# Patient Record
Sex: MALE | Race: WHITE | NOT HISPANIC OR LATINO | Employment: OTHER | URBAN - METROPOLITAN AREA
[De-identification: names, ages, dates, MRNs, and addresses within clinical notes are randomized per-mention and may not be internally consistent; named-entity substitution may affect disease eponyms.]

---

## 2017-04-18 ENCOUNTER — HOSPITAL ENCOUNTER (EMERGENCY)
Facility: HOSPITAL | Age: 82
Discharge: HOME | End: 2017-04-18
Attending: FAMILY MEDICINE
Payer: MEDICARE

## 2017-04-18 VITALS
WEIGHT: 230 LBS | TEMPERATURE: 98 F | SYSTOLIC BLOOD PRESSURE: 140 MMHG | RESPIRATION RATE: 18 BRPM | HEART RATE: 90 BPM | HEIGHT: 72 IN | OXYGEN SATURATION: 98 % | BODY MASS INDEX: 31.15 KG/M2 | DIASTOLIC BLOOD PRESSURE: 70 MMHG

## 2017-04-18 DIAGNOSIS — R33.9 URINARY RETENTION: Primary | ICD-10-CM

## 2017-04-18 LAB
ALBUMIN SERPL BCP-MCNC: 3.2 G/DL
ALP SERPL-CCNC: 67 U/L
ALT SERPL W/O P-5'-P-CCNC: 14 U/L
ANION GAP SERPL CALC-SCNC: 9 MMOL/L
AST SERPL-CCNC: 20 U/L
BACTERIA #/AREA URNS HPF: ABNORMAL /HPF
BASOPHILS # BLD AUTO: 0.04 K/UL
BASOPHILS NFR BLD: 0.8 %
BILIRUB SERPL-MCNC: 1 MG/DL
BILIRUB UR QL STRIP: NEGATIVE
BUN SERPL-MCNC: 16 MG/DL
CALCIUM SERPL-MCNC: 9.4 MG/DL
CHLORIDE SERPL-SCNC: 105 MMOL/L
CLARITY UR: CLEAR
CO2 SERPL-SCNC: 27 MMOL/L
COLOR UR: YELLOW
CREAT SERPL-MCNC: 1.5 MG/DL
DIFFERENTIAL METHOD: ABNORMAL
EOSINOPHIL # BLD AUTO: 0.1 K/UL
EOSINOPHIL NFR BLD: 2.5 %
ERYTHROCYTE [DISTWIDTH] IN BLOOD BY AUTOMATED COUNT: 13.1 %
EST. GFR  (AFRICAN AMERICAN): 49 ML/MIN/1.73 M^2
EST. GFR  (NON AFRICAN AMERICAN): 42 ML/MIN/1.73 M^2
GLUCOSE SERPL-MCNC: 94 MG/DL
GLUCOSE UR QL STRIP: NEGATIVE
HCT VFR BLD AUTO: 37.7 %
HGB BLD-MCNC: 12 G/DL
HGB UR QL STRIP: NEGATIVE
HYALINE CASTS #/AREA URNS LPF: 0 /LPF
KETONES UR QL STRIP: NEGATIVE
LEUKOCYTE ESTERASE UR QL STRIP: NEGATIVE
LYMPHOCYTES # BLD AUTO: 0.9 K/UL
LYMPHOCYTES NFR BLD: 18.2 %
MCH RBC QN AUTO: 31 PG
MCHC RBC AUTO-ENTMCNC: 31.8 %
MCV RBC AUTO: 97 FL
MICROSCOPIC COMMENT: ABNORMAL
MONOCYTES # BLD AUTO: 0.8 K/UL
MONOCYTES NFR BLD: 15.1 %
NEUTROPHILS # BLD AUTO: 3.3 K/UL
NEUTROPHILS NFR BLD: 63.4 %
NITRITE UR QL STRIP: NEGATIVE
PH UR STRIP: 6 [PH] (ref 5–8)
PLATELET # BLD AUTO: 166 K/UL
PMV BLD AUTO: 9 FL
POTASSIUM SERPL-SCNC: 3.8 MMOL/L
PROT SERPL-MCNC: 6.7 G/DL
PROT UR QL STRIP: ABNORMAL
RBC # BLD AUTO: 3.87 M/UL
RBC #/AREA URNS HPF: 1 /HPF (ref 0–4)
SODIUM SERPL-SCNC: 141 MMOL/L
SP GR UR STRIP: 1.02 (ref 1–1.03)
URN SPEC COLLECT METH UR: ABNORMAL
UROBILINOGEN UR STRIP-ACNC: ABNORMAL EU/DL
WBC # BLD AUTO: 5.17 K/UL
WBC #/AREA URNS HPF: 5 /HPF (ref 0–5)

## 2017-04-18 PROCEDURE — 36415 COLL VENOUS BLD VENIPUNCTURE: CPT

## 2017-04-18 PROCEDURE — 85025 COMPLETE CBC W/AUTO DIFF WBC: CPT

## 2017-04-18 PROCEDURE — 81000 URINALYSIS NONAUTO W/SCOPE: CPT

## 2017-04-18 PROCEDURE — 51702 INSERT TEMP BLADDER CATH: CPT

## 2017-04-18 PROCEDURE — 80053 COMPREHEN METABOLIC PANEL: CPT

## 2017-04-18 PROCEDURE — 99283 EMERGENCY DEPT VISIT LOW MDM: CPT | Mod: 25

## 2017-04-18 NOTE — ED AVS SNAPSHOT
[facility]  [address] [address]               Radhames Alonzo   2017  9:28 AM   ED    Description:  Male : 3/17/1934   Department:  [facility]           Your Care was Coordinated By:     Provider Role From To    Wolf Matthew MD Attending Provider 17 0929 --      Reason for Visit     Urinary Retention     Dysuria           Diagnoses this Visit        Comments    Urinary retention    -  Primary       ED Disposition     ED Disposition Condition Comment    Discharge             To Do List           Follow-up Information     Follow up with Primary Doctor No In 1 week(s).        Follow up with Urologist. Schedule an appointment as soon as possible for a visit in 2 days.    Why:  For continued care      [facility]     [facility] -  Assistance  Unless otherwise directed by your provider, please contact [facility], our nurse care line that is available for  assistance.     Registered nurses in the [facility] provide: appointment scheduling, clinical advisement, health education, and other advisory services.  Call: [phone] (toll free)               Medications           Message regarding Medications     Verify the changes and/or additions to your medication regime listed below are the same as discussed with your clinician today.  If any of these changes or additions are incorrect, please notify your healthcare provider.             Verify that the below list of medications is an accurate representation of the medications you are currently taking.  If none reported, the list may be blank. If incorrect, please contact your healthcare provider. Carry this list with you in case of emergency.           Current Medications     cholecalciferol, vitamin D3, (VITAMIN D3) 2,000 unit Cap Take 1 capsule by mouth once daily.    cloNIDine (CATAPRES) 0.1 MG tablet Take 0.1 mg by mouth 3 (three)  times daily as needed. SBP > 150    furosemide (LASIX) 40 MG tablet Take 40 mg by mouth as directed. Take 2 tablets on M-W-F,and 1 tablet T-Th-S-S    ipratropium-albuterol (COMBIVENT)  mcg/actuation inhaler Inhale 1 puff into the lungs every 6 (six) hours as needed for Wheezing or Shortness of Breath.    ipratropium-albuterol (COMBIVENT)  mcg/actuation inhaler Inhale 1 puff into the lungs every 6 (six) hours as needed for Wheezing or Shortness of Breath.    losartan (COZAAR) 100 MG tablet Take 100 mg by mouth once daily.    metoprolol succinate (TOPROL-XL) 100 MG 24 hr tablet Take 100 mg by mouth once daily.    predniSONE (DELTASONE) 10 MG tablet 3 for 3 days, 2 for 2 days and 1 for 1 day    simvastatin (ZOCOR) 80 MG tablet Take 40 mg by mouth every evening.    warfarin (COUMADIN) 5 MG tablet Take 5 mg by mouth once daily. 1-2 tablets daily as directed           Clinical Reference Information           Your Vitals Were     BP Pulse Temp Resp Height Weight    158/80 (BP Location: Left arm, Patient Position: Sitting) 102 97 °F (36.1 °C) (Oral) 20 6' (1.829 m) 104.3 kg (230 lb)    SpO2 BMI             98% 31.19 kg/m2         Allergies as of 4/18/2017     No Known Allergies      Immunizations Administered on Date of Encounter - 4/18/2017     None      ED Micro, Lab, POCT     Start Ordered       Status Ordering Provider    04/18/17 0935 04/18/17 0934  Urinalysis  STAT      Final result     04/18/17 0935 04/18/17 0934  CBC auto differential  STAT      Final result     04/18/17 0935 04/18/17 0934  Comprehensive metabolic panel  STAT      Final result     04/18/17 0934 04/18/17 0934  Urinalysis Microscopic  Once      Final result       ED Imaging Orders     None        Discharge Instructions           Urinary Retention (Male)  Urinary retention is the medical term for difficulty or inability to pass urine, even though your bladder is full.  Causes  The most common cause of urinary retention in men is the bladder  outlet being blocked. This can be due to an enlarged prostate gland or a bladder infection. Certain medicines can also cause this problem. This condition is more likely to occur as men get older.    This condition is treated by insertion of a catheter into the bladder to drain the urine. This provides immediate relief. The catheter may need to remain in place for a few days to prevent a recurrence. The catheter has a balloon on the tip which was inflated after insertion. This prevents the catheter from falling out.  Symptoms  Common symptoms of urinary retention include:  · Pain (not experienced by everyone)  · Frequent urination  · Feeling that the bladder is still full after urinating  · Incontinence (not being able to control the release of urine)  · Swollen abdomen  Treatment  This condition is treated by inserting a tube (catheter) into the bladder to drain the urine. This provides immediate relief. The catheter may need to stay in place for a few days. The catheter has a balloon on the tip, which is inflated after insertion. This prevents the catheter from falling out.  Home care  · If you were given antibiotics, take them until they are used up, or your healthcare provider tells you to stop. It is important to finish the antibiotics even though you feel better. This is to make sure your infection has cleared.  · If a catheter was left in place, it is important to keep bacteria from getting into the collection bag. Do not disconnect the catheter from the collection bag.  · Use a leg band to secure the drainage tube, so it does not pull on the catheter. Drain the collection bag when it becomes full using the drain spout at the bottom of the bag.  · Do not pull on or try to remove your catheter. This will injure your urethra. The catheter must be removed by a healthcare provider.  Follow-up care  Follow up with your healthcare provider, or as advised.  If a catheter was left in place, it can usually be removed  within 3 to 7 days. Some conditions require the catheter to stay in longer. Your healthcare provider will tell you when to return to have the catheter removed.  When to seek medical advice  Call your healthcare provider right away if any of these occur:  · Fever of 100.4ºF (38ºC) or higher, or as directed by your healthcare provider  · Bladder or lower-abdominal pain or fullness  · Abdominal swelling, nausea, vomiting, or back pain  · Blood or urine leakage around the catheter  · Bloody urine coming from the catheter (if a new symptom)  · Weakness, dizziness, or fainting  · Confusion or change in usual level of alertness  · If a catheter was left in place, return if:  ¨ Catheter falls out  ¨ Catheter stops draining for 6 hours  Date Last Reviewed: 7/26/2015  © 8907-1648 [employer]. [address] [place] [address], [place] [address]. All rights reserved. This information is not intended as a substitute for professional medical care. Always follow your healthcare professional's instructions.          Hein Catheter Care    A Hein catheter is a rubber tube that is placed through the urethra (opening where urine comes out) and into the bladder. This helps drain urine from the bladder. There is a small balloon on the end of the tube that is inflated after insertion. This keeps the catheter from sliding out of the bladder.  A Hein catheter is used to treat urinary retention (unable to pass urine). It is also used when there is incontinence (loss of bladder control).  Home care  · Finish taking any prescribed antibiotic even if you are feeling better before then.  · It is important to keep bacteria from getting into the collection bag. Do not disconnect the catheter from the collection bag.  · Use a leg band to secure the drainage tube, so it does not pull on the catheter. Drain the collection bag when it becomes full using the drain spout at the bottom of the bag.  · Do not try to pull or remove your catheter.  This will injure your urethra. It must be removed by your healthcare provider or nurse.  Follow-up care  Follow up with your healthcare provider as advised for repeat urine testing and catheter removal or replacement.  When to seek medical advice  Call your healthcare provider right away if any of these occur:  · Fever of 100.4ºF (38ºC) or higher, or as directed by your healthcare provider  · Bladder pain or fullness  · Abdominal swelling, nausea or vomiting, or back pain  · Blood or urine leakage around the catheter  · Bloody urine coming from the catheter (if a new symptom)  · Catheter falls out  · Catheter stops draining for 6 hours  · Weakness, dizziness, or fainting  Date Last Reviewed: 10/1/2016  © 6350-0086 [employer]. [address] [place] [address], [place] [address]. All rights reserved. This information is not intended as a substitute for professional medical care. Always follow your healthcare professional's instructions.          [facility] Sign-Up     Activating your [facility] account is as easy as 1-2-3!     1) Visit [email or web address], select Sign Up Now, enter this activation code and your date of birth, then select Next.  [email or web address]  Expires: 6/2/2017 10:42 AM      2) Create a username and password to use when you visit [facility] in the future and select a security question in case you lose your password and select Next.    3) Enter your e-mail address and click Sign Up!    Additional Information  If you have questions, please e-mail [email or web address] or call [phone] to talk to our [facility] staff. Remember, [facility] is NOT to be used for urgent needs. For medical emergencies, dial 911.         Smoking Cessation     If you would like to quit smoking:   You may be eligible for free services if you are a [place] resident and started smoking cigarettes before September 1, 1988.  Call the Smoking Cessation Trust (SCT) toll free at (581) 206-7287 or (332) 255-9872.   Call  1-800-QUIT-NOW if you do not meet the above criteria.   Contact us via email: [email or web address]   View our website for more information: [email or web address]         [facility] complies with applicable Federal civil rights laws and does not discriminate on the basis of race, color, national origin, age, disability, or sex.        Language Assistance Services     ATTENTION: Language assistance services are available, free of charge. Please call [phone].      ATENCIÓN: Si habla español, tiene a barker disposición servicios gratuitos de asistencia lingüística. Llame al [phone].     CHÚ Ý: N?u b?n nói Ti?ng Vi?t, có các d?ch v? h? tr? ngôn ng? mi?n phí dành cho b?n. G?i s? [phone].

## 2017-04-18 NOTE — ED TRIAGE NOTES
Patient presents to the ER with c/o of urinary retention and painful urination for the past week.

## 2017-04-18 NOTE — ED PROVIDER NOTES
Encounter Date: 4/18/2017       History     Chief Complaint   Patient presents with    Urinary Retention    Dysuria     Review of patient's allergies indicates:  No Known Allergies  Patient is a 83 y.o. male presenting with the following complaint: male genitourinary complaint. The history is provided by the patient. No  was used.   Male  Problem   Primary symptoms include no dysuria, no penile pain, no scrotal pain. This is a new problem. The current episode started several days ago. The problem has been unchanged. Pertinent negatives include no anorexia, no diaphoresis, no nausea, no vomiting, no abdominal pain, no abdominal swelling, no frequency, no constipation and no diarrhea. He has tried nothing for the symptoms.     Patient complains of urinary retention progressively getting worse for the past 3 days.  Some discomfort when he tries to urinate.  No fever chills nausea or vomiting.  No chest pain or shortness of breath.    Past Medical History:   Diagnosis Date    Atrial fibrillation     AVD (aortic valve disease)     Cardiomyopathy     Gout     Hypertension     Mitral valve disorder     Pure hypercholesterolemia      History reviewed. No pertinent surgical history.  History reviewed. No pertinent family history.  Social History   Substance Use Topics    Smoking status: Former Smoker    Smokeless tobacco: None    Alcohol use None     Review of Systems   Constitutional: Negative for diaphoresis.   Gastrointestinal: Negative for abdominal pain, anorexia, constipation, diarrhea, nausea and vomiting.   Genitourinary: Negative for dysuria, frequency and penile pain.       Physical Exam   Initial Vitals   BP Pulse Resp Temp SpO2   04/18/17 0925 04/18/17 0925 04/18/17 0925 04/18/17 0925 04/18/17 0925   158/80 102 20 97 °F (36.1 °C) 98 %     Physical Exam    Nursing note and vitals reviewed.  Constitutional: He appears well-developed and well-nourished.   Elderly male in no acute  distress.   HENT:   Head: Normocephalic and atraumatic.   Eyes: EOM are normal. Pupils are equal, round, and reactive to light.   Cardiovascular: Regular rhythm and normal heart sounds.   Pulmonary/Chest: Breath sounds normal.   Abdominal: He exhibits no distension and no mass. There is tenderness. There is no rebound and no guarding.       Neurological: He is alert and oriented to person, place, and time.   Psychiatric: He has a normal mood and affect.         ED Course   Procedures  Labs Reviewed   URINALYSIS   CBC W/ AUTO DIFFERENTIAL   COMPREHENSIVE METABOLIC PANEL                               ED Course     Clinical Impression:   The encounter diagnosis was Urinary retention.          Wolf Matthew MD  04/18/17 1030

## 2017-04-18 NOTE — DISCHARGE INSTRUCTIONS
Urinary Retention (Male)  Urinary retention is the medical term for difficulty or inability to pass urine, even though your bladder is full.  Causes  The most common cause of urinary retention in men is the bladder outlet being blocked. This can be due to an enlarged prostate gland or a bladder infection. Certain medicines can also cause this problem. This condition is more likely to occur as men get older.    This condition is treated by insertion of a catheter into the bladder to drain the urine. This provides immediate relief. The catheter may need to remain in place for a few days to prevent a recurrence. The catheter has a balloon on the tip which was inflated after insertion. This prevents the catheter from falling out.  Symptoms  Common symptoms of urinary retention include:  · Pain (not experienced by everyone)  · Frequent urination  · Feeling that the bladder is still full after urinating  · Incontinence (not being able to control the release of urine)  · Swollen abdomen  Treatment  This condition is treated by inserting a tube (catheter) into the bladder to drain the urine. This provides immediate relief. The catheter may need to stay in place for a few days. The catheter has a balloon on the tip, which is inflated after insertion. This prevents the catheter from falling out.  Home care  · If you were given antibiotics, take them until they are used up, or your healthcare provider tells you to stop. It is important to finish the antibiotics even though you feel better. This is to make sure your infection has cleared.  · If a catheter was left in place, it is important to keep bacteria from getting into the collection bag. Do not disconnect the catheter from the collection bag.  · Use a leg band to secure the drainage tube, so it does not pull on the catheter. Drain the collection bag when it becomes full using the drain spout at the bottom of the bag.  · Do not pull on or try to remove your catheter.  This will injure your urethra. The catheter must be removed by a healthcare provider.  Follow-up care  Follow up with your healthcare provider, or as advised.  If a catheter was left in place, it can usually be removed within 3 to 7 days. Some conditions require the catheter to stay in longer. Your healthcare provider will tell you when to return to have the catheter removed.  When to seek medical advice  Call your healthcare provider right away if any of these occur:  · Fever of 100.4ºF (38ºC) or higher, or as directed by your healthcare provider  · Bladder or lower-abdominal pain or fullness  · Abdominal swelling, nausea, vomiting, or back pain  · Blood or urine leakage around the catheter  · Bloody urine coming from the catheter (if a new symptom)  · Weakness, dizziness, or fainting  · Confusion or change in usual level of alertness  · If a catheter was left in place, return if:  ¨ Catheter falls out  ¨ Catheter stops draining for 6 hours  Date Last Reviewed: 7/26/2015  © 4877-8618 [employer]. [address] [place] [address]. All rights reserved. This information is not intended as a substitute for professional medical care. Always follow your healthcare professional's instructions.          Hein Catheter Care    A Hein catheter is a rubber tube that is placed through the urethra (opening where urine comes out) and into the bladder. This helps drain urine from the bladder. There is a small balloon on the end of the tube that is inflated after insertion. This keeps the catheter from sliding out of the bladder.  A Hein catheter is used to treat urinary retention (unable to pass urine). It is also used when there is incontinence (loss of bladder control).  Home care  · Finish taking any prescribed antibiotic even if you are feeling better before then.  · It is important to keep bacteria from getting into the collection bag. Do not disconnect the catheter from the collection bag.  · Use a  leg band to secure the drainage tube, so it does not pull on the catheter. Drain the collection bag when it becomes full using the drain spout at the bottom of the bag.  · Do not try to pull or remove your catheter. This will injure your urethra. It must be removed by your healthcare provider or nurse.  Follow-up care  Follow up with your healthcare provider as advised for repeat urine testing and catheter removal or replacement.  When to seek medical advice  Call your healthcare provider right away if any of these occur:  · Fever of 100.4ºF (38ºC) or higher, or as directed by your healthcare provider  · Bladder pain or fullness  · Abdominal swelling, nausea or vomiting, or back pain  · Blood or urine leakage around the catheter  · Bloody urine coming from the catheter (if a new symptom)  · Catheter falls out  · Catheter stops draining for 6 hours  · Weakness, dizziness, or fainting  Date Last Reviewed: 10/1/2016  © 6383-8539 [employer]. [address] [place] [address], [place] [address]. All rights reserved. This information is not intended as a substitute for professional medical care. Always follow your healthcare professional's instructions.

## 2017-04-19 ENCOUNTER — NURSE TRIAGE (OUTPATIENT)
Dept: ADMINISTRATIVE | Facility: CLINIC | Age: 82
End: 2017-04-19

## 2017-04-19 NOTE — TELEPHONE ENCOUNTER
Reason for Disposition   Urinary catheter care, questions about    Protocols used: ST URINARY CATHETER - VALDES - COUDE-A-OH  Caller needed assistance with operating valdes bag. Caller states valdes bag seems to be leaking. Patient's wife is unsure if bag is properly closed. Advised caller to securely close the clamp used for emptying.

## 2017-05-12 ENCOUNTER — HOSPITAL ENCOUNTER (EMERGENCY)
Facility: HOSPITAL | Age: 82
Discharge: HOME | End: 2017-05-12
Attending: SURGERY
Payer: MEDICARE

## 2017-05-12 VITALS
TEMPERATURE: 97 F | RESPIRATION RATE: 20 BRPM | BODY MASS INDEX: 29.84 KG/M2 | WEIGHT: 220 LBS | HEART RATE: 86 BPM | DIASTOLIC BLOOD PRESSURE: 88 MMHG | SYSTOLIC BLOOD PRESSURE: 160 MMHG

## 2017-05-12 DIAGNOSIS — R33.9 URINARY RETENTION: Primary | ICD-10-CM

## 2017-05-12 LAB
BACTERIA #/AREA URNS HPF: ABNORMAL /HPF
BILIRUB UR QL STRIP: NEGATIVE
CLARITY UR: CLEAR
COLOR UR: YELLOW
GLUCOSE UR QL STRIP: NEGATIVE
HGB UR QL STRIP: ABNORMAL
KETONES UR QL STRIP: NEGATIVE
LEUKOCYTE ESTERASE UR QL STRIP: ABNORMAL
MICROSCOPIC COMMENT: ABNORMAL
NITRITE UR QL STRIP: NEGATIVE
PH UR STRIP: 6 [PH] (ref 5–8)
PROT UR QL STRIP: NEGATIVE
RBC #/AREA URNS HPF: >100 /HPF (ref 0–4)
SP GR UR STRIP: 1.01 (ref 1–1.03)
URN SPEC COLLECT METH UR: ABNORMAL
UROBILINOGEN UR STRIP-ACNC: 1 EU/DL
WBC #/AREA URNS HPF: >100 /HPF (ref 0–5)

## 2017-05-12 PROCEDURE — 87088 URINE BACTERIA CULTURE: CPT

## 2017-05-12 PROCEDURE — 87077 CULTURE AEROBIC IDENTIFY: CPT

## 2017-05-12 PROCEDURE — 99283 EMERGENCY DEPT VISIT LOW MDM: CPT | Mod: 25

## 2017-05-12 PROCEDURE — 87186 SC STD MICRODIL/AGAR DIL: CPT

## 2017-05-12 PROCEDURE — 51702 INSERT TEMP BLADDER CATH: CPT

## 2017-05-12 PROCEDURE — 81000 URINALYSIS NONAUTO W/SCOPE: CPT

## 2017-05-12 PROCEDURE — 87086 URINE CULTURE/COLONY COUNT: CPT

## 2017-05-12 RX ORDER — CIPROFLOXACIN 500 MG/1
500 TABLET ORAL 2 TIMES DAILY
Qty: 14 TABLET | Refills: 0 | Status: SHIPPED | OUTPATIENT
Start: 2017-05-12 | End: 2017-05-19

## 2017-05-12 RX ORDER — TAMSULOSIN HYDROCHLORIDE 0.4 MG/1
0.4 CAPSULE ORAL DAILY
Qty: 30 CAPSULE | Refills: 0 | Status: SHIPPED | OUTPATIENT
Start: 2017-05-12 | End: 2017-06-11

## 2017-05-12 NOTE — ED AVS SNAPSHOT
[facility]  [address] [facility] [address]               Radhames Alonzo   2017 10:35 AM   ED    Description:  Male : 3/17/1934   Department:  [facility]           Your Care was Coordinated By:     Provider Role From To    Doroteo Milan MD Attending Provider 17 1031 --      Reason for Visit     Dysuria           Diagnoses this Visit        Comments    Urinary retention    -  Primary       ED Disposition     ED Disposition Condition Comment    Discharge             To Do List           Follow-up Information     Follow up with Domenico Ramirez MD. Go in 3 days.    Specialty:  Urology    Contact information:    [place] [address]  [phone]         These Medications        Disp Refills Start End    tamsulosin (FLOMAX) 0.4 mg Cp24 30 capsule 0 2017    Take 1 capsule (0.4 mg total) by mouth once daily. - Oral    Pharmacy: [facility] [address] [place] [address] Ph #: [phone]       ciprofloxacin HCl (CIPRO) 500 MG tablet 14 tablet 0 2017    Take 1 tablet (500 mg total) by mouth 2 (two) times daily. - Oral    Pharmacy: [facility] [address] [place] [address] Ph #: [phone]         [facility]     [facility] Care [facility] -  Assistance  Unless otherwise directed by your provider, please contact [facility], our nurse care line that is available for  assistance.     Registered nurses in the [facility] provide: appointment scheduling, clinical advisement, health education, and other advisory services.  Call: [phone] (toll free)               Medications           Message regarding Medications     Verify the changes and/or additions to your medication regime listed below are the same as discussed with your clinician today.  If any of these changes or additions are incorrect, please notify your healthcare provider.         START taking these NEW medications        Refills    tamsulosin (FLOMAX) 0.4 mg Cp24 0    Sig: Take 1 capsule (0.4 mg total) by mouth once daily.    Class: Normal    Route: Oral    ciprofloxacin HCl (CIPRO) 500 MG tablet 0    Sig: Take 1 tablet (500 mg total) by mouth 2 (two) times daily.    Class: Normal    Route: Oral           Verify that the below list of medications is an accurate representation of the medications you are currently taking.  If none reported, the list may be blank. If incorrect, please contact your healthcare provider. Carry this list with you in case of emergency.           Current Medications     cholecalciferol, vitamin D3, (VITAMIN D3) 2,000 unit Cap Take 1 capsule by mouth once daily.    ciprofloxacin HCl (CIPRO) 500 MG tablet Take 1 tablet (500 mg total) by mouth 2 (two) times daily.    cloNIDine (CATAPRES) 0.1 MG tablet Take 0.1 mg by mouth 3 (three) times daily as needed. SBP > 150    furosemide (LASIX) 40 MG tablet Take 40 mg by mouth as directed. Take 2 tablets on M-W-F,and 1 tablet T-Th-S-S    ipratropium-albuterol (COMBIVENT)  mcg/actuation inhaler Inhale 1 puff into the lungs every 6 (six) hours as needed for Wheezing or Shortness of Breath.    ipratropium-albuterol (COMBIVENT)  mcg/actuation inhaler Inhale 1 puff into the lungs every 6 (six) hours as needed for Wheezing or Shortness of Breath.    losartan (COZAAR) 100 MG tablet Take 100 mg by mouth once daily.    metoprolol succinate (TOPROL-XL) 100 MG 24 hr tablet Take 100 mg by mouth once daily.    predniSONE (DELTASONE) 10 MG tablet 3 for 3 days, 2 for 2 days and 1 for 1 day    simvastatin (ZOCOR) 80 MG tablet Take 40 mg by mouth every evening.    tamsulosin (FLOMAX) 0.4 mg Cp24 Take 1 capsule (0.4 mg total) by mouth once daily.    warfarin (COUMADIN) 5 MG tablet Take 5 mg by mouth once daily. 1-2 tablets daily as directed           Clinical Reference Information           Your Vitals Were     BP Pulse Temp Resp  Weight BMI    185/96 (BP Location: Right arm) 86 96.7 °F (35.9 °C) (Oral) 20 99.8 kg (220 lb) 29.84 kg/m2      Allergies as of 5/12/2017     No Known Allergies      Immunizations Administered on Date of Encounter - 5/12/2017     None      ED Micro, Lab, POCT     Start Ordered       Status Ordering Provider    05/12/17 1038 05/12/17 1037  Urinalysis  STAT      In process     05/12/17 1038 05/12/17 1037  Urine culture  Add-on      Completed     05/12/17 1037 05/12/17 1037  Urinalysis Microscopic  Once      In process       ED Imaging Orders     None        Discharge Instructions         Urinary Retention (Male)  Urinary retention is the medical term for difficulty or inability to pass urine, even though your bladder is full.  Causes  The most common cause of urinary retention in men is the bladder outlet being blocked. This can be due to an enlarged prostate gland or a bladder infection. Certain medicines can also cause this problem. This condition is more likely to occur as men get older.    This condition is treated by insertion of a catheter into the bladder to drain the urine. This provides immediate relief. The catheter may need to remain in place for a few days to prevent a recurrence. The catheter has a balloon on the tip which was inflated after insertion. This prevents the catheter from falling out.  Symptoms  Common symptoms of urinary retention include:  · Pain (not experienced by everyone)  · Frequent urination  · Feeling that the bladder is still full after urinating  · Incontinence (not being able to control the release of urine)  · Swollen abdomen  Treatment  This condition is treated by inserting a tube (catheter) into the bladder to drain the urine. This provides immediate relief. The catheter may need to stay in place for a few days. The catheter has a balloon on the tip, which is inflated after insertion. This prevents the catheter from falling out.  Home care  · If you were given antibiotics, take  them until they are used up, or your healthcare provider tells you to stop. It is important to finish the antibiotics even though you feel better. This is to make sure your infection has cleared.  · If a catheter was left in place, it is important to keep bacteria from getting into the collection bag. Do not disconnect the catheter from the collection bag.  · Use a leg band to secure the drainage tube, so it does not pull on the catheter. Drain the collection bag when it becomes full using the drain spout at the bottom of the bag.  · Do not pull on or try to remove your catheter. This will injure your urethra. The catheter must be removed by a healthcare provider.  Follow-up care  Follow up with your healthcare provider, or as advised.  If a catheter was left in place, it can usually be removed within 3 to 7 days. Some conditions require the catheter to stay in longer. Your healthcare provider will tell you when to return to have the catheter removed.  When to seek medical advice  Call your healthcare provider right away if any of these occur:  · Fever of 100.4ºF (38ºC) or higher, or as directed by your healthcare provider  · Bladder or lower-abdominal pain or fullness  · Abdominal swelling, nausea, vomiting, or back pain  · Blood or urine leakage around the catheter  · Bloody urine coming from the catheter (if a new symptom)  · Weakness, dizziness, or fainting  · Confusion or change in usual level of alertness  · If a catheter was left in place, return if:  ¨ Catheter falls out  ¨ Catheter stops draining for 6 hours  Date Last Reviewed: 7/26/2015  © 7480-8145 [employer]. [address] [place] [address], [place] [address]. All rights reserved. This information is not intended as a substitute for professional medical care. Always follow your healthcare professional's instructions.          [facility] Sign-Up     Activating your [facility] account is as easy as 1-2-3!     1) Visit [email or web address], select Sign Up  Now, enter this activation code and your date of birth, then select Next.  [email or web address]  Expires: 6/2/2017 10:42 AM      2) Create a username and password to use when you visit [facility] in the future and select a security question in case you lose your password and select Next.    3) Enter your e-mail address and click Sign Up!    Additional Information  If you have questions, please e-mail [email or web address] or call [phone] to talk to our [facility] staff. Remember, [facility] is NOT to be used for urgent needs. For medical emergencies, dial 911.         Smoking Cessation     If you would like to quit smoking:   You may be eligible for free services if you are a [place] resident and started smoking cigarettes before September 1, 1988.  Call the Smoking Cessation Trust (SCT) toll free at (487) 360-2380 or (620) 000-0708.   Call 7-972-QUIT-NOW if you do not meet the above criteria.   Contact us via email: [email or web address]   View our website for more information: [email or web address]         [facility] complies with applicable Federal civil rights laws and does not discriminate on the basis of race, color, national origin, age, disability, or sex.        Language Assistance Services     ATTENTION: Language assistance services are available, free of charge. Please call [phone].      ATENCIÓN: Si habla español, tiene a barker disposición servicios gratuitos de asistencia lingüística. Llame al [phone].     CHÚ Ý: N?u b?n nói Ti?ng Vi?t, có các d?ch v? h? tr? ngôn ng? mi?n phí dành cho b?n. G?i s? [phone].

## 2017-05-12 NOTE — DISCHARGE INSTRUCTIONS
Urinary Retention (Male)  Urinary retention is the medical term for difficulty or inability to pass urine, even though your bladder is full.  Causes  The most common cause of urinary retention in men is the bladder outlet being blocked. This can be due to an enlarged prostate gland or a bladder infection. Certain medicines can also cause this problem. This condition is more likely to occur as men get older.    This condition is treated by insertion of a catheter into the bladder to drain the urine. This provides immediate relief. The catheter may need to remain in place for a few days to prevent a recurrence. The catheter has a balloon on the tip which was inflated after insertion. This prevents the catheter from falling out.  Symptoms  Common symptoms of urinary retention include:  · Pain (not experienced by everyone)  · Frequent urination  · Feeling that the bladder is still full after urinating  · Incontinence (not being able to control the release of urine)  · Swollen abdomen  Treatment  This condition is treated by inserting a tube (catheter) into the bladder to drain the urine. This provides immediate relief. The catheter may need to stay in place for a few days. The catheter has a balloon on the tip, which is inflated after insertion. This prevents the catheter from falling out.  Home care  · If you were given antibiotics, take them until they are used up, or your healthcare provider tells you to stop. It is important to finish the antibiotics even though you feel better. This is to make sure your infection has cleared.  · If a catheter was left in place, it is important to keep bacteria from getting into the collection bag. Do not disconnect the catheter from the collection bag.  · Use a leg band to secure the drainage tube, so it does not pull on the catheter. Drain the collection bag when it becomes full using the drain spout at the bottom of the bag.  · Do not pull on or try to remove your catheter.  This will injure your urethra. The catheter must be removed by a healthcare provider.  Follow-up care  Follow up with your healthcare provider, or as advised.  If a catheter was left in place, it can usually be removed within 3 to 7 days. Some conditions require the catheter to stay in longer. Your healthcare provider will tell you when to return to have the catheter removed.  When to seek medical advice  Call your healthcare provider right away if any of these occur:  · Fever of 100.4ºF (38ºC) or higher, or as directed by your healthcare provider  · Bladder or lower-abdominal pain or fullness  · Abdominal swelling, nausea, vomiting, or back pain  · Blood or urine leakage around the catheter  · Bloody urine coming from the catheter (if a new symptom)  · Weakness, dizziness, or fainting  · Confusion or change in usual level of alertness  · If a catheter was left in place, return if:  ¨ Catheter falls out  ¨ Catheter stops draining for 6 hours  Date Last Reviewed: 7/26/2015  © 7240-2465 [employer]. [address] [place] [address], [place] [address]. All rights reserved. This information is not intended as a substitute for professional medical care. Always follow your healthcare professional's instructions.

## 2017-05-12 NOTE — ED PROVIDER NOTES
[facility]                                        May 12, 2017                   Chief Complaint  83 y.o. male with Dysuria    History of Present Illness  Radhames Alonzo presents to the emergency room with dysuria this past week  Patient is wearing a indwelling Hein catheter due to prostate issues last week  Patient states he has an appointment with urologist next Monday, Hx of BPH  Pt states he needs his Hein catheter change, got clogged last night at home  Hein catheter has brown urine with no active hematuria noted on evaluation  Pt like a Hein catheter change, performed by the ER nurse today without issue    The history is provided by the patient    Past Medical History   -- Atrial fibrillation    -- AVD (aortic valve disease)    -- Cardiomyopathy    -- Gout    -- Hypertension    -- Mitral valve disorder    -- Pure hypercholesterolemia      No past surgical history on file.   No Known Allergies     Review of Systems and Physical Exam     Review of Systems  -- Constitution - no fever, denies fatigue, no weakness, no chills  -- Eyes - no tearing or redness, no visual disturbance  -- Ear, Nose - no tinnitus or earache, no nasal congestion or discharge  -- Mouth,Throat - no sore throat, no toothache, normal voice, normal swallowing  -- Respiratory - denies cough and congestion, no shortness of breath, no NUNEZ  -- Cardiovascular - denies chest pain, no palpitations, denies claudication  -- Gastrointestinal - denies abdominal pain, nausea, vomiting, or diarrhea  -- Genitourinary - urinary retention, no dysuria, no denies flank pain, no hematuria   -- Musculoskeletal - denies back pain, negative for myalgias and arthralgias   -- Neurological - no headache, denies weakness or seizure; no LOC  -- Skin - denies pallor, rash, or changes in skin. no hives or welts noted    Vital Signs  -- His blood pressure is 160/88 and his pulse is 86. His respiration is 20.      Physical Exam  -- Nursing note and  vitals reviewed  -- Head: Atraumatic. Normocephalic. No obvious abnormality  -- Eyes: Pupils are equal and reactive to light. Normal conjunctiva and lids  -- Cardiac: Normal rate, regular rhythm and normal heart sounds  -- Pulmonary: Normal respiratory effort, breath sounds clear to auscultation  -- Abdominal: Soft, no tenderness. Normal bowel sounds. Normal liver edge  -- Genitourinary: no flank pain on exam, no suprapubic pain by palpation   -- Musculoskeletal: Normal range of motion, no effusions. Joints stable   -- Neurological: No focal deficits. Showed good interaction with staff  -- Vascular: Posterior tibial, dorsalis pedis and radial pulses 2+ bilaterally      Emergency Room Course     Treatment and Evaluation  -- Urinalysis performed during this ER visit showed signs of infection   -- The urine today has been sent for lab culture, results pending   --  Hein was placed in the ER by the RN     Diagnosis  -- The encounter diagnosis was Urinary retention.    Disposition and Plan  -- Disposition: home  -- Condition: stable  -- Follow-up: Patient to follow up with Urology in 1-2 days.  -- I advised the patient that we have found no life threatening condition today  -- At this time, I believe the patient is clinically stable for discharge.   -- The patient acknowledges that close follow up with a MD is required   -- Patient agrees to comply with all instruction and direction given in the ER    This note is dictated on Dragon Natural Speaking word recognition program.  There are word recognition mistakes that are occasionally missed on review.           Doroteo Milan MD  05/12/17 9148

## 2017-05-15 LAB — BACTERIA UR CULT: NORMAL

## 2017-05-18 NOTE — PHYSICIAN QUERY
PT Name: Radhames Alonzo  MR #: [id number]     Physician Query Form - Documentation Clarification      CDS/: Viry Cisneros               Contact information:[email or web address]    This form is a permanent document in the medical record.     Query Date: May 18, 2017    By submitting this query, we are merely seeking further clarification of documentation. Please utilize your independent clinical judgment when addressing the question(s) below.    The Medical record reflects the following:    Supporting Clinical Findings Location in Medical Record     KLEBSIELLA PNEUMONIAE  >100,000 cfu/ml         Urine Culture   The encounter diagnosis was Urinary retention.    Radhames Alonzo presents to the emergency room with dysuria this past week  Patient is wearing a indwelling Hein catheter due to prostate issues last week    Urinalysis performed during this ER visit showed signs of infection      ED Summary                                                                            Dr. Milan,    Please review the urine culture and add any additional diagnosis if necessary.    Provider Use Only    Urine culture was significant for Klebsiella, confirming urinary tract infection                                                                                                                           [  ] Clinically undetermined

## 2017-05-18 NOTE — PHYSICIAN QUERY
PT Name: Radhames Alonzo  MR #: [id number]    Physician Query Form - Cause and Effect Relationship Clarification      CDS/: Viry Cisneros               Contact information:[email or web address]    This form is a permanent document in the medical record.     Query Date: May 18, 2017    By submitting this query, we are merely seeking further clarification of documentation. Please utilize your independent clinical judgment when addressing the question(s) below.    The Medical record contains the following:  Supporting Clinical Findings   Location in record     KLEBSIELLA PNEUMONIAE  >100,000 cfu/ml                                                                                                                                                                       Urine Culture      Radhames Alonzo presents to the emergency room with dysuria this past week  Patient is wearing a indwelling Valdes catheter due to prostate issues last week  Patient states he has an appointment with urologist next Monday, Hx of BPH  Pt states he needs his Valdes catheter change, got clogged last night at home  Valdes catheter has brown urine with no active hematuria noted on evaluation  Pt like a Valdes catheter change, performed by the ER nurse today without issue             Urinalysis performed during this ER visit showed signs of infection                                                                                                                                                                            ED Summary         Dr. Milan, please clarify if there is any correlation between infection of the urine and indwelling valdes catheter.           Are the conditions:     [  ] Due to or associated with each other     [  ] Unrelated to each other     [  ] Other (Please Specify): _________________________     [ X] Clinically Undetermined

## 2017-06-26 ENCOUNTER — HOSPITAL ENCOUNTER (EMERGENCY)
Facility: HOSPITAL | Age: 82
Discharge: HOME | End: 2017-06-26
Attending: FAMILY MEDICINE
Payer: MEDICARE

## 2017-06-26 VITALS
TEMPERATURE: 98 F | OXYGEN SATURATION: 97 % | SYSTOLIC BLOOD PRESSURE: 120 MMHG | WEIGHT: 220 LBS | HEIGHT: 73 IN | RESPIRATION RATE: 18 BRPM | HEART RATE: 89 BPM | BODY MASS INDEX: 29.16 KG/M2 | DIASTOLIC BLOOD PRESSURE: 74 MMHG

## 2017-06-26 DIAGNOSIS — N39.0 URINARY TRACT INFECTION WITHOUT HEMATURIA, SITE UNSPECIFIED: ICD-10-CM

## 2017-06-26 DIAGNOSIS — R33.9 URINARY RETENTION: Primary | ICD-10-CM

## 2017-06-26 LAB
BACTERIA #/AREA URNS HPF: ABNORMAL /HPF
BILIRUB UR QL STRIP: NEGATIVE
CLARITY UR: CLEAR
COLOR UR: YELLOW
GLUCOSE UR QL STRIP: NEGATIVE
HGB UR QL STRIP: ABNORMAL
KETONES UR QL STRIP: NEGATIVE
LEUKOCYTE ESTERASE UR QL STRIP: ABNORMAL
MICROSCOPIC COMMENT: ABNORMAL
NITRITE UR QL STRIP: POSITIVE
PH UR STRIP: 6 [PH] (ref 5–8)
PROT UR QL STRIP: NEGATIVE
RBC #/AREA URNS HPF: 5 /HPF (ref 0–4)
SP GR UR STRIP: 1.01 (ref 1–1.03)
URN SPEC COLLECT METH UR: ABNORMAL
UROBILINOGEN UR STRIP-ACNC: 1 EU/DL
WBC #/AREA URNS HPF: 9 /HPF (ref 0–5)

## 2017-06-26 PROCEDURE — 99283 EMERGENCY DEPT VISIT LOW MDM: CPT | Mod: 25

## 2017-06-26 PROCEDURE — 81000 URINALYSIS NONAUTO W/SCOPE: CPT

## 2017-06-26 PROCEDURE — 51702 INSERT TEMP BLADDER CATH: CPT

## 2017-06-26 RX ORDER — SULFAMETHOXAZOLE AND TRIMETHOPRIM 800; 160 MG/1; MG/1
1 TABLET ORAL
Status: DISCONTINUED | OUTPATIENT
Start: 2017-06-26 | End: 2017-06-26 | Stop reason: HOSPADM

## 2017-06-26 RX ORDER — SULFAMETHOXAZOLE AND TRIMETHOPRIM 800; 160 MG/1; MG/1
1 TABLET ORAL 2 TIMES DAILY
Qty: 14 TABLET | Refills: 0 | Status: SHIPPED | OUTPATIENT
Start: 2017-06-26 | End: 2017-07-03

## 2017-06-26 RX ORDER — TAMSULOSIN HYDROCHLORIDE 0.4 MG/1
0.4 CAPSULE ORAL
Status: DISCONTINUED | OUTPATIENT
Start: 2017-06-26 | End: 2017-06-26 | Stop reason: HOSPADM

## 2017-06-26 RX ORDER — TAMSULOSIN HYDROCHLORIDE 0.4 MG/1
0.4 CAPSULE ORAL DAILY
Qty: 30 CAPSULE | Refills: 0 | Status: SHIPPED | OUTPATIENT
Start: 2017-06-26 | End: 2020-03-05

## 2017-06-26 NOTE — ED NOTES
Hourly Rounding complete. Pt resting quietly in room respirations even and unlabored KANG pt has no needs or complaints at this time bed locked and in lowest position call bell in reach pt instructed to call for needs

## 2017-06-26 NOTE — ED NOTES
Assumed care of 83 y.o. male presents to ER ED 04/ED 04   Chief Complaint   Patient presents with    Urinary Retention    as per triage nurse.  No acute distress noted.

## 2017-06-26 NOTE — ED PROVIDER NOTES
Encounter Date: 6/26/2017       History     Chief Complaint   Patient presents with    Urinary Retention     The history is provided by the patient. No  was used.   Male  Problem   Primary symptoms include no dysuria, no genital itching, no genital lesions, no genital rash, no penile discharge, no penile pain. This is a recurrent problem. The problem has been unchanged. Sexual activity: non-contributory.     Patient has recurrent urinary retention.  His urinary catheter came out today he's had urinary retention.  No fever chills nausea or vomiting.  No dysuria urgency or frequency.    Review of patient's allergies indicates:  No Known Allergies  Past Medical History:   Diagnosis Date    Atrial fibrillation     AVD (aortic valve disease)     Cardiomyopathy     Gout     Hypertension     Mitral valve disorder     Pure hypercholesterolemia      History reviewed. No pertinent surgical history.  History reviewed. No pertinent family history.  Social History   Substance Use Topics    Smoking status: Former Smoker    Smokeless tobacco: Never Used    Alcohol use Not on file     Review of Systems   Genitourinary: Negative for dysuria, penile discharge and penile pain.       Physical Exam     Initial Vitals [06/26/17 1356]   BP Pulse Resp Temp SpO2   117/71 99 18 98 °F (36.7 °C) 97 %      MAP       86.33         Physical Exam    Nursing note and vitals reviewed.  Constitutional: He appears well-nourished.   Elderly male in no acute distress.   HENT:   Head: Normocephalic and atraumatic.   Cardiovascular: Normal rate and normal heart sounds.   Pulmonary/Chest: He has rhonchi.   Abdominal: Soft. Bowel sounds are normal.   Genitourinary: No discharge found.   Neurological: He is alert and oriented to person, place, and time.   Psychiatric: He has a normal mood and affect.         ED Course   Procedures  Labs Reviewed   URINALYSIS                               ED Course     Clinical Impression:    The primary encounter diagnosis was Urinary retention. A diagnosis of Urinary tract infection without hematuria, site unspecified was also pertinent to this visit.                           Wolf Matthew MD  06/26/17 1897

## 2017-06-26 NOTE — PROVIDER PROGRESS NOTES - EMERGENCY DEPT.
Encounter Date: 6/26/2017    ED Physician Progress Notes         patient feels much better.  Indicated I would give him prescriptions for tamsulosin and an antibiotic.

## 2017-06-26 NOTE — ED TRIAGE NOTES
Patient presents to the ER with c/o urinary retention.  Patient reports that this condition is chronic.  He has PCP appointment next week.

## 2017-09-12 ENCOUNTER — LAB VISIT (OUTPATIENT)
Dept: LAB | Facility: HOSPITAL | Age: 82
End: 2017-09-12
Attending: SPECIALIST
Payer: MEDICARE

## 2017-09-12 DIAGNOSIS — Z12.5 SPECIAL SCREENING FOR MALIGNANT NEOPLASM OF PROSTATE: Primary | ICD-10-CM

## 2017-09-12 LAB — COMPLEXED PSA SERPL-MCNC: 15.2 NG/ML

## 2017-09-12 PROCEDURE — 36415 COLL VENOUS BLD VENIPUNCTURE: CPT

## 2017-09-12 PROCEDURE — 84153 ASSAY OF PSA TOTAL: CPT

## 2017-10-04 ENCOUNTER — HOSPITAL ENCOUNTER (OUTPATIENT)
Dept: RADIOLOGY | Facility: HOSPITAL | Age: 82
Discharge: HOME | End: 2017-10-04
Attending: NURSE PRACTITIONER
Payer: MEDICARE

## 2017-10-04 DIAGNOSIS — R79.89 POSITIVE D-DIMER: ICD-10-CM

## 2017-10-04 DIAGNOSIS — R06.02 SOB (SHORTNESS OF BREATH): ICD-10-CM

## 2017-10-04 DIAGNOSIS — I48.91 ATRIAL FIBRILLATION: Primary | ICD-10-CM

## 2017-10-04 PROCEDURE — 25500020 PHARM REV CODE 255: Performed by: NURSE PRACTITIONER

## 2017-10-04 PROCEDURE — 71275 CT ANGIOGRAPHY CHEST: CPT | Mod: TC

## 2017-10-04 PROCEDURE — 71275 CT ANGIOGRAPHY CHEST: CPT | Mod: 26,,, | Performed by: RADIOLOGY

## 2017-10-04 RX ADMIN — IOHEXOL 100 ML: 350 INJECTION, SOLUTION INTRAVENOUS at 10:10

## 2018-03-15 ENCOUNTER — LAB VISIT (OUTPATIENT)
Dept: LAB | Facility: HOSPITAL | Age: 83
End: 2018-03-15
Attending: INTERNAL MEDICINE
Payer: MEDICARE

## 2018-03-15 DIAGNOSIS — E87.6 HYPOKALEMIA: ICD-10-CM

## 2018-03-15 DIAGNOSIS — I50.9 CHF (CONGESTIVE HEART FAILURE): Primary | ICD-10-CM

## 2018-03-15 LAB
ANION GAP SERPL CALC-SCNC: 9 MMOL/L
BUN SERPL-MCNC: 27 MG/DL
CALCIUM SERPL-MCNC: 9.8 MG/DL
CHLORIDE SERPL-SCNC: 102 MMOL/L
CO2 SERPL-SCNC: 29 MMOL/L
CREAT SERPL-MCNC: 1.4 MG/DL
EST. GFR  (AFRICAN AMERICAN): 53 ML/MIN/1.73 M^2
EST. GFR  (NON AFRICAN AMERICAN): 46 ML/MIN/1.73 M^2
GLUCOSE SERPL-MCNC: 95 MG/DL
POTASSIUM SERPL-SCNC: 4.5 MMOL/L
SODIUM SERPL-SCNC: 140 MMOL/L

## 2018-03-15 PROCEDURE — 80048 BASIC METABOLIC PNL TOTAL CA: CPT

## 2018-03-29 ENCOUNTER — LAB VISIT (OUTPATIENT)
Dept: LAB | Facility: HOSPITAL | Age: 83
End: 2018-03-29
Attending: INTERNAL MEDICINE
Payer: MEDICARE

## 2018-03-29 DIAGNOSIS — I50.9 CHF (CONGESTIVE HEART FAILURE): Primary | ICD-10-CM

## 2018-03-29 LAB
ALBUMIN SERPL BCP-MCNC: 3.3 G/DL
ALP SERPL-CCNC: 90 U/L
ALT SERPL W/O P-5'-P-CCNC: 16 U/L
ANION GAP SERPL CALC-SCNC: 9 MMOL/L
AST SERPL-CCNC: 25 U/L
BILIRUB DIRECT SERPL-MCNC: 0.5 MG/DL
BILIRUB SERPL-MCNC: 1 MG/DL
BNP SERPL-MCNC: 168 PG/ML
BUN SERPL-MCNC: 18 MG/DL
CALCIUM SERPL-MCNC: 9.7 MG/DL
CHLORIDE SERPL-SCNC: 102 MMOL/L
CHOLEST SERPL-MCNC: 127 MG/DL
CHOLEST/HDLC SERPL: 3 {RATIO}
CO2 SERPL-SCNC: 28 MMOL/L
CREAT SERPL-MCNC: 1.2 MG/DL
ERYTHROCYTE [DISTWIDTH] IN BLOOD BY AUTOMATED COUNT: 12.8 %
EST. GFR  (AFRICAN AMERICAN): >60 ML/MIN/1.73 M^2
EST. GFR  (NON AFRICAN AMERICAN): 55 ML/MIN/1.73 M^2
GLUCOSE SERPL-MCNC: 91 MG/DL
HCT VFR BLD AUTO: 38.3 %
HDLC SERPL-MCNC: 42 MG/DL
HDLC SERPL: 33.1 %
HGB BLD-MCNC: 12.3 G/DL
LDLC SERPL CALC-MCNC: 78 MG/DL
MCH RBC QN AUTO: 32.4 PG
MCHC RBC AUTO-ENTMCNC: 32.1 G/DL
MCV RBC AUTO: 101 FL
NONHDLC SERPL-MCNC: 85 MG/DL
PLATELET # BLD AUTO: 192 K/UL
PMV BLD AUTO: 9.4 FL
POTASSIUM SERPL-SCNC: 4 MMOL/L
PROT SERPL-MCNC: 7 G/DL
RBC # BLD AUTO: 3.8 M/UL
SODIUM SERPL-SCNC: 139 MMOL/L
TRIGL SERPL-MCNC: 35 MG/DL
WBC # BLD AUTO: 4.15 K/UL

## 2018-03-29 PROCEDURE — 83880 ASSAY OF NATRIURETIC PEPTIDE: CPT

## 2018-03-29 PROCEDURE — 80061 LIPID PANEL: CPT

## 2018-03-29 PROCEDURE — 85027 COMPLETE CBC AUTOMATED: CPT

## 2018-03-29 PROCEDURE — 80048 BASIC METABOLIC PNL TOTAL CA: CPT

## 2018-03-29 PROCEDURE — 80076 HEPATIC FUNCTION PANEL: CPT

## 2018-04-05 ENCOUNTER — LAB VISIT (OUTPATIENT)
Dept: LAB | Facility: HOSPITAL | Age: 83
End: 2018-04-05
Attending: INTERNAL MEDICINE
Payer: MEDICARE

## 2018-04-05 DIAGNOSIS — I50.9 CHF (CONGESTIVE HEART FAILURE): Primary | ICD-10-CM

## 2018-04-05 LAB
ANION GAP SERPL CALC-SCNC: 10 MMOL/L
BNP SERPL-MCNC: 293 PG/ML
BUN SERPL-MCNC: 20 MG/DL
CALCIUM SERPL-MCNC: 9.3 MG/DL
CHLORIDE SERPL-SCNC: 104 MMOL/L
CO2 SERPL-SCNC: 28 MMOL/L
CREAT SERPL-MCNC: 1.2 MG/DL
EST. GFR  (AFRICAN AMERICAN): >60 ML/MIN/1.73 M^2
EST. GFR  (NON AFRICAN AMERICAN): 55 ML/MIN/1.73 M^2
GLUCOSE SERPL-MCNC: 128 MG/DL
POTASSIUM SERPL-SCNC: 3.9 MMOL/L
SODIUM SERPL-SCNC: 142 MMOL/L

## 2018-04-05 PROCEDURE — 80048 BASIC METABOLIC PNL TOTAL CA: CPT

## 2018-04-05 PROCEDURE — 83880 ASSAY OF NATRIURETIC PEPTIDE: CPT

## 2018-04-05 PROCEDURE — 36415 COLL VENOUS BLD VENIPUNCTURE: CPT

## 2018-09-27 ENCOUNTER — LAB VISIT (OUTPATIENT)
Dept: LAB | Facility: HOSPITAL | Age: 83
End: 2018-09-27
Attending: INTERNAL MEDICINE
Payer: MEDICARE

## 2018-09-27 DIAGNOSIS — I50.32 CHRONIC DIASTOLIC HEART FAILURE: Primary | ICD-10-CM

## 2018-09-27 LAB
ANION GAP SERPL CALC-SCNC: 13 MMOL/L
BUN SERPL-MCNC: 17 MG/DL
CALCIUM SERPL-MCNC: 9.3 MG/DL
CHLORIDE SERPL-SCNC: 101 MMOL/L
CO2 SERPL-SCNC: 27 MMOL/L
CREAT SERPL-MCNC: 1.2 MG/DL
EST. GFR  (AFRICAN AMERICAN): >60 ML/MIN/1.73 M^2
EST. GFR  (NON AFRICAN AMERICAN): 55 ML/MIN/1.73 M^2
GLUCOSE SERPL-MCNC: 118 MG/DL
POTASSIUM SERPL-SCNC: 3 MMOL/L
SODIUM SERPL-SCNC: 141 MMOL/L

## 2018-09-27 PROCEDURE — 80048 BASIC METABOLIC PNL TOTAL CA: CPT

## 2018-10-05 ENCOUNTER — LAB VISIT (OUTPATIENT)
Dept: LAB | Facility: HOSPITAL | Age: 83
End: 2018-10-05
Attending: INTERNAL MEDICINE
Payer: MEDICARE

## 2018-10-05 DIAGNOSIS — I50.32 CHRONIC DIASTOLIC HEART FAILURE: Primary | ICD-10-CM

## 2018-10-05 LAB
ANION GAP SERPL CALC-SCNC: 10 MMOL/L
BUN SERPL-MCNC: 17 MG/DL
CALCIUM SERPL-MCNC: 9.1 MG/DL
CHLORIDE SERPL-SCNC: 102 MMOL/L
CO2 SERPL-SCNC: 27 MMOL/L
CREAT SERPL-MCNC: 1.3 MG/DL
EST. GFR  (AFRICAN AMERICAN): 58 ML/MIN/1.73 M^2
EST. GFR  (NON AFRICAN AMERICAN): 50 ML/MIN/1.73 M^2
GLUCOSE SERPL-MCNC: 152 MG/DL
POTASSIUM SERPL-SCNC: 4 MMOL/L
SODIUM SERPL-SCNC: 139 MMOL/L

## 2018-10-05 PROCEDURE — 36415 COLL VENOUS BLD VENIPUNCTURE: CPT

## 2018-10-05 PROCEDURE — 80048 BASIC METABOLIC PNL TOTAL CA: CPT

## 2018-10-17 ENCOUNTER — OFFICE VISIT (OUTPATIENT)
Dept: FAMILY MEDICINE | Facility: CLINIC | Age: 83
End: 2018-10-17
Payer: MEDICARE

## 2018-10-17 VITALS
WEIGHT: 225 LBS | HEIGHT: 73 IN | SYSTOLIC BLOOD PRESSURE: 132 MMHG | DIASTOLIC BLOOD PRESSURE: 68 MMHG | BODY MASS INDEX: 29.82 KG/M2 | HEART RATE: 92 BPM

## 2018-10-17 DIAGNOSIS — R73.9 HYPERGLYCEMIA: Primary | ICD-10-CM

## 2018-10-17 LAB
ANION GAP SERPL CALC-SCNC: 8 MMOL/L
BASOPHILS # BLD AUTO: 0.04 K/UL
BASOPHILS NFR BLD: 0.8 %
BUN SERPL-MCNC: 15 MG/DL
CALCIUM SERPL-MCNC: 9.4 MG/DL
CHLORIDE SERPL-SCNC: 100 MMOL/L
CO2 SERPL-SCNC: 30 MMOL/L
CREAT SERPL-MCNC: 1.4 MG/DL
DIFFERENTIAL METHOD: ABNORMAL
EOSINOPHIL # BLD AUTO: 0.2 K/UL
EOSINOPHIL NFR BLD: 4.4 %
ERYTHROCYTE [DISTWIDTH] IN BLOOD BY AUTOMATED COUNT: 13.2 %
EST. GFR  (AFRICAN AMERICAN): 53 ML/MIN/1.73 M^2
EST. GFR  (NON AFRICAN AMERICAN): 46 ML/MIN/1.73 M^2
GLUCOSE SERPL-MCNC: 91 MG/DL
HCT VFR BLD AUTO: 40.7 %
HGB BLD-MCNC: 12.7 G/DL
LYMPHOCYTES # BLD AUTO: 1.2 K/UL
LYMPHOCYTES NFR BLD: 22.1 %
MCH RBC QN AUTO: 32.3 PG
MCHC RBC AUTO-ENTMCNC: 31.2 G/DL
MCV RBC AUTO: 104 FL
MONOCYTES # BLD AUTO: 0.9 K/UL
MONOCYTES NFR BLD: 17.3 %
NEUTROPHILS # BLD AUTO: 2.9 K/UL
NEUTROPHILS NFR BLD: 55.4 %
PLATELET # BLD AUTO: 178 K/UL
PMV BLD AUTO: 9.5 FL
POTASSIUM SERPL-SCNC: 4 MMOL/L
RBC # BLD AUTO: 3.93 M/UL
SODIUM SERPL-SCNC: 138 MMOL/L
WBC # BLD AUTO: 5.25 K/UL

## 2018-10-17 PROCEDURE — 99213 OFFICE O/P EST LOW 20 MIN: CPT | Mod: PBBFAC | Performed by: NURSE PRACTITIONER

## 2018-10-17 PROCEDURE — 99999 PR STA SHADOW: CPT | Mod: PBBFAC,,, | Performed by: NURSE PRACTITIONER

## 2018-10-17 PROCEDURE — 99213 OFFICE O/P EST LOW 20 MIN: CPT | Mod: S$PBB | Performed by: NURSE PRACTITIONER

## 2018-10-17 PROCEDURE — 85025 COMPLETE CBC W/AUTO DIFF WBC: CPT

## 2018-10-17 PROCEDURE — 80048 BASIC METABOLIC PNL TOTAL CA: CPT

## 2018-10-17 PROCEDURE — 99999 PR PBB SHADOW E&M-EST. PATIENT-LVL III: CPT | Mod: PBBFAC,,, | Performed by: NURSE PRACTITIONER

## 2018-10-17 PROCEDURE — 36415 COLL VENOUS BLD VENIPUNCTURE: CPT | Mod: PBBFAC

## 2018-10-17 PROCEDURE — 83036 HEMOGLOBIN GLYCOSYLATED A1C: CPT

## 2018-10-17 NOTE — PROGRESS NOTES
Subjective:       Patient ID: Radhames Alonzo is a 84 y.o. male.    Chief Complaint: Hypertension    Sent over here because of elevated blood sugar per Dr. Jean-Baptiste. Sees [facility] for routine care.      Review of Systems   Constitutional: Negative.  Negative for appetite change, fatigue and fever.   HENT: Negative.  Negative for congestion, ear pain and sore throat.    Eyes: Negative.  Negative for visual disturbance.   Respiratory: Negative.  Negative for cough, shortness of breath and wheezing.    Cardiovascular: Negative.  Negative for chest pain and palpitations.   Gastrointestinal: Negative.  Negative for abdominal pain, diarrhea, nausea and vomiting.   Endocrine: Negative for polydipsia and polyphagia.        Always has gotten up at night to urinate; no change.   Genitourinary: Negative.  Negative for difficulty urinating.   Musculoskeletal: Negative.    Skin: Negative.  Negative for rash.   Neurological: Negative.  Negative for headaches.   Psychiatric/Behavioral: Negative.  Negative for sleep disturbance. The patient is not nervous/anxious.    All other systems reviewed and are negative.      Objective:      Physical Exam   Constitutional: He is oriented to person, place, and time. He appears well-developed and well-nourished.   HENT:   Head: Normocephalic.   Right Ear: External ear normal.   Left Ear: External ear normal.   Nose: Nose normal.   Mouth/Throat: Oropharynx is clear and moist.   Eyes: Conjunctivae and EOM are normal. Pupils are equal, round, and reactive to light.   Neck: Normal range of motion. Neck supple.   Cardiovascular:   Irregular heart rate   Pulmonary/Chest: Effort normal and breath sounds normal.   Abdominal: Soft. Bowel sounds are normal.   Musculoskeletal: Normal range of motion. He exhibits edema.   Venous stasis to LE   Neurological: He is alert and oriented to person, place, and time.   Skin: Skin is warm and dry.   Dry and scaly   Psychiatric: He has a normal mood and affect.   Nursing note  and vitals reviewed.      Assessment:       1. Hyperglycemia        Plan:   Radhames was seen today for hypertension.    Diagnoses and all orders for this visit:    Hyperglycemia  -     Hemoglobin A1c  -     CBC auto differential  -     Basic metabolic panel    Will call with lab results and therapy changes

## 2018-10-18 LAB
ESTIMATED AVG GLUCOSE: 105 MG/DL
HBA1C MFR BLD HPLC: 5.3 %

## 2018-10-25 ENCOUNTER — TELEPHONE (OUTPATIENT)
Dept: FAMILY MEDICINE | Facility: CLINIC | Age: 83
End: 2018-10-25

## 2018-10-25 NOTE — TELEPHONE ENCOUNTER
Spoke to [facility]. Dumont[facility] nurse ([facility]) states that pt is requesting a motorized scooter. She wants to know if it is possible for you to put some sort of documentation regarding pt's immobility on his last office visit? Please advise, thank you.     Fax: [phone]

## 2018-11-07 ENCOUNTER — TELEPHONE (OUTPATIENT)
Dept: FAMILY MEDICINE | Facility: CLINIC | Age: 83
End: 2018-11-07

## 2018-11-07 ENCOUNTER — LAB VISIT (OUTPATIENT)
Dept: LAB | Facility: HOSPITAL | Age: 83
End: 2018-11-07
Attending: NURSE PRACTITIONER
Payer: MEDICARE

## 2018-11-07 DIAGNOSIS — N39.0 URINARY TRACT INFECTION: Primary | ICD-10-CM

## 2018-11-07 LAB
BACTERIA #/AREA URNS HPF: ABNORMAL /HPF
BILIRUB UR QL STRIP: NEGATIVE
CLARITY UR: ABNORMAL
COLOR UR: ABNORMAL
GLUCOSE UR QL STRIP: NEGATIVE
HGB UR QL STRIP: ABNORMAL
HYALINE CASTS #/AREA URNS LPF: 0 /LPF
KETONES UR QL STRIP: NEGATIVE
LEUKOCYTE ESTERASE UR QL STRIP: ABNORMAL
MICROSCOPIC COMMENT: ABNORMAL
NITRITE UR QL STRIP: POSITIVE
PH UR STRIP: 6 [PH] (ref 5–8)
PROT UR QL STRIP: ABNORMAL
RBC #/AREA URNS HPF: 2 /HPF (ref 0–4)
SP GR UR STRIP: 1.02 (ref 1–1.03)
URN SPEC COLLECT METH UR: ABNORMAL
UROBILINOGEN UR STRIP-ACNC: >=8 EU/DL
WBC #/AREA URNS HPF: >100 /HPF (ref 0–5)

## 2018-11-07 PROCEDURE — 87088 URINE BACTERIA CULTURE: CPT

## 2018-11-07 PROCEDURE — 81000 URINALYSIS NONAUTO W/SCOPE: CPT

## 2018-11-07 PROCEDURE — 87077 CULTURE AEROBIC IDENTIFY: CPT

## 2018-11-07 PROCEDURE — 87186 SC STD MICRODIL/AGAR DIL: CPT

## 2018-11-07 PROCEDURE — 87086 URINE CULTURE/COLONY COUNT: CPT

## 2018-11-07 NOTE — TELEPHONE ENCOUNTER
----- Message from Deejay Berkowitz sent at 2018 10:23 AM CST -----  Contact: Patient  Radhames Alonzo  MRN: [id number]  : 3/17/1934  PCP: Primary Doctor No  Home Phone      [phone]  Work Phone      Not on file.  [employer]          [phone]      MESSAGE: desires mobility chair -- has some questions -- would like to speak with Ping     Call [phone]    PCP: Luisito

## 2018-11-07 NOTE — TELEPHONE ENCOUNTER
Spoke with Marylou--advised her that [employer] does not do mobility chair evals. They would have to contact a mobility company and get them to fax the eval form here and then the pt would have to make an appt with one of the doctors, voiced understanding.

## 2018-11-07 NOTE — TELEPHONE ENCOUNTER
Arelis with [employer] calling due to pt having urinary frequency, dark urine with strong odor. Gave her a verbal order to collect u/a at today's home visit, voiced understanding. Also faxed her a copy of the pt's last clinic visit to [phone]. They are seeing him per Dr Jean-Baptiste.

## 2018-11-07 NOTE — TELEPHONE ENCOUNTER
----- Message from Laura Miranda MA sent at 2018 10:56 AM CST -----  [facility]  Radhames Alonzo  MRN: [id number]  : 3/17/1934  PCP: Primary Doctor No  Home Phone      [phone]  Work Phone      Not on file.  [employer]          [phone]      MESSAGE: [facility] would like to speak with nurse regarding patient. He is have UTI symptoms.   Phone: [phone]

## 2018-11-07 NOTE — TELEPHONE ENCOUNTER
----- Message from Sabra Corrales sent at 2018  1:51 PM CST -----  Contact: Guardian  Radhames Alonzo  MRN: [id number]  : 3/17/1934  PCP: Primary Doctor No  Home Phone      [phone]  Work Phone      Not on file.  [employer]          [phone]      MESSAGE:   Patient's keeper called to check status of earlier call regarding getting a motorized scooter. Please call to discuss.    Radhames   [phone]

## 2018-11-08 DIAGNOSIS — N30.01 ACUTE CYSTITIS WITH HEMATURIA: Primary | ICD-10-CM

## 2018-11-08 RX ORDER — CEPHALEXIN 500 MG/1
500 CAPSULE ORAL EVERY 12 HOURS
Qty: 20 CAPSULE | Refills: 0 | Status: SHIPPED | OUTPATIENT
Start: 2018-11-08 | End: 2018-11-18

## 2018-11-08 NOTE — PROGRESS NOTES
Abnormal lab - + UTI - Keflex sent in. Recheck after antibiotic finished. Please notify HH and patient or care giver

## 2018-11-09 NOTE — TELEPHONE ENCOUNTER
Spoke with pt--advised him that he does have a UTI and Ping sent in abx to [facility]. Asked if he had someone that could go pick this up for him. He said he did.

## 2018-11-10 LAB — BACTERIA UR CULT: NORMAL

## 2018-11-21 ENCOUNTER — APPOINTMENT (OUTPATIENT)
Dept: LAB | Facility: HOSPITAL | Age: 83
End: 2018-11-21
Attending: FAMILY MEDICINE
Payer: MEDICARE

## 2018-11-21 DIAGNOSIS — N39.0 URINARY TRACT INFECTION, SITE NOT SPECIFIED: Primary | ICD-10-CM

## 2018-11-21 LAB
BACTERIA #/AREA URNS HPF: ABNORMAL /HPF
BILIRUB UR QL STRIP: NEGATIVE
CLARITY UR: CLEAR
COLOR UR: YELLOW
GLUCOSE UR QL STRIP: NEGATIVE
HGB UR QL STRIP: ABNORMAL
HYALINE CASTS #/AREA URNS LPF: 10 /LPF
KETONES UR QL STRIP: NEGATIVE
LEUKOCYTE ESTERASE UR QL STRIP: NEGATIVE
MICROSCOPIC COMMENT: ABNORMAL
NITRITE UR QL STRIP: NEGATIVE
PH UR STRIP: 6 [PH] (ref 5–8)
PROT UR QL STRIP: ABNORMAL
RBC #/AREA URNS HPF: 0 /HPF (ref 0–4)
SP GR UR STRIP: 1.01 (ref 1–1.03)
SQUAMOUS #/AREA URNS HPF: 2 /HPF
URN SPEC COLLECT METH UR: ABNORMAL
UROBILINOGEN UR STRIP-ACNC: 1 EU/DL
WBC #/AREA URNS HPF: 3 /HPF (ref 0–5)

## 2018-11-21 PROCEDURE — 87086 URINE CULTURE/COLONY COUNT: CPT

## 2018-11-21 PROCEDURE — 81000 URINALYSIS NONAUTO W/SCOPE: CPT

## 2018-11-23 LAB — BACTERIA UR CULT: NO GROWTH

## 2018-12-18 ENCOUNTER — LAB VISIT (OUTPATIENT)
Dept: LAB | Facility: HOSPITAL | Age: 83
End: 2018-12-18
Attending: INTERNAL MEDICINE
Payer: MEDICARE

## 2018-12-18 ENCOUNTER — TELEPHONE (OUTPATIENT)
Dept: FAMILY MEDICINE | Facility: CLINIC | Age: 83
End: 2018-12-18

## 2018-12-18 DIAGNOSIS — I50.32 HEART FAILURE, DIASTOLIC, CHRONIC: ICD-10-CM

## 2018-12-18 DIAGNOSIS — I11.0 HYPERTENSIVE HEART FAILURE: Primary | ICD-10-CM

## 2018-12-18 DIAGNOSIS — L97.509 ULCER OF FOOT, UNSPECIFIED LATERALITY, UNSPECIFIED ULCER STAGE: Primary | ICD-10-CM

## 2018-12-18 LAB
ALBUMIN SERPL BCP-MCNC: 3.3 G/DL
ALP SERPL-CCNC: 89 U/L
ALT SERPL W/O P-5'-P-CCNC: 15 U/L
ANION GAP SERPL CALC-SCNC: 10 MMOL/L
AST SERPL-CCNC: 27 U/L
BASOPHILS # BLD AUTO: 0.04 K/UL
BASOPHILS NFR BLD: 0.7 %
BILIRUB DIRECT SERPL-MCNC: 0.5 MG/DL
BILIRUB SERPL-MCNC: 0.8 MG/DL
BNP SERPL-MCNC: 239 PG/ML
BUN SERPL-MCNC: 22 MG/DL
CALCIUM SERPL-MCNC: 8.7 MG/DL
CHLORIDE SERPL-SCNC: 100 MMOL/L
CHOLEST SERPL-MCNC: 115 MG/DL
CHOLEST/HDLC SERPL: 2.9 {RATIO}
CO2 SERPL-SCNC: 30 MMOL/L
CREAT SERPL-MCNC: 1.3 MG/DL
DIFFERENTIAL METHOD: ABNORMAL
EOSINOPHIL # BLD AUTO: 0.2 K/UL
EOSINOPHIL NFR BLD: 3.1 %
ERYTHROCYTE [DISTWIDTH] IN BLOOD BY AUTOMATED COUNT: 12.9 %
EST. GFR  (AFRICAN AMERICAN): 58 ML/MIN/1.73 M^2
EST. GFR  (NON AFRICAN AMERICAN): 50 ML/MIN/1.73 M^2
GLUCOSE SERPL-MCNC: 81 MG/DL
HCT VFR BLD AUTO: 38.3 %
HDLC SERPL-MCNC: 39 MG/DL
HDLC SERPL: 33.9 %
HGB BLD-MCNC: 11.9 G/DL
LDLC SERPL CALC-MCNC: 66 MG/DL
LYMPHOCYTES # BLD AUTO: 1.2 K/UL
LYMPHOCYTES NFR BLD: 20.1 %
MCH RBC QN AUTO: 32.2 PG
MCHC RBC AUTO-ENTMCNC: 31.1 G/DL
MCV RBC AUTO: 104 FL
MONOCYTES # BLD AUTO: 0.9 K/UL
MONOCYTES NFR BLD: 14.1 %
NEUTROPHILS # BLD AUTO: 3.8 K/UL
NEUTROPHILS NFR BLD: 62 %
NONHDLC SERPL-MCNC: 76 MG/DL
PLATELET # BLD AUTO: 196 K/UL
PMV BLD AUTO: 9.3 FL
POTASSIUM SERPL-SCNC: 3 MMOL/L
PROT SERPL-MCNC: 6.8 G/DL
RBC # BLD AUTO: 3.7 M/UL
SODIUM SERPL-SCNC: 140 MMOL/L
TRIGL SERPL-MCNC: 50 MG/DL
WBC # BLD AUTO: 6.08 K/UL

## 2018-12-18 PROCEDURE — 80076 HEPATIC FUNCTION PANEL: CPT

## 2018-12-18 PROCEDURE — 80061 LIPID PANEL: CPT

## 2018-12-18 PROCEDURE — 83880 ASSAY OF NATRIURETIC PEPTIDE: CPT

## 2018-12-18 PROCEDURE — 36415 COLL VENOUS BLD VENIPUNCTURE: CPT

## 2018-12-18 PROCEDURE — 80048 BASIC METABOLIC PNL TOTAL CA: CPT

## 2018-12-18 PROCEDURE — 85025 COMPLETE CBC W/AUTO DIFF WBC: CPT

## 2018-12-18 NOTE — TELEPHONE ENCOUNTER
Spoke with Krystal from [facility] states pt has an ulcer on his L lateral foot. States she wrapped and dressed it, would like to know if you would like to order wound care

## 2018-12-24 ENCOUNTER — TELEPHONE (OUTPATIENT)
Dept: FAMILY MEDICINE | Facility: CLINIC | Age: 83
End: 2018-12-24

## 2018-12-24 DIAGNOSIS — R06.2 WHEEZING: ICD-10-CM

## 2018-12-24 DIAGNOSIS — R06.02 SOB (SHORTNESS OF BREATH): ICD-10-CM

## 2018-12-24 DIAGNOSIS — J45.50 ASTHMATIC BRONCHITIS, SEVERE PERSISTENT, UNCOMPLICATED: ICD-10-CM

## 2018-12-24 NOTE — TELEPHONE ENCOUNTER
----- Message from Sabra Corrales sent at 2018 10:32 AM CST -----  Contact: arelis with [facility]  Radhames Alonzo  MRN: [id number]  : 3/17/1934  PCP: Pennie Jorge  Home Phone      [phone]  Work Phone      Not on file.  [employer]          [phone]      MESSAGE:   Nuno called to discuss medication- inhaler either a prescription or sample. He needs a refill. Combivent inhaler.    Pharmacy: [facility] in [place]    Arelis  [phone]

## 2018-12-26 ENCOUNTER — TELEPHONE (OUTPATIENT)
Dept: FAMILY MEDICINE | Facility: CLINIC | Age: 83
End: 2018-12-26

## 2018-12-26 NOTE — TELEPHONE ENCOUNTER
Spoke with pt--has a question about wound care referral. Will speak with Sam about who this referral was sent to --need more info

## 2018-12-26 NOTE — TELEPHONE ENCOUNTER
----- Message from Sabra Corrales sent at 2018 11:21 AM CST -----  Contact: pt  Radhames Alonzo  MRN: [id number]  : 3/17/1934  PCP: Pennie Jorge  Home Phone      [phone]  Work Phone      Not on file.  [employer]          [phone]      MESSAGE:   Pt requests a sooner appointment than the  can schedule.  Does patient feel like they need to be seen today:  yes  What is the nature of the appointment:  Not feeling well  What visit type:  ep  Did you check other providers/department schedules for availability:   yes  Comments: pt said he has a sore on his left leg, it has clear drainage. He was told he needs to be seen by  or the wound clinic.     Phone:  [phone]

## 2018-12-27 ENCOUNTER — OFFICE VISIT (OUTPATIENT)
Dept: WOUND CARE | Facility: HOSPITAL | Age: 83
End: 2018-12-27
Attending: SURGERY
Payer: MEDICARE

## 2018-12-27 VITALS
HEART RATE: 84 BPM | RESPIRATION RATE: 18 BRPM | DIASTOLIC BLOOD PRESSURE: 98 MMHG | SYSTOLIC BLOOD PRESSURE: 150 MMHG | TEMPERATURE: 98 F

## 2018-12-27 DIAGNOSIS — L97.422 VENOUS STASIS ULCER OF LEFT MIDFOOT WITH FAT LAYER EXPOSED WITHOUT VARICOSE VEINS: ICD-10-CM

## 2018-12-27 DIAGNOSIS — I87.2 VENOUS STASIS ULCER OF LEFT MIDFOOT WITH FAT LAYER EXPOSED WITHOUT VARICOSE VEINS: ICD-10-CM

## 2018-12-27 DIAGNOSIS — L97.522 VENOUS STASIS ULCER OF OTHER PART OF LEFT FOOT WITH FAT LAYER EXPOSED WITHOUT VARICOSE VEINS: Primary | ICD-10-CM

## 2018-12-27 DIAGNOSIS — I87.2 VENOUS STASIS ULCER OF OTHER PART OF LEFT FOOT WITH FAT LAYER EXPOSED WITHOUT VARICOSE VEINS: Primary | ICD-10-CM

## 2018-12-27 PROCEDURE — 99213 OFFICE O/P EST LOW 20 MIN: CPT | Mod: 25

## 2018-12-27 PROCEDURE — 11042 DBRDMT SUBQ TIS 1ST 20SQCM/<: CPT

## 2018-12-27 PROCEDURE — 27201912 HC WOUND CARE DEBRIDEMENT SUPPLIES

## 2018-12-27 PROCEDURE — 99499 UNLISTED E&M SERVICE: CPT | Mod: ,,, | Performed by: SURGERY

## 2018-12-27 RX ORDER — NITROGLYCERIN 0.4 MG/1
0.4 TABLET SUBLINGUAL EVERY 5 MIN PRN
Status: ON HOLD | COMMUNITY
End: 2019-01-24 | Stop reason: HOSPADM

## 2018-12-27 RX ORDER — TORSEMIDE 20 MG/1
20 TABLET ORAL DAILY
COMMUNITY

## 2018-12-27 RX ORDER — BICALUTAMIDE 50 MG/1
50 TABLET, FILM COATED ORAL DAILY
COMMUNITY

## 2018-12-27 RX ORDER — POTASSIUM CHLORIDE 20 MEQ/1
20 TABLET, EXTENDED RELEASE ORAL 2 TIMES DAILY
COMMUNITY

## 2018-12-27 NOTE — H&P
HISTORY OF PRESENT ILLNESS:  Mr. Alonzo presents to Wound Clinic today for the   new wound on the left lateral foot that has been present for 4 or 5 weeks.  He   has had no previous problems with ulcers.  The patient lives at home,   nonambulatory.  He has chronic swelling of both legs, the left is much worse   than the right.  He was referred for evaluation of this ulcer of left lateral   foot.    ALLERGIES:  NO KNOWN ALLERGIES.    SOCIAL HISTORY:  He used to smoke, but quit over 20 years ago.  No alcohol.    PAST MEDICAL HISTORY:  AFib, hypertension, cardiomyopathy, gout, aortic valve   disease.    PAST SURGICAL HISTORY:  Negative.    SOCIAL HISTORY:  He lives at home.    FAMILY HISTORY:  Noncontributory.    MEDICATIONS:  Reviewed and are mostly supportive for his diagnosis.  He does not   have diabetes.    REVIEW OF SYSTEMS:  CONSTITUTIONAL:  He does not ambulate much according to his caregiver.  HEAD AND NECK:  He has had no vision changes, no sore throat, no headaches.  No   nasal discharge.  His neck has no masses.  No trouble swallowing.  RESPIRATORY:  He has had no shortness of breath or coughing or wheezing.  HEART:  He has had no chest pain or palpitations.  ABDOMEN:  He has had no nausea, vomiting or diarrhea.  GENITOURINARY:  No dysuria.  No hematuria.  NEUROLOGIC:  No neurological problems.  No history of seizures.  EXTREMITIES:  He does have chronic scaly skin on both lower legs, has chronic   swelling of both lower legs, had really not been treated for this.  He has no   rest pain, but he does not ambulate well enough.  He has ischemic changes with   ambulation.  PSYCHIATRIC:  Mental status is unchanged.    PHYSICAL EXAMINATION:  GENERAL:  He is awake, alert and oriented, in no apparent distress, cooperative.  HEENT:  Shows a little poor dentition.  HEAD AND NECK:  Otherwise normal.  LUNGS:  Clear.  HEART:  Regular rate and rhythm.  EXTREMITIES:  He has got chronic venous changes of both legs, left is  a little   worse than the right with a little more edema, a little more erythema, but no   sign of infection.  He has about 2.5 cm ulcer lateral to left foot with fat   layer exposed, some chronic eschar in the middle.  He has good Doppler pulses in   both lower extremities to the indexes.    IMPRESSION:  Chronic venous stasis with ulcer of the left lateral foot with a   fat layer exposed.    PLAN:  Mild compression, Santyl to the wound after debridement and elevation if   possible.  The patient is to return weekly for continued wound care.      BGL/HN  dd: 12/27/2018 11:34:08 (CST)  td: 12/27/2018 12:03:24 (CST)  Doc ID   #[id number]  Job ID #[id number]    CC:

## 2018-12-27 NOTE — PROGRESS NOTES
[facility]  Wound Care  Progress Note    Problem List Items Addressed This Visit     Venous stasis ulcer of left midfoot with fat layer exposed without varicose veins    Overview     Patient with first ever wound on left lateral foot. No pain. Present for about 4 wks. Has chronic swelling of both lower legs but left worse with edema and mild erythema to toes. Dry skin . Chronic changes of venous insufficiency. Doppler ok and good pulses bilateral. Former smoker over 20 yrs ago. Does not ambulate much.            Other Visit Diagnoses     Venous stasis ulcer of other part of left foot with fat layer exposed without varicose veins    -  Primary          See wound doc progress notes. Documents will be scanned.        Shaka Haq  [facility]

## 2018-12-27 NOTE — TELEPHONE ENCOUNTER
Called and spoke with Liza at [facility] and she states that the patient is coming in at 10:30 am today to be seen.

## 2019-01-02 DIAGNOSIS — J45.50 ASTHMATIC BRONCHITIS, SEVERE PERSISTENT, UNCOMPLICATED: ICD-10-CM

## 2019-01-02 NOTE — TELEPHONE ENCOUNTER
Arelis with [employer] calling from a home visit--pt with feet pain, wound, SOB--appt made for 1/4/19 with JF    Also pt's inhaler sent to pharmacy--cost is $300, [facility] [place] [facility] Fax rx to [phone]

## 2019-01-03 ENCOUNTER — OFFICE VISIT (OUTPATIENT)
Dept: WOUND CARE | Facility: HOSPITAL | Age: 84
End: 2019-01-03
Attending: SURGERY
Payer: MEDICARE

## 2019-01-03 VITALS — HEART RATE: 60 BPM | RESPIRATION RATE: 18 BRPM | SYSTOLIC BLOOD PRESSURE: 91 MMHG | DIASTOLIC BLOOD PRESSURE: 60 MMHG

## 2019-01-03 DIAGNOSIS — I87.2 VENOUS STASIS ULCER OF LEFT MIDFOOT WITH FAT LAYER EXPOSED WITHOUT VARICOSE VEINS: ICD-10-CM

## 2019-01-03 DIAGNOSIS — L97.422 VENOUS STASIS ULCER OF LEFT MIDFOOT WITH FAT LAYER EXPOSED WITHOUT VARICOSE VEINS: ICD-10-CM

## 2019-01-03 PROCEDURE — 11042 DBRDMT SUBQ TIS 1ST 20SQCM/<: CPT

## 2019-01-03 PROCEDURE — 99499 UNLISTED E&M SERVICE: CPT | Mod: ,,, | Performed by: SURGERY

## 2019-01-03 PROCEDURE — 27201912 HC WOUND CARE DEBRIDEMENT SUPPLIES

## 2019-01-03 PROCEDURE — 99499 NO LOS: ICD-10-PCS | Mod: ,,, | Performed by: SURGERY

## 2019-01-03 PROCEDURE — 11044 DBRDMT BONE 1ST 20 SQ CM/<: CPT

## 2019-01-03 NOTE — PROGRESS NOTES
[facility]  Wound Care  Progress Note    Problem List Items Addressed This Visit     Venous stasis ulcer of left midfoot with fat layer exposed without varicose veins    Overview     Patient with first ever wound on left lateral foot. No pain. Present for about 4 wks. Has chronic swelling of both lower legs but left worse with edema and mild erythema to toes. Dry skin . Chronic changes of venous insufficiency. Doppler pulses bilateral. Former smoker over 20 yrs ago. Does not ambulate much.  Was using Santyl.  Will switch to Mepilex Ag today.  Patient does have home health.  He is ambulating and has less edema in the left lower extremity today than last week.  Also with less drainage.               See wound doc progress notes. Documents will be scanned.        Miles Ya Jr  [facility]

## 2019-01-04 ENCOUNTER — OFFICE VISIT (OUTPATIENT)
Dept: FAMILY MEDICINE | Facility: CLINIC | Age: 84
End: 2019-01-04
Payer: MEDICARE

## 2019-01-04 VITALS
HEART RATE: 70 BPM | RESPIRATION RATE: 20 BRPM | SYSTOLIC BLOOD PRESSURE: 120 MMHG | DIASTOLIC BLOOD PRESSURE: 60 MMHG | OXYGEN SATURATION: 98 % | HEIGHT: 73 IN | BODY MASS INDEX: 29.69 KG/M2

## 2019-01-04 DIAGNOSIS — L97.529 ULCER OF LEFT FOOT, UNSPECIFIED ULCER STAGE: ICD-10-CM

## 2019-01-04 DIAGNOSIS — M79.672 LEFT FOOT PAIN: ICD-10-CM

## 2019-01-04 DIAGNOSIS — R06.02 SOB (SHORTNESS OF BREATH): Primary | ICD-10-CM

## 2019-01-04 DIAGNOSIS — J45.50 ASTHMATIC BRONCHITIS, SEVERE PERSISTENT, UNCOMPLICATED: ICD-10-CM

## 2019-01-04 PROCEDURE — 99214 OFFICE O/P EST MOD 30 MIN: CPT | Mod: PBBFAC | Performed by: NURSE PRACTITIONER

## 2019-01-04 PROCEDURE — 99213 OFFICE O/P EST LOW 20 MIN: CPT | Mod: 25,S$PBB | Performed by: NURSE PRACTITIONER

## 2019-01-04 PROCEDURE — 99999 PR STA SHADOW: CPT | Mod: S$PBB,PBBFAC,, | Performed by: NURSE PRACTITIONER

## 2019-01-04 PROCEDURE — 99999 PR STA SHADOW: ICD-10-PCS | Mod: PBBFAC,,, | Performed by: NURSE PRACTITIONER

## 2019-01-04 PROCEDURE — 99999 PR PBB SHADOW E&M-EST. PATIENT-LVL IV: CPT | Mod: PBBFAC,,, | Performed by: NURSE PRACTITIONER

## 2019-01-04 RX ORDER — HYDROCODONE BITARTRATE AND ACETAMINOPHEN 7.5; 325 MG/1; MG/1
1 TABLET ORAL EVERY 8 HOURS PRN
Qty: 21 TABLET | Refills: 0 | Status: ON HOLD | OUTPATIENT
Start: 2019-01-04 | End: 2019-01-24 | Stop reason: HOSPADM

## 2019-01-04 NOTE — PROGRESS NOTES
Subjective:       Patient ID: Radhames Alonzo is a 84 y.o. male.    Chief Complaint: Foot Pain (L foot) and Shortness of Breath    Patient presents for face to face for refill of Combivent - needs to go to [facility]. Also complains of foot pain at the left outer foot where there is an ulcer - seeing wound care and has [facility]. Wife accompanies him today.      Review of Systems   Constitutional: Negative.  Negative for appetite change, fatigue and fever.   HENT: Negative.  Negative for congestion, ear pain and sore throat.    Eyes: Negative.  Negative for visual disturbance.   Respiratory: Positive for shortness of breath. Negative for cough and wheezing.    Cardiovascular: Negative.    Gastrointestinal: Negative.  Negative for abdominal pain, diarrhea, nausea and vomiting.   Genitourinary: Negative.  Negative for difficulty urinating.   Musculoskeletal: Negative.    Skin: Positive for wound (left outer foot with increased pain). Negative for rash.   Neurological: Negative.  Negative for headaches.   Psychiatric/Behavioral: Negative.  Negative for sleep disturbance. The patient is not nervous/anxious.    All other systems reviewed and are negative.      Objective:      Physical Exam   Constitutional: He is oriented to person, place, and time. He appears well-developed and well-nourished. No distress.   HENT:   Head: Normocephalic and atraumatic.   Eyes: Pupils are equal, round, and reactive to light.   Neck: Normal range of motion. Neck supple.   Cardiovascular: Normal rate and regular rhythm.   Murmur heard.  Pulmonary/Chest: Effort normal and breath sounds normal. No respiratory distress.   Musculoskeletal:   Wheelchair bound. Wound at the left outer foot is ulcerated and pink. Skin on the foot is thick and dark in appearance. Very painful to palpation of area   Neurological: He is alert and oriented to person, place, and time.   Skin: Skin is warm and dry.   Psychiatric: He has a normal mood and affect.   Nursing note and  vitals reviewed.      Assessment:       1. SOB (shortness of breath)    2. Ulcer of left foot, unspecified ulcer stage    3. Asthmatic bronchitis, severe persistent, uncomplicated    4. Left foot pain        Plan:   Radhames was seen today for foot pain and shortness of breath.    Diagnoses and all orders for this visit:    SOB (shortness of breath) - [facility]    Ulcer of left foot, unspecified ulcer stage  -     HYDROcodone-acetaminophen (NORCO) 7.5-325 mg per tablet; Take 1 tablet by mouth every 8 (eight) hours as needed for Pain.    Asthmatic bronchitis, severe persistent, uncomplicated  -     ipratropium-albuterol (COMBIVENT)  mcg/actuation inhaler; Inhale 1 puff into the lungs every 6 (six) hours as needed for Wheezing or Shortness of Breath.    Left foot pain  -     HYDROcodone-acetaminophen (NORCO) 7.5-325 mg per tablet; Take 1 tablet by mouth every 8 (eight) hours as needed for Pain.    Continue wound care and RTC PRN

## 2019-01-09 ENCOUNTER — HOSPITAL ENCOUNTER (INPATIENT)
Facility: HOSPITAL | Age: 84
LOS: 15 days | Discharge: HOME HEALTH | DRG: 871 | End: 2019-01-24
Attending: SURGERY | Admitting: FAMILY MEDICINE
Payer: MEDICARE

## 2019-01-09 ENCOUNTER — TELEPHONE (OUTPATIENT)
Dept: FAMILY MEDICINE | Facility: CLINIC | Age: 84
End: 2019-01-09

## 2019-01-09 DIAGNOSIS — L03.116 CELLULITIS OF LEFT LOWER EXTREMITY: Primary | ICD-10-CM

## 2019-01-09 DIAGNOSIS — I27.9 PULMONARY HEART DISEASE: ICD-10-CM

## 2019-01-09 DIAGNOSIS — R65.20 SEVERE SEPSIS: ICD-10-CM

## 2019-01-09 DIAGNOSIS — J90 PLEURAL EFFUSION: ICD-10-CM

## 2019-01-09 DIAGNOSIS — A41.9 SEVERE SEPSIS: ICD-10-CM

## 2019-01-09 DIAGNOSIS — J96.11 CHRONIC RESPIRATORY FAILURE WITH HYPOXIA: ICD-10-CM

## 2019-01-09 DIAGNOSIS — I50.9 CHF (CONGESTIVE HEART FAILURE): ICD-10-CM

## 2019-01-09 PROBLEM — M62.82 RHABDOMYOLYSIS: Status: ACTIVE | Noted: 2019-01-09

## 2019-01-09 LAB
ALBUMIN SERPL BCP-MCNC: 3.3 G/DL
ALP SERPL-CCNC: 94 U/L
ALT SERPL W/O P-5'-P-CCNC: 20 U/L
ANION GAP SERPL CALC-SCNC: 13 MMOL/L
AST SERPL-CCNC: 76 U/L
BACTERIA #/AREA URNS HPF: ABNORMAL /HPF
BASOPHILS # BLD AUTO: 0.01 K/UL
BASOPHILS NFR BLD: 0.1 %
BILIRUB SERPL-MCNC: 2 MG/DL
BILIRUB UR QL STRIP: NEGATIVE
BNP SERPL-MCNC: 659 PG/ML
BUN SERPL-MCNC: 20 MG/DL
CALCIUM SERPL-MCNC: 9.4 MG/DL
CHLORIDE SERPL-SCNC: 92 MMOL/L
CK MB SERPL-MCNC: 8.2 NG/ML
CK MB SERPL-RTO: 0.3 %
CK SERPL-CCNC: 2378 U/L
CK SERPL-CCNC: 2752 U/L
CK SERPL-CCNC: 2752 U/L
CLARITY UR: CLEAR
CO2 SERPL-SCNC: 29 MMOL/L
COLOR UR: YELLOW
CREAT SERPL-MCNC: 1.4 MG/DL
DIFFERENTIAL METHOD: ABNORMAL
EOSINOPHIL # BLD AUTO: 0 K/UL
EOSINOPHIL NFR BLD: 0 %
ERYTHROCYTE [DISTWIDTH] IN BLOOD BY AUTOMATED COUNT: 13.3 %
EST. GFR  (AFRICAN AMERICAN): 53 ML/MIN/1.73 M^2
EST. GFR  (NON AFRICAN AMERICAN): 46 ML/MIN/1.73 M^2
GLUCOSE SERPL-MCNC: 124 MG/DL
GLUCOSE UR QL STRIP: NEGATIVE
GRAN CASTS #/AREA URNS LPF: 1 /LPF
HCT VFR BLD AUTO: 42.5 %
HGB BLD-MCNC: 13.4 G/DL
HGB UR QL STRIP: ABNORMAL
HYALINE CASTS #/AREA URNS LPF: 0 /LPF
KETONES UR QL STRIP: NEGATIVE
LACTATE SERPL-SCNC: 2.4 MMOL/L
LACTATE SERPL-SCNC: 2.4 MMOL/L
LACTATE SERPL-SCNC: 3.5 MMOL/L
LACTATE SERPL-SCNC: 3.6 MMOL/L
LEUKOCYTE ESTERASE UR QL STRIP: NEGATIVE
LYMPHOCYTES # BLD AUTO: 1.3 K/UL
LYMPHOCYTES NFR BLD: 8.6 %
MAGNESIUM SERPL-MCNC: 2.2 MG/DL
MCH RBC QN AUTO: 31.7 PG
MCHC RBC AUTO-ENTMCNC: 31.5 G/DL
MCV RBC AUTO: 101 FL
MICROSCOPIC COMMENT: ABNORMAL
MONOCYTES # BLD AUTO: 0.9 K/UL
MONOCYTES NFR BLD: 6.2 %
NEUTROPHILS # BLD AUTO: 12.3 K/UL
NEUTROPHILS NFR BLD: 85.1 %
NITRITE UR QL STRIP: NEGATIVE
PH UR STRIP: 6 [PH] (ref 5–8)
PHOSPHATE SERPL-MCNC: 3.2 MG/DL
PLATELET # BLD AUTO: 144 K/UL
PMV BLD AUTO: 9.1 FL
POTASSIUM SERPL-SCNC: 3 MMOL/L
PROT SERPL-MCNC: 7.8 G/DL
PROT UR QL STRIP: ABNORMAL
RBC # BLD AUTO: 4.23 M/UL
RBC #/AREA URNS HPF: 0 /HPF (ref 0–4)
SODIUM SERPL-SCNC: 134 MMOL/L
SP GR UR STRIP: 1.01 (ref 1–1.03)
TROPONIN I SERPL DL<=0.01 NG/ML-MCNC: 0.03 NG/ML
TROPONIN I SERPL DL<=0.01 NG/ML-MCNC: 0.04 NG/ML
TROPONIN I SERPL DL<=0.01 NG/ML-MCNC: 0.04 NG/ML
URN SPEC COLLECT METH UR: ABNORMAL
UROBILINOGEN UR STRIP-ACNC: 1 EU/DL
WBC # BLD AUTO: 14.46 K/UL
WBC #/AREA URNS HPF: 3 /HPF (ref 0–5)

## 2019-01-09 PROCEDURE — 63600175 PHARM REV CODE 636 W HCPCS: Performed by: SURGERY

## 2019-01-09 PROCEDURE — 82550 ASSAY OF CK (CPK): CPT | Mod: 91

## 2019-01-09 PROCEDURE — 93010 ELECTROCARDIOGRAM REPORT: CPT | Mod: ,,, | Performed by: INTERNAL MEDICINE

## 2019-01-09 PROCEDURE — 82550 ASSAY OF CK (CPK): CPT

## 2019-01-09 PROCEDURE — 25000003 PHARM REV CODE 250: Performed by: SURGERY

## 2019-01-09 PROCEDURE — 63600175 PHARM REV CODE 636 W HCPCS: Performed by: FAMILY MEDICINE

## 2019-01-09 PROCEDURE — 11000001 HC ACUTE MED/SURG PRIVATE ROOM

## 2019-01-09 PROCEDURE — 93005 ELECTROCARDIOGRAM TRACING: CPT

## 2019-01-09 PROCEDURE — 80053 COMPREHEN METABOLIC PANEL: CPT

## 2019-01-09 PROCEDURE — 96375 TX/PRO/DX INJ NEW DRUG ADDON: CPT

## 2019-01-09 PROCEDURE — 63600175 PHARM REV CODE 636 W HCPCS: Performed by: NURSE PRACTITIONER

## 2019-01-09 PROCEDURE — 99223 1ST HOSP IP/OBS HIGH 75: CPT | Mod: AI,,, | Performed by: FAMILY MEDICINE

## 2019-01-09 PROCEDURE — 84100 ASSAY OF PHOSPHORUS: CPT

## 2019-01-09 PROCEDURE — 83605 ASSAY OF LACTIC ACID: CPT | Mod: 91

## 2019-01-09 PROCEDURE — 99223 PR INITIAL HOSPITAL CARE,LEVL III: ICD-10-PCS | Mod: AI,,, | Performed by: FAMILY MEDICINE

## 2019-01-09 PROCEDURE — 84484 ASSAY OF TROPONIN QUANT: CPT | Mod: 91

## 2019-01-09 PROCEDURE — 83735 ASSAY OF MAGNESIUM: CPT

## 2019-01-09 PROCEDURE — 36415 COLL VENOUS BLD VENIPUNCTURE: CPT

## 2019-01-09 PROCEDURE — 82553 CREATINE MB FRACTION: CPT

## 2019-01-09 PROCEDURE — 25000003 PHARM REV CODE 250: Performed by: FAMILY MEDICINE

## 2019-01-09 PROCEDURE — 81000 URINALYSIS NONAUTO W/SCOPE: CPT

## 2019-01-09 PROCEDURE — 93010 EKG 12-LEAD: ICD-10-PCS | Mod: ,,, | Performed by: INTERNAL MEDICINE

## 2019-01-09 PROCEDURE — 94760 N-INVAS EAR/PLS OXIMETRY 1: CPT

## 2019-01-09 PROCEDURE — 85025 COMPLETE CBC W/AUTO DIFF WBC: CPT

## 2019-01-09 PROCEDURE — 99285 EMERGENCY DEPT VISIT HI MDM: CPT | Mod: 25

## 2019-01-09 PROCEDURE — 83880 ASSAY OF NATRIURETIC PEPTIDE: CPT

## 2019-01-09 PROCEDURE — 87040 BLOOD CULTURE FOR BACTERIA: CPT

## 2019-01-09 PROCEDURE — 27000221 HC OXYGEN, UP TO 24 HOURS

## 2019-01-09 PROCEDURE — S0077 INJECTION, CLINDAMYCIN PHOSP: HCPCS | Performed by: FAMILY MEDICINE

## 2019-01-09 PROCEDURE — 96365 THER/PROPH/DIAG IV INF INIT: CPT

## 2019-01-09 RX ORDER — METOPROLOL SUCCINATE 50 MG/1
100 TABLET, EXTENDED RELEASE ORAL DAILY
Status: DISCONTINUED | OUTPATIENT
Start: 2019-01-10 | End: 2019-01-15

## 2019-01-09 RX ORDER — LOSARTAN POTASSIUM 50 MG/1
100 TABLET ORAL DAILY
Status: DISCONTINUED | OUTPATIENT
Start: 2019-01-10 | End: 2019-01-15

## 2019-01-09 RX ORDER — FUROSEMIDE 10 MG/ML
20 INJECTION INTRAMUSCULAR; INTRAVENOUS ONCE
Status: COMPLETED | OUTPATIENT
Start: 2019-01-09 | End: 2019-01-09

## 2019-01-09 RX ORDER — SODIUM CHLORIDE 9 MG/ML
INJECTION, SOLUTION INTRAVENOUS CONTINUOUS
Status: ACTIVE | OUTPATIENT
Start: 2019-01-09 | End: 2019-01-10

## 2019-01-09 RX ORDER — FUROSEMIDE 10 MG/ML
40 INJECTION INTRAMUSCULAR; INTRAVENOUS
Status: COMPLETED | OUTPATIENT
Start: 2019-01-09 | End: 2019-01-09

## 2019-01-09 RX ORDER — CEFEPIME HYDROCHLORIDE 1 G/50ML
1 INJECTION, SOLUTION INTRAVENOUS
Status: DISCONTINUED | OUTPATIENT
Start: 2019-01-09 | End: 2019-01-21

## 2019-01-09 RX ORDER — ENOXAPARIN SODIUM 100 MG/ML
40 INJECTION SUBCUTANEOUS EVERY 24 HOURS
Status: DISCONTINUED | OUTPATIENT
Start: 2019-01-09 | End: 2019-01-10

## 2019-01-09 RX ORDER — SODIUM CHLORIDE 0.9 % (FLUSH) 0.9 %
3 SYRINGE (ML) INJECTION EVERY 8 HOURS
Status: DISCONTINUED | OUTPATIENT
Start: 2019-01-09 | End: 2019-01-14

## 2019-01-09 RX ORDER — SILVER SULFADIAZINE 10 G/1000G
1 CREAM TOPICAL
Status: COMPLETED | OUTPATIENT
Start: 2019-01-09 | End: 2019-01-09

## 2019-01-09 RX ORDER — CLINDAMYCIN PHOSPHATE 600 MG/50ML
600 INJECTION, SOLUTION INTRAVENOUS
Status: DISCONTINUED | OUTPATIENT
Start: 2019-01-09 | End: 2019-01-15

## 2019-01-09 RX ORDER — POLYETHYLENE GLYCOL 3350 17 G/17G
17 POWDER, FOR SOLUTION ORAL DAILY PRN
Status: DISCONTINUED | OUTPATIENT
Start: 2019-01-09 | End: 2019-01-24 | Stop reason: HOSPADM

## 2019-01-09 RX ADMIN — ENOXAPARIN SODIUM 40 MG: 100 INJECTION SUBCUTANEOUS at 06:01

## 2019-01-09 RX ADMIN — FUROSEMIDE 20 MG: 10 INJECTION, SOLUTION INTRAMUSCULAR; INTRAVENOUS at 10:01

## 2019-01-09 RX ADMIN — SODIUM CHLORIDE: 0.9 INJECTION, SOLUTION INTRAVENOUS at 09:01

## 2019-01-09 RX ADMIN — FUROSEMIDE 40 MG: 10 INJECTION, SOLUTION INTRAMUSCULAR; INTRAVENOUS at 02:01

## 2019-01-09 RX ADMIN — CEFEPIME HYDROCHLORIDE 1 G: 1 INJECTION, SOLUTION INTRAVENOUS at 10:01

## 2019-01-09 RX ADMIN — CEFTRIAXONE 1 G: 1 INJECTION, SOLUTION INTRAVENOUS at 01:01

## 2019-01-09 RX ADMIN — SILVER SULFADIAZINE 1 TUBE: 10 CREAM TOPICAL at 05:01

## 2019-01-09 RX ADMIN — CLINDAMYCIN IN 5 PERCENT DEXTROSE 600 MG: 12 INJECTION, SOLUTION INTRAVENOUS at 09:01

## 2019-01-09 NOTE — ED NOTES
The patient is awake, alert and cooperative with a calm affect, patient is aware of environment, airway is open and patent, respirations are spontaneous, normal effort and rate noted, skin warm and dry, moves all extremities well, appearance: no apparent distress noted, bed placed in low position, side rails up x 2, call light is within reach of patient or family, explanation of care provided to family and patient, alarms set and turned on for monitor, pulse ox, plan of care: family to bedside, observe and reassure, position of comfort, patient offers no complaints at this time, awaiting results, will continue to monitor.

## 2019-01-09 NOTE — TELEPHONE ENCOUNTER
Meg/Manpreet calling from a home visit--pt with c/o fall last night--has wheezing, SOB, skin tears to both knees, weakness, no feeling to lower extremities. Advised Meg to send pt to ER for eval/tx. She may have to call for ambulance for transportation. Voiced understanding.

## 2019-01-09 NOTE — ED NOTES
EMS reported caretaker reported pt had fallen 2 days ago and has not been eating, reported to Dr Alexander, Jeff [facility]

## 2019-01-09 NOTE — ED NOTES
Patient lying in bed, awake, alert, and calm, NADN, RR even and unlabored, call bell within reach, bed in lowest position and locked. Patient denies needs at this time, family at bedside, instructed to call for needs, patient verbalized understanding

## 2019-01-09 NOTE — CONSULTS
[facility]  Cardiology  Consult Note    Patient Name: Radhames Alonzo  MRN: [id number]  Admission Date: 1/9/2019  Hospital Length of Stay: 0 days  Code Status: No Order   Attending Provider: Pool Alexander Jr., MD   Consulting Provider: Delma Haq NP  Primary Care Physician: Pennie Jorge NP  Principal Problem:<principal problem not specified>    Patient information was obtained from patient and ER records.     Consults  Subjective:     Chief Complaint: Weakness, shortness of breath, cough     HPI: Patient is an 84 year old male with a past medical history of chronic diastolic heart failure, chronic atrial fibrillation, carotid stenosis, HHD, dyslipidemia, and chronic peripheral edema presents to the emergency room after becoming increasingly weak over the last two days. He also reports shortness of breath and coughing with white sputum. He has had a decline in status over the last couple of months and is now incontinent of bowels and bladder.  He denies any chest pain at this time. Upon admission, EKG reveals atrial fibrillation with controlled ventricular response. Chest x ray reveals left sided pleural effusion and mild pulmonary edema. Blood pressure is marginal. BNP moderately elevated at 659 as well as mildly elevated troponin .041 and CK-MB 2752. WBC mildly elevated and renal function borderline and at baseline. [facility] asked to evaluate.     Past Medical History:   Diagnosis Date    Atrial fibrillation     AVD (aortic valve disease)     Cardiomyopathy     Gout     Hypertension     Mitral valve disorder     Pure hypercholesterolemia        History reviewed. No pertinent surgical history.    Review of patient's allergies indicates:  No Known Allergies    No current facility-administered medications on file prior to encounter.      Current Outpatient Medications on File Prior to Encounter   Medication Sig    bicalutamide (CASODEX) 50 MG Tab Take 50 mg by mouth once daily.     cholecalciferol, vitamin D3, (VITAMIN D3) 2,000 unit Cap Take 1 capsule by mouth once daily.    cloNIDine (CATAPRES) 0.1 MG tablet Take 0.1 mg by mouth 3 (three) times daily as needed. SBP > 150    furosemide (LASIX) 40 MG tablet Take 40 mg by mouth as directed. Take 2 tablets on M-W-F,and 1 tablet T-Th-S-S    HYDROcodone-acetaminophen (NORCO) 7.5-325 mg per tablet Take 1 tablet by mouth every 8 (eight) hours as needed for Pain.    ipratropium-albuterol (COMBIVENT)  mcg/actuation inhaler Inhale 1 puff into the lungs every 6 (six) hours as needed for Wheezing or Shortness of Breath.    ipratropium-albuterol (COMBIVENT)  mcg/actuation inhaler Inhale 1 puff into the lungs every 6 (six) hours as needed for Wheezing or Shortness of Breath.    losartan (COZAAR) 100 MG tablet Take 100 mg by mouth once daily.    metoprolol succinate (TOPROL-XL) 100 MG 24 hr tablet Take 100 mg by mouth once daily.    nitroGLYCERIN (NITROSTAT) 0.4 MG SL tablet Place 0.4 mg under the tongue every 5 (five) minutes as needed for Chest pain.    potassium chloride SA (K-DUR,KLOR-CON) 20 MEQ tablet Take 20 mEq by mouth 2 (two) times daily.    simvastatin (ZOCOR) 80 MG tablet Take 40 mg by mouth every evening.    tamsulosin (FLOMAX) 0.4 mg Cp24 Take 1 capsule (0.4 mg total) by mouth once daily.    torsemide (DEMADEX) 20 MG Tab Take 20 mg by mouth once daily.     Family History     None        Tobacco Use    Smoking status: Former Smoker    Smokeless tobacco: Never Used   Substance and Sexual Activity    Alcohol use: No     Frequency: Never    Drug use: Not on file    Sexual activity: Not on file     ROS     Constitutional : Weakness, lethargy  EENT : Negative  CV : BLE edema +2 at baseline.   Respiratory : Shortness of breath, coughing with sputum production.   Gastrointestinal: Negative   Genitourinary: Negative  Musculoskeletal: Negative  Skin : Negative  Neurological : AAO x 3     Objective:     Vital Signs (Most  Recent):  Temp: 98.4 °F (36.9 °C) (01/09/19 1151)  Pulse: 96 (01/09/19 1321)  Resp: 20 (01/09/19 1321)  BP: (!) 98/54 (01/09/19 1321)  SpO2: 99 % (01/09/19 1321) Vital Signs (24h Range):  Temp:  [98.4 °F (36.9 °C)] 98.4 °F (36.9 °C)  Pulse:  [] 96  Resp:  [20-25] 20  SpO2:  [94 %-99 %] 99 %  BP: ()/(54-80) 98/54     Weight: 94.8 kg (209 lb)  Body mass index is 27.57 kg/m².    SpO2: 99 %  O2 Device (Oxygen Therapy): venti mask    No intake or output data in the 24 hours ending 01/09/19 1355    Lines/Drains/Airways     Drain                 Urethral Catheter 06/26/17 1422 Straight-tip 562 days                Physical Exam     General appearance: alert, appears stated age and cooperative. Frail in appearance. Lethargic. Generalized weakness.   Head: Normocephalic, without obvious abnormality, atraumatic  Eyes: conjunctivae/corneas clear. PERRL  Neck: no carotid bruit, no JVD and supple, symmetrical, trachea midline  Lungs: Expiratory rhales to left upper and lower lobe  Chest Wall: no tenderness  Heart: regular rate and rhythm, S1, S2 normal, 1/6 SM, click, rub or gallop  Abdomen: soft, non-tender; bowel sounds normal; no masses,  no organomegaly  Extremities: Extremities normal, atraumatic, no cyanosis, clubbing. +2 edema to BLE.   Pulses: Dorsalis Pedis R: 2+ (normal)/L: 2+ (normal)  Skin: Skin color, texture, turgor normal. No rashes or lesions  Neurologic: Normal mood and affect  Alert and oriented X 3    Significant Labs:   BMP:   Recent Labs   Lab 01/09/19  1205   *   *   K 3.0*   CL 92*   CO2 29   BUN 20   CREATININE 1.4   CALCIUM 9.4   , CMP   Recent Labs   Lab 01/09/19  1205   *   K 3.0*   CL 92*   CO2 29   *   BUN 20   CREATININE 1.4   CALCIUM 9.4   PROT 7.8   ALBUMIN 3.3*   BILITOT 2.0*   ALKPHOS 94   AST 76*   ALT 20   ANIONGAP 13   ESTGFRAFRICA 53*   EGFRNONAA 46*   , CBC   Recent Labs   Lab 01/09/19  1205   WBC 14.46*   HGB 13.4*   HCT 42.5   *    and Troponin    Recent Labs   Lab 01/09/19  1205   TROPONINI 0.041*       Assessment and Plan:     There are no hospital problems to display for this patient.      VTE Risk Mitigation (From admission, onward)    None        No current facility-administered medications for this encounter.      Current Outpatient Medications   Medication Sig    bicalutamide (CASODEX) 50 MG Tab Take 50 mg by mouth once daily.    cholecalciferol, vitamin D3, (VITAMIN D3) 2,000 unit Cap Take 1 capsule by mouth once daily.    cloNIDine (CATAPRES) 0.1 MG tablet Take 0.1 mg by mouth 3 (three) times daily as needed. SBP > 150    furosemide (LASIX) 40 MG tablet Take 40 mg by mouth as directed. Take 2 tablets on M-W-F,and 1 tablet T-Th-S-S    HYDROcodone-acetaminophen (NORCO) 7.5-325 mg per tablet Take 1 tablet by mouth every 8 (eight) hours as needed for Pain.    ipratropium-albuterol (COMBIVENT)  mcg/actuation inhaler Inhale 1 puff into the lungs every 6 (six) hours as needed for Wheezing or Shortness of Breath.    ipratropium-albuterol (COMBIVENT)  mcg/actuation inhaler Inhale 1 puff into the lungs every 6 (six) hours as needed for Wheezing or Shortness of Breath.    losartan (COZAAR) 100 MG tablet Take 100 mg by mouth once daily.    metoprolol succinate (TOPROL-XL) 100 MG 24 hr tablet Take 100 mg by mouth once daily.    nitroGLYCERIN (NITROSTAT) 0.4 MG SL tablet Place 0.4 mg under the tongue every 5 (five) minutes as needed for Chest pain.    potassium chloride SA (K-DUR,KLOR-CON) 20 MEQ tablet Take 20 mEq by mouth 2 (two) times daily.    simvastatin (ZOCOR) 80 MG tablet Take 40 mg by mouth every evening.    tamsulosin (FLOMAX) 0.4 mg Cp24 Take 1 capsule (0.4 mg total) by mouth once daily.    torsemide (DEMADEX) 20 MG Tab Take 20 mg by mouth once daily.       Dx:     Left sided pleural effusion and mild pulmonary edema evidenced by chest x ray. BNP moderately elevated at 659.     Acute on chronic diastolic CHF TTE December 2018  with LVEF 55%, mild LVH    Chronic atrial fibrillation rate controlled not currently on anticoagulation according to last progress note in clinic but on xarelto in the past.     Chronic peripheral edema at baseline.     Dyslipidemia    Hypertension now low.     Plan: lasix iv  Continue losartan toprol  Consider metolazone    Delma Haq NP for Dr. Jean-Baptiste   Cardiology   [facility]    I attest that I have personally seen and examined this patient. I have reviewed and discussed the management in detail as outlined above.

## 2019-01-09 NOTE — ED PROVIDER NOTES
Encounter Date: 1/9/2019       History     Chief Complaint   Patient presents with    Shortness of Breath     Patient is 84-year-old white male with history of atrial fibrillation and congestive heart failure.  He presents with overnight history of increasing shortness of breath and pulmonary congestion. He has a nonproductive cough.  He also complains of generalized weakness over the last day or so.  Denies specific chest pain at this time.          Review of patient's allergies indicates:  No Known Allergies  Past Medical History:   Diagnosis Date    Atrial fibrillation     AVD (aortic valve disease)     Cardiomyopathy     Gout     Hypertension     Mitral valve disorder     Pure hypercholesterolemia      No past surgical history on file.  No family history on file.  Social History     Tobacco Use    Smoking status: Former Smoker    Smokeless tobacco: Never Used   Substance Use Topics    Alcohol use: Not on file    Drug use: Not on file     Review of Systems   Constitutional: Negative for fever.   HENT: Negative for sore throat.    Respiratory: Positive for cough and shortness of breath.    Cardiovascular: Negative for chest pain.   Gastrointestinal: Negative for nausea.   Genitourinary: Negative for dysuria.   Musculoskeletal: Negative for back pain.   Skin: Negative for rash.   Neurological: Negative for weakness.   Hematological: Does not bruise/bleed easily.       Physical Exam     Initial Vitals [01/09/19 1151]   BP Pulse Resp Temp SpO2   119/80 96 (!) 24 98.4 °F (36.9 °C) 98 %      MAP       --         Physical Exam    Nursing note and vitals reviewed.  Constitutional: He appears well-developed and well-nourished.   HENT:   Head: Normocephalic and atraumatic.   Mouth/Throat: Oropharynx is clear and moist.   Eyes: EOM are normal. Pupils are equal, round, and reactive to light.   Neck: Normal range of motion. Neck supple.   Cardiovascular:   Irregular rate   Pulmonary/Chest: He has wheezes in the  right upper field and the left upper field. He has rales in the right upper field and the left upper field.   Abdominal: Soft.   Musculoskeletal: Normal range of motion.        Right lower leg: He exhibits edema.        Left lower leg: He exhibits edema.   Neurological: He is alert and oriented to person, place, and time.   Skin: Skin is warm and dry.   Psychiatric: He has a normal mood and affect. Thought content normal.         ED Course   Procedures  Labs Reviewed - No data to display       Imaging Results    None         BNP and chest x-ray consistent with congestive failure.  He has been seen by the cardiologist.  His initial lactic acid was 2.4, however I do not believe this is a septic picture because he is and congestive heart failure.  He will have repeat lactic acid drawn.                       Clinical Impression:   The encounter diagnosis was CHF (congestive heart failure).      Disposition:   Disposition: Admitted  Condition: Stable                        Pool Alexander Jr., MD  01/09/19 9847

## 2019-01-10 PROBLEM — E87.6 HYPOKALEMIA: Status: ACTIVE | Noted: 2019-01-10

## 2019-01-10 PROBLEM — J44.1 COPD EXACERBATION: Status: ACTIVE | Noted: 2019-01-10

## 2019-01-10 PROBLEM — L03.116 CELLULITIS OF LEFT LOWER EXTREMITY: Status: ACTIVE | Noted: 2019-01-10

## 2019-01-10 LAB
ANION GAP SERPL CALC-SCNC: 10 MMOL/L
BUN SERPL-MCNC: 18 MG/DL
CALCIUM SERPL-MCNC: 8.3 MG/DL
CHLORIDE SERPL-SCNC: 96 MMOL/L
CK SERPL-CCNC: 1762 U/L
CO2 SERPL-SCNC: 29 MMOL/L
CREAT SERPL-MCNC: 0.9 MG/DL
EST. GFR  (AFRICAN AMERICAN): >60 ML/MIN/1.73 M^2
EST. GFR  (NON AFRICAN AMERICAN): >60 ML/MIN/1.73 M^2
GLUCOSE SERPL-MCNC: 109 MG/DL
LACTATE SERPL-SCNC: 0.7 MMOL/L
LACTATE SERPL-SCNC: 0.8 MMOL/L
LACTATE SERPL-SCNC: 1 MMOL/L
POTASSIUM SERPL-SCNC: 2.8 MMOL/L
SODIUM SERPL-SCNC: 135 MMOL/L

## 2019-01-10 PROCEDURE — 82550 ASSAY OF CK (CPK): CPT

## 2019-01-10 PROCEDURE — 27000221 HC OXYGEN, UP TO 24 HOURS

## 2019-01-10 PROCEDURE — 83605 ASSAY OF LACTIC ACID: CPT | Mod: 91

## 2019-01-10 PROCEDURE — 25000003 PHARM REV CODE 250: Performed by: NURSE PRACTITIONER

## 2019-01-10 PROCEDURE — 11000001 HC ACUTE MED/SURG PRIVATE ROOM

## 2019-01-10 PROCEDURE — 99233 PR SUBSEQUENT HOSPITAL CARE,LEVL III: ICD-10-PCS | Mod: ,,, | Performed by: FAMILY MEDICINE

## 2019-01-10 PROCEDURE — 25000003 PHARM REV CODE 250: Performed by: FAMILY MEDICINE

## 2019-01-10 PROCEDURE — A4216 STERILE WATER/SALINE, 10 ML: HCPCS | Performed by: SURGERY

## 2019-01-10 PROCEDURE — 63600175 PHARM REV CODE 636 W HCPCS: Performed by: NURSE PRACTITIONER

## 2019-01-10 PROCEDURE — 25000003 PHARM REV CODE 250: Performed by: SURGERY

## 2019-01-10 PROCEDURE — S0077 INJECTION, CLINDAMYCIN PHOSP: HCPCS | Performed by: FAMILY MEDICINE

## 2019-01-10 PROCEDURE — 94761 N-INVAS EAR/PLS OXIMETRY MLT: CPT

## 2019-01-10 PROCEDURE — 36415 COLL VENOUS BLD VENIPUNCTURE: CPT

## 2019-01-10 PROCEDURE — 25000242 PHARM REV CODE 250 ALT 637 W/ HCPCS: Performed by: NURSE PRACTITIONER

## 2019-01-10 PROCEDURE — 80048 BASIC METABOLIC PNL TOTAL CA: CPT

## 2019-01-10 PROCEDURE — 63600175 PHARM REV CODE 636 W HCPCS: Performed by: FAMILY MEDICINE

## 2019-01-10 PROCEDURE — 94640 AIRWAY INHALATION TREATMENT: CPT

## 2019-01-10 PROCEDURE — 99233 SBSQ HOSP IP/OBS HIGH 50: CPT | Mod: ,,, | Performed by: FAMILY MEDICINE

## 2019-01-10 RX ORDER — DABIGATRAN ETEXILATE 75 MG/1
75 CAPSULE ORAL DAILY
Status: DISCONTINUED | OUTPATIENT
Start: 2019-01-10 | End: 2019-01-10

## 2019-01-10 RX ORDER — LEVALBUTEROL 1.25 MG/.5ML
1.25 SOLUTION, CONCENTRATE RESPIRATORY (INHALATION)
Status: DISCONTINUED | OUTPATIENT
Start: 2019-01-10 | End: 2019-01-24 | Stop reason: HOSPADM

## 2019-01-10 RX ORDER — FUROSEMIDE 10 MG/ML
80 INJECTION INTRAMUSCULAR; INTRAVENOUS 2 TIMES DAILY
Status: DISCONTINUED | OUTPATIENT
Start: 2019-01-10 | End: 2019-01-10

## 2019-01-10 RX ORDER — POTASSIUM CHLORIDE 20 MEQ/1
40 TABLET, EXTENDED RELEASE ORAL ONCE
Status: COMPLETED | OUTPATIENT
Start: 2019-01-10 | End: 2019-01-10

## 2019-01-10 RX ORDER — METHYLPREDNISOLONE SOD SUCC 125 MG
125 VIAL (EA) INJECTION ONCE
Status: COMPLETED | OUTPATIENT
Start: 2019-01-10 | End: 2019-01-10

## 2019-01-10 RX ORDER — FUROSEMIDE 10 MG/ML
80 INJECTION INTRAMUSCULAR; INTRAVENOUS 2 TIMES DAILY
Status: COMPLETED | OUTPATIENT
Start: 2019-01-10 | End: 2019-01-11

## 2019-01-10 RX ORDER — ENOXAPARIN SODIUM 100 MG/ML
1 INJECTION SUBCUTANEOUS
Status: DISCONTINUED | OUTPATIENT
Start: 2019-01-10 | End: 2019-01-14

## 2019-01-10 RX ADMIN — Medication 3 ML: at 01:01

## 2019-01-10 RX ADMIN — LOSARTAN POTASSIUM 100 MG: 50 TABLET, FILM COATED ORAL at 08:01

## 2019-01-10 RX ADMIN — LEVALBUTEROL 1.25 MG: 1.25 SOLUTION, CONCENTRATE RESPIRATORY (INHALATION) at 02:01

## 2019-01-10 RX ADMIN — CEFEPIME HYDROCHLORIDE 1 G: 1 INJECTION, SOLUTION INTRAVENOUS at 09:01

## 2019-01-10 RX ADMIN — METHYLPREDNISOLONE SODIUM SUCCINATE 125 MG: 125 INJECTION, POWDER, FOR SOLUTION INTRAMUSCULAR; INTRAVENOUS at 11:01

## 2019-01-10 RX ADMIN — Medication: at 10:01

## 2019-01-10 RX ADMIN — CLINDAMYCIN IN 5 PERCENT DEXTROSE 600 MG: 12 INJECTION, SOLUTION INTRAVENOUS at 05:01

## 2019-01-10 RX ADMIN — CEFEPIME HYDROCHLORIDE 1 G: 1 INJECTION, SOLUTION INTRAVENOUS at 05:01

## 2019-01-10 RX ADMIN — METOPROLOL SUCCINATE 100 MG: 50 TABLET, EXTENDED RELEASE ORAL at 08:01

## 2019-01-10 RX ADMIN — SODIUM CHLORIDE: 0.9 INJECTION, SOLUTION INTRAVENOUS at 03:01

## 2019-01-10 RX ADMIN — LEVALBUTEROL 1.25 MG: 1.25 SOLUTION, CONCENTRATE RESPIRATORY (INHALATION) at 07:01

## 2019-01-10 RX ADMIN — FUROSEMIDE 80 MG: 10 INJECTION, SOLUTION INTRAMUSCULAR; INTRAVENOUS at 05:01

## 2019-01-10 RX ADMIN — POTASSIUM CHLORIDE 40 MEQ: 1500 TABLET, EXTENDED RELEASE ORAL at 05:01

## 2019-01-10 RX ADMIN — FUROSEMIDE 80 MG: 10 INJECTION, SOLUTION INTRAMUSCULAR; INTRAVENOUS at 11:01

## 2019-01-10 RX ADMIN — ENOXAPARIN SODIUM 100 MG: 100 INJECTION SUBCUTANEOUS at 11:01

## 2019-01-10 RX ADMIN — POTASSIUM CHLORIDE 40 MEQ: 1500 TABLET, EXTENDED RELEASE ORAL at 11:01

## 2019-01-10 RX ADMIN — CLINDAMYCIN IN 5 PERCENT DEXTROSE 600 MG: 12 INJECTION, SOLUTION INTRAVENOUS at 01:01

## 2019-01-10 RX ADMIN — CLINDAMYCIN IN 5 PERCENT DEXTROSE 600 MG: 12 INJECTION, SOLUTION INTRAVENOUS at 09:01

## 2019-01-10 RX ADMIN — CEFEPIME HYDROCHLORIDE 1 G: 1 INJECTION, SOLUTION INTRAVENOUS at 12:01

## 2019-01-10 NOTE — NURSING
Handoff report received from Jin. Pt assessed per PCS flowsheet. No c/o pain or discomfort. Dr Tay rounding on pt and instructed to perform a 2L bolus @250ml /hr after seeing what dose of lasix [facility] wants to give patient due to Implementing Sepsis Bundle. Antibiotics started and tolerated well. Jocy with [facility] contacted and ordered a 20mg Lasix dose now x 1. Administered and tolerated well, will cont to monitor

## 2019-01-10 NOTE — PROGRESS NOTES
"[facility] Hospital Medicine  Progress Note    Patient Name: Radhames Alonzo  MRN: [id number]  Patient Class: IP- Inpatient   Admission Date: 1/9/2019  Length of Stay: 1 days  Attending Physician: Jesús Tay MD  Primary Care Provider: Pennie Jorge NP        Subjective:     Principal Problem:Severe sepsis    HPI:  84 year old male presents to ED b/c " I couldn't get up and walk."   SOB for about a year. Worse recently. Last night it got to the point that he was too SOB to stand up.   SOB at rest and with eating.   He also notes left foot pain. He has been seeing woundcare for a wound on this foot last 7 months   Workup in ED is significant for BNP 600s. Last BNP 200s 3 weeks ago. 160s 9 months ago.  He sees Dr. Jean-Baptiste regularly.   [facility] has seen pt and rec admit for diuresis, rule out MI   First TPN with slight bump.038  Lactic acid is 3.6. Meets I am told by ED MD that patient "has no signs of infection on physical exam." I am also told that CXR and U/A are clear.     He drinks six pack daily. Last drink 2 days ago. No h/o DT's in past         Hospital Course:  He has been started on cefepime and clinda for left lower ext cellulitis. He has been afebrile. WBC was 10104. Blood cultures NGTD. Lactate was up to 3.8, coming down.RR was 24 and had low sats on admission. Was on venti, now down to 3L NC     He is also wheezing. C/o SOB. CXR with CHF.     He does report that he takes pradaxa for his a fib at home. Not on home meds list nor reordered here. A fib on EKG. BNP was 659. Ordered for lasix 80mg IV BID. Urinating a lot. K low this am 2.8    Interval note: SOB  Review of Systems   Constitutional: Positive for chills. Negative for appetite change and fatigue.   HENT: Positive for congestion and sore throat. Negative for rhinorrhea.    Respiratory: Positive for shortness of breath. Negative for cough and wheezing.    Cardiovascular: Positive for leg swelling. Negative for chest pain and " palpitations.        2 pillow orthopnea    Gastrointestinal: Negative for constipation, nausea and vomiting.   Genitourinary: Positive for dysuria. Negative for difficulty urinating, enuresis, flank pain, frequency, hematuria and urgency.   Musculoskeletal: Positive for gait problem.   Skin: Positive for wound. Negative for color change and rash.   Neurological: Positive for weakness. Negative for light-headedness.   Hematological: Negative for adenopathy. Does not bruise/bleed easily.     Objective:     Vital Signs (Most Recent):  Temp: 96.2 °F (35.7 °C) (01/10/19 0746)  Pulse: 86 (01/10/19 1000)  Resp: 20 (01/10/19 0746)  BP: 111/66 (01/10/19 0746)  SpO2: 100 % (01/10/19 0746) Vital Signs (24h Range):  Temp:  [96.2 °F (35.7 °C)-98.4 °F (36.9 °C)] 96.2 °F (35.7 °C)  Pulse:  [] 86  Resp:  [18-25] 20  SpO2:  [94 %-100 %] 100 %  BP: ()/(54-80) 111/66     Weight: 99.8 kg (220 lb)  Body mass index is 29.03 kg/m².    Physical Exam   Constitutional: He is oriented to person, place, and time. He appears well-developed and well-nourished.   Fetor hepaticus    HENT:   Head: Normocephalic and atraumatic.   Right Ear: External ear normal.   Eyes: Conjunctivae and EOM are normal. Pupils are equal, round, and reactive to light. Scleral icterus is present.   Neck: Normal range of motion. Neck supple. No JVD present.   Cardiovascular: Normal rate, regular rhythm, normal heart sounds and intact distal pulses.   No murmur heard.  Pulmonary/Chest: He is in respiratory distress. He has wheezes. He has rales.   Crackle bases   Abdominal: Soft. Bowel sounds are normal. There is no rebound and no guarding.   Periumbilical venous congestion/prominence    Musculoskeletal: Normal range of motion. He exhibits edema.   Neurological: He is alert and oriented to person, place, and time. He has normal reflexes. He displays normal reflexes. No cranial nerve deficit. He exhibits normal muscle tone. Coordination normal.   Skin: Skin is  warm and dry. There is erythema.   2cm wound left lateral foot, see pic   Erythema of foot and leg up to thigh, see picture     Psychiatric: He has a normal mood and affect. His behavior is normal. Judgment and thought content normal.                 CRANIAL NERVES     CN III, IV, VI   Pupils are equal, round, and reactive to light.  Extraocular motions are normal.       Significant Labs:   Blood Culture:   Recent Labs   Lab 01/09/19  1205 01/09/19  1230   LABBLOO No Growth to date No Growth to date     CBC:   Recent Labs   Lab 01/09/19  1205   WBC 14.46*   HGB 13.4*   HCT 42.5   *     CMP:   Recent Labs   Lab 01/09/19  1205 01/10/19  0205   * 135*   K 3.0* 2.8*   CL 92* 96   CO2 29 29   * 109   BUN 20 18   CREATININE 1.4 0.9   CALCIUM 9.4 8.3*   PROT 7.8  --    ALBUMIN 3.3*  --    BILITOT 2.0*  --    ALKPHOS 94  --    AST 76*  --    ALT 20  --    ANIONGAP 13 10   EGFRNONAA 46* >60   lactic acid 3.6>>1.0  Mag 2.2  Recent Labs   Lab 01/09/19  2027   TROPONINI 0.032*       BNP  Recent Labs   Lab 01/09/19  1205   *       Urine Studies:   Recent Labs   Lab 01/09/19  1305   COLORU Yellow   APPEARANCEUA Clear   PHUR 6.0   SPECGRAV 1.015   PROTEINUA 1+*   GLUCUA Negative   KETONESU Negative   BILIRUBINUA Negative   OCCULTUA 3+*   NITRITE Negative   UROBILINOGEN 1.0   LEUKOCYTESUR Negative   RBCUA 0   WBCUA 3   BACTERIA Few*   HYALINECASTS 0     Blood cultures NGTD x 2    Significant Imaging:     CXR There is elevation of the left hemidiaphragm.  There is a small left-sided pleural effusion.  Central pulmonary vascular congestion is noted with mild interstitial edema.  The heart is enlarged.  Calcified atheromatous disease affects the aorta.  Age-appropriate degenerative changes affect the skeleton.    US lower ext   CXR   EKG Atrial fibrillation with premature ventricular or aberrantly conducted  complexes  Right axis deviation  Abnormal ECG  No previous ECGs available  Confirmed by Lizett BO,  Monica ([id number]) on 1/9/2019 4:33:19 PM    ECHO:    Assessment/Plan:      * Severe sepsis    Due to Venous stasis and wound of LLE   Start Cefepime and clinda to cover for everything including MRSA and psuedomonas.   Clinda instead of Vanc since mild CKD and we will diurese him as well   Lactic acid q 6 coming down  Bolus 2 liters overnight creat 0.9 bp 111/66 this am  woundcare consult          COPD exacerbation    Need to assess if truly hypoxic. No low sats charted but has been on venti and now 4L NC. Will try to wean. Repeat CXR.        Cellulitis of left lower extremity    See severe sepsis treatment  Check u/s       Hypokalemia    Likely due to large amount of fluids and lasix  Replace today 40meq po x 2 doses and consider adding in spironolactone tomorrow.       Rhabdomyolysis    Mild. Due to chronic ETOHism   2  liter bolus for sepsis should take care of it   CPK pending this am          CHF (congestive heart failure)    [facility] consulted   I am assuming they have a recent ECHO in their records. Will not order one here but would like records  Continue ARB, BB    [facility] on call for Lasix 80mg IV BID per cards, creat stable. Watch urine output strictly as well as weights.         Atrial fibrillation    Rate controlled. On metoprolol    He reports that he is taking pradaxa but called pharmacy and they do not report that he has filled anything for anticoagulation especially pradaxa in recent months. Start lovenox 1mg/kg BID until we get his home meds           VTE Risk Mitigation (From admission, onward)        Ordered     enoxaparin injection 100 mg  Every 12 hours (non-standard times)      01/10/19 1134     IP VTE HIGH RISK PATIENT  Once      01/09/19 3292              Laura Ruiz MD  Department of Hospital Medicine   [facility]

## 2019-01-10 NOTE — ASSESSMENT & PLAN NOTE
Rate controlled. On metoprolol  Not on anticoagulation according to [facility] note. I would presume this is from liver failure based on his exam

## 2019-01-10 NOTE — PROGRESS NOTES
[facility]  Cardiology  Progress Note    Patient Name: Radhames Alonzo  MRN: [id number]  Admission Date: 1/9/2019  Hospital Length of Stay: 1 days  Code Status: Full Code   Attending Physician: Jesús Tay MD   Primary Care Physician: Pennie Jorge NP  Expected Discharge Date: 1/10/2019  Principal Problem:Severe sepsis    Subjective:     Hospital Course: HPI: Patient is an 84 year old male with a past medical history of chronic diastolic heart failure, chronic atrial fibrillation, carotid stenosis, HHD, dyslipidemia, and chronic peripheral edema presents to the emergency room after becoming increasingly weak over the last two days. He also reports shortness of breath and coughing with white sputum. He has had a decline in status over the last couple of months and is now incontinent of bowels and bladder.  He denies any chest pain at this time. Upon admission, EKG reveals atrial fibrillation with controlled ventricular response. Chest x ray reveals left sided pleural effusion and mild pulmonary edema. Blood pressure is marginal. BNP moderately elevated at 659 as well as mildly elevated troponin .041 and CK-MB 2752. WBC mildly elevated and renal function borderline and at baseline. [facility] asked to evaluate.          Interval History: He has received two doses of Lasix IV and 2L bolus overnight as he was found to be septic with a venous stasis ulcer to left foot. He continues to endorse shortness of breath this morning. Also on IV abx per primary and wound care was consulted.     ROS      Constitutional : Weakness, lethargy  EENT : Negative  CV : BLE edema +2 at baseline.   Respiratory : Shortness of breath, coughing with sputum production.   Gastrointestinal: Negative   Genitourinary: Negative  Musculoskeletal: Negative  Skin : Ulcer to left lateral foot  Neurological : AAO x 3       Objective:     Vital Signs (Most Recent):  Temp: 96.2 °F (35.7 °C) (01/10/19 0746)  Pulse: 97 (01/10/19  0800)  Resp: 20 (01/10/19 0746)  BP: 111/66 (01/10/19 0746)  SpO2: 100 % (01/10/19 0746) Vital Signs (24h Range):  Temp:  [96.2 °F (35.7 °C)-98.4 °F (36.9 °C)] 96.2 °F (35.7 °C)  Pulse:  [] 97  Resp:  [18-25] 20  SpO2:  [94 %-100 %] 100 %  BP: ()/(54-80) 111/66     Weight: 99.8 kg (220 lb)  Body mass index is 29.03 kg/m².    SpO2: 100 %  O2 Device (Oxygen Therapy): nasal cannula      Intake/Output Summary (Last 24 hours) at 1/10/2019 0940  Last data filed at 1/10/2019 0555  Gross per 24 hour   Intake 2072 ml   Output 225 ml   Net 1847 ml       Lines/Drains/Airways     Peripheral Intravenous Line                 Peripheral IV - Single Lumen 01/10/19 0525 Right Upper Arm less than 1 day                Physical Exam       General appearance: alert, appears stated age and cooperative. Frail in appearance. Lethargic. Generalized weakness.   Head: Normocephalic, without obvious abnormality, atraumatic  Eyes: conjunctivae/corneas clear. PERRL  Neck: no carotid bruit, no JVD and supple, symmetrical, trachea midline  Lungs: Expiratory rhales to left upper and lower lobe  Chest Wall: no tenderness  Heart: regular rate and rhythm, S1, S2 normal, 1/6 SM, click, rub or gallop  Abdomen: Distended, soft, non-tender; bowel sounds normal; no masses,  no organomegaly  Extremities: Extremities normal, atraumatic, no cyanosis, clubbing. +2 edema to BLE.   Pulses: Dorsalis Pedis R: 2+ (normal)/L: 2+ (normal)  Skin: Skin color, texture, turgor normal. Venous stasis ulcer to left lateral foot.   Neurologic: Normal mood and affect  Alert and oriented X 3        Significant Labs:   BMP:   Recent Labs   Lab 01/09/19  1205 01/09/19  1832 01/10/19  0205   *  --  109   *  --  135*   K 3.0*  --  2.8*   CL 92*  --  96   CO2 29  --  29   BUN 20  --  18   CREATININE 1.4  --  0.9   CALCIUM 9.4  --  8.3*   MG  --  2.2  --    , CMP   Recent Labs   Lab 01/09/19  1205 01/10/19  0205   * 135*   K 3.0* 2.8*   CL 92* 96   CO2  29 29   * 109   BUN 20 18   CREATININE 1.4 0.9   CALCIUM 9.4 8.3*   PROT 7.8  --    ALBUMIN 3.3*  --    BILITOT 2.0*  --    ALKPHOS 94  --    AST 76*  --    ALT 20  --    ANIONGAP 13 10   ESTGFRAFRICA 53* >60   EGFRNONAA 46* >60   , CBC   Recent Labs   Lab 01/09/19  1205   WBC 14.46*   HGB 13.4*   HCT 42.5   *    and Troponin   Recent Labs   Lab 01/09/19  1205 01/09/19  1535 01/09/19 2027   TROPONINI 0.041* 0.038* 0.032*         Assessment and Plan:       Active Diagnoses:    Diagnosis Date Noted POA    PRINCIPAL PROBLEM:  Severe sepsis [A41.9, R65.20] 01/09/2019 Yes    CHF (congestive heart failure) [I50.9] 01/09/2019 Yes    Rhabdomyolysis [M62.82] 01/09/2019 Yes    Atrial fibrillation [I48.91] 04/30/2015 Yes      Problems Resolved During this Admission:       VTE Risk Mitigation (From admission, onward)        Ordered     enoxaparin injection 40 mg  Daily      01/09/19 1751     IP VTE HIGH RISK PATIENT  Once      01/09/19 1751        Dx: Improving     Sepsis possibly secondary to venous stasis ulcer on left lateral foot. 2L fluid bolus overnight, and IV abx initiated per primary.     Left sided pleural effusion and mild pulmonary edema evidenced by chest x ray. BNP moderately elevated at 659.      Acute on chronic diastolic CHF TTE December 2018 with LVEF 55%, mild LVH. Lasix IV 80 mg bid.      Chronic atrial fibrillation rate controlled not currently on anticoagulation according to last progress note in clinic but on xarelto in the past.      Chronic peripheral edema at baseline.      Dyslipidemia     Hypertension now low.      Plan:continue diuresis, ABX and breathing treament        Delma Haq NP for Dr. Jean-Baptiste  Cardiology  [facility]    I attest that I have personally seen and examined this patient. I have reviewed and discussed the management in detail as outlined above.

## 2019-01-10 NOTE — ASSESSMENT & PLAN NOTE
Due to Venous stasis and wound of LLE   Start Cefepime and clinda to cover for everything including MRSA and psuedomonas.   Clinda instead of Vanc since mild CKD and we will diurese him as well   Lactic acid q 6   Bolus 2 liters overnight   woundcare consult

## 2019-01-10 NOTE — HOSPITAL COURSE
He has been started on cefepime and clinda for left lower ext cellulitis. He has been afebrile. WBC was 01574. Blood cultures NGTD. Lactate was up to 3.8, coming down.RR was 24 and had low sats on admission. Was on venti, now down to 3L NC   He is also wheezing. C/o SOB. CXR with CHF.   He does report that he takes pradaxa for his a fib at home. Not on home meds list nor reordered here. A fib on EKG. BNP was 659. Ordered for lasix 80mg IV BID. Urinating a lot. K low this am 2.8    1/11/19 Getting lasix 80mg IV bid per Cardiology; not much urine output yesterday with dribbling noted; concern for obstruction; will get bladder scan; consider renal US.   Creat 0.9 stable; K+ 3.6    CPK 2378>1762; TNI 0.032 x 1; repeat this am     1-12 Pt continues to have SOB and pul congestion; Dr Robertson consulted    1-13 Pt is fairly complex with mx cardiac/pulmonary issues causing his coughing and SOB; has a hx of underlying prostate problems; a swing bed might be necessary; Dr Robertson and cardiology following    1/14 pt is still on clinda and cefepime for the lower ext cellulitis. Afebrile/ no elevated WBC. US negative for DVT. Still swollen but reports that it is much better than his normal   CXR 1/812 Left basilar consolidation or atelectasis and small pleural effusion. He is 95% on RA  Still weak. Having trouble standing on side bed.     1/15/19 Pt started feeling worse yesterday evening; this am noted to have LOW BP- SBP 80s, increased RR 30s, Temp 95.6,   Dr. Jean-Baptiste reports HF better, BNP 200s, still with LE edema, US negative for DVT; Also gets pradaxa  Underlying chronic prostate problems; defers any intervention or sx   Day 7/10 Cefepime and clinda for LE cellulitis and pneumonia. Blood cultures negative.   Looks jaundice with sclerema icterus; admit bilirubin - 2.0 elevated on 1/9/19 ; check LFTs/CMP/bilirubin    1/15/19: patient with worsening respiratory status overnight. He was tried on BiPaP but refused to wear it. Was  agitated during the night. He was abdominal breathing but maintainings sats on 2L NC. BNP normal. D-dimer normal and anticoagulated since admit, restarted PO pradaxa yesterday. This AM, continues to decompensated. He is alert and oriented on initial eval but over the course of our conversation decompensates and becomes very lethargic. He has been waxing and waning throughout the night per RN report. Unclear if has h/o cirrhosis but on chart review this AM his bilirubin 2 and mild elevation AST--this was not trended during his hospital stay. This AM he has scleral icterus and yellowing of skin which was NOT PRESENT YESTERDAY. Also his LLE cellulitis is worsening, redness extending up the shin to the knee today which was NOT PRESENT YESTERDAY. He is hypothermic, tachypenic, hypotensive and tachycardic (despite BB on board).     1/16/19  Transferred up to ICU yesterday for hypotension and AMS on the floor. NH4 level 24. Bolused 1 liter NS. Did not require pressors. This am 120s/70s  UOP is good. Abx changed from cefepime and clinda to cefepime and VANC. Afebrile.  ABG ordered yesterday. He does have some hypercapnea. Refuses to wear bipap.    1/17/19  Patient became more alert and arousable throughout the day yesterday. Still with issues lying flat (which is why his imaging was not completed yesterday). However, his BP is improved without his home meds. He is tolerating his abx well. He did wear Bipap VERY little last night, but did not sleep because of it. UOP is good. No fevers.     1/18/19  He is awake and alert this am. He wore his bipap overnight   Tachy but BP is stable   Increased edema this am     1/19/19  He is very somnolent this am. Fought with him overnight to get bipap. Required ativan. He is sleepy this am   He is now a DNR     1/20/19  Steroids increased and IV Lasix added yesterday  He wore bipap about an hour last night   He is more awake today, no ativan given.   Spoke about hospice yesterday since he  "largely refuses the Bipap. Pt family is in agreement with hospice if he deteriorates or fails to improve   Out of bed and eating breakfast today    1/21 he is awake and alert again today. Did wear bipap a couple hours over evening yesterday (5 1/2hrs). Ct shows pleural effusion and pericardial effusion. Not on diuresis due to creat inability to tolerate. D/julia IV yesterday. Creat 1.4 today.     He is still on vanc (7days) and cefepime (13 days) (and s/p 1 week clinda) for the lower leg cellulitis. Afebrile WBC did go up as well but on steroids dose up and down over last week. No fevers. BP stable     TTE December 2018 with LVEF 55%, mild LVH    1/22   Abx were d/julia yesterday as he has had 1 week vanc and clinda as well as 2 weeks cefepime and cellulitis had resolved yesterday. He remains a febrile. WBC stable (slightly lower than yesterday) legs without increase redness or swelling    Dr tom following. Does not feel that a throcentesis is warranted at this time. Will order patient CPAP for home use as he is tolerating it more each day. Pleural effusion stable. On 2L NC and sats 96%. Also noted pericardial effusion. [facility] following as well. Added aldactone and demadex 20mg po daily yesterday.  GFR stable. He is out -1420 on yesterday I/O. Reports breathing is getting better    Will need to have diuretics BID     1/23  This am pt bp was 77/52 manually. He was asymptomatic. Was increased to torsemide 20mg po BID and also on aldactone 25mg po daily. Creat stable 1.2 this am. He was -3620 yesterday.  this am. Given 500ml NS bolus for hypotension this am. Bp better 100/57. Cards following    Dr tom following as well. Trilogy ordered for home use. Awaiting approval.     Still without abx for cellulitis, legs remain without s/s infection.     1/24   He is lying in bed today. He reports that he is breathing "pretty damn good". He is no longer on O2. Has trilogy ordered for at home. Also has home health. Legs without " redness, still with pitting edema. On torsemide daily and aldactone daily. Kidney function stable. No fever. WBC stable. On prednisone 40mg po daily.     He reports that he has BM yesterday evening. Reports that it was green/brown. H/H slowing dropping and BUN up. Still has catheter,. Has had issues with urinary retention in past. Will set up with urology at d/c

## 2019-01-10 NOTE — ASSESSMENT & PLAN NOTE
[facility] consulted   I am assuming they have a recent ECHO in their records. Will not order one here  Continue ARB, BB  [facility] plan has IV Lasix. They have not ordered any lasix. I am not familiar with this patient.   Call [facility] on call for Lasix orders

## 2019-01-10 NOTE — H&P
"[facility] Hospital Medicine  History & Physical    Patient Name: Radhames Alonzo  MRN: [id number]  Admission Date: 1/9/2019  Attending Physician: Jesús Tay MD   Primary Care Provider: Pennie Jorge NP         Patient information was obtained from patient and ER records.     Subjective:     Principal Problem:Severe sepsis    Chief Complaint:   Chief Complaint   Patient presents with    Shortness of Breath        HPI: 84 year old male presents to ED b/c " I couldn't get up and walk."   SOB for about a year. Worse recently. Last night it got to the point that he was too SOB to stand up.   SOB at rest and with eating.   He also notes left foot pain. He has been seeing woundcare for a wound on this foot last 7 months   Workup in ED is significant for BNP 600s. Last BNP 200s 3 weeks ago. 160s 9 months ago.  He sees Dr. Jean-Baptiste regularly.   [facility] has seen pt and rec admit for diuresis, rule out MI   First TPN with slight bump.038  Lactic acid is 3.6. Meets I am told by ED MD that patient "has no signs of infection on physical exam." I am also told that CXR and U/A are clear.     He drinks six pack daily. Last drink 2 days ago. No h/o DT's in past         Past Medical History:   Diagnosis Date    Atrial fibrillation     AVD (aortic valve disease)     Cardiomyopathy     Gout     Hypertension     Mitral valve disorder     Pure hypercholesterolemia        History reviewed. No pertinent surgical history.    Review of patient's allergies indicates:  No Known Allergies    No current facility-administered medications on file prior to encounter.      Current Outpatient Medications on File Prior to Encounter   Medication Sig    bicalutamide (CASODEX) 50 MG Tab Take 50 mg by mouth once daily.    furosemide (LASIX) 40 MG tablet Take 40 mg by mouth as directed. Take 2 tablets on M-W-F,and 1 tablet T-Th-S-S    HYDROcodone-acetaminophen (NORCO) 7.5-325 mg per tablet Take 1 tablet by mouth every 8 " (eight) hours as needed for Pain.    losartan (COZAAR) 100 MG tablet Take 100 mg by mouth once daily.    metoprolol succinate (TOPROL-XL) 100 MG 24 hr tablet Take 100 mg by mouth once daily.    potassium chloride SA (K-DUR,KLOR-CON) 20 MEQ tablet Take 20 mEq by mouth 2 (two) times daily.    simvastatin (ZOCOR) 80 MG tablet Take 40 mg by mouth every evening.    torsemide (DEMADEX) 20 MG Tab Take 20 mg by mouth once daily.    cholecalciferol, vitamin D3, (VITAMIN D3) 2,000 unit Cap Take 1 capsule by mouth once daily.    cloNIDine (CATAPRES) 0.1 MG tablet Take 0.1 mg by mouth 3 (three) times daily as needed. SBP > 150    ipratropium-albuterol (COMBIVENT)  mcg/actuation inhaler Inhale 1 puff into the lungs every 6 (six) hours as needed for Wheezing or Shortness of Breath.    ipratropium-albuterol (COMBIVENT)  mcg/actuation inhaler Inhale 1 puff into the lungs every 6 (six) hours as needed for Wheezing or Shortness of Breath.    nitroGLYCERIN (NITROSTAT) 0.4 MG SL tablet Place 0.4 mg under the tongue every 5 (five) minutes as needed for Chest pain.    tamsulosin (FLOMAX) 0.4 mg Cp24 Take 1 capsule (0.4 mg total) by mouth once daily.     Family History     None        Tobacco Use    Smoking status: Former Smoker    Smokeless tobacco: Never Used   Substance and Sexual Activity    Alcohol use: No     Frequency: Never    Drug use: Not on file    Sexual activity: Not on file     Review of Systems   Constitutional: Positive for chills. Negative for activity change, appetite change, diaphoresis, fatigue, fever and unexpected weight change.   HENT: Positive for congestion and sore throat. Negative for dental problem, drooling, ear discharge, ear pain, facial swelling, mouth sores, nosebleeds, postnasal drip, rhinorrhea, sinus pressure, sneezing, trouble swallowing and voice change.    Eyes: Negative for photophobia, pain, discharge, redness, itching and visual disturbance.   Respiratory: Positive for  shortness of breath. Negative for apnea, cough, chest tightness and wheezing.    Cardiovascular: Positive for leg swelling. Negative for chest pain and palpitations.        2 pillow orthopnea    Gastrointestinal: Positive for constipation, nausea and vomiting. Negative for abdominal distention, abdominal pain, blood in stool and diarrhea.   Genitourinary: Positive for dysuria. Negative for difficulty urinating, enuresis, flank pain, frequency, hematuria and urgency.   Musculoskeletal: Negative for arthralgias, back pain, gait problem, joint swelling, myalgias, neck pain and neck stiffness.   Skin: Positive for wound. Negative for color change and rash.   Neurological: Negative for dizziness, tremors, seizures, syncope, facial asymmetry, speech difficulty, weakness, light-headedness, numbness and headaches.   Hematological: Negative for adenopathy. Does not bruise/bleed easily.   Psychiatric/Behavioral: Negative for agitation, behavioral problems, confusion, decreased concentration, dysphoric mood, sleep disturbance and suicidal ideas. The patient is not nervous/anxious.      Objective:     Vital Signs (Most Recent):  Temp: 98 °F (36.7 °C) (01/09/19 1751)  Pulse: 93 (01/09/19 1806)  Resp: 20 (01/09/19 1751)  BP: 132/78 (01/09/19 1751)  SpO2: 98 % (01/09/19 1924) Vital Signs (24h Range):  Temp:  [98 °F (36.7 °C)-98.4 °F (36.9 °C)] 98 °F (36.7 °C)  Pulse:  [] 93  Resp:  [18-25] 20  SpO2:  [94 %-100 %] 98 %  BP: ()/(54-80) 132/78     Weight: 94.8 kg (209 lb)  Body mass index is 27.57 kg/m².    Physical Exam   Constitutional: He is oriented to person, place, and time. He appears well-developed and well-nourished.   Fetor hepaticus    HENT:   Head: Normocephalic and atraumatic.   Right Ear: External ear normal.   Left Ear: External ear normal.   Nose: Nose normal.   Mouth/Throat: Oropharynx is clear and moist.   Eyes: Conjunctivae and EOM are normal. Pupils are equal, round, and reactive to light. Right eye  exhibits no discharge. Left eye exhibits no discharge. No scleral icterus.   Neck: Normal range of motion. Neck supple. No JVD present. No tracheal deviation present. No thyromegaly present.   Cardiovascular: Normal rate, regular rhythm, normal heart sounds and intact distal pulses.   No murmur heard.  Pulmonary/Chest: Effort normal and breath sounds normal. No respiratory distress. He has no wheezes. He has no rales. He exhibits no tenderness.   Crackle bases   Abdominal: Soft. Bowel sounds are normal. He exhibits no distension and no mass. There is no tenderness. There is no rebound and no guarding.   Periumbilical venous congestion/prominence    Musculoskeletal: Normal range of motion.   Lymphadenopathy:     He has no cervical adenopathy.   Neurological: He is alert and oriented to person, place, and time. He has normal reflexes. He displays normal reflexes. No cranial nerve deficit. He exhibits normal muscle tone. Coordination normal.   Skin: Skin is warm and dry. There is erythema.   2cm wound left lateral foot, see pic   Erythema of foot and leg up to thigh, see picture     Psychiatric: He has a normal mood and affect. His behavior is normal. Judgment and thought content normal.                 CRANIAL NERVES     CN III, IV, VI   Pupils are equal, round, and reactive to light.  Extraocular motions are normal.       Significant Labs:   Blood Culture: No results for input(s): LABBLOO in the last 48 hours.  CBC:   Recent Labs   Lab 01/09/19  1205   WBC 14.46*   HGB 13.4*   HCT 42.5   *     CMP:   Recent Labs   Lab 01/09/19  1205   *   K 3.0*   CL 92*   CO2 29   *   BUN 20   CREATININE 1.4   CALCIUM 9.4   PROT 7.8   ALBUMIN 3.3*   BILITOT 2.0*   ALKPHOS 94   AST 76*   ALT 20   ANIONGAP 13   EGFRNONAA 46*     Urine Culture: No results for input(s): LABURIN in the last 48 hours.  Urine Studies:   Recent Labs   Lab 01/09/19  1305   COLORU Yellow   APPEARANCEUA Clear   PHUR 6.0   SPECGRAV 1.015    PROTEINUA 1+*   GLUCUA Negative   KETONESU Negative   BILIRUBINUA Negative   OCCULTUA 3+*   NITRITE Negative   UROBILINOGEN 1.0   LEUKOCYTESUR Negative   RBCUA 0   WBCUA 3   BACTERIA Few*   HYALINECASTS 0       Significant Imaging: I have reviewed and interpreted all pertinent imaging results/findings within the past 24 hours.    Assessment/Plan:     * Severe sepsis    Due to Venous stasis and wound of LLE   Start Cefepime and clinda to cover for everything including MRSA and psuedomonas.   Clinda instead of Vanc since mild CKD and we will diurese him as well   Lactic acid q 6   Bolus 2 liters overnight   woundcare consult          Rhabdomyolysis    Mild. Due to chronic ETOHism   2  liter bolus for sepsis should take care of it   CPK in am          CHF (congestive heart failure)    [facility] consulted   I am assuming they have a recent ECHO in their records. Will not order one here  Continue ARB, BB  [facility] plan has IV Lasix. They have not ordered any lasix. I am not familiar with this patient.   Call [facility] on call for Lasix orders          Atrial fibrillation    Rate controlled. On metoprolol  Not on anticoagulation according to [facility] note. I would presume this is from liver failure based on his exam           VTE Risk Mitigation (From admission, onward)        Ordered     enoxaparin injection 40 mg  Daily      01/09/19 1751     IP VTE HIGH RISK PATIENT  Once      01/09/19 1751             Jesús Tay MD  Department of Hospital Medicine   [facility]

## 2019-01-10 NOTE — CONSULTS
Recommendation for Left lateral foot venous ulcer as follows:  Cleanse wound with NS using gauze. Apply silver alginate to wound bed, cover with ABD and secure with tape daily.      Optimize nutrition with increased protein and elevate lower legs to promote venous return.

## 2019-01-10 NOTE — PLAN OF CARE
01/10/19 1329   Medicare Message   Important Message from Medicare regarding Discharge Appeal Rights Given to patient/caregiver;Explained to patient/caregiver;Signed/date by patient/caregiver   Date IMM was signed 01/09/19   Time IMM was signed [id number]

## 2019-01-10 NOTE — ASSESSMENT & PLAN NOTE
Need to assess if truly hypoxic. No low sats charted but has been on venti and now 4L NC. Will try to wean. Repeat CXR.

## 2019-01-10 NOTE — HPI
"84 year old male presents to ED b/c " I couldn't get up and walk."   SOB for about a year. Worse recently. Last night it got to the point that he was too SOB to stand up.   SOB at rest and with eating.   He also notes left foot pain. He has been seeing woundcare for a wound on this foot last 7 months   Workup in ED is significant for BNP 600s. Last BNP 200s 3 weeks ago. 160s 9 months ago.  He sees Dr. Jean-Baptiste regularly.   [facility] has seen pt and rec admit for diuresis, rule out MI   First TPN with slight bump.038  Lactic acid is 3.6. Meets I am told by ED MD that patient "has no signs of infection on physical exam." I am also told that CXR and U/A are clear.     He drinks six pack daily. Last drink 2 days ago. No h/o DT's in past       "

## 2019-01-10 NOTE — ASSESSMENT & PLAN NOTE
Mild. Due to chronic ETOHism   2  liter bolus for sepsis should take care of it   CPK pending this am

## 2019-01-10 NOTE — ASSESSMENT & PLAN NOTE
Likely due to large amount of fluids and lasix  Replace today 40meq po x 2 doses and consider adding in spironolactone tomorrow.

## 2019-01-10 NOTE — ASSESSMENT & PLAN NOTE
[facility] consulted   I am assuming they have a recent ECHO in their records. Will not order one here but would like records  Continue ARB, BB    [facility] on call for Lasix 80mg IV BID per cards, creat stable. Watch urine output strictly as well as weights.

## 2019-01-10 NOTE — SUBJECTIVE & OBJECTIVE
Past Medical History:   Diagnosis Date    Atrial fibrillation     AVD (aortic valve disease)     Cardiomyopathy     Gout     Hypertension     Mitral valve disorder     Pure hypercholesterolemia        History reviewed. No pertinent surgical history.    Review of patient's allergies indicates:  No Known Allergies    No current facility-administered medications on file prior to encounter.      Current Outpatient Medications on File Prior to Encounter   Medication Sig    bicalutamide (CASODEX) 50 MG Tab Take 50 mg by mouth once daily.    furosemide (LASIX) 40 MG tablet Take 40 mg by mouth as directed. Take 2 tablets on M-W-F,and 1 tablet T-Th-S-S    HYDROcodone-acetaminophen (NORCO) 7.5-325 mg per tablet Take 1 tablet by mouth every 8 (eight) hours as needed for Pain.    losartan (COZAAR) 100 MG tablet Take 100 mg by mouth once daily.    metoprolol succinate (TOPROL-XL) 100 MG 24 hr tablet Take 100 mg by mouth once daily.    potassium chloride SA (K-DUR,KLOR-CON) 20 MEQ tablet Take 20 mEq by mouth 2 (two) times daily.    simvastatin (ZOCOR) 80 MG tablet Take 40 mg by mouth every evening.    torsemide (DEMADEX) 20 MG Tab Take 20 mg by mouth once daily.    cholecalciferol, vitamin D3, (VITAMIN D3) 2,000 unit Cap Take 1 capsule by mouth once daily.    cloNIDine (CATAPRES) 0.1 MG tablet Take 0.1 mg by mouth 3 (three) times daily as needed. SBP > 150    ipratropium-albuterol (COMBIVENT)  mcg/actuation inhaler Inhale 1 puff into the lungs every 6 (six) hours as needed for Wheezing or Shortness of Breath.    ipratropium-albuterol (COMBIVENT)  mcg/actuation inhaler Inhale 1 puff into the lungs every 6 (six) hours as needed for Wheezing or Shortness of Breath.    nitroGLYCERIN (NITROSTAT) 0.4 MG SL tablet Place 0.4 mg under the tongue every 5 (five) minutes as needed for Chest pain.    tamsulosin (FLOMAX) 0.4 mg Cp24 Take 1 capsule (0.4 mg total) by mouth once daily.     Family History     None         Tobacco Use    Smoking status: Former Smoker    Smokeless tobacco: Never Used   Substance and Sexual Activity    Alcohol use: No     Frequency: Never    Drug use: Not on file    Sexual activity: Not on file     Review of Systems   Constitutional: Positive for chills. Negative for activity change, appetite change, diaphoresis, fatigue, fever and unexpected weight change.   HENT: Positive for congestion and sore throat. Negative for dental problem, drooling, ear discharge, ear pain, facial swelling, mouth sores, nosebleeds, postnasal drip, rhinorrhea, sinus pressure, sneezing, trouble swallowing and voice change.    Eyes: Negative for photophobia, pain, discharge, redness, itching and visual disturbance.   Respiratory: Positive for shortness of breath. Negative for apnea, cough, chest tightness and wheezing.    Cardiovascular: Positive for leg swelling. Negative for chest pain and palpitations.        2 pillow orthopnea    Gastrointestinal: Positive for constipation, nausea and vomiting. Negative for abdominal distention, abdominal pain, blood in stool and diarrhea.   Genitourinary: Positive for dysuria. Negative for difficulty urinating, enuresis, flank pain, frequency, hematuria and urgency.   Musculoskeletal: Negative for arthralgias, back pain, gait problem, joint swelling, myalgias, neck pain and neck stiffness.   Skin: Positive for wound. Negative for color change and rash.   Neurological: Negative for dizziness, tremors, seizures, syncope, facial asymmetry, speech difficulty, weakness, light-headedness, numbness and headaches.   Hematological: Negative for adenopathy. Does not bruise/bleed easily.   Psychiatric/Behavioral: Negative for agitation, behavioral problems, confusion, decreased concentration, dysphoric mood, sleep disturbance and suicidal ideas. The patient is not nervous/anxious.      Objective:     Vital Signs (Most Recent):  Temp: 98 °F (36.7 °C) (01/09/19 1751)  Pulse: 93 (01/09/19  1806)  Resp: 20 (01/09/19 1751)  BP: 132/78 (01/09/19 1751)  SpO2: 98 % (01/09/19 1924) Vital Signs (24h Range):  Temp:  [98 °F (36.7 °C)-98.4 °F (36.9 °C)] 98 °F (36.7 °C)  Pulse:  [] 93  Resp:  [18-25] 20  SpO2:  [94 %-100 %] 98 %  BP: ()/(54-80) 132/78     Weight: 94.8 kg (209 lb)  Body mass index is 27.57 kg/m².    Physical Exam   Constitutional: He is oriented to person, place, and time. He appears well-developed and well-nourished.   Fetor hepaticus    HENT:   Head: Normocephalic and atraumatic.   Right Ear: External ear normal.   Left Ear: External ear normal.   Nose: Nose normal.   Mouth/Throat: Oropharynx is clear and moist.   Eyes: Conjunctivae and EOM are normal. Pupils are equal, round, and reactive to light. Right eye exhibits no discharge. Left eye exhibits no discharge. No scleral icterus.   Neck: Normal range of motion. Neck supple. No JVD present. No tracheal deviation present. No thyromegaly present.   Cardiovascular: Normal rate, regular rhythm, normal heart sounds and intact distal pulses.   No murmur heard.  Pulmonary/Chest: Effort normal and breath sounds normal. No respiratory distress. He has no wheezes. He has no rales. He exhibits no tenderness.   Crackle bases   Abdominal: Soft. Bowel sounds are normal. He exhibits no distension and no mass. There is no tenderness. There is no rebound and no guarding.   Periumbilical venous congestion/prominence    Musculoskeletal: Normal range of motion.   Lymphadenopathy:     He has no cervical adenopathy.   Neurological: He is alert and oriented to person, place, and time. He has normal reflexes. He displays normal reflexes. No cranial nerve deficit. He exhibits normal muscle tone. Coordination normal.   Skin: Skin is warm and dry. There is erythema.   2cm wound left lateral foot, see pic   Erythema of foot and leg up to thigh, see picture     Psychiatric: He has a normal mood and affect. His behavior is normal. Judgment and thought content  normal.                 CRANIAL NERVES     CN III, IV, VI   Pupils are equal, round, and reactive to light.  Extraocular motions are normal.       Significant Labs:   Blood Culture: No results for input(s): LABBLOO in the last 48 hours.  CBC:   Recent Labs   Lab 01/09/19  1205   WBC 14.46*   HGB 13.4*   HCT 42.5   *     CMP:   Recent Labs   Lab 01/09/19  1205   *   K 3.0*   CL 92*   CO2 29   *   BUN 20   CREATININE 1.4   CALCIUM 9.4   PROT 7.8   ALBUMIN 3.3*   BILITOT 2.0*   ALKPHOS 94   AST 76*   ALT 20   ANIONGAP 13   EGFRNONAA 46*     Urine Culture: No results for input(s): LABURIN in the last 48 hours.  Urine Studies:   Recent Labs   Lab 01/09/19  1305   COLORU Yellow   APPEARANCEUA Clear   PHUR 6.0   SPECGRAV 1.015   PROTEINUA 1+*   GLUCUA Negative   KETONESU Negative   BILIRUBINUA Negative   OCCULTUA 3+*   NITRITE Negative   UROBILINOGEN 1.0   LEUKOCYTESUR Negative   RBCUA 0   WBCUA 3   BACTERIA Few*   HYALINECASTS 0       Significant Imaging: I have reviewed and interpreted all pertinent imaging results/findings within the past 24 hours.

## 2019-01-10 NOTE — PLAN OF CARE
Problem: Adult Inpatient Plan of Care  Goal: Plan of Care Review  Educated patient on diagnosis and plan of care, Pt agrees with decisions Dr Tay has made, and is glad we are treating his cellulitis. Verbalized understanding and agrees to call for assistance or needs. Will cont to monitor

## 2019-01-10 NOTE — ASSESSMENT & PLAN NOTE
Due to Venous stasis and wound of LLE   Start Cefepime and clinda to cover for everything including MRSA and psuedomonas.   Clinda instead of Vanc since mild CKD and we will diurese him as well   Lactic acid q 6 coming down  Bolus 2 liters overnight creat 0.9 bp 111/66 this am  woundcare consult

## 2019-01-10 NOTE — PLAN OF CARE
Problem: Adult Inpatient Plan of Care  Goal: Plan of Care Review  Outcome: Ongoing (interventions implemented as appropriate)  Patient is compliant with fluid and medication management.  Patient is compliant with with all lab testing and procedures.  Patient remains free from all falls and injury.  Wife was at bedside for an hour this afternoon.  Patient still has decreased appetite and did not eat much of his lunch or supper.  VSS. Plan of care reviewed and agreed upon with patient.  Will continue to monitor.

## 2019-01-10 NOTE — SUBJECTIVE & OBJECTIVE
Interval note: SOB  Review of Systems   Constitutional: Positive for chills. Negative for appetite change and fatigue.   HENT: Positive for congestion and sore throat. Negative for rhinorrhea.    Respiratory: Positive for shortness of breath. Negative for cough and wheezing.    Cardiovascular: Positive for leg swelling. Negative for chest pain and palpitations.        2 pillow orthopnea    Gastrointestinal: Negative for constipation, nausea and vomiting.   Genitourinary: Positive for dysuria. Negative for difficulty urinating, enuresis, flank pain, frequency, hematuria and urgency.   Musculoskeletal: Positive for gait problem.   Skin: Positive for wound. Negative for color change and rash.   Neurological: Positive for weakness. Negative for light-headedness.   Hematological: Negative for adenopathy. Does not bruise/bleed easily.     Objective:     Vital Signs (Most Recent):  Temp: 96.2 °F (35.7 °C) (01/10/19 0746)  Pulse: 86 (01/10/19 1000)  Resp: 20 (01/10/19 0746)  BP: 111/66 (01/10/19 0746)  SpO2: 100 % (01/10/19 0746) Vital Signs (24h Range):  Temp:  [96.2 °F (35.7 °C)-98.4 °F (36.9 °C)] 96.2 °F (35.7 °C)  Pulse:  [] 86  Resp:  [18-25] 20  SpO2:  [94 %-100 %] 100 %  BP: ()/(54-80) 111/66     Weight: 99.8 kg (220 lb)  Body mass index is 29.03 kg/m².    Physical Exam   Constitutional: He is oriented to person, place, and time. He appears well-developed and well-nourished.   Fetor hepaticus    HENT:   Head: Normocephalic and atraumatic.   Right Ear: External ear normal.   Eyes: Conjunctivae and EOM are normal. Pupils are equal, round, and reactive to light. Scleral icterus is present.   Neck: Normal range of motion. Neck supple. No JVD present.   Cardiovascular: Normal rate, regular rhythm, normal heart sounds and intact distal pulses.   No murmur heard.  Pulmonary/Chest: He is in respiratory distress. He has wheezes. He has rales.   Crackle bases   Abdominal: Soft. Bowel sounds are normal. There is no  rebound and no guarding.   Periumbilical venous congestion/prominence    Musculoskeletal: Normal range of motion. He exhibits edema.   Neurological: He is alert and oriented to person, place, and time. He has normal reflexes. He displays normal reflexes. No cranial nerve deficit. He exhibits normal muscle tone. Coordination normal.   Skin: Skin is warm and dry. There is erythema.   2cm wound left lateral foot, see pic   Erythema of foot and leg up to thigh, see picture     Psychiatric: He has a normal mood and affect. His behavior is normal. Judgment and thought content normal.                 CRANIAL NERVES     CN III, IV, VI   Pupils are equal, round, and reactive to light.  Extraocular motions are normal.       Significant Labs:   Blood Culture:   Recent Labs   Lab 01/09/19  1205 01/09/19  1230   LABBLOO No Growth to date No Growth to date     CBC:   Recent Labs   Lab 01/09/19  1205   WBC 14.46*   HGB 13.4*   HCT 42.5   *     CMP:   Recent Labs   Lab 01/09/19  1205 01/10/19  0205   * 135*   K 3.0* 2.8*   CL 92* 96   CO2 29 29   * 109   BUN 20 18   CREATININE 1.4 0.9   CALCIUM 9.4 8.3*   PROT 7.8  --    ALBUMIN 3.3*  --    BILITOT 2.0*  --    ALKPHOS 94  --    AST 76*  --    ALT 20  --    ANIONGAP 13 10   EGFRNONAA 46* >60   lactic acid 3.6>>1.0  Mag 2.2  Recent Labs   Lab 01/09/19  2027   TROPONINI 0.032*       BNP  Recent Labs   Lab 01/09/19  1205   *       Urine Studies:   Recent Labs   Lab 01/09/19  1305   COLORU Yellow   APPEARANCEUA Clear   PHUR 6.0   SPECGRAV 1.015   PROTEINUA 1+*   GLUCUA Negative   KETONESU Negative   BILIRUBINUA Negative   OCCULTUA 3+*   NITRITE Negative   UROBILINOGEN 1.0   LEUKOCYTESUR Negative   RBCUA 0   WBCUA 3   BACTERIA Few*   HYALINECASTS 0     Blood cultures NGTD x 2    Significant Imaging:     CXR There is elevation of the left hemidiaphragm.  There is a small left-sided pleural effusion.  Central pulmonary vascular congestion is noted with mild  interstitial edema.  The heart is enlarged.  Calcified atheromatous disease affects the aorta.  Age-appropriate degenerative changes affect the skeleton.    US lower ext   CXR   EKG Atrial fibrillation with premature ventricular or aberrantly conducted  complexes  Right axis deviation  Abnormal ECG  No previous ECGs available  Confirmed by Monica Phoenix MD ([id number]) on 1/9/2019 4:33:19 PM    ECHO:

## 2019-01-10 NOTE — ASSESSMENT & PLAN NOTE
Rate controlled. On metoprolol    He reports that he is taking pradaxa but called pharmacy and they do not report that he has filled anything for anticoagulation especially pradaxa in recent months. Start lovenox 1mg/kg BID until we get his home meds

## 2019-01-10 NOTE — PLAN OF CARE
01/10/19 1330   Discharge Assessment   Assessment Type Discharge Planning Assessment   Confirmed/corrected address and phone number on facesheet? Yes   Assessment information obtained from? Patient   Expected Length of Stay (days) 3   Communicated expected length of stay with patient/caregiver yes   Prior to hospitilization cognitive status: Alert/Oriented   Prior to hospitalization functional status: Assistive Equipment   Current cognitive status: Alert/Oriented   Current Functional Status: Needs Assistance   Lives With spouse   Able to Return to Prior Arrangements yes   Is patient able to care for self after discharge? Unable to determine at this time (comments)   Who are your caregiver(s) and their phone number(s)? Sherman- [phone]   Patient's perception of discharge disposition home or selfcare   Readmission Within the Last 30 Days no previous admission in last 30 days   Patient currently being followed by outpatient case management? No   Patient currently receives any other outside agency services? Yes   Name and contact number of agency or person providing outside services [facility]   Is it the patient/care giver preference to resume care with the current outside agency? Yes   Equipment Currently Used at Home walker, standard   Do you have any problems affording any of your prescribed medications? No   Is the patient taking medications as prescribed? yes   Does the patient have transportation home? Yes   Transportation Anticipated family or friend will provide   Dialysis Name and Scheduled days n/a   Does the patient receive services at the [facility]? No   Discharge Plan A Home;Home Health   Discharge Plan B Home Health   DME Needed Upon Discharge  none   Patient/Family in Agreement with Plan yes         Patient admitted for cellulitis and CHF. He lives at home with spouse and states that it is just them two. He states that other than Home Health ([facility]) he does not have  anyone that checks on them. He states they take care of each other. He denies any needs for discharge at this time. Discharge planning brochure and educational handout of what signs and symptoms to look for after discharge given to patient and family. Patient and family are encouraged to call with any questions or help the would need. Contact information given. CM will continue to follow patient throughout the transitions of care, and assist with any discharge needs.

## 2019-01-11 PROBLEM — N40.1 BENIGN PROSTATIC HYPERPLASIA (BPH) WITH POST-VOID DRIBBLING: Status: ACTIVE | Noted: 2019-01-11

## 2019-01-11 PROBLEM — N39.43 BENIGN PROSTATIC HYPERPLASIA (BPH) WITH POST-VOID DRIBBLING: Status: ACTIVE | Noted: 2019-01-11

## 2019-01-11 LAB
ANION GAP SERPL CALC-SCNC: 8 MMOL/L
BASOPHILS # BLD AUTO: 0.01 K/UL
BASOPHILS NFR BLD: 0.2 %
BUN SERPL-MCNC: 21 MG/DL
CALCIUM SERPL-MCNC: 9 MG/DL
CHLORIDE SERPL-SCNC: 100 MMOL/L
CO2 SERPL-SCNC: 30 MMOL/L
CREAT SERPL-MCNC: 0.9 MG/DL
DIFFERENTIAL METHOD: ABNORMAL
EOSINOPHIL # BLD AUTO: 0 K/UL
EOSINOPHIL NFR BLD: 0 %
ERYTHROCYTE [DISTWIDTH] IN BLOOD BY AUTOMATED COUNT: 13.2 %
EST. GFR  (AFRICAN AMERICAN): >60 ML/MIN/1.73 M^2
EST. GFR  (NON AFRICAN AMERICAN): >60 ML/MIN/1.73 M^2
GLUCOSE SERPL-MCNC: 159 MG/DL
HCT VFR BLD AUTO: 38.9 %
HGB BLD-MCNC: 11.9 G/DL
LYMPHOCYTES # BLD AUTO: 0.5 K/UL
LYMPHOCYTES NFR BLD: 8.9 %
MCH RBC QN AUTO: 31.4 PG
MCHC RBC AUTO-ENTMCNC: 30.6 G/DL
MCV RBC AUTO: 103 FL
MONOCYTES # BLD AUTO: 0.3 K/UL
MONOCYTES NFR BLD: 4.3 %
NEUTROPHILS # BLD AUTO: 5.1 K/UL
NEUTROPHILS NFR BLD: 86.6 %
PLATELET # BLD AUTO: 129 K/UL
PMV BLD AUTO: 9.9 FL
POTASSIUM SERPL-SCNC: 3.6 MMOL/L
RBC # BLD AUTO: 3.79 M/UL
SODIUM SERPL-SCNC: 138 MMOL/L
TROPONIN I SERPL DL<=0.01 NG/ML-MCNC: 0.01 NG/ML
WBC # BLD AUTO: 5.86 K/UL

## 2019-01-11 PROCEDURE — 85025 COMPLETE CBC W/AUTO DIFF WBC: CPT

## 2019-01-11 PROCEDURE — 25000003 PHARM REV CODE 250: Performed by: FAMILY MEDICINE

## 2019-01-11 PROCEDURE — 27000221 HC OXYGEN, UP TO 24 HOURS

## 2019-01-11 PROCEDURE — A4216 STERILE WATER/SALINE, 10 ML: HCPCS | Performed by: SURGERY

## 2019-01-11 PROCEDURE — 63600175 PHARM REV CODE 636 W HCPCS: Performed by: NURSE PRACTITIONER

## 2019-01-11 PROCEDURE — 94761 N-INVAS EAR/PLS OXIMETRY MLT: CPT

## 2019-01-11 PROCEDURE — 63600175 PHARM REV CODE 636 W HCPCS: Performed by: FAMILY MEDICINE

## 2019-01-11 PROCEDURE — 25000003 PHARM REV CODE 250: Performed by: NURSE PRACTITIONER

## 2019-01-11 PROCEDURE — 99233 SBSQ HOSP IP/OBS HIGH 50: CPT | Mod: ,,, | Performed by: FAMILY MEDICINE

## 2019-01-11 PROCEDURE — S0077 INJECTION, CLINDAMYCIN PHOSP: HCPCS | Performed by: FAMILY MEDICINE

## 2019-01-11 PROCEDURE — 36415 COLL VENOUS BLD VENIPUNCTURE: CPT

## 2019-01-11 PROCEDURE — 99233 PR SUBSEQUENT HOSPITAL CARE,LEVL III: ICD-10-PCS | Mod: ,,, | Performed by: FAMILY MEDICINE

## 2019-01-11 PROCEDURE — 94640 AIRWAY INHALATION TREATMENT: CPT

## 2019-01-11 PROCEDURE — 25000242 PHARM REV CODE 250 ALT 637 W/ HCPCS: Performed by: NURSE PRACTITIONER

## 2019-01-11 PROCEDURE — 80048 BASIC METABOLIC PNL TOTAL CA: CPT

## 2019-01-11 PROCEDURE — 84484 ASSAY OF TROPONIN QUANT: CPT

## 2019-01-11 PROCEDURE — 11000001 HC ACUTE MED/SURG PRIVATE ROOM

## 2019-01-11 PROCEDURE — 25000003 PHARM REV CODE 250: Performed by: SURGERY

## 2019-01-11 RX ORDER — TORSEMIDE 20 MG/1
20 TABLET ORAL 2 TIMES DAILY
Status: DISCONTINUED | OUTPATIENT
Start: 2019-01-12 | End: 2019-01-15

## 2019-01-11 RX ADMIN — CEFEPIME HYDROCHLORIDE 1 G: 1 INJECTION, SOLUTION INTRAVENOUS at 08:01

## 2019-01-11 RX ADMIN — LEVALBUTEROL 1.25 MG: 1.25 SOLUTION, CONCENTRATE RESPIRATORY (INHALATION) at 07:01

## 2019-01-11 RX ADMIN — Medication 3 ML: at 05:01

## 2019-01-11 RX ADMIN — FUROSEMIDE 80 MG: 10 INJECTION, SOLUTION INTRAMUSCULAR; INTRAVENOUS at 08:01

## 2019-01-11 RX ADMIN — LEVALBUTEROL 1.25 MG: 1.25 SOLUTION, CONCENTRATE RESPIRATORY (INHALATION) at 01:01

## 2019-01-11 RX ADMIN — Medication 3 ML: at 06:01

## 2019-01-11 RX ADMIN — CLINDAMYCIN IN 5 PERCENT DEXTROSE 600 MG: 12 INJECTION, SOLUTION INTRAVENOUS at 01:01

## 2019-01-11 RX ADMIN — LOSARTAN POTASSIUM 100 MG: 50 TABLET, FILM COATED ORAL at 08:01

## 2019-01-11 RX ADMIN — ENOXAPARIN SODIUM 100 MG: 100 INJECTION SUBCUTANEOUS at 01:01

## 2019-01-11 RX ADMIN — FUROSEMIDE 80 MG: 10 INJECTION, SOLUTION INTRAMUSCULAR; INTRAVENOUS at 06:01

## 2019-01-11 RX ADMIN — METOPROLOL SUCCINATE 100 MG: 50 TABLET, EXTENDED RELEASE ORAL at 08:01

## 2019-01-11 RX ADMIN — CLINDAMYCIN IN 5 PERCENT DEXTROSE 600 MG: 12 INJECTION, SOLUTION INTRAVENOUS at 08:01

## 2019-01-11 RX ADMIN — CEFEPIME HYDROCHLORIDE 1 G: 1 INJECTION, SOLUTION INTRAVENOUS at 01:01

## 2019-01-11 RX ADMIN — CEFEPIME HYDROCHLORIDE 1 G: 1 INJECTION, SOLUTION INTRAVENOUS at 04:01

## 2019-01-11 RX ADMIN — CLINDAMYCIN IN 5 PERCENT DEXTROSE 600 MG: 12 INJECTION, SOLUTION INTRAVENOUS at 05:01

## 2019-01-11 NOTE — PROGRESS NOTES
"Completed bladder scan on patient a few minutes after he urinated. No residual found. Patient stated that he is not always able to get to the urinal in time and has been having accidents. He also states that he had a "big accident" last night where he completely missed the urinal.  "

## 2019-01-11 NOTE — PROGRESS NOTES
"[facility] Medicine  Progress Note    Patient Name: Radhames Alonzo  MRN: [id number]  Patient Class: IP- Inpatient   Admission Date: 1/9/2019  Length of Stay: 2 days  Attending Physician: Jesús Tay MD  Primary Care Provider: Pennie Jorge NP        Subjective:     Principal Problem:Severe sepsis    HPI:  84 year old male presents to ED b/c " I couldn't get up and walk."   SOB for about a year. Worse recently. Last night it got to the point that he was too SOB to stand up.   SOB at rest and with eating.   He also notes left foot pain. He has been seeing woundcare for a wound on this foot last 7 months   Workup in ED is significant for BNP 600s. Last BNP 200s 3 weeks ago. 160s 9 months ago.  He sees Dr. Jean-Baptiste regularly.   [facility] has seen pt and rec admit for diuresis, rule out MI   First TPN with slight bump.038  Lactic acid is 3.6. Meets I am told by ED MD that patient "has no signs of infection on physical exam." I am also told that CXR and U/A are clear.     He drinks six pack daily. Last drink 2 days ago. No h/o DT's in past         Hospital Course:  He has been started on cefepime and clinda for left lower ext cellulitis. He has been afebrile. WBC was 65360. Blood cultures NGTD. Lactate was up to 3.8, coming down.RR was 24 and had low sats on admission. Was on venti, now down to 3L NC   He is also wheezing. C/o SOB. CXR with CHF.   He does report that he takes pradaxa for his a fib at home. Not on home meds list nor reordered here. A fib on EKG. BNP was 659. Ordered for lasix 80mg IV BID. Urinating a lot. K low this am 2.8    1/11/19 Getting lasix 80mg IV bid per Cardiology; not much urine output yesterday with dribbling noted; concern for obstruction; will get bladder scan; consider renal US.   Creat 0.9 stable; K+ 3.6    CPK 2378>1762; TNI 0.032 x 1; repeat this am     Interval note: SOB  Review of Systems   Constitutional: Positive for chills. Negative for appetite change " and fatigue.   HENT: Positive for congestion and sore throat. Negative for rhinorrhea.    Respiratory: Positive for shortness of breath. Negative for cough and wheezing.    Cardiovascular: Positive for leg swelling. Negative for chest pain and palpitations.        2 pillow orthopnea    Gastrointestinal: Negative for constipation, nausea and vomiting.   Genitourinary: Positive for dysuria. Negative for difficulty urinating, enuresis, flank pain, frequency, hematuria and urgency.   Musculoskeletal: Positive for gait problem.   Skin: Positive for wound. Negative for color change and rash.   Neurological: Positive for weakness. Negative for light-headedness.   Hematological: Negative for adenopathy. Does not bruise/bleed easily.     Objective:     Vital Signs (Most Recent):  Temp: 96.1 °F (35.6 °C) (01/11/19 0731)  Pulse: 90 (01/11/19 0731)  Resp: 20 (01/11/19 0731)  BP: 128/76 (01/11/19 0731)  SpO2: 98 % (01/11/19 0731) Vital Signs (24h Range):  Temp:  [96 °F (35.6 °C)-97.5 °F (36.4 °C)] 96.1 °F (35.6 °C)  Pulse:  [81-98] 90  Resp:  [18-24] 20  SpO2:  [94 %-99 %] 98 %  BP: (107-132)/(64-81) 128/76     Weight: 99.6 kg (219 lb 9.6 oz)  Body mass index is 28.97 kg/m².    Physical Exam   Constitutional: He is oriented to person, place, and time. He appears well-developed and well-nourished.   Fetor hepaticus    HENT:   Head: Normocephalic and atraumatic.   Right Ear: External ear normal.   Eyes: Conjunctivae and EOM are normal. Pupils are equal, round, and reactive to light. Scleral icterus is present.   Neck: Normal range of motion. Neck supple. No JVD present.   Cardiovascular: Normal rate, regular rhythm, normal heart sounds and intact distal pulses.   No murmur heard.  Pulmonary/Chest: He is in respiratory distress. He has wheezes. He has rales.   Crackle bases   Abdominal: Soft. Bowel sounds are normal. There is no rebound and no guarding.   Periumbilical venous congestion/prominence    Musculoskeletal: Normal range  of motion. He exhibits edema.   Neurological: He is alert and oriented to person, place, and time. He has normal reflexes. He displays normal reflexes. No cranial nerve deficit. He exhibits normal muscle tone. Coordination normal.   Skin: Skin is warm and dry. There is erythema.   2cm wound left lateral foot, see pic   Erythema of foot and leg up to thigh, see picture     Psychiatric: He has a normal mood and affect. His behavior is normal. Judgment and thought content normal.                 CRANIAL NERVES     CN III, IV, VI   Pupils are equal, round, and reactive to light.  Extraocular motions are normal.       Significant Labs:   Blood Culture:   Recent Labs   Lab 01/09/19  1205 01/09/19  1230   LABBLOO No Growth to date  No Growth to date No Growth to date  No Growth to date     CBC:   Recent Labs   Lab 01/09/19  1205 01/11/19  0419   WBC 14.46* 5.86   HGB 13.4* 11.9*   HCT 42.5 38.9*   * 129*     CMP:   Recent Labs   Lab 01/09/19  1205 01/10/19  0205 01/11/19  0419   * 135* 138   K 3.0* 2.8* 3.6   CL 92* 96 100   CO2 29 29 30*   * 109 159*   BUN 20 18 21   CREATININE 1.4 0.9 0.9   CALCIUM 9.4 8.3* 9.0   PROT 7.8  --   --    ALBUMIN 3.3*  --   --    BILITOT 2.0*  --   --    ALKPHOS 94  --   --    AST 76*  --   --    ALT 20  --   --    ANIONGAP 13 10 8   EGFRNONAA 46* >60 >60   lactic acid 3.6>>1.0  Mag 2.2  Recent Labs   Lab 01/09/19 2027   TROPONINI 0.032*       BNP  Recent Labs   Lab 01/09/19  1205   *       Urine Studies:   Recent Labs   Lab 01/09/19  1305   COLORU Yellow   APPEARANCEUA Clear   PHUR 6.0   SPECGRAV 1.015   PROTEINUA 1+*   GLUCUA Negative   KETONESU Negative   BILIRUBINUA Negative   OCCULTUA 3+*   NITRITE Negative   UROBILINOGEN 1.0   LEUKOCYTESUR Negative   RBCUA 0   WBCUA 3   BACTERIA Few*   HYALINECASTS 0     Blood cultures NGTD x 2    Significant Imaging:     CXR There is elevation of the left hemidiaphragm.  There is a small left-sided pleural effusion.   Central pulmonary vascular congestion is noted with mild interstitial edema.  The heart is enlarged.  Calcified atheromatous disease affects the aorta.  Age-appropriate degenerative changes affect the skeleton.    1/10/19US lower ext  Negative for bilateral lower extremity DVT.    CXR   EKG Atrial fibrillation with premature ventricular or aberrantly conducted  complexes  Right axis deviation  Abnormal ECG  No previous ECGs available  Confirmed by Monica Phoenix MD ([id number]) on 1/9/2019 4:33:19 PM    ECHO:- TTE December 2018 with LVEF 55%      Assessment/Plan:      * Severe sepsis    Due to Venous stasis and wound of LLE   Start Cefepime and clinda to cover for everything including MRSA and psuedomonas.   Clinda instead of Vanc since mild CKD and we will diurese him as well   Lactic acid q 6 coming down  Bolus 2 liters overnight creat 0.9 bp 111/66 this am  woundcare consult          COPD exacerbation    Need to assess if truly hypoxic. No low sats charted but has been on venti and now 4L NC. Will try to wean. Repeat CXR.   1/11/19 O2 sat 95-99%        Cellulitis of left lower extremity    See severe sepsis treatment  Check u/s  Negative for bilateral lower extremity DVT.     Hypokalemia    Likely due to large amount of fluids and lasix  Replace today 40meq po x 2 doses and consider adding in spironolactone tomorrow.  K+ 3.6 today      Rhabdomyolysis    Mild. Due to chronic ETOHism   2  liter bolus for sepsis should take care of it   CPK pending this am          CHF (congestive heart failure)    [facility]is consulted   I am assuming they have a recent ECHO in their records. Will not order one here but would like records  Continue ARB, BB    [facility] on call for Lasix 80mg IV BID per cards, creat stable. Watch urine output strictly as well as weights.         Atrial fibrillation    Rate controlled. On metoprolol    He reports that he is taking pradaxa but called pharmacy and they do not report that he has filled anything for  anticoagulation especially pradaxa in recent months. Start lovenox 1mg/kg BID until we get his home meds           VTE Risk Mitigation (From admission, onward)        Ordered     enoxaparin injection 100 mg  Every 12 hours (non-standard times)      01/10/19 1134     IP VTE HIGH RISK PATIENT  Once      01/09/19 0354              Cash Medina MD  Department of Hospital Medicine   [facility]

## 2019-01-11 NOTE — NURSING
Report received from Erin, Pt has wife at the bedside currently, Pt is utilizing urinals to void currently and occasionally has trouble making all of it in the urinal. Pt and bed changed due to spillage. Educated on urinal use and proper placement of the urinal. Verbalized understanding, emphasized importance of turning every 2 hrs to prevent skin breakdown. Verbalized understanding. Will cont to monitor.

## 2019-01-11 NOTE — PLAN OF CARE
01/11/19 1117   Discharge Reassessment   Assessment Type Discharge Planning Reassessment         Patient will remain for continued treatment today. No needs or concerns voiced at this time. CM will continue to follow and assist as needed.

## 2019-01-11 NOTE — PLAN OF CARE
Problem: Adult Inpatient Plan of Care  Goal: Plan of Care Review  Outcome: Ongoing (interventions implemented as appropriate)  Plan of care reviewed with pt and spouse. Reviewed goals and diagnosis. Explained reasons for use of diuretics and expectations. Verbalized understanding, pt seems slightly confused, will cont to monitor

## 2019-01-11 NOTE — SUBJECTIVE & OBJECTIVE
Interval note: SOB  Review of Systems   Constitutional: Positive for chills. Negative for appetite change and fatigue.   HENT: Positive for congestion and sore throat. Negative for rhinorrhea.    Respiratory: Positive for shortness of breath. Negative for cough and wheezing.    Cardiovascular: Positive for leg swelling. Negative for chest pain and palpitations.        2 pillow orthopnea    Gastrointestinal: Negative for constipation, nausea and vomiting.   Genitourinary: Positive for dysuria. Negative for difficulty urinating, enuresis, flank pain, frequency, hematuria and urgency.   Musculoskeletal: Positive for gait problem.   Skin: Positive for wound. Negative for color change and rash.   Neurological: Positive for weakness. Negative for light-headedness.   Hematological: Negative for adenopathy. Does not bruise/bleed easily.     Objective:     Vital Signs (Most Recent):  Temp: 96.1 °F (35.6 °C) (01/11/19 0731)  Pulse: 90 (01/11/19 0731)  Resp: 20 (01/11/19 0731)  BP: 128/76 (01/11/19 0731)  SpO2: 98 % (01/11/19 0731) Vital Signs (24h Range):  Temp:  [96 °F (35.6 °C)-97.5 °F (36.4 °C)] 96.1 °F (35.6 °C)  Pulse:  [81-98] 90  Resp:  [18-24] 20  SpO2:  [94 %-99 %] 98 %  BP: (107-132)/(64-81) 128/76     Weight: 99.6 kg (219 lb 9.6 oz)  Body mass index is 28.97 kg/m².    Physical Exam   Constitutional: He is oriented to person, place, and time. He appears well-developed and well-nourished.   Fetor hepaticus    HENT:   Head: Normocephalic and atraumatic.   Right Ear: External ear normal.   Eyes: Conjunctivae and EOM are normal. Pupils are equal, round, and reactive to light. Scleral icterus is present.   Neck: Normal range of motion. Neck supple. No JVD present.   Cardiovascular: Normal rate, regular rhythm, normal heart sounds and intact distal pulses.   No murmur heard.  Pulmonary/Chest: He is in respiratory distress. He has wheezes. He has rales.   Crackle bases   Abdominal: Soft. Bowel sounds are normal. There is  no rebound and no guarding.   Periumbilical venous congestion/prominence    Musculoskeletal: Normal range of motion. He exhibits edema.   Neurological: He is alert and oriented to person, place, and time. He has normal reflexes. He displays normal reflexes. No cranial nerve deficit. He exhibits normal muscle tone. Coordination normal.   Skin: Skin is warm and dry. There is erythema.   2cm wound left lateral foot, see pic   Erythema of foot and leg up to thigh, see picture     Psychiatric: He has a normal mood and affect. His behavior is normal. Judgment and thought content normal.                 CRANIAL NERVES     CN III, IV, VI   Pupils are equal, round, and reactive to light.  Extraocular motions are normal.       Significant Labs:   Blood Culture:   Recent Labs   Lab 01/09/19  1205 01/09/19  1230   LABBLOO No Growth to date  No Growth to date No Growth to date  No Growth to date     CBC:   Recent Labs   Lab 01/09/19  1205 01/11/19  0419   WBC 14.46* 5.86   HGB 13.4* 11.9*   HCT 42.5 38.9*   * 129*     CMP:   Recent Labs   Lab 01/09/19  1205 01/10/19  0205 01/11/19  0419   * 135* 138   K 3.0* 2.8* 3.6   CL 92* 96 100   CO2 29 29 30*   * 109 159*   BUN 20 18 21   CREATININE 1.4 0.9 0.9   CALCIUM 9.4 8.3* 9.0   PROT 7.8  --   --    ALBUMIN 3.3*  --   --    BILITOT 2.0*  --   --    ALKPHOS 94  --   --    AST 76*  --   --    ALT 20  --   --    ANIONGAP 13 10 8   EGFRNONAA 46* >60 >60   lactic acid 3.6>>1.0  Mag 2.2  Recent Labs   Lab 01/09/19  2027   TROPONINI 0.032*       BNP  Recent Labs   Lab 01/09/19  1205   *       Urine Studies:   Recent Labs   Lab 01/09/19  1305   COLORU Yellow   APPEARANCEUA Clear   PHUR 6.0   SPECGRAV 1.015   PROTEINUA 1+*   GLUCUA Negative   KETONESU Negative   BILIRUBINUA Negative   OCCULTUA 3+*   NITRITE Negative   UROBILINOGEN 1.0   LEUKOCYTESUR Negative   RBCUA 0   WBCUA 3   BACTERIA Few*   HYALINECASTS 0     Blood cultures NGTD x 2    Significant Imaging:      CXR There is elevation of the left hemidiaphragm.  There is a small left-sided pleural effusion.  Central pulmonary vascular congestion is noted with mild interstitial edema.  The heart is enlarged.  Calcified atheromatous disease affects the aorta.  Age-appropriate degenerative changes affect the skeleton.    1/10/19US lower ext  Negative for bilateral lower extremity DVT.    CXR   EKG Atrial fibrillation with premature ventricular or aberrantly conducted  complexes  Right axis deviation  Abnormal ECG  No previous ECGs available  Confirmed by Monica Phoenix MD ([id number]) on 1/9/2019 4:33:19 PM    ECHO:- TTE December 2018 with LVEF 55%

## 2019-01-11 NOTE — ASSESSMENT & PLAN NOTE
Likely due to large amount of fluids and lasix  Replace today 40meq po x 2 doses and consider adding in spironolactone tomorrow.  K+ 3.6 today

## 2019-01-11 NOTE — PROGRESS NOTES
Condom catheter placed for accurate I & O's. Procedure & purpose explained to patient. He voiced understanding.

## 2019-01-11 NOTE — PROGRESS NOTES
[facility]  Cardiology  Progress Note    Patient Name: Radhames Alonzo  MRN: [id number]  Admission Date: 1/9/2019  Hospital Length of Stay: 2 days  Code Status: Full Code   Attending Physician: Jesús Tay MD   Primary Care Physician: Pennie Jorge NP  Expected Discharge Date: 1/10/2019  Principal Problem:Severe sepsis    Subjective:     Hospital Course: Patient is an 84 year old male with a past medical history of chronic diastolic heart failure, chronic atrial fibrillation, carotid stenosis, HHD, dyslipidemia, and chronic peripheral edema presents to the emergency room after becoming increasingly weak over the last two days. He also reports shortness of breath and coughing with white sputum. He has had a decline in status over the last couple of months and is now incontinent of bowels and bladder.  He denies any chest pain at this time. Upon admission, EKG reveals atrial fibrillation with controlled ventricular response. Chest x ray reveals left sided pleural effusion and mild pulmonary edema. Blood pressure is marginal. BNP moderately elevated at 659 as well as mildly elevated troponin .041 and CK-MB 2752. WBC mildly elevated and renal function borderline and at baseline. CIS asked to evaluate.         Interval History: He has received two doses of Lasix IV and 2L bolus as he was found to be septic with a venous stasis ulcer to left foot. Shortness of breath improving. Also on IV abx per primary and wound care was consulted.       ROS        Constitutional : Weakness, lethargy  EENT : Negative  CV : BLE edema +2 at baseline.   Respiratory : Shortness of breath, coughing with sputum production.   Gastrointestinal: Negative   Genitourinary: Negative  Musculoskeletal: Negative  Skin : Ulcer to left lateral foot  Neurological : AAO x 3       Objective:     Vital Signs (Most Recent):  Temp: 96.1 °F (35.6 °C) (01/11/19 0731)  Pulse: 98 (01/11/19 0903)  Resp: 20 (01/11/19 0731)  BP: 128/76  (01/11/19 0731)  SpO2: 98 % (01/11/19 0731) Vital Signs (24h Range):  Temp:  [96 °F (35.6 °C)-97.5 °F (36.4 °C)] 96.1 °F (35.6 °C)  Pulse:  [81-98] 98  Resp:  [18-24] 20  SpO2:  [94 %-99 %] 98 %  BP: (107-132)/(64-81) 128/76     Weight: 99.6 kg (219 lb 9.6 oz)  Body mass index is 28.97 kg/m².    SpO2: 98 %  O2 Device (Oxygen Therapy): nasal cannula      Intake/Output Summary (Last 24 hours) at 1/11/2019 1000  Last data filed at 1/11/2019 0524  Gross per 24 hour   Intake 1160 ml   Output 1350 ml   Net -190 ml       Lines/Drains/Airways     Peripheral Intravenous Line                 Peripheral IV - Single Lumen 01/10/19 0525 Right Upper Arm 1 day                Physical Exam       General appearance: alert, appears stated age and cooperative. Frail in appearance. Lethargic. Generalized weakness.   Head: Normocephalic, without obvious abnormality, atraumatic  Eyes: conjunctivae/corneas clear. PERRL  Neck: no carotid bruit, no JVD and supple, symmetrical, trachea midline  Lungs: Expiratory rhales to left upper and lower lobe  Chest Wall: no tenderness  Heart: regular rate and rhythm, S1, S2 normal, 1/6 SM, click, rub or gallop  Abdomen: Distended, soft, non-tender; bowel sounds normal; no masses,  no organomegaly  Extremities: Extremities normal, atraumatic, no cyanosis, clubbing. +2 edema to BLE.   Pulses: Dorsalis Pedis R: 2+ (normal)/L: 2+ (normal)  Skin: Skin color, texture, turgor normal. Venous stasis ulcer to left lateral foot.   Neurologic: Normal mood and affect  Alert and oriented X 3      Significant Labs:   BMP:   Recent Labs   Lab 01/09/19  1205 01/09/19  1832 01/10/19  0205 01/11/19  0419   *  --  109 159*   *  --  135* 138   K 3.0*  --  2.8* 3.6   CL 92*  --  96 100   CO2 29  --  29 30*   BUN 20  --  18 21   CREATININE 1.4  --  0.9 0.9   CALCIUM 9.4  --  8.3* 9.0   MG  --  2.2  --   --    , CMP   Recent Labs   Lab 01/09/19  1205 01/10/19  0205 01/11/19  0419   * 135* 138   K 3.0* 2.8*  3.6   CL 92* 96 100   CO2 29 29 30*   * 109 159*   BUN 20 18 21   CREATININE 1.4 0.9 0.9   CALCIUM 9.4 8.3* 9.0   PROT 7.8  --   --    ALBUMIN 3.3*  --   --    BILITOT 2.0*  --   --    ALKPHOS 94  --   --    AST 76*  --   --    ALT 20  --   --    ANIONGAP 13 10 8   ESTGFRAFRICA 53* >60 >60   EGFRNONAA 46* >60 >60   , CBC   Recent Labs   Lab 01/09/19  1205 01/11/19  0419   WBC 14.46* 5.86   HGB 13.4* 11.9*   HCT 42.5 38.9*   * 129*    and Troponin   Recent Labs   Lab 01/09/19  1535 01/09/19 2027 01/11/19  0419   TROPONINI 0.038* 0.032* 0.015       Assessment and Plan:       Active Diagnoses:    Diagnosis Date Noted POA    PRINCIPAL PROBLEM:  Severe sepsis [A41.9, R65.20] 01/09/2019 Yes    Hypokalemia [E87.6] 01/10/2019 Yes    Cellulitis of left lower extremity [L03.116] 01/10/2019 Yes    COPD exacerbation [J44.1] 01/10/2019 Yes    CHF (congestive heart failure) [I50.9] 01/09/2019 Yes    Rhabdomyolysis [M62.82] 01/09/2019 Yes    Atrial fibrillation [I48.91] 04/30/2015 Yes      Problems Resolved During this Admission:       VTE Risk Mitigation (From admission, onward)        Ordered     enoxaparin injection 100 mg  Every 12 hours (non-standard times)      01/10/19 1134     IP VTE HIGH RISK PATIENT  Once      01/09/19 1751        Current Facility-Administered Medications   Medication    cefepime in dextrose 5 % 1 gram/50 mL IVPB 1 g    clindamycin 600 MG/50 ML D5W 600 mg/50 mL IVPB 600 mg    enoxaparin injection 100 mg    furosemide injection 80 mg    levalbuterol nebulizer solution 1.25 mg    losartan tablet 100 mg    metoprolol succinate (TOPROL-XL) 24 hr tablet 100 mg    polyethylene glycol packet 17 g    sodium chloride 0.9% flush 3 mL         Dx: Improving     Sepsis possibly secondary to venous stasis ulcer on left lateral foot. IV abx initiated per primary.      Left sided pleural effusion and mild pulmonary edema evidenced by chest x ray. BNP moderately elevated at 659.      Acute  on chronic diastolic CHF TTE December 2018 with LVEF 55%, mild LVH. Lasix IV 80 mg bid.    Rhabdomyolysis improving. Chronic ETOH use. CPK improved at 1762 from 2378.      Chronic atrial fibrillation rate controlled not currently on anticoagulation according to last progress note in clinic but on xarelto in the past.      Chronic peripheral edema at baseline.      Dyslipidemia     Hypertension now low.     Plan:    Patient still with JVD and requiring supplemental oxygen.  I would continue IV Lasix until tomorrow morning and then convert to Demadex 20mg po twice daily.  Routine BW and CXR with BNP in the am.  Wean O2 provided O2 sat greater than 90%.  Patient seen and examined and agree with above.  Delma Haq NP for Dr. Donald  Cardiology  [facility]    I attest that I have personally seen and examined this patient. I have reviewed and discussed the management in detail as outlined above.

## 2019-01-12 LAB
ANION GAP SERPL CALC-SCNC: 8 MMOL/L
BASOPHILS # BLD AUTO: 0.01 K/UL
BASOPHILS NFR BLD: 0.1 %
BNP SERPL-MCNC: 231 PG/ML
BUN SERPL-MCNC: 23 MG/DL
CALCIUM SERPL-MCNC: 9 MG/DL
CHLORIDE SERPL-SCNC: 100 MMOL/L
CO2 SERPL-SCNC: 33 MMOL/L
CREAT SERPL-MCNC: 0.9 MG/DL
DIFFERENTIAL METHOD: ABNORMAL
EOSINOPHIL # BLD AUTO: 0 K/UL
EOSINOPHIL NFR BLD: 0.4 %
ERYTHROCYTE [DISTWIDTH] IN BLOOD BY AUTOMATED COUNT: 13.3 %
EST. GFR  (AFRICAN AMERICAN): >60 ML/MIN/1.73 M^2
EST. GFR  (NON AFRICAN AMERICAN): >60 ML/MIN/1.73 M^2
GLUCOSE SERPL-MCNC: 104 MG/DL
HCT VFR BLD AUTO: 40.3 %
HGB BLD-MCNC: 12.4 G/DL
LYMPHOCYTES # BLD AUTO: 0.8 K/UL
LYMPHOCYTES NFR BLD: 10.6 %
MCH RBC QN AUTO: 31.8 PG
MCHC RBC AUTO-ENTMCNC: 30.8 G/DL
MCV RBC AUTO: 103 FL
MONOCYTES # BLD AUTO: 0.7 K/UL
MONOCYTES NFR BLD: 9.4 %
NEUTROPHILS # BLD AUTO: 5.7 K/UL
NEUTROPHILS NFR BLD: 79.5 %
PLATELET # BLD AUTO: 146 K/UL
PMV BLD AUTO: 9.9 FL
POTASSIUM SERPL-SCNC: 3.4 MMOL/L
RBC # BLD AUTO: 3.9 M/UL
SODIUM SERPL-SCNC: 141 MMOL/L
WBC # BLD AUTO: 7.2 K/UL

## 2019-01-12 PROCEDURE — 63600175 PHARM REV CODE 636 W HCPCS: Performed by: FAMILY MEDICINE

## 2019-01-12 PROCEDURE — 25000003 PHARM REV CODE 250: Performed by: FAMILY MEDICINE

## 2019-01-12 PROCEDURE — A4216 STERILE WATER/SALINE, 10 ML: HCPCS | Performed by: SURGERY

## 2019-01-12 PROCEDURE — 27000190 HC CPAP FULL FACE MASK W/VALVE

## 2019-01-12 PROCEDURE — S0077 INJECTION, CLINDAMYCIN PHOSP: HCPCS | Performed by: FAMILY MEDICINE

## 2019-01-12 PROCEDURE — 99232 PR SUBSEQUENT HOSPITAL CARE,LEVL II: ICD-10-PCS | Mod: ,,, | Performed by: FAMILY MEDICINE

## 2019-01-12 PROCEDURE — 83880 ASSAY OF NATRIURETIC PEPTIDE: CPT

## 2019-01-12 PROCEDURE — 27000221 HC OXYGEN, UP TO 24 HOURS

## 2019-01-12 PROCEDURE — 25000003 PHARM REV CODE 250: Performed by: SURGERY

## 2019-01-12 PROCEDURE — 94660 CPAP INITIATION&MGMT: CPT

## 2019-01-12 PROCEDURE — 94761 N-INVAS EAR/PLS OXIMETRY MLT: CPT

## 2019-01-12 PROCEDURE — 94640 AIRWAY INHALATION TREATMENT: CPT

## 2019-01-12 PROCEDURE — 25000003 PHARM REV CODE 250: Performed by: NURSE PRACTITIONER

## 2019-01-12 PROCEDURE — 11000001 HC ACUTE MED/SURG PRIVATE ROOM

## 2019-01-12 PROCEDURE — 99232 SBSQ HOSP IP/OBS MODERATE 35: CPT | Mod: ,,, | Performed by: FAMILY MEDICINE

## 2019-01-12 PROCEDURE — 25000242 PHARM REV CODE 250 ALT 637 W/ HCPCS: Performed by: NURSE PRACTITIONER

## 2019-01-12 PROCEDURE — 99900035 HC TECH TIME PER 15 MIN (STAT)

## 2019-01-12 PROCEDURE — 85025 COMPLETE CBC W/AUTO DIFF WBC: CPT

## 2019-01-12 PROCEDURE — 80048 BASIC METABOLIC PNL TOTAL CA: CPT

## 2019-01-12 PROCEDURE — 63600175 PHARM REV CODE 636 W HCPCS: Performed by: NURSE PRACTITIONER

## 2019-01-12 RX ORDER — FUROSEMIDE 10 MG/ML
40 INJECTION INTRAMUSCULAR; INTRAVENOUS ONCE
Status: COMPLETED | OUTPATIENT
Start: 2019-01-12 | End: 2019-01-12

## 2019-01-12 RX ORDER — LORAZEPAM 0.5 MG/1
0.5 TABLET ORAL EVERY 8 HOURS PRN
Status: DISCONTINUED | OUTPATIENT
Start: 2019-01-12 | End: 2019-01-20

## 2019-01-12 RX ORDER — HYDROCODONE BITARTRATE AND ACETAMINOPHEN 5; 325 MG/1; MG/1
1 TABLET ORAL EVERY 8 HOURS PRN
Status: DISCONTINUED | OUTPATIENT
Start: 2019-01-12 | End: 2019-01-18

## 2019-01-12 RX ORDER — PHENAZOPYRIDINE HYDROCHLORIDE 100 MG/1
100 TABLET, FILM COATED ORAL
Status: DISCONTINUED | OUTPATIENT
Start: 2019-01-12 | End: 2019-01-15

## 2019-01-12 RX ORDER — HYDROCODONE BITARTRATE AND ACETAMINOPHEN 5; 325 MG/1; MG/1
1 TABLET ORAL 2 TIMES DAILY PRN
Status: DISCONTINUED | OUTPATIENT
Start: 2019-01-12 | End: 2019-01-12

## 2019-01-12 RX ADMIN — CEFEPIME HYDROCHLORIDE 1 G: 1 INJECTION, SOLUTION INTRAVENOUS at 05:01

## 2019-01-12 RX ADMIN — TORSEMIDE 20 MG: 20 TABLET ORAL at 10:01

## 2019-01-12 RX ADMIN — CLINDAMYCIN IN 5 PERCENT DEXTROSE 600 MG: 12 INJECTION, SOLUTION INTRAVENOUS at 08:01

## 2019-01-12 RX ADMIN — CLINDAMYCIN IN 5 PERCENT DEXTROSE 600 MG: 12 INJECTION, SOLUTION INTRAVENOUS at 05:01

## 2019-01-12 RX ADMIN — FUROSEMIDE 40 MG: 10 INJECTION, SOLUTION INTRAMUSCULAR; INTRAVENOUS at 06:01

## 2019-01-12 RX ADMIN — METOPROLOL SUCCINATE 100 MG: 50 TABLET, EXTENDED RELEASE ORAL at 10:01

## 2019-01-12 RX ADMIN — LOSARTAN POTASSIUM 100 MG: 50 TABLET, FILM COATED ORAL at 10:01

## 2019-01-12 RX ADMIN — ENOXAPARIN SODIUM 100 MG: 100 INJECTION SUBCUTANEOUS at 01:01

## 2019-01-12 RX ADMIN — TORSEMIDE 20 MG: 20 TABLET ORAL at 08:01

## 2019-01-12 RX ADMIN — LORAZEPAM 0.5 MG: 0.5 TABLET ORAL at 10:01

## 2019-01-12 RX ADMIN — PHENAZOPYRIDINE HYDROCHLORIDE 100 MG: 100 TABLET ORAL at 08:01

## 2019-01-12 RX ADMIN — HYDROCODONE BITARTRATE AND ACETAMINOPHEN 1 TABLET: 5; 325 TABLET ORAL at 03:01

## 2019-01-12 RX ADMIN — LEVALBUTEROL 1.25 MG: 1.25 SOLUTION, CONCENTRATE RESPIRATORY (INHALATION) at 01:01

## 2019-01-12 RX ADMIN — CEFEPIME HYDROCHLORIDE 1 G: 1 INJECTION, SOLUTION INTRAVENOUS at 01:01

## 2019-01-12 RX ADMIN — CEFEPIME HYDROCHLORIDE 1 G: 1 INJECTION, SOLUTION INTRAVENOUS at 08:01

## 2019-01-12 RX ADMIN — CLINDAMYCIN IN 5 PERCENT DEXTROSE 600 MG: 12 INJECTION, SOLUTION INTRAVENOUS at 01:01

## 2019-01-12 RX ADMIN — LEVALBUTEROL 1.25 MG: 1.25 SOLUTION, CONCENTRATE RESPIRATORY (INHALATION) at 07:01

## 2019-01-12 RX ADMIN — ENOXAPARIN SODIUM 100 MG: 100 INJECTION SUBCUTANEOUS at 12:01

## 2019-01-12 RX ADMIN — Medication 3 ML: at 01:01

## 2019-01-12 NOTE — ASSESSMENT & PLAN NOTE
Due to Venous stasis and wound of LLE   Start Cefepime and clinda to cover for everything including MRSA and psuedomonas.   Clinda instead of Vanc since mild CKD and we will diurese him as well   Lactic acid q 6 coming down  Bolus 2 liters overnight creat 0.9 bp 111/66 this am  woundcare consult     1-12 legs are down but still pul congestion

## 2019-01-12 NOTE — ASSESSMENT & PLAN NOTE
Need to assess if truly hypoxic. No low sats charted but has been on venti and now 4L NC. Will try to wean. Repeat CXR.   1/11/19 O2 sat 95-99%     1-12 Pt is labored with his breathing and continuing to cough; Dr Robertson consulted

## 2019-01-12 NOTE — PLAN OF CARE
Problem: Adult Inpatient Plan of Care  Goal: Plan of Care Review  Outcome: Ongoing (interventions implemented as appropriate)   01/12/19 1206   Plan of Care Review   Plan of Care Reviewed With patient

## 2019-01-12 NOTE — PLAN OF CARE
Initial Anesthesia Post-op Note    Patient: Timoteo Isbell  Procedure(s) Performed: TOE ARTHROPLASTY-4TH AND 5TH TOES  Anesthesia type: General    Vital Last Value   Temperature 36.9 °C (98.5 °F) (03/13/17 0942)   Pulse 84 (03/13/17 0942)   Respiratory Rate 19 (03/13/17 0942)   Non-Invasive   Blood Pressure 137/78 (03/13/17 0942)   Arterial  Blood Pressure     Pulse Oximetry 98 % (03/13/17 0942)     Last 24 I/O:   Intake/Output Summary (Last 24 hours) at 03/13/17 1209  Last data filed at 03/13/17 1157   Gross per 24 hour   Intake              900 ml   Output                0 ml   Net              900 ml       PATIENT LOCATION: PACU Phase 1  POST-OP VITAL SIGNS: stable  LEVEL OF CONSCIOUSNESS: sedated  RESPIRATORY STATUS: spontaneous ventilation and oral airway  CARDIOVASCULAR: blood pressure returned to baseline and stable  PAIN MANAGEMENT: adequately controlled  NAUSEA: None  AIRWAY PATENCY: oral airway  POST-OP ASSESSMENT: no complications and patient tolerated procedure well with no complications  COMPLICATIONS: none  Comments: Report to RN, questions answered.  HANDOFF:  Handoff to receiving nurse was performed and questions were answered       Problem: Adult Inpatient Plan of Care  Goal: Plan of Care Review  Outcome: Ongoing (interventions implemented as appropriate)  POC reviewed and continued. Safety of patient maintained. Patient on telemetry monitoring.

## 2019-01-12 NOTE — SUBJECTIVE & OBJECTIVE
Interval note: SOB  Review of Systems   Constitutional: Positive for chills. Negative for appetite change and fatigue.   HENT: Positive for congestion and sore throat. Negative for rhinorrhea.    Respiratory: Positive for shortness of breath. Negative for cough and wheezing.    Cardiovascular: Positive for leg swelling. Negative for chest pain and palpitations.        2 pillow orthopnea    Gastrointestinal: Negative for constipation, nausea and vomiting.   Genitourinary: Positive for dysuria. Negative for difficulty urinating, enuresis, flank pain, frequency, hematuria and urgency.   Musculoskeletal: Positive for gait problem.   Skin: Positive for wound. Negative for color change and rash.   Neurological: Positive for weakness. Negative for light-headedness.   Hematological: Negative for adenopathy. Does not bruise/bleed easily.     Objective:     Vital Signs (Most Recent):  Temp: 96.4 °F (35.8 °C) (01/12/19 1200)  Pulse: 96 (01/12/19 1437)  Resp: 20 (01/12/19 1314)  BP: 129/88 (01/12/19 1200)  SpO2: 96 % (01/12/19 1314) Vital Signs (24h Range):  Temp:  [96.2 °F (35.7 °C)-96.6 °F (35.9 °C)] 96.4 °F (35.8 °C)  Pulse:  [] 96  Resp:  [17-20] 20  SpO2:  [95 %-98 %] 96 %  BP: (117-133)/(65-88) 129/88     Weight: 99.6 kg (219 lb 9.6 oz)  Body mass index is 28.97 kg/m².    Physical Exam   Constitutional: He is oriented to person, place, and time. He appears well-developed and well-nourished.   Fetor hepaticus    HENT:   Head: Normocephalic and atraumatic.   Right Ear: External ear normal.   Eyes: Conjunctivae and EOM are normal. Pupils are equal, round, and reactive to light. Scleral icterus is present.   Neck: Normal range of motion. Neck supple. No JVD present.   Cardiovascular: Normal rate, regular rhythm, normal heart sounds and intact distal pulses.   No murmur heard.  Pulmonary/Chest: He is in respiratory distress. He has wheezes. He has rales.   Crackle bases   Abdominal: Soft. Bowel sounds are normal. There  is no rebound and no guarding.   Periumbilical venous congestion/prominence    Musculoskeletal: Normal range of motion. He exhibits edema.   Neurological: He is alert and oriented to person, place, and time. He has normal reflexes. He displays normal reflexes. No cranial nerve deficit. He exhibits normal muscle tone. Coordination normal.   Skin: Skin is warm and dry. There is erythema.   2cm wound left lateral foot, see pic   Erythema of foot and leg up to thigh, see picture     Psychiatric: He has a normal mood and affect. His behavior is normal. Judgment and thought content normal.                 CRANIAL NERVES     CN III, IV, VI   Pupils are equal, round, and reactive to light.  Extraocular motions are normal.       Significant Labs:   Blood Culture:   No results for input(s): LABBLOO in the last 48 hours.  CBC:   Recent Labs   Lab 01/11/19 0419 01/12/19 0652   WBC 5.86 7.20   HGB 11.9* 12.4*   HCT 38.9* 40.3   * 146*     CMP:   Recent Labs   Lab 01/11/19 0419 01/12/19 0652    141   K 3.6 3.4*    100   CO2 30* 33*   * 104   BUN 21 23   CREATININE 0.9 0.9   CALCIUM 9.0 9.0   ANIONGAP 8 8   EGFRNONAA >60 >60   lactic acid 3.6>>1.0  Mag 2.2  Recent Labs   Lab 01/11/19 0419   TROPONINI 0.015       BNP  Recent Labs   Lab 01/12/19 0652   *       Urine Studies:   No results for input(s): COLORU, APPEARANCEUA, PHUR, SPECGRAV, PROTEINUA, GLUCUA, KETONESU, BILIRUBINUA, OCCULTUA, NITRITE, UROBILINOGEN, LEUKOCYTESUR, RBCUA, WBCUA, BACTERIA, SQUAMEPITHEL, HYALINECASTS in the last 48 hours.    Invalid input(s): WRIGHTSUR  Blood cultures NGTD x 2    Significant Imaging:     CXR There is elevation of the left hemidiaphragm.  There is a small left-sided pleural effusion.  Central pulmonary vascular congestion is noted with mild interstitial edema.  The heart is enlarged.  Calcified atheromatous disease affects the aorta.  Age-appropriate degenerative changes affect the  skeleton.    1/10/19US lower ext  Negative for bilateral lower extremity DVT.    CXR   EKG Atrial fibrillation with premature ventricular or aberrantly conducted  complexes  Right axis deviation  Abnormal ECG  No previous ECGs available  Confirmed by Monica Phoenix MD ([id number]) on 1/9/2019 4:33:19 PM    ECHO:- TTE December 2018 with LVEF 55%  Interval note: SOB  Review of Systems   Constitutional: Positive for chills. Negative for appetite change and fatigue.   HENT: Positive for congestion and sore throat. Negative for rhinorrhea.    Respiratory: Positive for shortness of breath. Negative for cough and wheezing.    Cardiovascular: Positive for leg swelling. Negative for chest pain and palpitations.        2 pillow orthopnea    Gastrointestinal: Negative for constipation, nausea and vomiting.   Genitourinary: Positive for dysuria. Negative for difficulty urinating, enuresis, flank pain, frequency, hematuria and urgency.   Musculoskeletal: Positive for gait problem.   Skin: Positive for wound. Negative for color change and rash.   Neurological: Positive for weakness. Negative for light-headedness.   Hematological: Negative for adenopathy. Does not bruise/bleed easily.     Objective:     Vital Signs (Most Recent):  Temp: 96.1 °F (35.6 °C) (01/11/19 0731)  Pulse: 90 (01/11/19 0731)  Resp: 20 (01/11/19 0731)  BP: 128/76 (01/11/19 0731)  SpO2: 98 % (01/11/19 0731) Vital Signs (24h Range):  Temp:  [96 °F (35.6 °C)-97.5 °F (36.4 °C)] 96.1 °F (35.6 °C)  Pulse:  [81-98] 90  Resp:  [18-24] 20  SpO2:  [94 %-99 %] 98 %  BP: (107-132)/(64-81) 128/76     Weight: 99.6 kg (219 lb 9.6 oz)  Body mass index is 28.97 kg/m².    Physical Exam   Constitutional: He is oriented to person, place, and time. He appears well-developed and well-nourished.   Fetor hepaticus    HENT:   Head: Normocephalic and atraumatic.   Right Ear: External ear normal.   Eyes: Conjunctivae and EOM are normal. Pupils are equal, round, and reactive to light. Scleral  icterus is present.   Neck: Normal range of motion. Neck supple. No JVD present.   Cardiovascular: Normal rate, regular rhythm, normal heart sounds and intact distal pulses.   No murmur heard.  Pulmonary/Chest: He is in respiratory distress. He has wheezes. He has rales.   Crackle bases   Abdominal: Soft. Bowel sounds are normal. There is no rebound and no guarding.   Periumbilical venous congestion/prominence    Musculoskeletal: Normal range of motion. He exhibits edema.   Neurological: He is alert and oriented to person, place, and time. He has normal reflexes. He displays normal reflexes. No cranial nerve deficit. He exhibits normal muscle tone. Coordination normal.   Skin: Skin is warm and dry. There is erythema.   2cm wound left lateral foot, see pic   Erythema of foot and leg up to thigh, see picture     Psychiatric: He has a normal mood and affect. His behavior is normal. Judgment and thought content normal.                 CRANIAL NERVES     CN III, IV, VI   Pupils are equal, round, and reactive to light.  Extraocular motions are normal.       Significant Labs:   Blood Culture:   Recent Labs   Lab 01/09/19  1205 01/09/19  1230   LABBLOO No Growth to date  No Growth to date No Growth to date  No Growth to date     CBC:   Recent Labs   Lab 01/09/19  1205 01/11/19  0419   WBC 14.46* 5.86   HGB 13.4* 11.9*   HCT 42.5 38.9*   * 129*     CMP:   Recent Labs   Lab 01/09/19  1205 01/10/19  0205 01/11/19  0419   * 135* 138   K 3.0* 2.8* 3.6   CL 92* 96 100   CO2 29 29 30*   * 109 159*   BUN 20 18 21   CREATININE 1.4 0.9 0.9   CALCIUM 9.4 8.3* 9.0   PROT 7.8  --   --    ALBUMIN 3.3*  --   --    BILITOT 2.0*  --   --    ALKPHOS 94  --   --    AST 76*  --   --    ALT 20  --   --    ANIONGAP 13 10 8   EGFRNONAA 46* >60 >60   lactic acid 3.6>>1.0  Mag 2.2  Recent Labs   Lab 01/09/19 2027   TROPONINI 0.032*       BNP  Recent Labs   Lab 01/09/19  1205   *       Urine Studies:   Recent Labs   Lab  01/09/19  1305   COLORU Yellow   APPEARANCEUA Clear   PHUR 6.0   SPECGRAV 1.015   PROTEINUA 1+*   GLUCUA Negative   KETONESU Negative   BILIRUBINUA Negative   OCCULTUA 3+*   NITRITE Negative   UROBILINOGEN 1.0   LEUKOCYTESUR Negative   RBCUA 0   WBCUA 3   BACTERIA Few*   HYALINECASTS 0     Blood cultures NGTD x 2    Significant Imaging:     CXR There is elevation of the left hemidiaphragm.  There is a small left-sided pleural effusion.  Central pulmonary vascular congestion is noted with mild interstitial edema.  The heart is enlarged.  Calcified atheromatous disease affects the aorta.  Age-appropriate degenerative changes affect the skeleton.    1/10/19US lower ext  Negative for bilateral lower extremity DVT.    CXR   EKG Atrial fibrillation with premature ventricular or aberrantly conducted  complexes  Right axis deviation  Abnormal ECG  No previous ECGs available  Confirmed by Monica Phoenix MD ([id number]) on 1/9/2019 4:33:19 PM    ECHO:- TTE December 2018 with LVEF 55%

## 2019-01-12 NOTE — CONSULTS
[facility]  Pulmonology  Consult Note    Patient Name: Radhames Alonzo  MRN: [id number]  Admission Date: 1/9/2019  Hospital Length of Stay: 3 days  Code Status: Full Code  Attending Physician: Jesús Tay MD  Primary Care Provider: Pennie Jorge NP   Principal Problem: Severe sepsis    Consults  Subjective:     HPI:  Radhames Alonzo is a 84 y.o. year old male that's presents with a chief complaint of Progressing SOB sore throat with nasal congestion and Wheezing for 10 to 14  Days.Pt has had progressive SOB for weeks .Elevated BNP. Consulted to evaluate Respiratory status.    Past Medical History:   Diagnosis Date    Atrial fibrillation     AVD (aortic valve disease)     Cardiomyopathy     Gout     Hypertension     Mitral valve disorder     Pure hypercholesterolemia         History reviewed. No pertinent surgical history.    Prior to Admission medications    Medication Sig Start Date End Date Taking? Authorizing Provider   bicalutamide (CASODEX) 50 MG Tab Take 50 mg by mouth once daily.   Yes Historical Provider, MD   furosemide (LASIX) 40 MG tablet Take 40 mg by mouth as directed. Take 2 tablets on M-W-F,and 1 tablet T-Th-S-S   Yes Historical Provider, MD   HYDROcodone-acetaminophen (NORCO) 7.5-325 mg per tablet Take 1 tablet by mouth every 8 (eight) hours as needed for Pain. 1/4/19 1/11/19 Yes Pennie Jorge NP   losartan (COZAAR) 100 MG tablet Take 100 mg by mouth once daily.   Yes Historical Provider, MD   metoprolol succinate (TOPROL-XL) 100 MG 24 hr tablet Take 100 mg by mouth once daily.   Yes Historical Provider, MD   potassium chloride SA (K-DUR,KLOR-CON) 20 MEQ tablet Take 20 mEq by mouth 2 (two) times daily.   Yes Historical Provider, MD   simvastatin (ZOCOR) 80 MG tablet Take 40 mg by mouth every evening.   Yes Historical Provider, MD   torsemide (DEMADEX) 20 MG Tab Take 20 mg by mouth once daily.   Yes Historical Provider, MD   cholecalciferol, vitamin D3, (VITAMIN  D3) 2,000 unit Cap Take 1 capsule by mouth once daily.    Historical Provider, MD   cloNIDine (CATAPRES) 0.1 MG tablet Take 0.1 mg by mouth 3 (three) times daily as needed. SBP > 150    Historical Provider, MD   ipratropium-albuterol (COMBIVENT)  mcg/actuation inhaler Inhale 1 puff into the lungs every 6 (six) hours as needed for Wheezing or Shortness of Breath. 12/26/18   Pennie Jorge NP   ipratropium-albuterol (COMBIVENT)  mcg/actuation inhaler Inhale 1 puff into the lungs every 6 (six) hours as needed for Wheezing or Shortness of Breath. 1/4/19   Pennie Jorge NP   nitroGLYCERIN (NITROSTAT) 0.4 MG SL tablet Place 0.4 mg under the tongue every 5 (five) minutes as needed for Chest pain.    Historical Provider, MD   tamsulosin (FLOMAX) 0.4 mg Cp24 Take 1 capsule (0.4 mg total) by mouth once daily. 6/26/17 1/4/19  Wolf Matthew MD       Social History     Socioeconomic History    Marital status:      Spouse name: Not on file    Number of children: Not on file    Years of education: Not on file    Highest education level: Not on file   Social Needs    Financial resource strain: Not on file    Food insecurity - worry: Not on file    Food insecurity - inability: Not on file    Transportation needs - medical: Not on file    Transportation needs - non-medical: Not on file   Occupational History    Not on file   Tobacco Use    Smoking status: Former Smoker    Smokeless tobacco: Never Used   Substance and Sexual Activity    Alcohol use: No     Frequency: Never    Drug use: Not on file    Sexual activity: Not on file   Other Topics Concern    Not on file   Social History Narrative    Not on file       History reviewed. No pertinent family history.    Review of patient's allergies indicates:  No Known Allergies Allergies have been reviewed.     ROS: ROS    PE:   Vitals:    01/12/19 1200 01/12/19 1211 01/12/19 1314 01/12/19 1437   BP: 129/88      BP Location: Left arm       Patient Position: Sitting      Pulse: 102 86 88 96   Resp: 18  20    Temp: 96.4 °F (35.8 °C)      TempSrc: Tympanic      SpO2: 97%  96%    Weight:       Height:        Physical Exam    Alert and orientated X 3   HEENT: Head: Normocephalic no trauma                Ears : Normal Pinna No Drainage no Battles sign                Eyes: Vision Unchanged, No conjunctivitis,No drainage                Neck: Supple, No JVD,No Abnormal Carotid Pulsations                Throat: No Erythema, No pus,No Swelling,Mallampati score= 3    Chest: Course BS with wheezing and rhonchi bilaterally   Cardiac: RRR S1+ S2 with a -S3: +M = 2/6, No R/H/G  Abdomen: Bowel Sounds are Normal.Soft Abdomen. No organomegaly of Liver,Spleen,or Kidneys   CNS: Non focal and intact. Cranial nerves 2, 346,8,9,10 and 12 are normal.Norrmal gait.Normal posture.  Extremities: No Clubbing,No Cyanosis with oxygen,Positive mild edema of lower extremities Bilateral  Skin: No Rash, wound Left foot ,and cellulitis of the foot .    Lab Results   Component Value Date    WBC 7.20 01/12/2019    HGB 12.4 (L) 01/12/2019    HCT 40.3 01/12/2019     (L) 01/12/2019    CHOL 115 (L) 12/18/2018    TRIG 50 12/18/2018    HDL 39 (L) 12/18/2018    ALT 20 01/09/2019    AST 76 (H) 01/09/2019     01/12/2019    K 3.4 (L) 01/12/2019     01/12/2019    CREATININE 0.9 01/12/2019    BUN 23 01/12/2019    CO2 33 (H) 01/12/2019    PSA 15.2 (H) 09/12/2017    HGBA1C 5.3 10/17/2018               Assessment/Plan:     COPD exacerbation    Will follow cxr and continue neb steroids and neb     CHF (congestive heart failure)    Stable      Atrial fibrillation    follow HR          Will recheck CXR and follow   Thank you for your consult. I will follow-up with patient. Please contact us if you have any additional questions.     Sukh Robertson MD  Pulmonology  [facility]

## 2019-01-12 NOTE — PROGRESS NOTES
"[facility] Hospital Medicine  Progress Note    Patient Name: Radhames Alonzo  MRN: [id number]  Patient Class: IP- Inpatient   Admission Date: 1/9/2019  Length of Stay: 3 days  Attending Physician: Jesús Tay MD  Primary Care Provider: Pennie Jorge NP        Subjective:     Principal Problem:Severe sepsis    HPI:  84 year old male presents to ED b/c " I couldn't get up and walk."   SOB for about a year. Worse recently. Last night it got to the point that he was too SOB to stand up.   SOB at rest and with eating.   He also notes left foot pain. He has been seeing woundcare for a wound on this foot last 7 months   Workup in ED is significant for BNP 600s. Last BNP 200s 3 weeks ago. 160s 9 months ago.  He sees Dr. Jean-Baptiste regularly.   [facility] has seen pt and rec admit for diuresis, rule out MI   First TPN with slight bump.038  Lactic acid is 3.6. Meets I am told by ED MD that patient "has no signs of infection on physical exam." I am also told that CXR and U/A are clear.     He drinks six pack daily. Last drink 2 days ago. No h/o DT's in past         Hospital Course:  He has been started on cefepime and clinda for left lower ext cellulitis. He has been afebrile. WBC was 67226. Blood cultures NGTD. Lactate was up to 3.8, coming down.RR was 24 and had low sats on admission. Was on venti, now down to 3L NC   He is also wheezing. C/o SOB. CXR with CHF.   He does report that he takes pradaxa for his a fib at home. Not on home meds list nor reordered here. A fib on EKG. BNP was 659. Ordered for lasix 80mg IV BID. Urinating a lot. K low this am 2.8    1/11/19 Getting lasix 80mg IV bid per Cardiology; not much urine output yesterday with dribbling noted; concern for obstruction; will get bladder scan; consider renal US.   Creat 0.9 stable; K+ 3.6    CPK 2378>1762; TNI 0.032 x 1; repeat this am     1-12 Pt continues to have SOB and pul congestion; Dr Robertson consulted    Interval note: SOB  Review of " Systems   Constitutional: Positive for chills. Negative for appetite change and fatigue.   HENT: Positive for congestion and sore throat. Negative for rhinorrhea.    Respiratory: Positive for shortness of breath. Negative for cough and wheezing.    Cardiovascular: Positive for leg swelling. Negative for chest pain and palpitations.        2 pillow orthopnea    Gastrointestinal: Negative for constipation, nausea and vomiting.   Genitourinary: Positive for dysuria. Negative for difficulty urinating, enuresis, flank pain, frequency, hematuria and urgency.   Musculoskeletal: Positive for gait problem.   Skin: Positive for wound. Negative for color change and rash.   Neurological: Positive for weakness. Negative for light-headedness.   Hematological: Negative for adenopathy. Does not bruise/bleed easily.     Objective:     Vital Signs (Most Recent):  Temp: 96.4 °F (35.8 °C) (01/12/19 1200)  Pulse: 96 (01/12/19 1437)  Resp: 20 (01/12/19 1314)  BP: 129/88 (01/12/19 1200)  SpO2: 96 % (01/12/19 1314) Vital Signs (24h Range):  Temp:  [96.2 °F (35.7 °C)-96.6 °F (35.9 °C)] 96.4 °F (35.8 °C)  Pulse:  [] 96  Resp:  [17-20] 20  SpO2:  [95 %-98 %] 96 %  BP: (117-133)/(65-88) 129/88     Weight: 99.6 kg (219 lb 9.6 oz)  Body mass index is 28.97 kg/m².    Physical Exam   Constitutional: He is oriented to person, place, and time. He appears well-developed and well-nourished.   Fetor hepaticus    HENT:   Head: Normocephalic and atraumatic.   Right Ear: External ear normal.   Eyes: Conjunctivae and EOM are normal. Pupils are equal, round, and reactive to light. Scleral icterus is present.   Neck: Normal range of motion. Neck supple. No JVD present.   Cardiovascular: Normal rate, regular rhythm, normal heart sounds and intact distal pulses.   No murmur heard.  Pulmonary/Chest: He is in respiratory distress. He has wheezes. He has rales.   Crackle bases   Abdominal: Soft. Bowel sounds are normal. There is no rebound and no guarding.    Periumbilical venous congestion/prominence    Musculoskeletal: Normal range of motion. He exhibits edema.   Neurological: He is alert and oriented to person, place, and time. He has normal reflexes. He displays normal reflexes. No cranial nerve deficit. He exhibits normal muscle tone. Coordination normal.   Skin: Skin is warm and dry. There is erythema.   2cm wound left lateral foot, see pic   Erythema of foot and leg up to thigh, see picture     Psychiatric: He has a normal mood and affect. His behavior is normal. Judgment and thought content normal.                 CRANIAL NERVES     CN III, IV, VI   Pupils are equal, round, and reactive to light.  Extraocular motions are normal.       Significant Labs:   Blood Culture:   No results for input(s): LABBLOO in the last 48 hours.  CBC:   Recent Labs   Lab 01/11/19 0419 01/12/19 0652   WBC 5.86 7.20   HGB 11.9* 12.4*   HCT 38.9* 40.3   * 146*     CMP:   Recent Labs   Lab 01/11/19 0419 01/12/19 0652    141   K 3.6 3.4*    100   CO2 30* 33*   * 104   BUN 21 23   CREATININE 0.9 0.9   CALCIUM 9.0 9.0   ANIONGAP 8 8   EGFRNONAA >60 >60   lactic acid 3.6>>1.0  Mag 2.2  Recent Labs   Lab 01/11/19 0419   TROPONINI 0.015       BNP  Recent Labs   Lab 01/12/19 0652   *       Urine Studies:   No results for input(s): COLORU, APPEARANCEUA, PHUR, SPECGRAV, PROTEINUA, GLUCUA, KETONESU, BILIRUBINUA, OCCULTUA, NITRITE, UROBILINOGEN, LEUKOCYTESUR, RBCUA, WBCUA, BACTERIA, SQUAMEPITHEL, HYALINECASTS in the last 48 hours.    Invalid input(s): WRIGHTSUR  Blood cultures NGTD x 2    Significant Imaging:     CXR There is elevation of the left hemidiaphragm.  There is a small left-sided pleural effusion.  Central pulmonary vascular congestion is noted with mild interstitial edema.  The heart is enlarged.  Calcified atheromatous disease affects the aorta.  Age-appropriate degenerative changes affect the skeleton.    1/10/19US lower ext  Negative for  bilateral lower extremity DVT.    CXR   EKG Atrial fibrillation with premature ventricular or aberrantly conducted  complexes  Right axis deviation  Abnormal ECG  No previous ECGs available  Confirmed by Monica Phoenix MD ([id number]) on 1/9/2019 4:33:19 PM    ECHO:- TTE December 2018 with LVEF 55%  Interval note: SOB  Review of Systems   Constitutional: Positive for chills. Negative for appetite change and fatigue.   HENT: Positive for congestion and sore throat. Negative for rhinorrhea.    Respiratory: Positive for shortness of breath. Negative for cough and wheezing.    Cardiovascular: Positive for leg swelling. Negative for chest pain and palpitations.        2 pillow orthopnea    Gastrointestinal: Negative for constipation, nausea and vomiting.   Genitourinary: Positive for dysuria. Negative for difficulty urinating, enuresis, flank pain, frequency, hematuria and urgency.   Musculoskeletal: Positive for gait problem.   Skin: Positive for wound. Negative for color change and rash.   Neurological: Positive for weakness. Negative for light-headedness.   Hematological: Negative for adenopathy. Does not bruise/bleed easily.     Objective:     Vital Signs (Most Recent):  Temp: 96.1 °F (35.6 °C) (01/11/19 0731)  Pulse: 90 (01/11/19 0731)  Resp: 20 (01/11/19 0731)  BP: 128/76 (01/11/19 0731)  SpO2: 98 % (01/11/19 0731) Vital Signs (24h Range):  Temp:  [96 °F (35.6 °C)-97.5 °F (36.4 °C)] 96.1 °F (35.6 °C)  Pulse:  [81-98] 90  Resp:  [18-24] 20  SpO2:  [94 %-99 %] 98 %  BP: (107-132)/(64-81) 128/76     Weight: 99.6 kg (219 lb 9.6 oz)  Body mass index is 28.97 kg/m².    Physical Exam   Constitutional: He is oriented to person, place, and time. He appears well-developed and well-nourished.   Fetor hepaticus    HENT:   Head: Normocephalic and atraumatic.   Right Ear: External ear normal.   Eyes: Conjunctivae and EOM are normal. Pupils are equal, round, and reactive to light. Scleral icterus is present.   Neck: Normal range of  motion. Neck supple. No JVD present.   Cardiovascular: Normal rate, regular rhythm, normal heart sounds and intact distal pulses.   No murmur heard.  Pulmonary/Chest: He is in respiratory distress. He has wheezes. He has rales.   Crackle bases   Abdominal: Soft. Bowel sounds are normal. There is no rebound and no guarding.   Periumbilical venous congestion/prominence    Musculoskeletal: Normal range of motion. He exhibits edema.   Neurological: He is alert and oriented to person, place, and time. He has normal reflexes. He displays normal reflexes. No cranial nerve deficit. He exhibits normal muscle tone. Coordination normal.   Skin: Skin is warm and dry. There is erythema.   2cm wound left lateral foot, see pic   Erythema of foot and leg up to thigh, see picture     Psychiatric: He has a normal mood and affect. His behavior is normal. Judgment and thought content normal.                 CRANIAL NERVES     CN III, IV, VI   Pupils are equal, round, and reactive to light.  Extraocular motions are normal.       Significant Labs:   Blood Culture:   Recent Labs   Lab 01/09/19  1205 01/09/19  1230   LABBLOO No Growth to date  No Growth to date No Growth to date  No Growth to date     CBC:   Recent Labs   Lab 01/09/19  1205 01/11/19  0419   WBC 14.46* 5.86   HGB 13.4* 11.9*   HCT 42.5 38.9*   * 129*     CMP:   Recent Labs   Lab 01/09/19  1205 01/10/19  0205 01/11/19  0419   * 135* 138   K 3.0* 2.8* 3.6   CL 92* 96 100   CO2 29 29 30*   * 109 159*   BUN 20 18 21   CREATININE 1.4 0.9 0.9   CALCIUM 9.4 8.3* 9.0   PROT 7.8  --   --    ALBUMIN 3.3*  --   --    BILITOT 2.0*  --   --    ALKPHOS 94  --   --    AST 76*  --   --    ALT 20  --   --    ANIONGAP 13 10 8   EGFRNONAA 46* >60 >60   lactic acid 3.6>>1.0  Mag 2.2  Recent Labs   Lab 01/09/19 2027   TROPONINI 0.032*       BNP  Recent Labs   Lab 01/09/19  1205   *       Urine Studies:   Recent Labs   Lab 01/09/19  1305   COLORU Yellow    APPEARANCEUA Clear   PHUR 6.0   SPECGRAV 1.015   PROTEINUA 1+*   GLUCUA Negative   KETONESU Negative   BILIRUBINUA Negative   OCCULTUA 3+*   NITRITE Negative   UROBILINOGEN 1.0   LEUKOCYTESUR Negative   RBCUA 0   WBCUA 3   BACTERIA Few*   HYALINECASTS 0     Blood cultures NGTD x 2    Significant Imaging:     CXR There is elevation of the left hemidiaphragm.  There is a small left-sided pleural effusion.  Central pulmonary vascular congestion is noted with mild interstitial edema.  The heart is enlarged.  Calcified atheromatous disease affects the aorta.  Age-appropriate degenerative changes affect the skeleton.    1/10/19US lower ext  Negative for bilateral lower extremity DVT.    CXR   EKG Atrial fibrillation with premature ventricular or aberrantly conducted  complexes  Right axis deviation  Abnormal ECG  No previous ECGs available  Confirmed by Monica Phoenix MD ([id number]) on 1/9/2019 4:33:19 PM    ECHO:- TTE December 2018 with LVEF 55%      Assessment/Plan:      * Severe sepsis    Due to Venous stasis and wound of LLE   Start Cefepime and clinda to cover for everything including MRSA and psuedomonas.   Clinda instead of Vanc since mild CKD and we will diurese him as well   Lactic acid q 6 coming down  Bolus 2 liters overnight creat 0.9 bp 111/66 this am  woundcare consult     1-12 legs are down but still pul congestion       COPD exacerbation    Need to assess if truly hypoxic. No low sats charted but has been on venti and now 4L NC. Will try to wean. Repeat CXR.   1/11/19 O2 sat 95-99%     1-12 Pt is labored with his breathing and continuing to cough; Dr Robertson consulted       Cellulitis of left lower extremity    See severe sepsis treatment  Check u/s  Negative for bilateral lower extremity DVT.     Hypokalemia    Likely due to large amount of fluids and lasix  Replace today 40meq po x 2 doses and consider adding in spironolactone tomorrow.  K+ 3.6 today      Rhabdomyolysis    Mild. Due to chronic ETOHism   2  liter  bolus for sepsis should take care of it   CPK pending this am          CHF (congestive heart failure)    Cis consulted   I am assuming they have a recent ECHO in their records. Will not order one here but would like records  Continue ARB, BB    [facility]IS on call for Lasix 80mg IV BID per cards, creat stable. Watch urine output strictly as well as weights.         Atrial fibrillation    Rate controlled. On metoprolol    He reports that he is taking pradaxa but called pharmacy and they do not report that he has filled anything for anticoagulation especially pradaxa in recent months. Start lovenox 1mg/kg BID until we get his home meds         Cardiomyopathy      Cardiology is following       VTE Risk Mitigation (From admission, onward)        Ordered     enoxaparin injection 100 mg  Every 12 hours (non-standard times)      01/10/19 1134     IP VTE HIGH RISK PATIENT  Once      01/09/19 7546              Cash Medina MD  Department of Hospital Medicine   [facility]

## 2019-01-13 LAB
ANION GAP SERPL CALC-SCNC: 13 MMOL/L
BASOPHILS NFR BLD: 0 %
BUN SERPL-MCNC: 22 MG/DL
CALCIUM SERPL-MCNC: 9.3 MG/DL
CHLORIDE SERPL-SCNC: 96 MMOL/L
CO2 SERPL-SCNC: 32 MMOL/L
CREAT SERPL-MCNC: 1 MG/DL
DIFFERENTIAL METHOD: ABNORMAL
EOSINOPHIL NFR BLD: 0 %
ERYTHROCYTE [DISTWIDTH] IN BLOOD BY AUTOMATED COUNT: 13.3 %
EST. GFR  (AFRICAN AMERICAN): >60 ML/MIN/1.73 M^2
EST. GFR  (NON AFRICAN AMERICAN): >60 ML/MIN/1.73 M^2
GLUCOSE SERPL-MCNC: 154 MG/DL
HCT VFR BLD AUTO: 42.9 %
HGB BLD-MCNC: 13.5 G/DL
LYMPHOCYTES NFR BLD: 15 %
MCH RBC QN AUTO: 32.3 PG
MCHC RBC AUTO-ENTMCNC: 31.5 G/DL
MCV RBC AUTO: 103 FL
MONOCYTES NFR BLD: 2 %
NEUTROPHILS NFR BLD: 82 %
NEUTS BAND NFR BLD MANUAL: 1 %
PLATELET # BLD AUTO: 150 K/UL
PLATELET BLD QL SMEAR: ABNORMAL
PMV BLD AUTO: 9.7 FL
POTASSIUM SERPL-SCNC: 3.4 MMOL/L
RBC # BLD AUTO: 4.18 M/UL
SODIUM SERPL-SCNC: 141 MMOL/L
WBC # BLD AUTO: 4.83 K/UL

## 2019-01-13 PROCEDURE — 80048 BASIC METABOLIC PNL TOTAL CA: CPT

## 2019-01-13 PROCEDURE — 94640 AIRWAY INHALATION TREATMENT: CPT

## 2019-01-13 PROCEDURE — 25000242 PHARM REV CODE 250 ALT 637 W/ HCPCS: Performed by: NURSE PRACTITIONER

## 2019-01-13 PROCEDURE — 85027 COMPLETE CBC AUTOMATED: CPT

## 2019-01-13 PROCEDURE — 99232 PR SUBSEQUENT HOSPITAL CARE,LEVL II: ICD-10-PCS | Mod: ,,, | Performed by: FAMILY MEDICINE

## 2019-01-13 PROCEDURE — A4216 STERILE WATER/SALINE, 10 ML: HCPCS | Performed by: SURGERY

## 2019-01-13 PROCEDURE — 25000003 PHARM REV CODE 250: Performed by: SURGERY

## 2019-01-13 PROCEDURE — 99232 SBSQ HOSP IP/OBS MODERATE 35: CPT | Mod: ,,, | Performed by: FAMILY MEDICINE

## 2019-01-13 PROCEDURE — 36415 COLL VENOUS BLD VENIPUNCTURE: CPT

## 2019-01-13 PROCEDURE — 25000003 PHARM REV CODE 250: Performed by: FAMILY MEDICINE

## 2019-01-13 PROCEDURE — 85007 BL SMEAR W/DIFF WBC COUNT: CPT

## 2019-01-13 PROCEDURE — 63600175 PHARM REV CODE 636 W HCPCS: Performed by: NURSE PRACTITIONER

## 2019-01-13 PROCEDURE — 25000003 PHARM REV CODE 250: Performed by: NURSE PRACTITIONER

## 2019-01-13 PROCEDURE — 63600175 PHARM REV CODE 636 W HCPCS: Performed by: FAMILY MEDICINE

## 2019-01-13 PROCEDURE — 99900035 HC TECH TIME PER 15 MIN (STAT)

## 2019-01-13 PROCEDURE — 11000001 HC ACUTE MED/SURG PRIVATE ROOM

## 2019-01-13 PROCEDURE — 63600175 PHARM REV CODE 636 W HCPCS: Performed by: INTERNAL MEDICINE

## 2019-01-13 PROCEDURE — 27000221 HC OXYGEN, UP TO 24 HOURS

## 2019-01-13 PROCEDURE — 94660 CPAP INITIATION&MGMT: CPT

## 2019-01-13 PROCEDURE — 94761 N-INVAS EAR/PLS OXIMETRY MLT: CPT

## 2019-01-13 PROCEDURE — S0077 INJECTION, CLINDAMYCIN PHOSP: HCPCS | Performed by: FAMILY MEDICINE

## 2019-01-13 RX ADMIN — Medication 3 ML: at 02:01

## 2019-01-13 RX ADMIN — TORSEMIDE 20 MG: 20 TABLET ORAL at 09:01

## 2019-01-13 RX ADMIN — METOPROLOL SUCCINATE 100 MG: 50 TABLET, EXTENDED RELEASE ORAL at 08:01

## 2019-01-13 RX ADMIN — TORSEMIDE 20 MG: 20 TABLET ORAL at 08:01

## 2019-01-13 RX ADMIN — Medication 3 ML: at 09:01

## 2019-01-13 RX ADMIN — Medication 3 ML: at 05:01

## 2019-01-13 RX ADMIN — ENOXAPARIN SODIUM 100 MG: 100 INJECTION SUBCUTANEOUS at 01:01

## 2019-01-13 RX ADMIN — HYDROCODONE BITARTRATE AND ACETAMINOPHEN 1 TABLET: 5; 325 TABLET ORAL at 09:01

## 2019-01-13 RX ADMIN — METHYLPREDNISOLONE SODIUM SUCCINATE 20 MG: 40 INJECTION, POWDER, FOR SOLUTION INTRAMUSCULAR; INTRAVENOUS at 05:01

## 2019-01-13 RX ADMIN — CLINDAMYCIN IN 5 PERCENT DEXTROSE 600 MG: 12 INJECTION, SOLUTION INTRAVENOUS at 09:01

## 2019-01-13 RX ADMIN — LEVALBUTEROL 1.25 MG: 1.25 SOLUTION, CONCENTRATE RESPIRATORY (INHALATION) at 07:01

## 2019-01-13 RX ADMIN — ENOXAPARIN SODIUM 100 MG: 100 INJECTION SUBCUTANEOUS at 12:01

## 2019-01-13 RX ADMIN — PHENAZOPYRIDINE HYDROCHLORIDE 100 MG: 100 TABLET ORAL at 08:01

## 2019-01-13 RX ADMIN — LOSARTAN POTASSIUM 100 MG: 50 TABLET, FILM COATED ORAL at 08:01

## 2019-01-13 RX ADMIN — LEVALBUTEROL 1.25 MG: 1.25 SOLUTION, CONCENTRATE RESPIRATORY (INHALATION) at 01:01

## 2019-01-13 RX ADMIN — CLINDAMYCIN IN 5 PERCENT DEXTROSE 600 MG: 12 INJECTION, SOLUTION INTRAVENOUS at 02:01

## 2019-01-13 RX ADMIN — CLINDAMYCIN IN 5 PERCENT DEXTROSE 600 MG: 12 INJECTION, SOLUTION INTRAVENOUS at 05:01

## 2019-01-13 RX ADMIN — METHYLPREDNISOLONE SODIUM SUCCINATE 20 MG: 40 INJECTION, POWDER, FOR SOLUTION INTRAMUSCULAR; INTRAVENOUS at 02:01

## 2019-01-13 RX ADMIN — METHYLPREDNISOLONE SODIUM SUCCINATE 20 MG: 40 INJECTION, POWDER, FOR SOLUTION INTRAMUSCULAR; INTRAVENOUS at 12:01

## 2019-01-13 RX ADMIN — CEFEPIME HYDROCHLORIDE 1 G: 1 INJECTION, SOLUTION INTRAVENOUS at 05:01

## 2019-01-13 RX ADMIN — PHENAZOPYRIDINE HYDROCHLORIDE 100 MG: 100 TABLET ORAL at 01:01

## 2019-01-13 RX ADMIN — PHENAZOPYRIDINE HYDROCHLORIDE 100 MG: 100 TABLET ORAL at 05:01

## 2019-01-13 RX ADMIN — CEFEPIME HYDROCHLORIDE 1 G: 1 INJECTION, SOLUTION INTRAVENOUS at 09:01

## 2019-01-13 RX ADMIN — CEFEPIME HYDROCHLORIDE 1 G: 1 INJECTION, SOLUTION INTRAVENOUS at 02:01

## 2019-01-13 NOTE — ASSESSMENT & PLAN NOTE
Due to Venous stasis and wound of LLE   Start Cefepime and clinda to cover for everything including MRSA and psuedomonas.   Clinda instead of Vanc since mild CKD and we will diurese him as well   Lactic acid q 6 coming down  Bolus 2 liters overnight creat 0.9 bp 111/66 this am  woundcare consult     1-12 legs are down but still pul congestion    1-13 Not retaining as much fluids, but still SOB and coughing; burns when he urinates from his Prostate issues(Ca)

## 2019-01-13 NOTE — PROGRESS NOTES
"[facility] Hospital Medicine  Progress Note    Patient Name: Radhames Alonzo  MRN: [id number]  Patient Class: IP- Inpatient   Admission Date: 1/9/2019  Length of Stay: 4 days  Attending Physician: Jesús Tay MD  Primary Care Provider: Pennie Jorge NP        Subjective:     Principal Problem:Severe sepsis    HPI:  84 year old male presents to ED b/c " I couldn't get up and walk."   SOB for about a year. Worse recently. Last night it got to the point that he was too SOB to stand up.   SOB at rest and with eating.   He also notes left foot pain. He has been seeing woundcare for a wound on this foot last 7 months   Workup in ED is significant for BNP 600s. Last BNP 200s 3 weeks ago. 160s 9 months ago.  He sees Dr. Jean-Baptiste regularly.   [facility] has seen pt and rec admit for diuresis, rule out MI   First TPN with slight bump.038  Lactic acid is 3.6. Meets I am told by ED MD that patient "has no signs of infection on physical exam." I am also told that CXR and U/A are clear.     He drinks six pack daily. Last drink 2 days ago. No h/o DT's in past         Hospital Course:  He has been started on cefepime and clinda for left lower ext cellulitis. He has been afebrile. WBC was 40188. Blood cultures NGTD. Lactate was up to 3.8, coming down.RR was 24 and had low sats on admission. Was on venti, now down to 3L NC   He is also wheezing. C/o SOB. CXR with CHF.   He does report that he takes pradaxa for his a fib at home. Not on home meds list nor reordered here. A fib on EKG. BNP was 659. Ordered for lasix 80mg IV BID. Urinating a lot. K low this am 2.8    1/11/19 Getting lasix 80mg IV bid per Cardiology; not much urine output yesterday with dribbling noted; concern for obstruction; will get bladder scan; consider renal US.   Creat 0.9 stable; K+ 3.6    CPK 2378>1762; TNI 0.032 x 1; repeat this am     1-12 Pt continues to have SOB and pul congestion; Dr Robertson consulted    1-13 Pt is fairly complex with mx " cardiac/pulmonary issues causing his coughing and SOB; has a hx of underlying prostate problems; a swing bed might be necessary; Dr Robertson and cardiology following    No new subjective & objective note has been filed under this hospital service since the last note was generated.    Assessment/Plan:      * Severe sepsis    Due to Venous stasis and wound of LLE   Start Cefepime and clinda to cover for everything including MRSA and psuedomonas.   Clinda instead of Vanc since mild CKD and we will diurese him as well   Lactic acid q 6 coming down  Bolus 2 liters overnight creat 0.9 bp 111/66 this am  woundcare consult     1-12 legs are down but still pul congestion    1-13 Not retaining as much fluids, but still SOB and coughing; burns when he urinates from his Prostate issues(Ca)       COPD exacerbation    Need to assess if truly hypoxic. No low sats charted but has been on venti and now 4L NC. Will try to wean. Repeat CXR.   1/11/19 O2 sat 95-99%     1-12 Pt is labored with his breathing and continuing to cough; Dr Robertson consulted       Cellulitis of left lower extremity    See severe sepsis treatment  Check u/s  Negative for bilateral lower extremity DVT.     Hypokalemia    Likely due to large amount of fluids and lasix  Replace today 40meq po x 2 doses and consider adding in spironolactone tomorrow.  K+ 3.6 today      Rhabdomyolysis    Mild. Due to chronic ETOHism   2  liter bolus for sepsis should take care of it   CPK pending this am          CHF (congestive heart failure)    Cis consulted   I am assuming they have a recent ECHO in their records. Will not order one here but would like records  Continue ARB, BB    CIS on call for Lasix 80mg IV BID per cards, creat stable. Watch urine output strictly as well as weights.         Atrial fibrillation    Rate controlled. On metoprolol    He reports that he is taking pradaxa but called pharmacy and they do not report that he has filled anything for anticoagulation especially  pradaxa in recent months. Start lovenox 1mg/kg BID until we get his home meds         Cardiomyopathy      Cardiology is following       VTE Risk Mitigation (From admission, onward)        Ordered     enoxaparin injection 100 mg  Every 12 hours (non-standard times)      01/10/19 1134     IP VTE HIGH RISK PATIENT  Once      01/09/19 3515              Cash Medina MD  Department of Hospital Medicine   [facility]

## 2019-01-13 NOTE — PLAN OF CARE
Problem: Adult Inpatient Plan of Care  Goal: Plan of Care Review  Outcome: Ongoing (interventions implemented as appropriate)  Patient is compliant with fluid and medication management.  Patient is compliant with with all lab testing and procedures.  Patient remains free from all falls and injury.  Urine is orange in color from medication.  VSS. Plan of care reviewed and agreed upon with patient.  Will continue to monitor.

## 2019-01-13 NOTE — PLAN OF CARE
Problem: Adult Inpatient Plan of Care  Goal: Plan of Care Review  Outcome: Ongoing (interventions implemented as appropriate)  Patient sitting on side of bed most shift. Some anxiety over bipap, given PRN ativan but patient still unable to tolerate. Remained on 2L NC. Some complaints of abdominal pain, started on pyridium. Some relief stated. Dressing changed on foot because of incontinent issues. VS stable, A&O to person/place. A-fib on tele. No acute changes noted Plan of care reviewed and agreed upon with patient and family.

## 2019-01-13 NOTE — SUBJECTIVE & OBJECTIVE
Interval History: still with mild sob ++ dysuria     Objective:     Vital Signs (Most Recent):  Temp: 97.4 °F (36.3 °C) (01/13/19 1201)  Pulse: 95 (01/13/19 1201)  Resp: 18 (01/13/19 1201)  BP: 113/65 (01/13/19 1201)  SpO2: 98 % (01/13/19 1201) Vital Signs (24h Range):  Temp:  [97 °F (36.1 °C)-98.1 °F (36.7 °C)] 97.4 °F (36.3 °C)  Pulse:  [] 95  Resp:  [18-25] 18  SpO2:  [93 %-99 %] 98 %  BP: (106-133)/(62-78) 113/65     Weight: 99.6 kg (219 lb 9.6 oz)  Body mass index is 28.97 kg/m².      Intake/Output Summary (Last 24 hours) at 1/13/2019 1246  Last data filed at 1/13/2019 0720  Gross per 24 hour   Intake 460 ml   Output 1275 ml   Net -815 ml       Physical Exam   Constitutional: He is oriented to person, place, and time. He appears well-developed and well-nourished. He is cooperative.  Non-toxic appearance. He does not appear ill. No distress.   HENT:   Head: Normocephalic and atraumatic.   Right Ear: Hearing, tympanic membrane, external ear and ear canal normal.   Left Ear: Hearing, tympanic membrane, external ear and ear canal normal.   Nose: Nose normal. No mucosal edema, rhinorrhea or nasal deformity. No epistaxis. Right sinus exhibits no maxillary sinus tenderness and no frontal sinus tenderness. Left sinus exhibits no maxillary sinus tenderness and no frontal sinus tenderness.   Mouth/Throat: Uvula is midline, oropharynx is clear and moist and mucous membranes are normal. No trismus in the jaw. Normal dentition. No uvula swelling. No posterior oropharyngeal erythema.   Eyes: Conjunctivae and lids are normal. No scleral icterus.   Sclera clear bilat   Neck: Trachea normal, full passive range of motion without pain and phonation normal. Neck supple.   Cardiovascular: Normal rate, regular rhythm, normal heart sounds, intact distal pulses and normal pulses.   Pulmonary/Chest: He is in respiratory distress (mild to moderate). He has decreased breath sounds in the right upper field, the right middle field,  the right lower field, the left upper field, the left middle field and the left lower field. He has no wheezes. He has rhonchi in the right middle field, the right lower field, the left middle field and the left lower field.   Abdominal: Soft. Normal appearance and bowel sounds are normal. He exhibits no distension. There is no tenderness.   Musculoskeletal: Normal range of motion. He exhibits no edema or deformity.   Neurological: He is alert and oriented to person, place, and time. He exhibits normal muscle tone. Coordination normal.   Skin: Skin is warm, dry and intact. He is not diaphoretic. No pallor.   Psychiatric: He has a normal mood and affect. His speech is normal and behavior is normal. Judgment and thought content normal. Cognition and memory are normal.   Nursing note and vitals reviewed.      Vents:  Oxygen Concentration (%): 30 (01/12/19 1700)    Lines/Drains/Airways     Peripheral Intravenous Line                 Peripheral IV - Single Lumen 01/10/19 0525 Right Upper Arm 3 days                Significant Labs:    CBC/Anemia Profile:  Recent Labs   Lab 01/12/19  0652 01/13/19  0634   WBC 7.20 4.83   HGB 12.4* 13.5*   HCT 40.3 42.9   * 150   * 103*   RDW 13.3 13.3        Chemistries:  Recent Labs   Lab 01/12/19  0652 01/13/19  0634    141   K 3.4* 3.4*    96   CO2 33* 32*   BUN 23 22   CREATININE 0.9 1.0   CALCIUM 9.0 9.3       All pertinent labs within the past 24 hours have been reviewed.    Significant Imaging:  I have reviewed all pertinent imaging results/findings within the past 24 hours.

## 2019-01-13 NOTE — PROGRESS NOTES
"[facility] Medicine  Progress Note    Patient Name: Radhames Alonzo  MRN: [id number]  Patient Class: IP- Inpatient   Admission Date: 1/9/2019  Length of Stay: 4 days  Attending Physician: Jesús Tay MD  Primary Care Provider: Pennie Jorge NP        Subjective:     Principal Problem:Severe sepsis    HPI:  84 year old male presents to ED b/c " I couldn't get up and walk."   SOB for about a year. Worse recently. Last night it got to the point that he was too SOB to stand up.   SOB at rest and with eating.   He also notes left foot pain. He has been seeing woundcare for a wound on this foot last 7 months   Workup in ED is significant for BNP 600s. Last BNP 200s 3 weeks ago. 160s 9 months ago.  He sees Dr. Jean-Baptiste regularly.   [facility] has seen pt and rec admit for diuresis, rule out MI   First TPN with slight bump.038  Lactic acid is 3.6. Meets I am told by ED MD that patient "has no signs of infection on physical exam." I am also told that CXR and U/A are clear.     He drinks six pack daily. Last drink 2 days ago. No h/o DT's in past         Hospital Course:  He has been started on cefepime and clinda for left lower ext cellulitis. He has been afebrile. WBC was 62126. Blood cultures NGTD. Lactate was up to 3.8, coming down.RR was 24 and had low sats on admission. Was on venti, now down to 3L NC   He is also wheezing. C/o SOB. CXR with CHF.   He does report that he takes pradaxa for his a fib at home. Not on home meds list nor reordered here. A fib on EKG. BNP was 659. Ordered for lasix 80mg IV BID. Urinating a lot. K low this am 2.8    1/11/19 Getting lasix 80mg IV bid per Cardiology; not much urine output yesterday with dribbling noted; concern for obstruction; will get bladder scan; consider renal US.   Creat 0.9 stable; K+ 3.6    CPK 2378>1762; TNI 0.032 x 1; repeat this am     1-12 Pt continues to have SOB and pul congestion; Dr Robertson consulted    1-13 Pt is fairly complex with mx " cardiac/pulmonary issues causing his coughing and SOB; has a hx of underlying prostate problems; a swing bed might be necessary; Dr Robertson and cardiology following    Interval note: SOB  Review of Systems   Constitutional: Positive for chills. Negative for appetite change and fatigue.   HENT: Positive for congestion and sore throat. Negative for rhinorrhea.    Respiratory: Positive for shortness of breath. Negative for cough and wheezing.    Cardiovascular: Positive for leg swelling. Negative for chest pain and palpitations.        2 pillow orthopnea    Gastrointestinal: Negative for constipation, nausea and vomiting.   Genitourinary: Positive for dysuria. Negative for difficulty urinating, enuresis, flank pain, frequency, hematuria and urgency.   Musculoskeletal: Positive for gait problem.   Skin: Positive for wound. Negative for color change and rash.   Neurological: Positive for weakness. Negative for light-headedness.   Hematological: Negative for adenopathy. Does not bruise/bleed easily.     Objective:     Vital Signs (Most Recent):  Temp: 97 °F (36.1 °C) (01/13/19 0720)  Pulse: (!) 111 (01/13/19 0944)  Resp: 18 (01/13/19 0735)  BP: 133/77 (01/13/19 0720)  SpO2: 96 % (01/13/19 0735) Vital Signs (24h Range):  Temp:  [96.4 °F (35.8 °C)-98.1 °F (36.7 °C)] 97 °F (36.1 °C)  Pulse:  [] 111  Resp:  [18-25] 18  SpO2:  [93 %-99 %] 96 %  BP: (106-133)/(62-88) 133/77     Weight: 99.6 kg (219 lb 9.6 oz)  Body mass index is 28.97 kg/m².    Physical Exam   Constitutional: He is oriented to person, place, and time. He appears well-developed and well-nourished.   Fetor hepaticus    HENT:   Head: Normocephalic and atraumatic.   Right Ear: External ear normal.   Eyes: Conjunctivae and EOM are normal. Pupils are equal, round, and reactive to light. Scleral icterus is present.   Neck: Normal range of motion. Neck supple. No JVD present.   Cardiovascular: Normal rate, regular rhythm, normal heart sounds and intact distal  pulses.   No murmur heard.  Pulmonary/Chest: He is in respiratory distress. He has wheezes. He has rales.   Crackle bases   Abdominal: Soft. Bowel sounds are normal. There is no rebound and no guarding.   Periumbilical venous congestion/prominence    Musculoskeletal: Normal range of motion. He exhibits edema.   Neurological: He is alert and oriented to person, place, and time. He has normal reflexes. He displays normal reflexes. No cranial nerve deficit. He exhibits normal muscle tone. Coordination normal.   Skin: Skin is warm and dry. There is erythema.   2cm wound left lateral foot, see pic   Erythema of foot and leg up to thigh, see picture     Psychiatric: He has a normal mood and affect. His behavior is normal. Judgment and thought content normal.                 CRANIAL NERVES     CN III, IV, VI   Pupils are equal, round, and reactive to light.  Extraocular motions are normal.       Significant Labs:   Blood Culture:   No results for input(s): LABBLOO in the last 48 hours.  CBC:   Recent Labs   Lab 01/12/19  0652 01/13/19  0634   WBC 7.20 4.83   HGB 12.4* 13.5*   HCT 40.3 42.9   * 150     CMP:   Recent Labs   Lab 01/12/19  0652 01/13/19  0634    141   K 3.4* 3.4*    96   CO2 33* 32*    154*   BUN 23 22   CREATININE 0.9 1.0   CALCIUM 9.0 9.3   ANIONGAP 8 13   EGFRNONAA >60 >60   lactic acid 3.6>>1.0  Mag 2.2  Recent Labs   Lab 01/11/19  0419   TROPONINI 0.015       BNP  Recent Labs   Lab 01/12/19  0652   *       Urine Studies:   No results for input(s): COLORU, APPEARANCEUA, PHUR, SPECGRAV, PROTEINUA, GLUCUA, KETONESU, BILIRUBINUA, OCCULTUA, NITRITE, UROBILINOGEN, LEUKOCYTESUR, RBCUA, WBCUA, BACTERIA, SQUAMEPITHEL, HYALINECASTS in the last 48 hours.    Invalid input(s): WRIGHTSUR  Blood cultures NGTD x 2    Significant Imaging:     CXR There is elevation of the left hemidiaphragm.  There is a small left-sided pleural effusion.  Central pulmonary vascular congestion is noted with  mild interstitial edema.  The heart is enlarged.  Calcified atheromatous disease affects the aorta.  Age-appropriate degenerative changes affect the skeleton.    1/10/19US lower ext  Negative for bilateral lower extremity DVT.    CXR   EKG Atrial fibrillation with premature ventricular or aberrantly conducted  complexes  Right axis deviation  Abnormal ECG  No previous ECGs available  Confirmed by Monica Phoenix MD ([id number]) on 1/9/2019 4:33:19 PM    ECHO:- TTE December 2018 with LVEF 55%  Interval note: SOB  Review of Systems   Constitutional: Positive for chills. Negative for appetite change and fatigue.   HENT: Positive for congestion and sore throat. Negative for rhinorrhea.    Respiratory: Positive for shortness of breath. Negative for cough and wheezing.    Cardiovascular: Positive for leg swelling. Negative for chest pain and palpitations.        2 pillow orthopnea    Gastrointestinal: Negative for constipation, nausea and vomiting.   Genitourinary: Positive for dysuria. Negative for difficulty urinating, enuresis, flank pain, frequency, hematuria and urgency.   Musculoskeletal: Positive for gait problem.   Skin: Positive for wound. Negative for color change and rash.   Neurological: Positive for weakness. Negative for light-headedness.   Hematological: Negative for adenopathy. Does not bruise/bleed easily.     Objective:     Vital Signs (Most Recent):  Temp: 96.1 °F (35.6 °C) (01/11/19 0731)  Pulse: 90 (01/11/19 0731)  Resp: 20 (01/11/19 0731)  BP: 128/76 (01/11/19 0731)  SpO2: 98 % (01/11/19 0731) Vital Signs (24h Range):  Temp:  [96 °F (35.6 °C)-97.5 °F (36.4 °C)] 96.1 °F (35.6 °C)  Pulse:  [81-98] 90  Resp:  [18-24] 20  SpO2:  [94 %-99 %] 98 %  BP: (107-132)/(64-81) 128/76     Weight: 99.6 kg (219 lb 9.6 oz)  Body mass index is 28.97 kg/m².    Physical Exam   Constitutional: He is oriented to person, place, and time. He appears well-developed and well-nourished.   Fetor hepaticus    HENT:   Head: Normocephalic  and atraumatic.   Right Ear: External ear normal.   Eyes: Conjunctivae and EOM are normal. Pupils are equal, round, and reactive to light. Scleral icterus is present.   Neck: Normal range of motion. Neck supple. No JVD present.   Cardiovascular: Normal rate, regular rhythm, normal heart sounds and intact distal pulses.   No murmur heard.  Pulmonary/Chest: He is in respiratory distress. He has wheezes. He has rales.   Crackle bases   Abdominal: Soft. Bowel sounds are normal. There is no rebound and no guarding.   Periumbilical venous congestion/prominence    Musculoskeletal: Normal range of motion. He exhibits edema.   Neurological: He is alert and oriented to person, place, and time. He has normal reflexes. He displays normal reflexes. No cranial nerve deficit. He exhibits normal muscle tone. Coordination normal.   Skin: Skin is warm and dry. There is erythema.   2cm wound left lateral foot, see pic   Erythema of foot and leg up to thigh, see picture     Psychiatric: He has a normal mood and affect. His behavior is normal. Judgment and thought content normal.                 CRANIAL NERVES     CN III, IV, VI   Pupils are equal, round, and reactive to light.  Extraocular motions are normal.       Significant Labs:   Blood Culture:   Recent Labs   Lab 01/09/19  1205 01/09/19  1230   LABBLOO No Growth to date  No Growth to date No Growth to date  No Growth to date     CBC:   Recent Labs   Lab 01/09/19  1205 01/11/19  0419   WBC 14.46* 5.86   HGB 13.4* 11.9*   HCT 42.5 38.9*   * 129*     CMP:   Recent Labs   Lab 01/09/19  1205 01/10/19  0205 01/11/19  0419   * 135* 138   K 3.0* 2.8* 3.6   CL 92* 96 100   CO2 29 29 30*   * 109 159*   BUN 20 18 21   CREATININE 1.4 0.9 0.9   CALCIUM 9.4 8.3* 9.0   PROT 7.8  --   --    ALBUMIN 3.3*  --   --    BILITOT 2.0*  --   --    ALKPHOS 94  --   --    AST 76*  --   --    ALT 20  --   --    ANIONGAP 13 10 8   EGFRNONAA 46* >60 >60   lactic acid 3.6>>1.0  Mag  2.2  Recent Labs   Lab 01/09/19 2027   TROPONINI 0.032*       BNP  Recent Labs   Lab 01/09/19  1205   *       Urine Studies:   Recent Labs   Lab 01/09/19  1305   COLORU Yellow   APPEARANCEUA Clear   PHUR 6.0   SPECGRAV 1.015   PROTEINUA 1+*   GLUCUA Negative   KETONESU Negative   BILIRUBINUA Negative   OCCULTUA 3+*   NITRITE Negative   UROBILINOGEN 1.0   LEUKOCYTESUR Negative   RBCUA 0   WBCUA 3   BACTERIA Few*   HYALINECASTS 0     Blood cultures NGTD x 2    Significant Imaging:     CXR There is elevation of the left hemidiaphragm.  There is a small left-sided pleural effusion.  Central pulmonary vascular congestion is noted with mild interstitial edema.  The heart is enlarged.  Calcified atheromatous disease affects the aorta.  Age-appropriate degenerative changes affect the skeleton.    1/10/19US lower ext  Negative for bilateral lower extremity DVT.    CXR   EKG Atrial fibrillation with premature ventricular or aberrantly conducted  complexes  Right axis deviation  Abnormal ECG  No previous ECGs available  Confirmed by Monica Phoenix MD ([id number]) on 1/9/2019 4:33:19 PM    ECHO:- TTE December 2018 with LVEF 55%  Interval note: SOB  Review of Systems   Constitutional: Positive for chills. Negative for appetite change and fatigue.   HENT: Positive for congestion and sore throat. Negative for rhinorrhea.    Respiratory: Positive for shortness of breath. Negative for cough and wheezing.    Cardiovascular: Positive for leg swelling. Negative for chest pain and palpitations.        2 pillow orthopnea    Gastrointestinal: Negative for constipation, nausea and vomiting.   Genitourinary: Positive for dysuria. Negative for difficulty urinating, enuresis, flank pain, frequency, hematuria and urgency.   Musculoskeletal: Positive for gait problem.   Skin: Positive for wound. Negative for color change and rash.   Neurological: Positive for weakness. Negative for light-headedness.   Hematological: Negative for adenopathy.  Does not bruise/bleed easily.     Objective:     Vital Signs (Most Recent):  Temp: 97 °F (36.1 °C) (01/13/19 0720)  Pulse: (!) 111 (01/13/19 0944)  Resp: 18 (01/13/19 0735)  BP: 133/77 (01/13/19 0720)  SpO2: 96 % (01/13/19 0735) Vital Signs (24h Range):  Temp:  [96.4 °F (35.8 °C)-98.1 °F (36.7 °C)] 97 °F (36.1 °C)  Pulse:  [] 111  Resp:  [18-25] 18  SpO2:  [93 %-99 %] 96 %  BP: (106-133)/(62-88) 133/77     Weight: 99.6 kg (219 lb 9.6 oz)  Body mass index is 28.97 kg/m².    Physical Exam   Constitutional: He is oriented to person, place, and time. He appears well-developed and well-nourished.   Fetor hepaticus    HENT:   Head: Normocephalic and atraumatic.   Right Ear: External ear normal.   Eyes: Conjunctivae and EOM are normal. Pupils are equal, round, and reactive to light. Scleral icterus is present.   Neck: Normal range of motion. Neck supple. No JVD present.   Cardiovascular: Normal rate, regular rhythm, normal heart sounds and intact distal pulses.   No murmur heard.  Pulmonary/Chest: He is in respiratory distress. He has wheezes. He has rales.   Crackle bases   Abdominal: Soft. Bowel sounds are normal. There is no rebound and no guarding.   Periumbilical venous congestion/prominence    Musculoskeletal: Normal range of motion. He exhibits edema.   Neurological: He is alert and oriented to person, place, and time. He has normal reflexes. He displays normal reflexes. No cranial nerve deficit. He exhibits normal muscle tone. Coordination normal.   Skin: Skin is warm and dry. There is erythema.   2cm wound left lateral foot, see pic   Erythema of foot and leg up to thigh, see picture     Psychiatric: He has a normal mood and affect. His behavior is normal. Judgment and thought content normal.                 CRANIAL NERVES     CN III, IV, VI   Pupils are equal, round, and reactive to light.  Extraocular motions are normal.       Significant Labs:   Blood Culture:   No results for input(s): LABBLOO in the  last 48 hours.  CBC:   Recent Labs   Lab 01/12/19  0652 01/13/19  0634   WBC 7.20 4.83   HGB 12.4* 13.5*   HCT 40.3 42.9   * 150     CMP:   Recent Labs   Lab 01/12/19  0652 01/13/19  0634    141   K 3.4* 3.4*    96   CO2 33* 32*    154*   BUN 23 22   CREATININE 0.9 1.0   CALCIUM 9.0 9.3   ANIONGAP 8 13   EGFRNONAA >60 >60   lactic acid 3.6>>1.0  Mag 2.2  Recent Labs   Lab 01/11/19  0419   TROPONINI 0.015       BNP  Recent Labs   Lab 01/12/19  0652   *       Urine Studies:   No results for input(s): COLORU, APPEARANCEUA, PHUR, SPECGRAV, PROTEINUA, GLUCUA, KETONESU, BILIRUBINUA, OCCULTUA, NITRITE, UROBILINOGEN, LEUKOCYTESUR, RBCUA, WBCUA, BACTERIA, SQUAMEPITHEL, HYALINECASTS in the last 48 hours.    Invalid input(s): WRIGHTSUR  Blood cultures NGTD x 2    Significant Imaging:     CXR There is elevation of the left hemidiaphragm.  There is a small left-sided pleural effusion.  Central pulmonary vascular congestion is noted with mild interstitial edema.  The heart is enlarged.  Calcified atheromatous disease affects the aorta.  Age-appropriate degenerative changes affect the skeleton.    1/10/19US lower ext  Negative for bilateral lower extremity DVT.    CXR   EKG Atrial fibrillation with premature ventricular or aberrantly conducted  complexes  Right axis deviation  Abnormal ECG  No previous ECGs available  Confirmed by Monica Phoenix MD ([id number]) on 1/9/2019 4:33:19 PM    ECHO:- TTE December 2018 with LVEF 55%  Interval note: SOB  Review of Systems   Constitutional: Positive for chills. Negative for appetite change and fatigue.   HENT: Positive for congestion and sore throat. Negative for rhinorrhea.    Respiratory: Positive for shortness of breath. Negative for cough and wheezing.    Cardiovascular: Positive for leg swelling. Negative for chest pain and palpitations.        2 pillow orthopnea    Gastrointestinal: Negative for constipation, nausea and vomiting.   Genitourinary: Positive for  dysuria. Negative for difficulty urinating, enuresis, flank pain, frequency, hematuria and urgency.   Musculoskeletal: Positive for gait problem.   Skin: Positive for wound. Negative for color change and rash.   Neurological: Positive for weakness. Negative for light-headedness.   Hematological: Negative for adenopathy. Does not bruise/bleed easily.     Objective:     Vital Signs (Most Recent):  Temp: 96.1 °F (35.6 °C) (01/11/19 0731)  Pulse: 90 (01/11/19 0731)  Resp: 20 (01/11/19 0731)  BP: 128/76 (01/11/19 0731)  SpO2: 98 % (01/11/19 0731) Vital Signs (24h Range):  Temp:  [96 °F (35.6 °C)-97.5 °F (36.4 °C)] 96.1 °F (35.6 °C)  Pulse:  [81-98] 90  Resp:  [18-24] 20  SpO2:  [94 %-99 %] 98 %  BP: (107-132)/(64-81) 128/76     Weight: 99.6 kg (219 lb 9.6 oz)  Body mass index is 28.97 kg/m².    Physical Exam   Constitutional: He is oriented to person, place, and time. He appears well-developed and well-nourished.   Fetor hepaticus    HENT:   Head: Normocephalic and atraumatic.   Right Ear: External ear normal.   Eyes: Conjunctivae and EOM are normal. Pupils are equal, round, and reactive to light. Scleral icterus is present.   Neck: Normal range of motion. Neck supple. No JVD present.   Cardiovascular: Normal rate, regular rhythm, normal heart sounds and intact distal pulses.   No murmur heard.  Pulmonary/Chest: He is in respiratory distress. He has wheezes. He has rales.   Crackle bases   Abdominal: Soft. Bowel sounds are normal. There is no rebound and no guarding.   Periumbilical venous congestion/prominence    Musculoskeletal: Normal range of motion. He exhibits edema.   Neurological: He is alert and oriented to person, place, and time. He has normal reflexes. He displays normal reflexes. No cranial nerve deficit. He exhibits normal muscle tone. Coordination normal.   Skin: Skin is warm and dry. There is erythema.   2cm wound left lateral foot, see pic   Erythema of foot and leg up to thigh, see picture      Psychiatric: He has a normal mood and affect. His behavior is normal. Judgment and thought content normal.                 CRANIAL NERVES     CN III, IV, VI   Pupils are equal, round, and reactive to light.  Extraocular motions are normal.       Significant Labs:   Blood Culture:   Recent Labs   Lab 01/09/19  1205 01/09/19  1230   LABBLOO No Growth to date  No Growth to date No Growth to date  No Growth to date     CBC:   Recent Labs   Lab 01/09/19  1205 01/11/19  0419   WBC 14.46* 5.86   HGB 13.4* 11.9*   HCT 42.5 38.9*   * 129*     CMP:   Recent Labs   Lab 01/09/19  1205 01/10/19  0205 01/11/19  0419   * 135* 138   K 3.0* 2.8* 3.6   CL 92* 96 100   CO2 29 29 30*   * 109 159*   BUN 20 18 21   CREATININE 1.4 0.9 0.9   CALCIUM 9.4 8.3* 9.0   PROT 7.8  --   --    ALBUMIN 3.3*  --   --    BILITOT 2.0*  --   --    ALKPHOS 94  --   --    AST 76*  --   --    ALT 20  --   --    ANIONGAP 13 10 8   EGFRNONAA 46* >60 >60   lactic acid 3.6>>1.0  Mag 2.2  Recent Labs   Lab 01/09/19  2027   TROPONINI 0.032*       BNP  Recent Labs   Lab 01/09/19  1205   *       Urine Studies:   Recent Labs   Lab 01/09/19  1305   COLORU Yellow   APPEARANCEUA Clear   PHUR 6.0   SPECGRAV 1.015   PROTEINUA 1+*   GLUCUA Negative   KETONESU Negative   BILIRUBINUA Negative   OCCULTUA 3+*   NITRITE Negative   UROBILINOGEN 1.0   LEUKOCYTESUR Negative   RBCUA 0   WBCUA 3   BACTERIA Few*   HYALINECASTS 0     Blood cultures NGTD x 2    Significant Imaging:     CXR There is elevation of the left hemidiaphragm.  There is a small left-sided pleural effusion.  Central pulmonary vascular congestion is noted with mild interstitial edema.  The heart is enlarged.  Calcified atheromatous disease affects the aorta.  Age-appropriate degenerative changes affect the skeleton.    1/10/19US lower ext  Negative for bilateral lower extremity DVT.    CXR   EKG Atrial fibrillation with premature ventricular or aberrantly  conducted  complexes  Right axis deviation  Abnormal ECG  No previous ECGs available  Confirmed by Monica Phoenix MD ([id number]) on 1/9/2019 4:33:19 PM    ECHO:- TTE December 2018 with LVEF 55%      Assessment/Plan:      * Severe sepsis    Due to Venous stasis and wound of LLE   Start Cefepime and clinda to cover for everything including MRSA and psuedomonas.   Clinda instead of Vanc since mild CKD and we will diurese him as well   Lactic acid q 6 coming down  Bolus 2 liters overnight creat 0.9 bp 111/66 this am  woundcare consult     1-12 legs are down but still pul congestion    1-13 Not retaining as much fluids, but still SOB and coughing; burns when he urinates from his Prostate issues(Ca)       COPD exacerbation    Need to assess if truly hypoxic. No low sats charted but has been on venti and now 4L NC. Will try to wean. Repeat CXR.   1/11/19 O2 sat 95-99%     1-12 Pt is labored with his breathing and continuing to cough; Dr Robertson consulted       Cellulitis of left lower extremity    See severe sepsis treatment  Check u/s  Negative for bilateral lower extremity DVT.     Hypokalemia    Likely due to large amount of fluids and lasix  Replace today 40meq po x 2 doses and consider adding in spironolactone tomorrow.  K+ 3.6 today      Rhabdomyolysis    Mild. Due to chronic ETOHism   2  liter bolus for sepsis should take care of it   CPK pending this am          CHF (congestive heart failure)    [facility] consulted   I am assuming they have a recent ECHO in their records. Will not order one here but would like records  Continue ARB, BB    [facility] on call for Lasix 80mg IV BID per cards, creat stable. Watch urine output strictly as well as weights.         Atrial fibrillation    Rate controlled. On metoprolol    He reports that he is taking pradaxa but called pharmacy and they do not report that he has filled anything for anticoagulation especially pradaxa in recent months. Start lovenox 1mg/kg BID until we get his home meds          Cardiomyopathy      Cardiology is following       VTE Risk Mitigation (From admission, onward)        Ordered     enoxaparin injection 100 mg  Every 12 hours (non-standard times)      01/10/19 1134     IP VTE HIGH RISK PATIENT  Once      01/09/19 9243              Cash Medina MD  Department of Hospital Medicine   [facility]

## 2019-01-13 NOTE — PROGRESS NOTES
[facility]  Pulmonology  Progress Note    Patient Name: Radhames Alonzo  MRN: [id number]  Admission Date: 1/9/2019  Hospital Length of Stay: 4 days  Code Status: Full Code  Attending Provider: Jesús Tay MD  Primary Care Provider: Pennie Jorge NP   Principal Problem: Severe sepsis    Subjective:     Interval History: still with mild sob ++ dysuria     Objective:     Vital Signs (Most Recent):  Temp: 97.4 °F (36.3 °C) (01/13/19 1201)  Pulse: 95 (01/13/19 1201)  Resp: 18 (01/13/19 1201)  BP: 113/65 (01/13/19 1201)  SpO2: 98 % (01/13/19 1201) Vital Signs (24h Range):  Temp:  [97 °F (36.1 °C)-98.1 °F (36.7 °C)] 97.4 °F (36.3 °C)  Pulse:  [] 95  Resp:  [18-25] 18  SpO2:  [93 %-99 %] 98 %  BP: (106-133)/(62-78) 113/65     Weight: 99.6 kg (219 lb 9.6 oz)  Body mass index is 28.97 kg/m².      Intake/Output Summary (Last 24 hours) at 1/13/2019 1246  Last data filed at 1/13/2019 0720  Gross per 24 hour   Intake 460 ml   Output 1275 ml   Net -815 ml       Physical Exam   Constitutional: He is oriented to person, place, and time. He appears well-developed and well-nourished. He is cooperative.  Non-toxic appearance. He does not appear ill. No distress.   HENT:   Head: Normocephalic and atraumatic.   Right Ear: Hearing, tympanic membrane, external ear and ear canal normal.   Left Ear: Hearing, tympanic membrane, external ear and ear canal normal.   Nose: Nose normal. No mucosal edema, rhinorrhea or nasal deformity. No epistaxis. Right sinus exhibits no maxillary sinus tenderness and no frontal sinus tenderness. Left sinus exhibits no maxillary sinus tenderness and no frontal sinus tenderness.   Mouth/Throat: Uvula is midline, oropharynx is clear and moist and mucous membranes are normal. No trismus in the jaw. Normal dentition. No uvula swelling. No posterior oropharyngeal erythema.   Eyes: Conjunctivae and lids are normal. No scleral icterus.   Sclera clear bilat   Neck: Trachea normal, full  passive range of motion without pain and phonation normal. Neck supple.   Cardiovascular: Normal rate, regular rhythm, normal heart sounds, intact distal pulses and normal pulses.   Pulmonary/Chest: He is in respiratory distress (mild to moderate). He has decreased breath sounds in the right upper field, the right middle field, the right lower field, the left upper field, the left middle field and the left lower field. He has no wheezes. He has rhonchi in the right middle field, the right lower field, the left middle field and the left lower field.   Abdominal: Soft. Normal appearance and bowel sounds are normal. He exhibits no distension. There is no tenderness.   Musculoskeletal: Normal range of motion. He exhibits no edema or deformity.   Neurological: He is alert and oriented to person, place, and time. He exhibits normal muscle tone. Coordination normal.   Skin: Skin is warm, dry and intact. He is not diaphoretic. No pallor.   Psychiatric: He has a normal mood and affect. His speech is normal and behavior is normal. Judgment and thought content normal. Cognition and memory are normal.   Nursing note and vitals reviewed.      Vents:  Oxygen Concentration (%): 30 (01/12/19 1700)    Lines/Drains/Airways     Peripheral Intravenous Line                 Peripheral IV - Single Lumen 01/10/19 0525 Right Upper Arm 3 days                Significant Labs:    CBC/Anemia Profile:  Recent Labs   Lab 01/12/19  0652 01/13/19  0634   WBC 7.20 4.83   HGB 12.4* 13.5*   HCT 40.3 42.9   * 150   * 103*   RDW 13.3 13.3        Chemistries:  Recent Labs   Lab 01/12/19  0652 01/13/19  0634    141   K 3.4* 3.4*    96   CO2 33* 32*   BUN 23 22   CREATININE 0.9 1.0   CALCIUM 9.0 9.3       All pertinent labs within the past 24 hours have been reviewed.    Significant Imaging:  I have reviewed all pertinent imaging results/findings within the past 24 hours.    Assessment/Plan:     COPD exacerbation    Will follow  cxr and continue neb steroids and neb     CHF (congestive heart failure)    Stable      Atrial fibrillation    follow HR      Cardiomyopathy    Stable now             Sukh Robertson MD  Pulmonology  [facility]

## 2019-01-13 NOTE — SUBJECTIVE & OBJECTIVE
Interval note: SOB  Review of Systems   Constitutional: Positive for chills. Negative for appetite change and fatigue.   HENT: Positive for congestion and sore throat. Negative for rhinorrhea.    Respiratory: Positive for shortness of breath. Negative for cough and wheezing.    Cardiovascular: Positive for leg swelling. Negative for chest pain and palpitations.        2 pillow orthopnea    Gastrointestinal: Negative for constipation, nausea and vomiting.   Genitourinary: Positive for dysuria. Negative for difficulty urinating, enuresis, flank pain, frequency, hematuria and urgency.   Musculoskeletal: Positive for gait problem.   Skin: Positive for wound. Negative for color change and rash.   Neurological: Positive for weakness. Negative for light-headedness.   Hematological: Negative for adenopathy. Does not bruise/bleed easily.     Objective:     Vital Signs (Most Recent):  Temp: 97 °F (36.1 °C) (01/13/19 0720)  Pulse: (!) 111 (01/13/19 0944)  Resp: 18 (01/13/19 0735)  BP: 133/77 (01/13/19 0720)  SpO2: 96 % (01/13/19 0735) Vital Signs (24h Range):  Temp:  [96.4 °F (35.8 °C)-98.1 °F (36.7 °C)] 97 °F (36.1 °C)  Pulse:  [] 111  Resp:  [18-25] 18  SpO2:  [93 %-99 %] 96 %  BP: (106-133)/(62-88) 133/77     Weight: 99.6 kg (219 lb 9.6 oz)  Body mass index is 28.97 kg/m².    Physical Exam   Constitutional: He is oriented to person, place, and time. He appears well-developed and well-nourished.   Fetor hepaticus    HENT:   Head: Normocephalic and atraumatic.   Right Ear: External ear normal.   Eyes: Conjunctivae and EOM are normal. Pupils are equal, round, and reactive to light. Scleral icterus is present.   Neck: Normal range of motion. Neck supple. No JVD present.   Cardiovascular: Normal rate, regular rhythm, normal heart sounds and intact distal pulses.   No murmur heard.  Pulmonary/Chest: He is in respiratory distress. He has wheezes. He has rales.   Crackle bases   Abdominal: Soft. Bowel sounds are normal. There  is no rebound and no guarding.   Periumbilical venous congestion/prominence    Musculoskeletal: Normal range of motion. He exhibits edema.   Neurological: He is alert and oriented to person, place, and time. He has normal reflexes. He displays normal reflexes. No cranial nerve deficit. He exhibits normal muscle tone. Coordination normal.   Skin: Skin is warm and dry. There is erythema.   2cm wound left lateral foot, see pic   Erythema of foot and leg up to thigh, see picture     Psychiatric: He has a normal mood and affect. His behavior is normal. Judgment and thought content normal.                 CRANIAL NERVES     CN III, IV, VI   Pupils are equal, round, and reactive to light.  Extraocular motions are normal.       Significant Labs:   Blood Culture:   No results for input(s): LABBLOO in the last 48 hours.  CBC:   Recent Labs   Lab 01/12/19  0652 01/13/19  0634   WBC 7.20 4.83   HGB 12.4* 13.5*   HCT 40.3 42.9   * 150     CMP:   Recent Labs   Lab 01/12/19  0652 01/13/19  0634    141   K 3.4* 3.4*    96   CO2 33* 32*    154*   BUN 23 22   CREATININE 0.9 1.0   CALCIUM 9.0 9.3   ANIONGAP 8 13   EGFRNONAA >60 >60   lactic acid 3.6>>1.0  Mag 2.2  Recent Labs   Lab 01/11/19  0419   TROPONINI 0.015       BNP  Recent Labs   Lab 01/12/19  0652   *       Urine Studies:   No results for input(s): COLORU, APPEARANCEUA, PHUR, SPECGRAV, PROTEINUA, GLUCUA, KETONESU, BILIRUBINUA, OCCULTUA, NITRITE, UROBILINOGEN, LEUKOCYTESUR, RBCUA, WBCUA, BACTERIA, SQUAMEPITHEL, HYALINECASTS in the last 48 hours.    Invalid input(s): WRIGHTSUR  Blood cultures NGTD x 2    Significant Imaging:     CXR There is elevation of the left hemidiaphragm.  There is a small left-sided pleural effusion.  Central pulmonary vascular congestion is noted with mild interstitial edema.  The heart is enlarged.  Calcified atheromatous disease affects the aorta.  Age-appropriate degenerative changes affect the skeleton.    1/10/19US  lower ext  Negative for bilateral lower extremity DVT.    CXR   EKG Atrial fibrillation with premature ventricular or aberrantly conducted  complexes  Right axis deviation  Abnormal ECG  No previous ECGs available  Confirmed by Monica Phoenix MD ([id number]) on 1/9/2019 4:33:19 PM    ECHO:- TTE December 2018 with LVEF 55%  Interval note: SOB  Review of Systems   Constitutional: Positive for chills. Negative for appetite change and fatigue.   HENT: Positive for congestion and sore throat. Negative for rhinorrhea.    Respiratory: Positive for shortness of breath. Negative for cough and wheezing.    Cardiovascular: Positive for leg swelling. Negative for chest pain and palpitations.        2 pillow orthopnea    Gastrointestinal: Negative for constipation, nausea and vomiting.   Genitourinary: Positive for dysuria. Negative for difficulty urinating, enuresis, flank pain, frequency, hematuria and urgency.   Musculoskeletal: Positive for gait problem.   Skin: Positive for wound. Negative for color change and rash.   Neurological: Positive for weakness. Negative for light-headedness.   Hematological: Negative for adenopathy. Does not bruise/bleed easily.     Objective:     Vital Signs (Most Recent):  Temp: 96.1 °F (35.6 °C) (01/11/19 0731)  Pulse: 90 (01/11/19 0731)  Resp: 20 (01/11/19 0731)  BP: 128/76 (01/11/19 0731)  SpO2: 98 % (01/11/19 0731) Vital Signs (24h Range):  Temp:  [96 °F (35.6 °C)-97.5 °F (36.4 °C)] 96.1 °F (35.6 °C)  Pulse:  [81-98] 90  Resp:  [18-24] 20  SpO2:  [94 %-99 %] 98 %  BP: (107-132)/(64-81) 128/76     Weight: 99.6 kg (219 lb 9.6 oz)  Body mass index is 28.97 kg/m².    Physical Exam   Constitutional: He is oriented to person, place, and time. He appears well-developed and well-nourished.   Fetor hepaticus    HENT:   Head: Normocephalic and atraumatic.   Right Ear: External ear normal.   Eyes: Conjunctivae and EOM are normal. Pupils are equal, round, and reactive to light. Scleral icterus is present.    Neck: Normal range of motion. Neck supple. No JVD present.   Cardiovascular: Normal rate, regular rhythm, normal heart sounds and intact distal pulses.   No murmur heard.  Pulmonary/Chest: He is in respiratory distress. He has wheezes. He has rales.   Crackle bases   Abdominal: Soft. Bowel sounds are normal. There is no rebound and no guarding.   Periumbilical venous congestion/prominence    Musculoskeletal: Normal range of motion. He exhibits edema.   Neurological: He is alert and oriented to person, place, and time. He has normal reflexes. He displays normal reflexes. No cranial nerve deficit. He exhibits normal muscle tone. Coordination normal.   Skin: Skin is warm and dry. There is erythema.   2cm wound left lateral foot, see pic   Erythema of foot and leg up to thigh, see picture     Psychiatric: He has a normal mood and affect. His behavior is normal. Judgment and thought content normal.                 CRANIAL NERVES     CN III, IV, VI   Pupils are equal, round, and reactive to light.  Extraocular motions are normal.       Significant Labs:   Blood Culture:   Recent Labs   Lab 01/09/19  1205 01/09/19  1230   LABBLOO No Growth to date  No Growth to date No Growth to date  No Growth to date     CBC:   Recent Labs   Lab 01/09/19  1205 01/11/19  0419   WBC 14.46* 5.86   HGB 13.4* 11.9*   HCT 42.5 38.9*   * 129*     CMP:   Recent Labs   Lab 01/09/19  1205 01/10/19  0205 01/11/19  0419   * 135* 138   K 3.0* 2.8* 3.6   CL 92* 96 100   CO2 29 29 30*   * 109 159*   BUN 20 18 21   CREATININE 1.4 0.9 0.9   CALCIUM 9.4 8.3* 9.0   PROT 7.8  --   --    ALBUMIN 3.3*  --   --    BILITOT 2.0*  --   --    ALKPHOS 94  --   --    AST 76*  --   --    ALT 20  --   --    ANIONGAP 13 10 8   EGFRNONAA 46* >60 >60   lactic acid 3.6>>1.0  Mag 2.2  Recent Labs   Lab 01/09/19 2027   TROPONINI 0.032*       BNP  Recent Labs   Lab 01/09/19  1205   *       Urine Studies:   Recent Labs   Lab 01/09/19  6327    COLORU Yellow   APPEARANCEUA Clear   PHUR 6.0   SPECGRAV 1.015   PROTEINUA 1+*   GLUCUA Negative   KETONESU Negative   BILIRUBINUA Negative   OCCULTUA 3+*   NITRITE Negative   UROBILINOGEN 1.0   LEUKOCYTESUR Negative   RBCUA 0   WBCUA 3   BACTERIA Few*   HYALINECASTS 0     Blood cultures NGTD x 2    Significant Imaging:     CXR There is elevation of the left hemidiaphragm.  There is a small left-sided pleural effusion.  Central pulmonary vascular congestion is noted with mild interstitial edema.  The heart is enlarged.  Calcified atheromatous disease affects the aorta.  Age-appropriate degenerative changes affect the skeleton.    1/10/19US lower ext  Negative for bilateral lower extremity DVT.    CXR   EKG Atrial fibrillation with premature ventricular or aberrantly conducted  complexes  Right axis deviation  Abnormal ECG  No previous ECGs available  Confirmed by Mnoica Phoenix MD ([id number]) on 1/9/2019 4:33:19 PM    ECHO:- TTE December 2018 with LVEF 55%  Interval note: SOB  Review of Systems   Constitutional: Positive for chills. Negative for appetite change and fatigue.   HENT: Positive for congestion and sore throat. Negative for rhinorrhea.    Respiratory: Positive for shortness of breath. Negative for cough and wheezing.    Cardiovascular: Positive for leg swelling. Negative for chest pain and palpitations.        2 pillow orthopnea    Gastrointestinal: Negative for constipation, nausea and vomiting.   Genitourinary: Positive for dysuria. Negative for difficulty urinating, enuresis, flank pain, frequency, hematuria and urgency.   Musculoskeletal: Positive for gait problem.   Skin: Positive for wound. Negative for color change and rash.   Neurological: Positive for weakness. Negative for light-headedness.   Hematological: Negative for adenopathy. Does not bruise/bleed easily.     Objective:     Vital Signs (Most Recent):  Temp: 97 °F (36.1 °C) (01/13/19 0720)  Pulse: (!) 111 (01/13/19 0944)  Resp: 18 (01/13/19  0735)  BP: 133/77 (01/13/19 0720)  SpO2: 96 % (01/13/19 0735) Vital Signs (24h Range):  Temp:  [96.4 °F (35.8 °C)-98.1 °F (36.7 °C)] 97 °F (36.1 °C)  Pulse:  [] 111  Resp:  [18-25] 18  SpO2:  [93 %-99 %] 96 %  BP: (106-133)/(62-88) 133/77     Weight: 99.6 kg (219 lb 9.6 oz)  Body mass index is 28.97 kg/m².    Physical Exam   Constitutional: He is oriented to person, place, and time. He appears well-developed and well-nourished.   Fetor hepaticus    HENT:   Head: Normocephalic and atraumatic.   Right Ear: External ear normal.   Eyes: Conjunctivae and EOM are normal. Pupils are equal, round, and reactive to light. Scleral icterus is present.   Neck: Normal range of motion. Neck supple. No JVD present.   Cardiovascular: Normal rate, regular rhythm, normal heart sounds and intact distal pulses.   No murmur heard.  Pulmonary/Chest: He is in respiratory distress. He has wheezes. He has rales.   Crackle bases   Abdominal: Soft. Bowel sounds are normal. There is no rebound and no guarding.   Periumbilical venous congestion/prominence    Musculoskeletal: Normal range of motion. He exhibits edema.   Neurological: He is alert and oriented to person, place, and time. He has normal reflexes. He displays normal reflexes. No cranial nerve deficit. He exhibits normal muscle tone. Coordination normal.   Skin: Skin is warm and dry. There is erythema.   2cm wound left lateral foot, see pic   Erythema of foot and leg up to thigh, see picture     Psychiatric: He has a normal mood and affect. His behavior is normal. Judgment and thought content normal.                 CRANIAL NERVES     CN III, IV, VI   Pupils are equal, round, and reactive to light.  Extraocular motions are normal.       Significant Labs:   Blood Culture:   No results for input(s): LABBLOO in the last 48 hours.  CBC:   Recent Labs   Lab 01/12/19  0652 01/13/19  0634   WBC 7.20 4.83   HGB 12.4* 13.5*   HCT 40.3 42.9   * 150     CMP:   Recent Labs   Lab  01/12/19  0652 01/13/19  0634    141   K 3.4* 3.4*    96   CO2 33* 32*    154*   BUN 23 22   CREATININE 0.9 1.0   CALCIUM 9.0 9.3   ANIONGAP 8 13   EGFRNONAA >60 >60   lactic acid 3.6>>1.0  Mag 2.2  Recent Labs   Lab 01/11/19  0419   TROPONINI 0.015       BNP  Recent Labs   Lab 01/12/19  0652   *       Urine Studies:   No results for input(s): COLORU, APPEARANCEUA, PHUR, SPECGRAV, PROTEINUA, GLUCUA, KETONESU, BILIRUBINUA, OCCULTUA, NITRITE, UROBILINOGEN, LEUKOCYTESUR, RBCUA, WBCUA, BACTERIA, SQUAMEPITHEL, HYALINECASTS in the last 48 hours.    Invalid input(s): WRIGHTSUR  Blood cultures NGTD x 2    Significant Imaging:     CXR There is elevation of the left hemidiaphragm.  There is a small left-sided pleural effusion.  Central pulmonary vascular congestion is noted with mild interstitial edema.  The heart is enlarged.  Calcified atheromatous disease affects the aorta.  Age-appropriate degenerative changes affect the skeleton.    1/10/19US lower ext  Negative for bilateral lower extremity DVT.    CXR   EKG Atrial fibrillation with premature ventricular or aberrantly conducted  complexes  Right axis deviation  Abnormal ECG  No previous ECGs available  Confirmed by Monica Phoenix MD ([id number]) on 1/9/2019 4:33:19 PM    ECHO:- TTE December 2018 with LVEF 55%  Interval note: SOB  Review of Systems   Constitutional: Positive for chills. Negative for appetite change and fatigue.   HENT: Positive for congestion and sore throat. Negative for rhinorrhea.    Respiratory: Positive for shortness of breath. Negative for cough and wheezing.    Cardiovascular: Positive for leg swelling. Negative for chest pain and palpitations.        2 pillow orthopnea    Gastrointestinal: Negative for constipation, nausea and vomiting.   Genitourinary: Positive for dysuria. Negative for difficulty urinating, enuresis, flank pain, frequency, hematuria and urgency.   Musculoskeletal: Positive for gait problem.   Skin: Positive  for wound. Negative for color change and rash.   Neurological: Positive for weakness. Negative for light-headedness.   Hematological: Negative for adenopathy. Does not bruise/bleed easily.     Objective:     Vital Signs (Most Recent):  Temp: 96.1 °F (35.6 °C) (01/11/19 0731)  Pulse: 90 (01/11/19 0731)  Resp: 20 (01/11/19 0731)  BP: 128/76 (01/11/19 0731)  SpO2: 98 % (01/11/19 0731) Vital Signs (24h Range):  Temp:  [96 °F (35.6 °C)-97.5 °F (36.4 °C)] 96.1 °F (35.6 °C)  Pulse:  [81-98] 90  Resp:  [18-24] 20  SpO2:  [94 %-99 %] 98 %  BP: (107-132)/(64-81) 128/76     Weight: 99.6 kg (219 lb 9.6 oz)  Body mass index is 28.97 kg/m².    Physical Exam   Constitutional: He is oriented to person, place, and time. He appears well-developed and well-nourished.   Fetor hepaticus    HENT:   Head: Normocephalic and atraumatic.   Right Ear: External ear normal.   Eyes: Conjunctivae and EOM are normal. Pupils are equal, round, and reactive to light. Scleral icterus is present.   Neck: Normal range of motion. Neck supple. No JVD present.   Cardiovascular: Normal rate, regular rhythm, normal heart sounds and intact distal pulses.   No murmur heard.  Pulmonary/Chest: He is in respiratory distress. He has wheezes. He has rales.   Crackle bases   Abdominal: Soft. Bowel sounds are normal. There is no rebound and no guarding.   Periumbilical venous congestion/prominence    Musculoskeletal: Normal range of motion. He exhibits edema.   Neurological: He is alert and oriented to person, place, and time. He has normal reflexes. He displays normal reflexes. No cranial nerve deficit. He exhibits normal muscle tone. Coordination normal.   Skin: Skin is warm and dry. There is erythema.   2cm wound left lateral foot, see pic   Erythema of foot and leg up to thigh, see picture     Psychiatric: He has a normal mood and affect. His behavior is normal. Judgment and thought content normal.                 CRANIAL NERVES     CN III, IV, VI   Pupils are  equal, round, and reactive to light.  Extraocular motions are normal.       Significant Labs:   Blood Culture:   Recent Labs   Lab 01/09/19  1205 01/09/19  1230   LABBLOO No Growth to date  No Growth to date No Growth to date  No Growth to date     CBC:   Recent Labs   Lab 01/09/19  1205 01/11/19  0419   WBC 14.46* 5.86   HGB 13.4* 11.9*   HCT 42.5 38.9*   * 129*     CMP:   Recent Labs   Lab 01/09/19  1205 01/10/19  0205 01/11/19  0419   * 135* 138   K 3.0* 2.8* 3.6   CL 92* 96 100   CO2 29 29 30*   * 109 159*   BUN 20 18 21   CREATININE 1.4 0.9 0.9   CALCIUM 9.4 8.3* 9.0   PROT 7.8  --   --    ALBUMIN 3.3*  --   --    BILITOT 2.0*  --   --    ALKPHOS 94  --   --    AST 76*  --   --    ALT 20  --   --    ANIONGAP 13 10 8   EGFRNONAA 46* >60 >60   lactic acid 3.6>>1.0  Mag 2.2  Recent Labs   Lab 01/09/19  2027   TROPONINI 0.032*       BNP  Recent Labs   Lab 01/09/19  1205   *       Urine Studies:   Recent Labs   Lab 01/09/19  1305   COLORU Yellow   APPEARANCEUA Clear   PHUR 6.0   SPECGRAV 1.015   PROTEINUA 1+*   GLUCUA Negative   KETONESU Negative   BILIRUBINUA Negative   OCCULTUA 3+*   NITRITE Negative   UROBILINOGEN 1.0   LEUKOCYTESUR Negative   RBCUA 0   WBCUA 3   BACTERIA Few*   HYALINECASTS 0     Blood cultures NGTD x 2    Significant Imaging:     CXR There is elevation of the left hemidiaphragm.  There is a small left-sided pleural effusion.  Central pulmonary vascular congestion is noted with mild interstitial edema.  The heart is enlarged.  Calcified atheromatous disease affects the aorta.  Age-appropriate degenerative changes affect the skeleton.    1/10/19US lower ext  Negative for bilateral lower extremity DVT.    CXR   EKG Atrial fibrillation with premature ventricular or aberrantly conducted  complexes  Right axis deviation  Abnormal ECG  No previous ECGs available  Confirmed by Monica Phoenix MD ([id number]) on 1/9/2019 4:33:19 PM    ECHO:- TTE December 2018 with LVEF 55%

## 2019-01-14 PROBLEM — J18.9 PNEUMONIA: Status: ACTIVE | Noted: 2019-01-10

## 2019-01-14 LAB
ALLENS TEST: ABNORMAL
ANION GAP SERPL CALC-SCNC: 12 MMOL/L
BACTERIA BLD CULT: NORMAL
BACTERIA BLD CULT: NORMAL
BASOPHILS NFR BLD: 0 %
BNP SERPL-MCNC: 210 PG/ML
BUN SERPL-MCNC: 26 MG/DL
CALCIUM SERPL-MCNC: 9.6 MG/DL
CHLORIDE SERPL-SCNC: 95 MMOL/L
CO2 SERPL-SCNC: 33 MMOL/L
CREAT SERPL-MCNC: 1 MG/DL
D DIMER PPP IA.FEU-MCNC: <0.19 MG/L FEU
DELSYS: ABNORMAL
DIFFERENTIAL METHOD: ABNORMAL
EOSINOPHIL NFR BLD: 0 %
ERYTHROCYTE [DISTWIDTH] IN BLOOD BY AUTOMATED COUNT: 13 %
EST. GFR  (AFRICAN AMERICAN): >60 ML/MIN/1.73 M^2
EST. GFR  (NON AFRICAN AMERICAN): >60 ML/MIN/1.73 M^2
GLUCOSE SERPL-MCNC: 116 MG/DL
HCO3 UR-SCNC: 40.5 MMOL/L (ref 22–26)
HCT VFR BLD AUTO: 42.4 %
HGB BLD-MCNC: 13.2 G/DL
LYMPHOCYTES NFR BLD: 11 %
MCH RBC QN AUTO: 31.7 PG
MCHC RBC AUTO-ENTMCNC: 31.1 G/DL
MCV RBC AUTO: 102 FL
MONOCYTES NFR BLD: 5 %
NEUTROPHILS NFR BLD: 83 %
NEUTS BAND NFR BLD MANUAL: 1 %
PCO2 BLDA: 57 MMHG (ref 35–45)
PH SMN: 7.46 [PH] (ref 7.35–7.45)
PLATELET # BLD AUTO: 178 K/UL
PLATELET BLD QL SMEAR: ABNORMAL
PMV BLD AUTO: 9.8 FL
PO2 BLDA: 99 MMHG (ref 75–100)
POC BE: 14.2 MMOL/L (ref -2–2)
POC COHB: 1.8 % (ref 0–3)
POC METHB: 0.7 % (ref 0–1.5)
POC O2HB ARTERIAL: 96.9 % (ref 94–100)
POC SATURATED O2: 99.4 % (ref 90–100)
POC TCO2: 42.2 MMOL/L
POC THB: 12.8 G/DL (ref 12–18)
POTASSIUM SERPL-SCNC: 3.4 MMOL/L
RBC # BLD AUTO: 4.16 M/UL
SITE: ABNORMAL
SODIUM SERPL-SCNC: 140 MMOL/L
WBC # BLD AUTO: 7.32 K/UL

## 2019-01-14 PROCEDURE — A4216 STERILE WATER/SALINE, 10 ML: HCPCS | Performed by: SURGERY

## 2019-01-14 PROCEDURE — 63600175 PHARM REV CODE 636 W HCPCS: Performed by: NURSE PRACTITIONER

## 2019-01-14 PROCEDURE — 63600175 PHARM REV CODE 636 W HCPCS: Performed by: FAMILY MEDICINE

## 2019-01-14 PROCEDURE — 27000221 HC OXYGEN, UP TO 24 HOURS

## 2019-01-14 PROCEDURE — 80048 BASIC METABOLIC PNL TOTAL CA: CPT

## 2019-01-14 PROCEDURE — 94640 AIRWAY INHALATION TREATMENT: CPT

## 2019-01-14 PROCEDURE — 83880 ASSAY OF NATRIURETIC PEPTIDE: CPT

## 2019-01-14 PROCEDURE — 25000003 PHARM REV CODE 250: Performed by: FAMILY MEDICINE

## 2019-01-14 PROCEDURE — 25000003 PHARM REV CODE 250: Performed by: NURSE PRACTITIONER

## 2019-01-14 PROCEDURE — 85379 FIBRIN DEGRADATION QUANT: CPT

## 2019-01-14 PROCEDURE — 85007 BL SMEAR W/DIFF WBC COUNT: CPT

## 2019-01-14 PROCEDURE — 82803 BLOOD GASES ANY COMBINATION: CPT | Performed by: INTERNAL MEDICINE

## 2019-01-14 PROCEDURE — 97530 THERAPEUTIC ACTIVITIES: CPT

## 2019-01-14 PROCEDURE — 85027 COMPLETE CBC AUTOMATED: CPT

## 2019-01-14 PROCEDURE — 94761 N-INVAS EAR/PLS OXIMETRY MLT: CPT

## 2019-01-14 PROCEDURE — 94660 CPAP INITIATION&MGMT: CPT

## 2019-01-14 PROCEDURE — 25000242 PHARM REV CODE 250 ALT 637 W/ HCPCS: Performed by: NURSE PRACTITIONER

## 2019-01-14 PROCEDURE — 63600175 PHARM REV CODE 636 W HCPCS: Performed by: INTERNAL MEDICINE

## 2019-01-14 PROCEDURE — 25000003 PHARM REV CODE 250: Performed by: SURGERY

## 2019-01-14 PROCEDURE — 36415 COLL VENOUS BLD VENIPUNCTURE: CPT

## 2019-01-14 PROCEDURE — 97161 PT EVAL LOW COMPLEX 20 MIN: CPT

## 2019-01-14 PROCEDURE — 99233 SBSQ HOSP IP/OBS HIGH 50: CPT | Mod: ,,, | Performed by: INTERNAL MEDICINE

## 2019-01-14 PROCEDURE — 25000242 PHARM REV CODE 250 ALT 637 W/ HCPCS

## 2019-01-14 PROCEDURE — 99233 PR SUBSEQUENT HOSPITAL CARE,LEVL III: ICD-10-PCS | Mod: ,,, | Performed by: INTERNAL MEDICINE

## 2019-01-14 PROCEDURE — S0077 INJECTION, CLINDAMYCIN PHOSP: HCPCS | Performed by: FAMILY MEDICINE

## 2019-01-14 PROCEDURE — 99900035 HC TECH TIME PER 15 MIN (STAT)

## 2019-01-14 PROCEDURE — 11000001 HC ACUTE MED/SURG PRIVATE ROOM

## 2019-01-14 RX ORDER — DABIGATRAN ETEXILATE 75 MG/1
150 CAPSULE ORAL 2 TIMES DAILY
Status: DISCONTINUED | OUTPATIENT
Start: 2019-01-14 | End: 2019-01-24 | Stop reason: HOSPADM

## 2019-01-14 RX ORDER — LEVALBUTEROL 1.25 MG/.5ML
SOLUTION, CONCENTRATE RESPIRATORY (INHALATION)
Status: COMPLETED
Start: 2019-01-14 | End: 2019-01-14

## 2019-01-14 RX ORDER — BICALUTAMIDE 50 MG/1
50 TABLET, FILM COATED ORAL DAILY
Status: DISCONTINUED | OUTPATIENT
Start: 2019-01-14 | End: 2019-01-24 | Stop reason: HOSPADM

## 2019-01-14 RX ORDER — TAMSULOSIN HYDROCHLORIDE 0.4 MG/1
0.4 CAPSULE ORAL DAILY
Status: DISCONTINUED | OUTPATIENT
Start: 2019-01-14 | End: 2019-01-24 | Stop reason: HOSPADM

## 2019-01-14 RX ORDER — POTASSIUM CHLORIDE 20 MEQ/1
40 TABLET, EXTENDED RELEASE ORAL ONCE
Status: COMPLETED | OUTPATIENT
Start: 2019-01-14 | End: 2019-01-14

## 2019-01-14 RX ORDER — LEVALBUTEROL 1.25 MG/.5ML
1.25 SOLUTION, CONCENTRATE RESPIRATORY (INHALATION) EVERY 4 HOURS PRN
Status: DISCONTINUED | OUTPATIENT
Start: 2019-01-14 | End: 2019-01-24 | Stop reason: HOSPADM

## 2019-01-14 RX ADMIN — LEVALBUTEROL 1.25 MG: 1.25 SOLUTION, CONCENTRATE RESPIRATORY (INHALATION) at 10:01

## 2019-01-14 RX ADMIN — POTASSIUM CHLORIDE 40 MEQ: 1500 TABLET, EXTENDED RELEASE ORAL at 12:01

## 2019-01-14 RX ADMIN — LOSARTAN POTASSIUM 100 MG: 50 TABLET, FILM COATED ORAL at 10:01

## 2019-01-14 RX ADMIN — PHENAZOPYRIDINE HYDROCHLORIDE 100 MG: 100 TABLET ORAL at 12:01

## 2019-01-14 RX ADMIN — TAMSULOSIN HYDROCHLORIDE 0.4 MG: 0.4 CAPSULE ORAL at 12:01

## 2019-01-14 RX ADMIN — HYDROCODONE BITARTRATE AND ACETAMINOPHEN 1 TABLET: 5; 325 TABLET ORAL at 12:01

## 2019-01-14 RX ADMIN — CLINDAMYCIN IN 5 PERCENT DEXTROSE 600 MG: 12 INJECTION, SOLUTION INTRAVENOUS at 08:01

## 2019-01-14 RX ADMIN — LORAZEPAM 0.5 MG: 0.5 TABLET ORAL at 08:01

## 2019-01-14 RX ADMIN — TORSEMIDE 20 MG: 20 TABLET ORAL at 10:01

## 2019-01-14 RX ADMIN — PHENAZOPYRIDINE HYDROCHLORIDE 100 MG: 100 TABLET ORAL at 10:01

## 2019-01-14 RX ADMIN — LORAZEPAM 0.5 MG: 0.5 TABLET ORAL at 12:01

## 2019-01-14 RX ADMIN — METHYLPREDNISOLONE SODIUM SUCCINATE 20 MG: 40 INJECTION, POWDER, FOR SOLUTION INTRAMUSCULAR; INTRAVENOUS at 12:01

## 2019-01-14 RX ADMIN — LEVALBUTEROL 1.25 MG: 1.25 SOLUTION, CONCENTRATE RESPIRATORY (INHALATION) at 07:01

## 2019-01-14 RX ADMIN — Medication 3 ML: at 06:01

## 2019-01-14 RX ADMIN — CEFEPIME HYDROCHLORIDE 1 G: 1 INJECTION, SOLUTION INTRAVENOUS at 12:01

## 2019-01-14 RX ADMIN — BICALUTAMIDE 50 MG: 50 TABLET, FILM COATED ORAL at 12:01

## 2019-01-14 RX ADMIN — CLINDAMYCIN IN 5 PERCENT DEXTROSE 600 MG: 12 INJECTION, SOLUTION INTRAVENOUS at 12:01

## 2019-01-14 RX ADMIN — CEFEPIME HYDROCHLORIDE 1 G: 1 INJECTION, SOLUTION INTRAVENOUS at 05:01

## 2019-01-14 RX ADMIN — LEVALBUTEROL 1.25 MG: 1.25 SOLUTION, CONCENTRATE RESPIRATORY (INHALATION) at 02:01

## 2019-01-14 RX ADMIN — DABIGATRAN ETEXILATE MESYLATE 150 MG: 75 CAPSULE ORAL at 12:01

## 2019-01-14 RX ADMIN — ENOXAPARIN SODIUM 100 MG: 100 INJECTION SUBCUTANEOUS at 12:01

## 2019-01-14 RX ADMIN — DABIGATRAN ETEXILATE MESYLATE 150 MG: 75 CAPSULE ORAL at 08:01

## 2019-01-14 RX ADMIN — METHYLPREDNISOLONE SODIUM SUCCINATE 20 MG: 40 INJECTION, POWDER, FOR SOLUTION INTRAMUSCULAR; INTRAVENOUS at 05:01

## 2019-01-14 RX ADMIN — TORSEMIDE 20 MG: 20 TABLET ORAL at 08:01

## 2019-01-14 RX ADMIN — CLINDAMYCIN IN 5 PERCENT DEXTROSE 600 MG: 12 INJECTION, SOLUTION INTRAVENOUS at 05:01

## 2019-01-14 RX ADMIN — METOPROLOL SUCCINATE 100 MG: 50 TABLET, EXTENDED RELEASE ORAL at 10:01

## 2019-01-14 RX ADMIN — CEFEPIME HYDROCHLORIDE 1 G: 1 INJECTION, SOLUTION INTRAVENOUS at 08:01

## 2019-01-14 RX ADMIN — PHENAZOPYRIDINE HYDROCHLORIDE 100 MG: 100 TABLET ORAL at 06:01

## 2019-01-14 NOTE — ASSESSMENT & PLAN NOTE
[facility] consulted    ECHO in their records  Continue ARB, BB    [facility] has him on torsemide 20mg po BID creat stable. Watch urine output strictly as well as weights.

## 2019-01-14 NOTE — PLAN OF CARE
01/14/19 1132   Discharge Reassessment   Assessment Type Discharge Planning Reassessment         Patient will remain for continued treatment today. He will need to complete 10 days of IV antibiotic therapy. Today is day 6/10. PT ordered for deconditioning as well. No needs or concerns voiced at this time. CM will continue to follow and assist as needed.

## 2019-01-14 NOTE — PLAN OF CARE
Problem: Adult Inpatient Plan of Care  Goal: Plan of Care Review  Outcome: Ongoing (interventions implemented as appropriate)  Patient still with complaints of dysuria. Some relief stated with PRN meds. Refusing Bipap. Some incontinence when no one helping. VS stable, disoriented to time only. A-fib on tele. No acute changes noted. POC reviewed and agreed upon. .

## 2019-01-14 NOTE — PROGRESS NOTES
[facility]  Cardiology  Progress Note    Patient Name: Radhames Alonzo  MRN: [id number]  Admission Date: 1/9/2019  Hospital Length of Stay: 5 days  Code Status: Full Code   Attending Physician: Jesús Tay MD   Primary Care Physician: Pennie Jorge NP  Expected Discharge Date: 1/10/2019  Principal Problem:Severe sepsis    Subjective:      Hospital Course: Patient is an 84 year old male with a past medical history of chronic diastolic heart failure, chronic atrial fibrillation, carotid stenosis, HHD, dyslipidemia, and chronic peripheral edema presents to the emergency room after becoming increasingly weak over the last two days. He also reports shortness of breath and coughing with white sputum. He has had a decline in status over the last couple of months and is now incontinent of bowels and bladder.  He denies any chest pain at this time. Upon admission, EKG reveals atrial fibrillation with controlled ventricular response. Chest x ray reveals left sided pleural effusion and mild pulmonary edema. Blood pressure is marginal. BNP moderately elevated at 659 as well as mildly elevated troponin .041 and CK-MB 2752. WBC mildly elevated and renal function borderline and at baseline. CIS asked to evaluate.             ROS    Constitutional : Weakness, lethargy  EENT : Negative  CV : BLE edema +2 at baseline.   Respiratory : Shortness of breath, coughing with sputum production.   Gastrointestinal: Negative   Genitourinary: Negative  Musculoskeletal: Negative  Skin : Ulcer to left lateral foot  Neurological : AAO x 3       Objective:     Vital Signs (Most Recent):  Temp: 96.5 °F (35.8 °C) (01/14/19 0751)  Pulse: 95 (01/14/19 0751)  Resp: 18 (01/14/19 0751)  BP: 116/62 (01/14/19 0751)  SpO2: (!) 94 % (01/14/19 0751) Vital Signs (24h Range):  Temp:  [96.1 °F (35.6 °C)-98.4 °F (36.9 °C)] 96.5 °F (35.8 °C)  Pulse:  [] 95  Resp:  [16-22] 18  SpO2:  [94 %-99 %] 94 %  BP: ()/(54-72) 116/62      Weight: 97.3 kg (214 lb 6.4 oz)  Body mass index is 28.29 kg/m².    SpO2: (!) 94 %  O2 Device (Oxygen Therapy): nasal cannula      Intake/Output Summary (Last 24 hours) at 1/14/2019 0829  Last data filed at 1/14/2019 0500  Gross per 24 hour   Intake 240 ml   Output 975 ml   Net -735 ml       Lines/Drains/Airways     Peripheral Intravenous Line                 Peripheral IV - Single Lumen 01/10/19 0525 Right Upper Arm 4 days         Peripheral IV - Single Lumen 01/13/19 1411 Anterior;Left Forearm less than 1 day                Physical Exam  General appearance: alert, appears stated age and cooperative. Frail in appearance. Lethargic. Generalized weakness.   Head: Normocephalic, without obvious abnormality, atraumatic  Eyes: conjunctivae/corneas clear. PERRL  Neck: no carotid bruit, no JVD and supple, symmetrical, trachea midline  Lungs: Expiratory rhales to left upper and lower lobe  Chest Wall: no tenderness  Heart: regular rate and rhythm, S1, S2 normal, 1/6 SM, click, rub or gallop  Abdomen: Distended, soft, non-tender; bowel sounds normal; no masses,  no organomegaly  Extremities: Extremities normal, atraumatic, no cyanosis, clubbing. +2 edema to BLE.   Pulses: Dorsalis Pedis R: 2+ (normal)/L: 2+ (normal)  Skin: Skin color, texture, turgor normal. Venous stasis ulcer to left lateral foot.   Neurologic: Normal mood and affect  Alert and oriented X 3         Significant Labs:   ABG: No results for input(s): PH, PCO2, HCO3, POCSATURATED, BE in the last 48 hours., Blood Culture: No results for input(s): LABBLOO in the last 48 hours., BMP:   Recent Labs   Lab 01/13/19  0634 01/14/19  0532   * 116*    140   K 3.4* 3.4*   CL 96 95   CO2 32* 33*   BUN 22 26*   CREATININE 1.0 1.0   CALCIUM 9.3 9.6   , CMP   Recent Labs   Lab 01/13/19  0634 01/14/19  0532    140   K 3.4* 3.4*   CL 96 95   CO2 32* 33*   * 116*   BUN 22 26*   CREATININE 1.0 1.0   CALCIUM 9.3 9.6   ANIONGAP 13 12   ESTGFRAFRICA  >60 >60   EGFRNONAA >60 >60   , CBC   Recent Labs   Lab 01/13/19  0634 01/14/19  0532   WBC 4.83 7.32   HGB 13.5* 13.2*   HCT 42.9 42.4    178   , INR No results for input(s): INR, PROTIME in the last 48 hours., Lipid Panel No results for input(s): CHOL, HDL, LDLCALC, TRIG, CHOLHDL in the last 48 hours.,   Pathology Results  (Last 10 years)    None      , Troponin No results for input(s): TROPONINI in the last 48 hours., All pertinent lab results from the last 24 hours have been reviewed. and   Recent Lab Results       01/14/19  0532        Anion Gap 12     BANDS 1.0     Basophil% 0.0     BUN, Bld 26     Calcium 9.6     Chloride 95     CO2 33     Creatinine 1.0     Differential Method Manual     eGFR if  >60     eGFR if non  >60  Comment:  Calculation used to obtain the estimated glomerular filtration  rate (eGFR) is the CKD-EPI equation.        Eosinophil% 0.0     Glucose 116     Gran% 83.0     Hematocrit 42.4     Hemoglobin 13.2     Lymph% 11.0     MCH 31.7     MCHC 31.1          Mono% 5.0     MPV 9.8     Platelet Estimate Appears normal     Platelets 178     Potassium 3.4     RBC 4.16     RDW 13.0     Sodium 140     WBC 7.32           Significant Imaging: CT scan: CT ABDOMEN PELVIS WITH CONTRAST: No results found for this visit on 01/09/19. and CT ABDOMEN PELVIS WITHOUT CONTRAST: No results found for this visit on 01/09/19., Echocardiogram: 2D echo with color flow doppler: No results found for this or any previous visit. and Transthoracic echo (TTE) complete (Cupid Only): No results found for this or any previous visit. and X-Ray: CXR: X-Ray Chest 1 View (CXR):   Results for orders placed or performed during the hospital encounter of 01/09/19   X-Ray Chest 1 View    Narrative    EXAMINATION:  XR CHEST 1 VIEW    CLINICAL HISTORY:  wheezing;    TECHNIQUE:  Single frontal view of the chest was performed.    COMPARISON:  01/09/2019    FINDINGS:  The heart shadow is  significantly enlarged.  There is left retrocardiac opacity which could represent atelectasis, aspiration or pneumonia.  Left-sided pleural effusion is suspected.  There is congestion of the pulmonary vasculature.      Impression    As above.      Electronically signed by: Yanci Vazquez MD  Date:    01/10/2019  Time:    12:58    and X-Ray Chest PA and Lateral (CXR): No results found for this visit on 01/09/19. and KUB: X-Ray Abdomen AP 1 View (KUB): No results found for this visit on 01/09/19.  Assessment and Plan:         Active Diagnoses:    Diagnosis Date Noted POA    PRINCIPAL PROBLEM:  Severe sepsis [A41.9, R65.20] 01/09/2019 Yes    Benign prostatic hyperplasia (BPH) with post-void dribbling [N40.1, N39.43] 01/11/2019 Yes    Hypokalemia [E87.6] 01/10/2019 Yes    Cellulitis of left lower extremity [L03.116] 01/10/2019 Yes    COPD exacerbation [J44.1] 01/10/2019 Yes    CHF (congestive heart failure) [I50.9] 01/09/2019 Yes    Rhabdomyolysis [M62.82] 01/09/2019 Yes    Atrial fibrillation [I48.91] 04/30/2015 Yes    Cardiomyopathy [I42.9] 04/30/2015 Yes      Problems Resolved During this Admission:       VTE Risk Mitigation (From admission, onward)        Ordered     enoxaparin injection 100 mg  Every 12 hours (non-standard times)      01/10/19 1134     IP VTE HIGH RISK PATIENT  Once      01/09/19 1751        Current Facility-Administered Medications   Medication    cefepime in dextrose 5 % 1 gram/50 mL IVPB 1 g    clindamycin 600 MG/50 ML D5W 600 mg/50 mL IVPB 600 mg    enoxaparin injection 100 mg    HYDROcodone-acetaminophen 5-325 mg per tablet 1 tablet    levalbuterol nebulizer solution 1.25 mg    LORazepam tablet 0.5 mg    losartan tablet 100 mg    methylPREDNISolone sodium succinate injection 20 mg    metoprolol succinate (TOPROL-XL) 24 hr tablet 100 mg    phenazopyridine tablet 100 mg    polyethylene glycol packet 17 g    sodium chloride 0.9% flush 3 mL    torsemide tablet 20 mg        Dx: Improving; now near baseline     Sepsis possibly secondary to venous stasis ulcer on left lateral foot. IV abx initiated per primary.      Left sided pleural effusion and mild pulmonary edema evidenced by chest x ray. BNP moderately elevated at 659.      Acute on chronic diastolic CHF TTE December 2018 with LVEF 55%, mild LVH. Lasix IV 80 mg bid.     Rhabdomyolysis improving. Chronic ETOH use. CPK improved at 1762 from 2378.      Chronic atrial fibrillation rate controlled not currently on anticoagulation according to last progress note in clinic but on xarelto in the past.      Chronic peripheral edema at baseline.      Dyslipidemia     Hypertension now low.      Plan:adjust torsemide as needed when ready for DC home  Continue iv lasix while inpt    Routine BW and CXR with BNP in the am.  Wean O2 provided O2 sat greater than 90%.      Transcribed by  Woodrow Butt NP for Dr ANSLEY Jean-Baptiste  Cardiology  [facility]    I attest that I have personally seen and examined this patient. I have reviewed and discussed the management in detail as outlined above.

## 2019-01-14 NOTE — PROGRESS NOTES
[facility]  Pulmonology  Progress Note    Patient Name: Radhames Alonzo  MRN: [id number]  Admission Date: 1/9/2019  Hospital Length of Stay: 5 days  Code Status: Full Code  Attending Provider: Jesús Tay MD  Primary Care Provider: Pennie Jorge NP   Principal Problem: Severe sepsis    Subjective:     Interval History: sitting up feet on the floor and discolored     Objective:     Vital Signs (Most Recent):  Temp: 96.2 °F (35.7 °C) (01/14/19 1149)  Pulse: 91 (01/14/19 1149)  Resp: 16 (01/14/19 1149)  BP: 109/71 (01/14/19 1149)  SpO2: 98 % (01/14/19 1149) Vital Signs (24h Range):  Temp:  [96.1 °F (35.6 °C)-98.4 °F (36.9 °C)] 96.2 °F (35.7 °C)  Pulse:  [] 91  Resp:  [16-22] 16  SpO2:  [94 %-99 %] 98 %  BP: ()/(54-72) 109/71     Weight: 97.3 kg (214 lb 6.4 oz)  Body mass index is 28.29 kg/m².      Intake/Output Summary (Last 24 hours) at 1/14/2019 1413  Last data filed at 1/14/2019 0900  Gross per 24 hour   Intake 360 ml   Output 675 ml   Net -315 ml       Physical Exam   Constitutional: He is oriented to person, place, and time. He appears well-developed and well-nourished. He is cooperative.  Non-toxic appearance. He does not appear ill. No distress.   HENT:   Head: Normocephalic and atraumatic.   Right Ear: Hearing, tympanic membrane, external ear and ear canal normal.   Left Ear: Hearing, tympanic membrane, external ear and ear canal normal.   Nose: Nose normal. No mucosal edema, rhinorrhea or nasal deformity. No epistaxis. Right sinus exhibits no maxillary sinus tenderness and no frontal sinus tenderness. Left sinus exhibits no maxillary sinus tenderness and no frontal sinus tenderness.   Mouth/Throat: Uvula is midline, oropharynx is clear and moist and mucous membranes are normal. No trismus in the jaw. Normal dentition. No uvula swelling. No posterior oropharyngeal erythema.   Eyes: Conjunctivae and lids are normal. No scleral icterus.   Sclera clear bilat   Neck: Trachea  normal, full passive range of motion without pain and phonation normal. Neck supple.   Cardiovascular: Normal rate, regular rhythm, normal heart sounds, intact distal pulses and normal pulses.   Pulmonary/Chest: He is in respiratory distress (mild to moderate). He has decreased breath sounds in the right upper field, the right middle field, the right lower field, the left upper field, the left middle field and the left lower field. He has wheezes in the right lower field and the left lower field. He has rhonchi in the right lower field and the left lower field.   Abdominal: Soft. Normal appearance and bowel sounds are normal. He exhibits no distension. There is no tenderness.   Musculoskeletal: Normal range of motion. He exhibits no edema or deformity.   Neurological: He is alert and oriented to person, place, and time. He exhibits normal muscle tone. Coordination normal.   Skin: Skin is warm, dry and intact. He is not diaphoretic. No pallor.   Psychiatric: He has a normal mood and affect. His speech is normal and behavior is normal. Judgment and thought content normal. Cognition and memory are normal.   Nursing note and vitals reviewed.      Vents:  Oxygen Concentration (%): 30 (01/12/19 1700)    Lines/Drains/Airways     Peripheral Intravenous Line                 Peripheral IV - Single Lumen 01/10/19 0525 Right Upper Arm 4 days         Peripheral IV - Single Lumen 01/13/19 1411 Anterior;Left Forearm 1 day                Significant Labs:    CBC/Anemia Profile:  Recent Labs   Lab 01/13/19  0634 01/14/19  0532   WBC 4.83 7.32   HGB 13.5* 13.2*   HCT 42.9 42.4    178   * 102*   RDW 13.3 13.0        Chemistries:  Recent Labs   Lab 01/13/19  0634 01/14/19  0532    140   K 3.4* 3.4*   CL 96 95   CO2 32* 33*   BUN 22 26*   CREATININE 1.0 1.0   CALCIUM 9.3 9.6       All pertinent labs within the past 24 hours have been reviewed.    Significant Imaging:  I have reviewed all pertinent imaging  results/findings within the past 24 hours.    Assessment/Plan:     Pneumonia    Left Lower Lobe Cxr todayWill follow cxr and continue neb steroids and neb     CHF (congestive heart failure)    Stable      Atrial fibrillation    follow HR      Cardiomyopathy    Stable now             Sukh Robertson MD  Pulmonology  [facility]

## 2019-01-14 NOTE — SUBJECTIVE & OBJECTIVE
Interval History: sitting up feet on the floor and discolored     Objective:     Vital Signs (Most Recent):  Temp: 96.2 °F (35.7 °C) (01/14/19 1149)  Pulse: 91 (01/14/19 1149)  Resp: 16 (01/14/19 1149)  BP: 109/71 (01/14/19 1149)  SpO2: 98 % (01/14/19 1149) Vital Signs (24h Range):  Temp:  [96.1 °F (35.6 °C)-98.4 °F (36.9 °C)] 96.2 °F (35.7 °C)  Pulse:  [] 91  Resp:  [16-22] 16  SpO2:  [94 %-99 %] 98 %  BP: ()/(54-72) 109/71     Weight: 97.3 kg (214 lb 6.4 oz)  Body mass index is 28.29 kg/m².      Intake/Output Summary (Last 24 hours) at 1/14/2019 1413  Last data filed at 1/14/2019 0900  Gross per 24 hour   Intake 360 ml   Output 675 ml   Net -315 ml       Physical Exam   Constitutional: He is oriented to person, place, and time. He appears well-developed and well-nourished. He is cooperative.  Non-toxic appearance. He does not appear ill. No distress.   HENT:   Head: Normocephalic and atraumatic.   Right Ear: Hearing, tympanic membrane, external ear and ear canal normal.   Left Ear: Hearing, tympanic membrane, external ear and ear canal normal.   Nose: Nose normal. No mucosal edema, rhinorrhea or nasal deformity. No epistaxis. Right sinus exhibits no maxillary sinus tenderness and no frontal sinus tenderness. Left sinus exhibits no maxillary sinus tenderness and no frontal sinus tenderness.   Mouth/Throat: Uvula is midline, oropharynx is clear and moist and mucous membranes are normal. No trismus in the jaw. Normal dentition. No uvula swelling. No posterior oropharyngeal erythema.   Eyes: Conjunctivae and lids are normal. No scleral icterus.   Sclera clear bilat   Neck: Trachea normal, full passive range of motion without pain and phonation normal. Neck supple.   Cardiovascular: Normal rate, regular rhythm, normal heart sounds, intact distal pulses and normal pulses.   Pulmonary/Chest: He is in respiratory distress (mild to moderate). He has decreased breath sounds in the right upper field, the right  middle field, the right lower field, the left upper field, the left middle field and the left lower field. He has wheezes in the right lower field and the left lower field. He has rhonchi in the right lower field and the left lower field.   Abdominal: Soft. Normal appearance and bowel sounds are normal. He exhibits no distension. There is no tenderness.   Musculoskeletal: Normal range of motion. He exhibits no edema or deformity.   Neurological: He is alert and oriented to person, place, and time. He exhibits normal muscle tone. Coordination normal.   Skin: Skin is warm, dry and intact. He is not diaphoretic. No pallor.   Psychiatric: He has a normal mood and affect. His speech is normal and behavior is normal. Judgment and thought content normal. Cognition and memory are normal.   Nursing note and vitals reviewed.      Vents:  Oxygen Concentration (%): 30 (01/12/19 1700)    Lines/Drains/Airways     Peripheral Intravenous Line                 Peripheral IV - Single Lumen 01/10/19 0525 Right Upper Arm 4 days         Peripheral IV - Single Lumen 01/13/19 1411 Anterior;Left Forearm 1 day                Significant Labs:    CBC/Anemia Profile:  Recent Labs   Lab 01/13/19  0634 01/14/19  0532   WBC 4.83 7.32   HGB 13.5* 13.2*   HCT 42.9 42.4    178   * 102*   RDW 13.3 13.0        Chemistries:  Recent Labs   Lab 01/13/19  0634 01/14/19  0532    140   K 3.4* 3.4*   CL 96 95   CO2 32* 33*   BUN 22 26*   CREATININE 1.0 1.0   CALCIUM 9.3 9.6       All pertinent labs within the past 24 hours have been reviewed.    Significant Imaging:  I have reviewed all pertinent imaging results/findings within the past 24 hours.

## 2019-01-14 NOTE — ASSESSMENT & PLAN NOTE
Rate controlled. On metoprolol    He reports that he is taking pradaxa but called pharmacy and they do not report that he has filled anything for anticoagulation especially pradaxa in recent months. Start lovenox 1mg/kg BID until we get his home meds    He was getting pradax per [facility], will restart at d/c

## 2019-01-14 NOTE — ASSESSMENT & PLAN NOTE
Likely due to large amount of fluids and lasix  Replace today 40meq po x 2 doses and consider adding in spironolactone tomorrow.  K+ 3.4 today -replace

## 2019-01-14 NOTE — ASSESSMENT & PLAN NOTE
See severe sepsis treatment  Check u/s  Negative for bilateral lower extremity DVT.    cont cefepime and clinda x 10 days wound care Cleanse wound with NS using gauze. Apply silver alginate to wound bed, cover with ABD and secure with tape daily.

## 2019-01-14 NOTE — SUBJECTIVE & OBJECTIVE
Interval note: SOB better as well as leg no fever,no elevated WBC    Review of Systems   Constitutional: Negative for appetite change, chills and fatigue.   HENT: Negative for congestion, rhinorrhea and sore throat.    Respiratory: Negative for cough, shortness of breath and wheezing.    Cardiovascular: Positive for leg swelling (improving). Negative for chest pain and palpitations.   Gastrointestinal: Negative for constipation, nausea and vomiting.   Genitourinary: Negative for difficulty urinating, dysuria, enuresis, flank pain, frequency, hematuria and urgency.   Musculoskeletal: Negative for gait problem.   Skin: Positive for wound. Negative for color change and rash.   Neurological: Positive for weakness (generalized). Negative for light-headedness.   Hematological: Negative for adenopathy. Does not bruise/bleed easily.     Objective:     Vital Signs (Most Recent):  Temp: 96.5 °F (35.8 °C) (01/14/19 0751)  Pulse: 92 (01/14/19 0913)  Resp: 18 (01/14/19 0751)  BP: 116/62 (01/14/19 0751)  SpO2: (!) 94 % (01/14/19 0751) Vital Signs (24h Range):  Temp:  [96.1 °F (35.6 °C)-98.4 °F (36.9 °C)] 96.5 °F (35.8 °C)  Pulse:  [] 92  Resp:  [16-22] 18  SpO2:  [94 %-99 %] 94 %  BP: ()/(54-72) 116/62     Weight: 97.3 kg (214 lb 6.4 oz)  Body mass index is 28.29 kg/m².    Physical Exam   Constitutional: He is oriented to person, place, and time. He appears well-developed and well-nourished.   HENT:   Head: Normocephalic and atraumatic.   Right Ear: External ear normal.   Eyes: Conjunctivae and EOM are normal. Pupils are equal, round, and reactive to light. No scleral icterus.   Neck: Normal range of motion. Neck supple. No JVD present.   Cardiovascular: Normal rate, regular rhythm, normal heart sounds and intact distal pulses.   No murmur heard.  Pulmonary/Chest: Effort normal. No respiratory distress. He has no wheezes. He has no rales.   Abdominal: Soft. Bowel sounds are normal. There is no rebound and no guarding.    Musculoskeletal: Normal range of motion. He exhibits edema.   Neurological: He is alert and oriented to person, place, and time. He has normal reflexes. He displays normal reflexes. No cranial nerve deficit. He exhibits normal muscle tone. Coordination normal.   Skin: Skin is warm and dry. There is erythema.   2cm wound left lateral foot, see pic   Erythema of foot and leg up to thigh is improving, see picture     Psychiatric: He has a normal mood and affect. His behavior is normal. Judgment and thought content normal.   Nursing note and vitals reviewed.          CRANIAL NERVES     CN III, IV, VI   Pupils are equal, round, and reactive to light.  Extraocular motions are normal.               Significant Labs:     CBC:   Recent Labs   Lab 01/13/19  0634 01/14/19  0532   WBC 4.83 7.32   HGB 13.5* 13.2*   HCT 42.9 42.4    178     CMP:   Recent Labs   Lab 01/13/19  0634 01/14/19  0532    140   K 3.4* 3.4*   CL 96 95   CO2 32* 33*   * 116*   BUN 22 26*   CREATININE 1.0 1.0   CALCIUM 9.3 9.6   ANIONGAP 13 12   EGFRNONAA >60 >60   lactic acid 3.6>>1.0  Mag 2.2  Recent Labs   Lab 01/11/19  0419   TROPONINI 0.015       BNP  Recent Labs   Lab 01/12/19  0652   *       Blood cultures NGTD x 2    Significant Imaging:     CXR 1/12 Left basilar consolidation or atelectasis and small pleural effusion    CXR 1/10 There is elevation of the left hemidiaphragm.  There is a small left-sided pleural effusion.  Central pulmonary vascular congestion is noted with mild interstitial edema.  The heart is enlarged.  Calcified atheromatous disease affects the aorta.  Age-appropriate degenerative changes affect the skeleton.    1/10/19US lower ext  Negative for bilateral lower extremity DVT.      EKG Atrial fibrillation with premature ventricular or aberrantly conducted  complexes  Right axis deviation  Abnormal ECG  No previous ECGs available  Confirmed by Monica Phoenix MD ([id number]) on 1/9/2019 4:33:19 PM    ECHO:- TTE  December 2018 with LVEF 55%

## 2019-01-14 NOTE — ASSESSMENT & PLAN NOTE
Due to Venous stasis and wound of LLE   Start Cefepime and clinda to cover for everything including MRSA and psuedomonas.   Clinda instead of Vanc since mild CKD and we will diurese him as well   Lactic acid q 6 coming down  Bolus 2 liters overnight creat 0.9 bp 111/66 this am  woundcare consult     1-12 legs are down but still pul congestion    1-13 Not retaining as much fluids, but still SOB and coughing; burns when he urinates from his Prostate issues(Ca)    1-14 better/resolved

## 2019-01-14 NOTE — ASSESSMENT & PLAN NOTE
Need to assess if truly hypoxic. No low sats charted but has been on venti and now 4L NC. Will try to wean. Repeat CXR.   1/11/19 O2 sat 95-99%     1-12 Pt is labored with his breathing and continuing to cough; Dr Robertson consulted  1-14 no longer needing O2, cont cefepime and clinda x 10 days, cont nebs,stop steroids

## 2019-01-14 NOTE — PT/OT/SLP EVAL
Physical Therapy Evaluation    Patient Name:  Radhames Alonzo   MRN:  [id number]    Recommendations:     Discharge Recommendations:  other (see comments)(Home with Home Health)   Discharge Equipment Recommendations: walker, rolling, commode   Barriers to discharge: multiple medical issues    Assessment:     Radhames Alonzo is a 84 y.o. male admitted with a medical diagnosis of Severe sepsis. .  Patient presented with limited functional mobility affected by impaired endurance, impaired self care skills, gait instability, impaired mobility and dec. Activity tolerance.  Pt currently able to complete bed mobility, transfers and ambulation with CGA/Min A with RW with pt gait  Being limited by fatigue and LE swelling. Pt will benefit with OT evaluation for self care skills which pt's spouse reports that she assists pt with ADL function. Skilled PT/OT services to improve safety, independence and dec. [place] of care.     Recent Surgery: * No surgery found *      Plan:     During this hospitalization, patient to be seen 5 x/week to address the identified rehab impairments via gait training, therapeutic activities, therapeutic exercises and progress toward the following goals:     Plan of Care Expires: 1/21/19    Subjective     Chief Complaint: I had a hard time walking, I wasn't able to walk when I came in  Patient/Family Comments/goals: to get equipment (walker and bed) for home use  Pain/Comfort:  Pain Rating 1: 0/10  Pain Rating Post-Intervention 1: 0/10    Patients cultural, spiritual, Zoroastrianism conflicts given the current situation:      Living Environment:  Pt lives with spouse in a one story home with stairs to enter home.   Prior to admission, patients level of function was needing assistance from spouse for ADL's and was a household level ambulator prior to admit.  Equipment used at home: walker, standard.  DME owned (not currently used): standard walker.  Upon discharge, patient will have assistance from  spouse.    Objective:     Communicated with nursing and patient prior to session.  Patient found sitting at edge of bed upon PT entry to room found with: peripheral IV, telemetry([employer])     General Precautions: Standard,     Orthopedic Precautions:N/A   Braces: N/A     Exams:  · Cognitive Exam:  Patient is oriented to Person, Place and Time  · Gross Motor Coordination:  WFL  · RUE ROM: WFL  · RUE Strength: WFL  · LUE ROM: WFL  · LUE Strength: WFL  · RLE ROM: WFL except pt limited by swelling to LE  · RLE Strength: WFL  · LLE ROM: Deficits: pt limited by swelling to LE  · LLE Strength: WFL    Functional Mobility:  · Bed Mobility:     · Transfers:     · Sit to Stand:  contact guard assistance and minimum assistance with rolling walker  · Bed to Chair: contact guard assistance and minimum assistance with  rolling walker  using  Step Transfer  · Gait: Pt able to ambulate with RW x 25 ft with CGA/Min A with very slow uli, assymetrical step/stride length, dec. foot clearance with gait     AM-PAC 6 CLICK MOBILITY  Total Score:17       Therapeutic Activities and Exercises:   Pt able to perform Out of bed activity, with emphasis on standing and transfer safety with use of RW. Pt able to tolerate dynamic standing activity while performing functional reaching activity.    AM-PAC 6 CLICK MOBILITY  Total Score:17     Patient left up at edge of bed with call button in reach and spouse and nursing present.    GOALS:   Multidisciplinary Problems     Physical Therapy Goals        Problem: Physical Therapy Goal    Goal Priority Disciplines Outcome Goal Variances Interventions   Physical Therapy Goal     PT, PT/OT      Description:  Goals to be met by: 7     Patient will increase functional independence with mobility by performin. Supine to sit with Supervision or Set-up Assistance  2. Sit to supine with Supervision or Set-up Assistance  3. Bed to chair transfer with Supervision or Set-up Assistancewith or without rolling  walker using Step Transfer TECHNIQUE  4. Gait  x 100  feet with Supervision or Set-up Assistance with or without rolling walker  5. Lower extremity exercise program x10 reps per handout, with assistance as needed                      History:     Past Medical History:   Diagnosis Date    Atrial fibrillation     AVD (aortic valve disease)     Cardiomyopathy     Gout     Hypertension     Mitral valve disorder     Pure hypercholesterolemia        History reviewed. No pertinent surgical history.    Clinical Decision Making:     History  Co-morbidities and personal factors that may impact the plan of care Examination  Body Structures and Functions, activity limitations and participation restrictions that may impact the plan of care Clinical Presentation   Decision Making/ Complexity Score   Co-morbidities:   [x] Time since onset of injury / illness / exacerbation  [x] Status of current condition  []Patient's cognitive status and safety concerns    [x] Multiple Medical Problems (see med hx)  Personal Factors:   [] Patient's age  [x] Prior Level of function   [] Patient's home situation (environment and family support)  [] Patient's level of motivation  [x] Expected progression of patient      HISTORY:(criteria)    [] 40370 - no personal factors/history    [] 86899 - has 1-2 personal factor/comorbidity     [x] 85529 - has >3 personal factor/comorbidity     Body Regions:  [x] Objective examination findings  [] Head     []  Neck  [] Trunk   [] Upper Extremity  [x] Lower Extremity    Body Systems:  [] For communication ability, affect, cognition, language, and learning style: the assessment of the ability to make needs known, consciousness, orientation (person, place, and time), expected emotional /behavioral responses, and learning preferences (eg, learning barriers, education  needs)  [x] For the neuromuscular system: a general assessment of gross coordinated movement (eg, balance, gait, locomotion, transfers, and  transitions) and motor function  (motor control and motor learning)  [x] For the musculoskeletal system: the assessment of gross symmetry, gross range of motion, gross strength, height, and weight  [x] For the integumentary system: the assessment of pliability(texture), presence of scar formation, skin color, and skin integrity  [] For cardiovascular/pulmonary system: the assessment of heart rate, respiratory rate, blood pressure, and edema     Activity limitations:    [] Patient's cognitive status and saf ety concerns          [] Status of current condition      [] Weight bearing restriction  [] Cardiopulmunary Restriction    Participation Restrictions:   [] Goals and goal agreement with the patient     [] Rehab potential (prognosis) and probable outcome      Examination of Body System: (criteria)    [] 27527 - addressing 1-2 elements    [x] 53219 - addressing a total of 3 or more elements     [] 42279 -  Addressing a total of 4 or more elements         Clinical Presentation: (criteria)  Stable - 46875     On examination of body system using standardized tests and measures patient presents with 3 or more elements from any of the following: body structures and functions, activity limitations, and/or participation restrictions.  Leading to a clinical presentation that is considered stable and/or uncomplicated                              Clinical Decision Making  (Eval Complexity):  Low- 55212     Time Tracking:     PT Received On: 01/14/19  PT Start Time: 1400     PT Stop Time: 1435  PT Total Time (min): 35 min     Billable Minutes: Evaluation low and Therapeutic Activity 10      Sukh Clancy, PT  01/14/2019

## 2019-01-15 PROBLEM — G93.40 ENCEPHALOPATHY: Status: ACTIVE | Noted: 2019-01-15

## 2019-01-15 PROBLEM — J90 PLEURAL EFFUSION ON LEFT: Status: ACTIVE | Noted: 2019-01-15

## 2019-01-15 LAB
ALBUMIN SERPL BCP-MCNC: 2.9 G/DL
ALLENS TEST: ABNORMAL
ALP SERPL-CCNC: 58 U/L
ALT SERPL W/O P-5'-P-CCNC: 33 U/L
AMMONIA PLAS-SCNC: 24 UMOL/L
ANION GAP SERPL CALC-SCNC: 9 MMOL/L
ANION GAP SERPL CALC-SCNC: 9 MMOL/L
AST SERPL-CCNC: 42 U/L
BASOPHILS # BLD AUTO: 0.01 K/UL
BASOPHILS NFR BLD: 0.1 %
BILIRUB DIRECT SERPL-MCNC: 0.6 MG/DL
BILIRUB SERPL-MCNC: 1 MG/DL
BUN SERPL-MCNC: 30 MG/DL
BUN SERPL-MCNC: 30 MG/DL
CALCIUM SERPL-MCNC: 9 MG/DL
CALCIUM SERPL-MCNC: 9.1 MG/DL
CHLORIDE SERPL-SCNC: 96 MMOL/L
CHLORIDE SERPL-SCNC: 99 MMOL/L
CK SERPL-CCNC: 201 U/L
CO2 SERPL-SCNC: 35 MMOL/L
CO2 SERPL-SCNC: 36 MMOL/L
CREAT SERPL-MCNC: 1 MG/DL
CREAT SERPL-MCNC: 1.1 MG/DL
DELSYS: ABNORMAL
DIFFERENTIAL METHOD: ABNORMAL
EOSINOPHIL # BLD AUTO: 0 K/UL
EOSINOPHIL NFR BLD: 0.3 %
ERYTHROCYTE [DISTWIDTH] IN BLOOD BY AUTOMATED COUNT: 13.1 %
EST. GFR  (AFRICAN AMERICAN): >60 ML/MIN/1.73 M^2
EST. GFR  (AFRICAN AMERICAN): >60 ML/MIN/1.73 M^2
EST. GFR  (NON AFRICAN AMERICAN): >60 ML/MIN/1.73 M^2
EST. GFR  (NON AFRICAN AMERICAN): >60 ML/MIN/1.73 M^2
GLUCOSE SERPL-MCNC: 112 MG/DL
GLUCOSE SERPL-MCNC: 96 MG/DL
HCO3 UR-SCNC: 39.6 MMOL/L (ref 22–26)
HCT VFR BLD AUTO: 41.1 %
HGB BLD-MCNC: 12.6 G/DL
INR PPP: 1.3
LACTATE SERPL-SCNC: 1.5 MMOL/L
LYMPHOCYTES # BLD AUTO: 1.2 K/UL
LYMPHOCYTES NFR BLD: 17.9 %
MCH RBC QN AUTO: 31.4 PG
MCHC RBC AUTO-ENTMCNC: 30.7 G/DL
MCV RBC AUTO: 103 FL
MONOCYTES # BLD AUTO: 0.9 K/UL
MONOCYTES NFR BLD: 12.8 %
NEUTROPHILS # BLD AUTO: 4.7 K/UL
NEUTROPHILS NFR BLD: 69.2 %
PCO2 BLDA: 57 MMHG (ref 35–45)
PH SMN: 7.45 [PH] (ref 7.35–7.45)
PLATELET # BLD AUTO: 186 K/UL
PMV BLD AUTO: 9.4 FL
PO2 BLDA: 98 MMHG (ref 75–100)
POC BE: 13.2 MMOL/L (ref -2–2)
POC COHB: 1.8 % (ref 0–3)
POC METHB: 0.9 % (ref 0–1.5)
POC O2HB ARTERIAL: 96.4 % (ref 94–100)
POC SATURATED O2: 99 % (ref 90–100)
POC TCO2: 41.3 MMOL/L
POC THB: 13.1 G/DL (ref 12–18)
POTASSIUM SERPL-SCNC: 2.9 MMOL/L
POTASSIUM SERPL-SCNC: 3.1 MMOL/L
PROT SERPL-MCNC: 6.5 G/DL
PROTHROMBIN TIME: 13.1 SEC
RBC # BLD AUTO: 4.01 M/UL
SITE: ABNORMAL
SODIUM SERPL-SCNC: 141 MMOL/L
SODIUM SERPL-SCNC: 143 MMOL/L
WBC # BLD AUTO: 6.82 K/UL

## 2019-01-15 PROCEDURE — 94660 CPAP INITIATION&MGMT: CPT

## 2019-01-15 PROCEDURE — 80076 HEPATIC FUNCTION PANEL: CPT

## 2019-01-15 PROCEDURE — 99900035 HC TECH TIME PER 15 MIN (STAT)

## 2019-01-15 PROCEDURE — 36415 COLL VENOUS BLD VENIPUNCTURE: CPT

## 2019-01-15 PROCEDURE — 63600175 PHARM REV CODE 636 W HCPCS: Performed by: INTERNAL MEDICINE

## 2019-01-15 PROCEDURE — S0077 INJECTION, CLINDAMYCIN PHOSP: HCPCS | Performed by: FAMILY MEDICINE

## 2019-01-15 PROCEDURE — 63600175 PHARM REV CODE 636 W HCPCS: Performed by: FAMILY MEDICINE

## 2019-01-15 PROCEDURE — 25000003 PHARM REV CODE 250: Performed by: FAMILY MEDICINE

## 2019-01-15 PROCEDURE — 80048 BASIC METABOLIC PNL TOTAL CA: CPT

## 2019-01-15 PROCEDURE — 82550 ASSAY OF CK (CPK): CPT

## 2019-01-15 PROCEDURE — 94640 AIRWAY INHALATION TREATMENT: CPT

## 2019-01-15 PROCEDURE — 83605 ASSAY OF LACTIC ACID: CPT

## 2019-01-15 PROCEDURE — 99233 SBSQ HOSP IP/OBS HIGH 50: CPT | Mod: ,,, | Performed by: INTERNAL MEDICINE

## 2019-01-15 PROCEDURE — 25000003 PHARM REV CODE 250: Performed by: INTERNAL MEDICINE

## 2019-01-15 PROCEDURE — 36600 WITHDRAWAL OF ARTERIAL BLOOD: CPT

## 2019-01-15 PROCEDURE — 27000221 HC OXYGEN, UP TO 24 HOURS

## 2019-01-15 PROCEDURE — 25000242 PHARM REV CODE 250 ALT 637 W/ HCPCS: Performed by: INTERNAL MEDICINE

## 2019-01-15 PROCEDURE — 85025 COMPLETE CBC W/AUTO DIFF WBC: CPT

## 2019-01-15 PROCEDURE — 82140 ASSAY OF AMMONIA: CPT

## 2019-01-15 PROCEDURE — 82803 BLOOD GASES ANY COMBINATION: CPT | Performed by: INTERNAL MEDICINE

## 2019-01-15 PROCEDURE — 99233 PR SUBSEQUENT HOSPITAL CARE,LEVL III: ICD-10-PCS | Mod: ,,, | Performed by: INTERNAL MEDICINE

## 2019-01-15 PROCEDURE — 25000242 PHARM REV CODE 250 ALT 637 W/ HCPCS: Performed by: NURSE PRACTITIONER

## 2019-01-15 PROCEDURE — 94761 N-INVAS EAR/PLS OXIMETRY MLT: CPT

## 2019-01-15 PROCEDURE — 85610 PROTHROMBIN TIME: CPT

## 2019-01-15 PROCEDURE — 20000000 HC ICU ROOM

## 2019-01-15 RX ORDER — POTASSIUM CHLORIDE 7.45 MG/ML
10 INJECTION INTRAVENOUS ONCE
Status: COMPLETED | OUTPATIENT
Start: 2019-01-15 | End: 2019-01-15

## 2019-01-15 RX ORDER — POTASSIUM CHLORIDE 7.45 MG/ML
20 INJECTION INTRAVENOUS 2 TIMES DAILY
Status: DISCONTINUED | OUTPATIENT
Start: 2019-01-15 | End: 2019-01-15

## 2019-01-15 RX ORDER — ACETAMINOPHEN 325 MG/1
650 TABLET ORAL EVERY 8 HOURS PRN
Status: DISCONTINUED | OUTPATIENT
Start: 2019-01-15 | End: 2019-01-16

## 2019-01-15 RX ORDER — SODIUM CHLORIDE 9 MG/ML
INJECTION, SOLUTION INTRAVENOUS CONTINUOUS
Status: DISCONTINUED | OUTPATIENT
Start: 2019-01-15 | End: 2019-01-16

## 2019-01-15 RX ORDER — POTASSIUM CHLORIDE 20 MEQ/1
40 TABLET, EXTENDED RELEASE ORAL ONCE
Status: DISCONTINUED | OUTPATIENT
Start: 2019-01-15 | End: 2019-01-15

## 2019-01-15 RX ORDER — TRIPROLIDINE/PSEUDOEPHEDRINE 2.5MG-60MG
600 TABLET ORAL ONCE
Status: COMPLETED | OUTPATIENT
Start: 2019-01-15 | End: 2019-01-15

## 2019-01-15 RX ADMIN — LEVALBUTEROL 1.25 MG: 1.25 SOLUTION, CONCENTRATE RESPIRATORY (INHALATION) at 07:01

## 2019-01-15 RX ADMIN — POTASSIUM CHLORIDE 10 MEQ: 7.46 INJECTION, SOLUTION INTRAVENOUS at 03:01

## 2019-01-15 RX ADMIN — SODIUM CHLORIDE 1000 ML: 0.9 INJECTION, SOLUTION INTRAVENOUS at 06:01

## 2019-01-15 RX ADMIN — LEVALBUTEROL 1.25 MG: 1.25 SOLUTION, CONCENTRATE RESPIRATORY (INHALATION) at 04:01

## 2019-01-15 RX ADMIN — CEFEPIME HYDROCHLORIDE 1 G: 1 INJECTION, SOLUTION INTRAVENOUS at 01:01

## 2019-01-15 RX ADMIN — VANCOMYCIN 1500 MG: 1 INJECTION, SOLUTION INTRAVENOUS at 03:01

## 2019-01-15 RX ADMIN — SODIUM CHLORIDE: 0.9 INJECTION, SOLUTION INTRAVENOUS at 09:01

## 2019-01-15 RX ADMIN — LEVALBUTEROL 1.25 MG: 1.25 SOLUTION, CONCENTRATE RESPIRATORY (INHALATION) at 03:01

## 2019-01-15 RX ADMIN — ACETAMINOPHEN 650 MG: 325 TABLET ORAL at 02:01

## 2019-01-15 RX ADMIN — CEFEPIME HYDROCHLORIDE 1 G: 1 INJECTION, SOLUTION INTRAVENOUS at 09:01

## 2019-01-15 RX ADMIN — SODIUM CHLORIDE 100 ML/HR: 0.9 INJECTION, SOLUTION INTRAVENOUS at 07:01

## 2019-01-15 RX ADMIN — POTASSIUM CHLORIDE 10 MEQ: 7.46 INJECTION, SOLUTION INTRAVENOUS at 12:01

## 2019-01-15 RX ADMIN — CLINDAMYCIN IN 5 PERCENT DEXTROSE 600 MG: 12 INJECTION, SOLUTION INTRAVENOUS at 12:01

## 2019-01-15 RX ADMIN — CLINDAMYCIN IN 5 PERCENT DEXTROSE 600 MG: 12 INJECTION, SOLUTION INTRAVENOUS at 06:01

## 2019-01-15 RX ADMIN — LEVALBUTEROL 1.25 MG: 1.25 SOLUTION, CONCENTRATE RESPIRATORY (INHALATION) at 01:01

## 2019-01-15 RX ADMIN — POTASSIUM CHLORIDE 10 MEQ: 10 INJECTION, SOLUTION INTRAVENOUS at 10:01

## 2019-01-15 RX ADMIN — POTASSIUM CHLORIDE 10 MEQ: 7.46 INJECTION, SOLUTION INTRAVENOUS at 04:01

## 2019-01-15 RX ADMIN — IBUPROFEN 600 MG: 100 SUSPENSION ORAL at 07:01

## 2019-01-15 RX ADMIN — CEFEPIME HYDROCHLORIDE 1 G: 1 INJECTION, SOLUTION INTRAVENOUS at 06:01

## 2019-01-15 NOTE — PROGRESS NOTES
[facility]  Cardiology  Progress Note    Patient Name: Radhames Alonzo  MRN: [id number]  Admission Date: 1/9/2019  Hospital Length of Stay: 6 days  Code Status: Full Code   Attending Physician: Jesús Tay MD   Primary Care Physician: Pennie Jorge NP  Expected Discharge Date: 1/10/2019  Principal Problem:Severe sepsis    Subjective:     Hospital Course: Patient is an 84 year old male with a past medical history of chronic diastolic heart failure, chronic atrial fibrillation, carotid stenosis, HHD, dyslipidemia, and chronic peripheral edema presents to the emergency room after becoming increasingly weak over the last two days. He also reports shortness of breath and coughing with white sputum. He has had a decline in status over the last couple of months.    Interval History: small improvements    ROS   Constitutional : Weakness, lethargy  EENT : Negative  CV : BLE edema +2 at baseline.   Respiratory : Shortness of breath, coughing with sputum production.   Gastrointestinal: Negative   Genitourinary: Negative  Musculoskeletal: Negative  Skin : Ulcer to left lateral foot  Neurological : AAO x 3     Objective:     Vital Signs (Most Recent):  Temp: 96 °F (35.6 °C) (01/15/19 0346)  Pulse: (!) 111 (01/15/19 0734)  Resp: (!) 22 (01/15/19 0734)  BP: 135/71 (01/15/19 0346)  SpO2: 99 % (01/15/19 0734) Vital Signs (24h Range):  Temp:  [96 °F (35.6 °C)-96.6 °F (35.9 °C)] 96 °F (35.6 °C)  Pulse:  [] 111  Resp:  [16-26] 22  SpO2:  [94 %-99 %] 99 %  BP: (109-135)/(65-89) 135/71     Weight: 97.3 kg (214 lb 6.4 oz)  Body mass index is 28.29 kg/m².    SpO2: 99 %  O2 Device (Oxygen Therapy): nasal cannula      Intake/Output Summary (Last 24 hours) at 1/15/2019 0823  Last data filed at 1/15/2019 0608  Gross per 24 hour   Intake 680 ml   Output 300 ml   Net 380 ml       Lines/Drains/Airways     Peripheral Intravenous Line                 Peripheral IV - Single Lumen 01/13/19 1411 Anterior;Left Forearm  1 day                Physical Exam   General appearance: alert, appears stated age and cooperative. Frail in appearance. Lethargic. Generalized weakness.   Head: Normocephalic, without obvious abnormality, atraumatic  Eyes: conjunctivae/corneas clear. PERRL  Neck: no carotid bruit, no JVD and supple, symmetrical, trachea midline  Lungs: Expiratory rhales to left upper and lower lobe  Chest Wall: no tenderness  Heart: regular rate and rhythm, S1, S2 normal, 1/6 SM, click, rub or gallop  Abdomen: Distended, soft, non-tender; bowel sounds normal; no masses,  no organomegaly  Extremities: Extremities normal, atraumatic, no cyanosis, clubbing. +2 edema to BLE.   Pulses: Dorsalis Pedis R: 2+ (normal)/L: 2+ (normal)  Skin: Skin color, texture, turgor normal. Venous stasis ulcer to left lateral foot.   Neurologic: Normal mood and affect  Alert and oriented X 3    Significant Labs:   BMP:   Recent Labs   Lab 01/14/19  0532 01/15/19  0531   * 96    141   K 3.4* 2.9*   CL 95 96   CO2 33* 36*   BUN 26* 30*   CREATININE 1.0 1.1   CALCIUM 9.6 9.1   , CMP   Recent Labs   Lab 01/14/19  0532 01/15/19  0531    141   K 3.4* 2.9*   CL 95 96   CO2 33* 36*   * 96   BUN 26* 30*   CREATININE 1.0 1.1   CALCIUM 9.6 9.1   ANIONGAP 12 9   ESTGFRAFRICA >60 >60   EGFRNONAA >60 >60   , CBC   Recent Labs   Lab 01/14/19  0532 01/15/19  0531   WBC 7.32 6.82   HGB 13.2* 12.6*   HCT 42.4 41.1    186    and Troponin No results for input(s): TROPONINI in the last 48 hours.    Assessment and Plan:       Active Diagnoses:    Diagnosis Date Noted POA    PRINCIPAL PROBLEM:  Severe sepsis [A41.9, R65.20] 01/09/2019 Yes    Benign prostatic hyperplasia (BPH) with post-void dribbling [N40.1, N39.43] 01/11/2019 Yes    Hypokalemia [E87.6] 01/10/2019 Yes    Cellulitis of left lower extremity [L03.116] 01/10/2019 Yes    Pneumonia [J18.9] 01/10/2019 Yes    CHF (congestive heart failure) [I50.9] 01/09/2019 Yes    Rhabdomyolysis  [M62.82] 01/09/2019 Yes    Atrial fibrillation [I48.91] 04/30/2015 Yes    Cardiomyopathy [I42.9] 04/30/2015 Yes      Problems Resolved During this Admission:       VTE Risk Mitigation (From admission, onward)        Ordered     dabigatran etexilate capsule 150 mg  2 times daily      01/14/19 1112     IP VTE HIGH RISK PATIENT  Once      01/09/19 1751        Current Facility-Administered Medications   Medication    bicalutamide tablet 50 mg    cefepime in dextrose 5 % 1 gram/50 mL IVPB 1 g    clindamycin 600 MG/50 ML D5W 600 mg/50 mL IVPB 600 mg    dabigatran etexilate capsule 150 mg    HYDROcodone-acetaminophen 5-325 mg per tablet 1 tablet    levalbuterol nebulizer solution 1.25 mg    levalbuterol nebulizer solution 1.25 mg    LORazepam tablet 0.5 mg    phenazopyridine tablet 100 mg    polyethylene glycol packet 17 g    tamsulosin 24 hr capsule 0.4 mg     Dx: CHF resolved but LLeg cellulitis worse     Sepsis possibly secondary to venous stasis ulcer on left lateral foot. IV abx initiated per primary.      Left sided pleural effusion and mild pulmonary edema evidenced by chest x ray. BNP moderately elevated at 659.      Acute on chronic diastolic CHF TTE December 2018 with LVEF 55%, mild LVH. Lasix IV 80 mg bid.     Rhabdomyolysis improving. Chronic ETOH use. CPK improved at 1762 from 2378.      Chronic atrial fibrillation rate controlled not currently on anticoagulation according to last progress note in clinic but on xarelto in the past.      Chronic peripheral edema at baseline.      Dyslipidemia     Hypertension now low.     Nigel Gibbs, NP for Dr Jean-Baptiste  Cardiology  [facility]    PLAN: may need ABX adjustment  ?ID consult if worsens    I attest that I have personally seen and examined this patient. I have reviewed and discussed the management in detail as outlined above.

## 2019-01-15 NOTE — PROGRESS NOTES
"[facility] Hospital Medicine  Progress Note    Patient Name: Radhames Alonzo  MRN: [id number]  Patient Class: IP- Inpatient   Admission Date: 1/9/2019  Length of Stay: 5 days  Attending Physician: Jesús Tay MD  Primary Care Provider: Pennie Jorge NP        Subjective:     Principal Problem:Severe sepsis    HPI:  84 year old male presents to ED b/c " I couldn't get up and walk."   SOB for about a year. Worse recently. Last night it got to the point that he was too SOB to stand up.   SOB at rest and with eating.   He also notes left foot pain. He has been seeing woundcare for a wound on this foot last 7 months   Workup in ED is significant for BNP 600s. Last BNP 200s 3 weeks ago. 160s 9 months ago.  He sees Dr. Jean-Baptiste regularly.   [facility] has seen pt and rec admit for diuresis, rule out MI   First TPN with slight bump.038  Lactic acid is 3.6. Meets I am told by ED MD that patient "has no signs of infection on physical exam." I am also told that CXR and U/A are clear.     He drinks six pack daily. Last drink 2 days ago. No h/o DT's in past         Hospital Course:  He has been started on cefepime and clinda for left lower ext cellulitis. He has been afebrile. WBC was 53360. Blood cultures NGTD. Lactate was up to 3.8, coming down.RR was 24 and had low sats on admission. Was on venti, now down to 3L NC   He is also wheezing. C/o SOB. CXR with CHF.   He does report that he takes pradaxa for his a fib at home. Not on home meds list nor reordered here. A fib on EKG. BNP was 659. Ordered for lasix 80mg IV BID. Urinating a lot. K low this am 2.8    1/11/19 Getting lasix 80mg IV bid per Cardiology; not much urine output yesterday with dribbling noted; concern for obstruction; will get bladder scan; consider renal US.   Creat 0.9 stable; K+ 3.6    CPK 2378>1762; TNI 0.032 x 1; repeat this am     1-12 Pt continues to have SOB and pul congestion; Dr Robertson consulted    1-13 Pt is fairly complex with mx " cardiac/pulmonary issues causing his coughing and SOB; has a hx of underlying prostate problems; a swing bed might be necessary; Dr Robertson and cardiology following    1/14 pt is still on clinda and cefepime for the lower ext cellulitis. Afebrile/ no elevated WBC. US negative for DVT. Still swollen but reports that it is much better than his normal     CXR 1/812 Left basilar consolidation or atelectasis and small pleural effusion. He is 95% on RA    Still weak. Having trouble standing on side bed.     Interval note: SOB better as well as leg no fever,no elevated WBC    Review of Systems   Constitutional: Negative for appetite change, chills and fatigue.   HENT: Negative for congestion, rhinorrhea and sore throat.    Respiratory: Negative for cough, shortness of breath and wheezing.    Cardiovascular: Positive for leg swelling (improving). Negative for chest pain and palpitations.   Gastrointestinal: Negative for constipation, nausea and vomiting.   Genitourinary: Negative for difficulty urinating, dysuria, enuresis, flank pain, frequency, hematuria and urgency.   Musculoskeletal: Negative for gait problem.   Skin: Positive for wound. Negative for color change and rash.   Neurological: Positive for weakness (generalized). Negative for light-headedness.   Hematological: Negative for adenopathy. Does not bruise/bleed easily.     Objective:     Vital Signs (Most Recent):  Temp: 96.5 °F (35.8 °C) (01/14/19 0751)  Pulse: 92 (01/14/19 0913)  Resp: 18 (01/14/19 0751)  BP: 116/62 (01/14/19 0751)  SpO2: (!) 94 % (01/14/19 0751) Vital Signs (24h Range):  Temp:  [96.1 °F (35.6 °C)-98.4 °F (36.9 °C)] 96.5 °F (35.8 °C)  Pulse:  [] 92  Resp:  [16-22] 18  SpO2:  [94 %-99 %] 94 %  BP: ()/(54-72) 116/62     Weight: 97.3 kg (214 lb 6.4 oz)  Body mass index is 28.29 kg/m².    Physical Exam   Constitutional: He is oriented to person, place, and time. He appears well-developed and well-nourished.   HENT:   Head: Normocephalic  and atraumatic.   Right Ear: External ear normal.   Eyes: Conjunctivae and EOM are normal. Pupils are equal, round, and reactive to light. No scleral icterus.   Neck: Normal range of motion. Neck supple. No JVD present.   Cardiovascular: Normal rate, regular rhythm, normal heart sounds and intact distal pulses.   No murmur heard.  Pulmonary/Chest: Effort normal. No respiratory distress. He has no wheezes. He has no rales.   Abdominal: Soft. Bowel sounds are normal. There is no rebound and no guarding.   Musculoskeletal: Normal range of motion. He exhibits edema.   Neurological: He is alert and oriented to person, place, and time. He has normal reflexes. He displays normal reflexes. No cranial nerve deficit. He exhibits normal muscle tone. Coordination normal.   Skin: Skin is warm and dry. There is erythema.   2cm wound left lateral foot, see pic   Erythema of foot and leg up to thigh is improving, see picture     Psychiatric: He has a normal mood and affect. His behavior is normal. Judgment and thought content normal.   Nursing note and vitals reviewed.          CRANIAL NERVES     CN III, IV, VI   Pupils are equal, round, and reactive to light.  Extraocular motions are normal.               Significant Labs:     CBC:   Recent Labs   Lab 01/13/19  0634 01/14/19  0532   WBC 4.83 7.32   HGB 13.5* 13.2*   HCT 42.9 42.4    178     CMP:   Recent Labs   Lab 01/13/19  0634 01/14/19  0532    140   K 3.4* 3.4*   CL 96 95   CO2 32* 33*   * 116*   BUN 22 26*   CREATININE 1.0 1.0   CALCIUM 9.3 9.6   ANIONGAP 13 12   EGFRNONAA >60 >60   lactic acid 3.6>>1.0  Mag 2.2  Recent Labs   Lab 01/11/19  0419   TROPONINI 0.015       BNP  Recent Labs   Lab 01/12/19  0652   *       Blood cultures NGTD x 2    Significant Imaging:     CXR 1/12 Left basilar consolidation or atelectasis and small pleural effusion    CXR 1/10 There is elevation of the left hemidiaphragm.  There is a small left-sided pleural effusion.   Central pulmonary vascular congestion is noted with mild interstitial edema.  The heart is enlarged.  Calcified atheromatous disease affects the aorta.  Age-appropriate degenerative changes affect the skeleton.    1/10/19US lower ext  Negative for bilateral lower extremity DVT.      EKG Atrial fibrillation with premature ventricular or aberrantly conducted  complexes  Right axis deviation  Abnormal ECG  No previous ECGs available  Confirmed by Monica Phoenix MD ([id number]) on 1/9/2019 4:33:19 PM    ECHO:- TTE December 2018 with LVEF 55%      Assessment/Plan:      * Severe sepsis    Due to Venous stasis and wound of LLE   Start Cefepime and clinda to cover for everything including MRSA and psuedomonas.   Clinda instead of Vanc since mild CKD and we will diurese him as well   Lactic acid q 6 coming down  Bolus 2 liters overnight creat 0.9 bp 111/66 this am  woundcare consult     1-12 legs are down but still pul congestion    1-13 Not retaining as much fluids, but still SOB and coughing; burns when he urinates from his Prostate issues(Ca)    1-14 better/resolved     Benign prostatic hyperplasia (BPH) with post-void dribbling      Resumes flomax and casodex     Pneumonia    Need to assess if truly hypoxic. No low sats charted but has been on venti and now 4L NC. Will try to wean. Repeat CXR.   1/11/19 O2 sat 95-99%     1-12 Pt is labored with his breathing and continuing to cough; Dr Robertson consulted  1-14 no longer needing O2, cont cefepime and clinda x 10 days, cont nebs,stop steroids     Cellulitis of left lower extremity    See severe sepsis treatment  Check u/s  Negative for bilateral lower extremity DVT.    cont cefepime and clinda x 10 days wound care Cleanse wound with NS using gauze. Apply silver alginate to wound bed, cover with ABD and secure with tape daily.           Hypokalemia    Likely due to large amount of fluids and lasix  Replace today 40meq po x 2 doses and consider adding in spironolactone tomorrow.  K+  3.4 today -replace     Rhabdomyolysis    Mild. Due to chronic ETOHism   2  liter bolus for sepsis should take care of it   CPK improved       CHF (congestive heart failure)    [facility] consulted    ECHO in their records  Continue ARB, BB    [facility] has him on torsemide 20mg po BID creat stable. Watch urine output strictly as well as weights.         Atrial fibrillation    Rate controlled. On metoprolol    He reports that he is taking pradaxa but called pharmacy and they do not report that he has filled anything for anticoagulation especially pradaxa in recent months. Start lovenox 1mg/kg BID until we get his home meds    [facility], will restart at d/c     Cardiomyopathy      Cardiology is following       VTE Risk Mitigation (From admission, onward)        Ordered     dabigatran etexilate capsule 150 mg  2 times daily      01/14/19 1112     IP VTE HIGH RISK PATIENT  Once      01/09/19 1755              Beatrice Delatorre MD  Department of Hospital Medicine   [facility]

## 2019-01-15 NOTE — PROGRESS NOTES
Pt agreed to try to wear BiPAP at lower settings; placed on BiPAP with settings as charted. Will attempt to titrate pressures up if pt tolerates and remains wearing BiPAP.

## 2019-01-15 NOTE — NURSING
Phone call made to pts wife awaiting call back.   Dr. Delatorre in with patient. New orders received.

## 2019-01-15 NOTE — ASSESSMENT & PLAN NOTE
Waxing and waning mental status  Overnight thought to be sundowning but his AM dramatic waxing and waning just during our 10 minute conversation. Went form alert to very lethargic. + jaundice  Hypotensive, tachycardic, hypothermic (95.6 axillary)  Sepsis vs hepatic  LFTs, lactic acid pending  If looks like acute liver failure consider rifaxamin, hepatology consult and transfer    Transfer to ICU today

## 2019-01-15 NOTE — PROGRESS NOTES
Staff Handoff  Report received from Rupal PENA and patient assessed per flowsheet. Patient anxious, uncooperative, pulling off Bipap, trying to climb out of bed, E-sitter at bedside for safety. Patient has labored breathing, using abd muscles. Loud audible whz noted. Afib on telemetry. Patient spilled urinal on self, PCT in to change bed linens. Left foot with dsg D/I. Abrasions to knee, legs, toes. Will continue to monitor.      Resident Handoff

## 2019-01-15 NOTE — ASSESSMENT & PLAN NOTE
Rate controlled. On metoprolol    He reports that he is taking pradaxa but called pharmacy and they do not report that he has filled anything for anticoagulation especially pradaxa in recent months. Start lovenox 1mg/kg BID until we get his home meds    [facility], will restart at d/c    1/15/19: tachycardic despite BB and now hypotensive. Holding BB and all BP meds. Sinus on telemetry right now. Cont pradaxa for now but may stop again and switch back to lovenox today if continues to worsen and pending labs

## 2019-01-15 NOTE — PT/OT/SLP PROGRESS
Physical Therapy      Patient Name:  Radhames Alonzo   MRN:  [id number]    Patient not seen today secondary to Patient ill (Comment)(Pt with decline in medical status leading to transfer to ICU). Will follow-up after MD clearance for continued PT.    Sukh Clancy, PT

## 2019-01-15 NOTE — PROGRESS NOTES
"[facility] Medicine  Progress Note    Patient Name: Radhames Alonzo  MRN: [id number]  Patient Class: IP- Inpatient   Admission Date: 1/9/2019  Length of Stay: 6 days  Attending Physician: Jesús Tay MD  Primary Care Provider: Pennie Jorge NP        Subjective:     Principal Problem:Severe sepsis    HPI:  84 year old male presents to ED b/c " I couldn't get up and walk."   SOB for about a year. Worse recently. Last night it got to the point that he was too SOB to stand up.   SOB at rest and with eating.   He also notes left foot pain. He has been seeing woundcare for a wound on this foot last 7 months   Workup in ED is significant for BNP 600s. Last BNP 200s 3 weeks ago. 160s 9 months ago.  He sees Dr. Jean-Baptiste regularly.   [facility] has seen pt and rec admit for diuresis, rule out MI   First TPN with slight bump.038  Lactic acid is 3.6. Meets I am told by ED MD that patient "has no signs of infection on physical exam." I am also told that CXR and U/A are clear.     He drinks six pack daily. Last drink 2 days ago. No h/o DT's in past         Hospital Course:  He has been started on cefepime and clinda for left lower ext cellulitis. He has been afebrile. WBC was 20409. Blood cultures NGTD. Lactate was up to 3.8, coming down.RR was 24 and had low sats on admission. Was on venti, now down to 3L NC   He is also wheezing. C/o SOB. CXR with CHF.   He does report that he takes pradaxa for his a fib at home. Not on home meds list nor reordered here. A fib on EKG. BNP was 659. Ordered for lasix 80mg IV BID. Urinating a lot. K low this am 2.8    1/11/19 Getting lasix 80mg IV bid per Cardiology; not much urine output yesterday with dribbling noted; concern for obstruction; will get bladder scan; consider renal US.   Creat 0.9 stable; K+ 3.6    CPK 2378>1762; TNI 0.032 x 1; repeat this am     1-12 Pt continues to have SOB and pul congestion; Dr Robertson consulted    1-13 Pt is fairly complex with mx " cardiac/pulmonary issues causing his coughing and SOB; has a hx of underlying prostate problems; a swing bed might be necessary; Dr Robertson and cardiology following    1/14 pt is still on clinda and cefepime for the lower ext cellulitis. Afebrile/ no elevated WBC. US negative for DVT. Still swollen but reports that it is much better than his normal   CXR 1/812 Left basilar consolidation or atelectasis and small pleural effusion. He is 95% on RA  Still weak. Having trouble standing on side bed.     1/15/19 Pt started feeling worse yesterday evening; this am noted to have LOW BP- SBP 80s, increased RR 30s, Temp 95.6,   Dr. Jean-Baptiste reports HF better, BNP 200s, still with LE edema, US negative for DVT; Also gets pradaxa  Underlying chronic prostate problems; defers any intervention or sx   Day 7/10 Cefepime and clinda for LE cellulitis and pneumonia. Blood cultures negative.   Looks jaundice with sclerema icterus; admit bilirubin - 2.0 elevated on 1/9/19 ; check LFTs/CMP/bilirubin    1/15/19: patient with worsening respiratory status overnight. He was tried on BiPaP but refused to wear it. Was agitated during the night. He was abdominal breathing but maintainings sats on 2L NC. BNP normal. D-dimer normal and anticoagulated since admit, restarted PO pradaxa yesterday. This AM, continues to decompensated. He is alert and oriented on initial eval but over the course of our conversation decompensates and becomes very lethargic. He has been waxing and waning throughout the night per RN report. Unclear if has h/o cirrhosis but on chart review this AM his bilirubin 2 and mild elevation AST--this was not trended during his hospital stay. This AM he has scleral icterus and yellowing of skin which was NOT PRESENT YESTERDAY. Also his LLE cellulitis is worsening, redness extending up the shin to the knee today which was NOT PRESENT YESTERDAY. He is hypothermic, tachypenic, hypotensive and tachycardic (despite BB on board).     Interval  History: worsneing clinical status, sepsis vs hepatic encephalopathy      Review of Systems   Unable to perform ROS: Mental status change     Objective:     Vital Signs (Most Recent):  Temp: 96.3 °F (35.7 °C) (01/15/19 0932)  Pulse: 103 (01/15/19 0828)  Resp: (!) 24 (01/15/19 0828)  BP: (!) 89/65 (01/15/19 0828)  SpO2: 97 % (01/15/19 0828) Vital Signs (24h Range):  Temp:  [95.6 °F (35.3 °C)-96.6 °F (35.9 °C)] 96.3 °F (35.7 °C)  Pulse:  [] 103  Resp:  [16-26] 24  SpO2:  [84 %-99 %] 97 %  BP: ()/(65-89) 89/65     Weight: 97.3 kg (214 lb 6.4 oz)  Body mass index is 28.29 kg/m².    Intake/Output Summary (Last 24 hours) at 1/15/2019 0932  Last data filed at 1/15/2019 0608  Gross per 24 hour   Intake 440 ml   Output 200 ml   Net 240 ml      Physical Exam   Constitutional: He is oriented to person, place, and time. He appears well-developed and well-nourished. No distress.   HENT:   Head: Normocephalic and atraumatic.   Right Ear: External ear normal.   Left Ear: External ear normal.   Eyes: EOM are normal. Pupils are equal, round, and reactive to light. Right eye exhibits no discharge. Left eye exhibits no discharge. Scleral icterus is present.   Neck: Neck supple. No tracheal deviation present.   Cardiovascular: Normal rate and regular rhythm.   No murmur heard.  Pulmonary/Chest: He is in respiratory distress (abdominal breathing). He has wheezes. He has no rales.   Abdominal: Soft. Bowel sounds are normal. He exhibits distension. There is no tenderness.   Musculoskeletal: He exhibits edema (b/l LE, worse from yesterday).   Neurological: He is alert and oriented to person, place, and time. No cranial nerve deficit.   Skin: Skin is warm and dry. There is erythema (LLE worsening).   jaundice   Psychiatric: He has a normal mood and affect. His behavior is normal.   Nursing note and vitals reviewed.      Significant Labs:   Blood Culture: No results for input(s): LABBLOO in the last 48 hours.  CBC:   Recent Labs    Lab 01/14/19  0532 01/15/19  0531   WBC 7.32 6.82   HGB 13.2* 12.6*   HCT 42.4 41.1    186     CMP:   Recent Labs   Lab 01/14/19  0532 01/15/19  0531    141   K 3.4* 2.9*   CL 95 96   CO2 33* 36*   * 96   BUN 26* 30*   CREATININE 1.0 1.1   CALCIUM 9.6 9.1   ANIONGAP 12 9   EGFRNONAA >60 >60     Lactic Acid: No results for input(s): LACTATE in the last 48 hours.  Urine Culture: No results for input(s): LABURIN in the last 48 hours.  Urine Studies: No results for input(s): COLORU, APPEARANCEUA, PHUR, SPECGRAV, PROTEINUA, GLUCUA, KETONESU, BILIRUBINUA, OCCULTUA, NITRITE, UROBILINOGEN, LEUKOCYTESUR, RBCUA, WBCUA, BACTERIA, SQUAMEPITHEL, HYALINECASTS in the last 48 hours.    Invalid input(s): WRIGHTSUR  All pertinent labs within the past 24 hours have been reviewed.    Significant Imaging: I have reviewed all pertinent imaging results/findings within the past 24 hours.    Assessment/Plan:      * Severe sepsis    Due to Venous stasis and wound of LLE   Start Cefepime and clinda to cover for everything including MRSA and psuedomonas.   Clinda instead of Vanc since mild CKD and we will diurese him as well   Lactic acid q 6 coming down  Bolus 2 liters overnight creat 0.9 bp 111/66 this am  woundcare consult     1-12 legs are down but still pul congestion    1-13 Not retaining as much fluids, but still SOB and coughing; burns when he urinates from his Prostate issues(Ca)    1-14 better/resolved    1-15: worsening status this AM. + confusion, hypotension, tachycardia. LLE redness is worsening from yesterday despite antibx. Should be well covered with clida and cefepime so puzzling. When stable consider imaging of leg to look for deeper infection     Pleural effusion on left    Getting bigger on CXR from yesterday  CT ordered--will do when stabilized  ? Underlying consolidation. ? Empyema--this why he worsening       Encephalopathy    Waxing and waning mental status  Overnight thought to be sundowning but  his AM dramatic waxing and waning just during our 10 minute conversation. Went form alert to very lethargic. + jaundice  Hypotensive, tachycardic, hypothermic (95.6 axillary)  Sepsis vs hepatic  LFTs, lactic acid pending  If looks like acute liver failure consider rifaxamin, hepatology consult and transfer    Transfer to ICU today       Benign prostatic hyperplasia (BPH) with post-void dribbling      Resume flomax and casodex--with waxing and waning mental status may remove PO meds     Pneumonia    Need to assess if truly hypoxic. No low sats charted but has been on venti and now 4L NC. Will try to wean. Repeat CXR.   1/11/19 O2 sat 95-99%     1-12 Pt is labored with his breathing and continuing to cough; Dr Robertson consulted  1-14 no longer needing O2, cont cefepime and clinda x 10 days, cont nebs,stop steroids    1/15/19: marked change in respiratory status this morning, was worsening overnight. He is oxygenating, sats good on 2L and ABG shows good sats. CXR with enlarging left pleural effusion otherwise normal. CT ordered and will do once stabilized--? Underlying consolidation with empyema. Looking for loculations, etc. Consider thoracentesis. Could explain his worsening status. He is in respiratory distress and abdominal breathing. Taking off BiPaP. He is a FULL CODE.        Cellulitis of left lower extremity    See severe sepsis treatment  Check u/s  Negative for bilateral lower extremity DVT.    cont cefepime and clinda x 10 days wound care Cleanse wound with NS using gauze. Apply silver alginate to wound bed, cover with ABD and secure with tape daily.      1/15/19: marked worsening of erythema from yesterday despite antibx. Puzzling why the sudden change. D-dimer negative and anticoagulated so low suspicion for DVT. Worsening vitals as well (see sepsis)         Hypokalemia    Likely due to torsemide use  Replaced today     Rhabdomyolysis    Mild. Due to chronic ETOHism   2  liter bolus for sepsis should take  care of it   CPK improved    1/15/19: not checked over last 5 days. Repeat CPK today. Restarting IVF       CHF (congestive heart failure)    [facility] consulted    ECHO in their records  Continue ARB, BB    [facility] has him on torsemide 20mg po BID creat stable. Watch urine output strictly as well as weights.         Atrial fibrillation    Rate controlled. On metoprolol    He reports that he is taking pradaxa but called pharmacy and they do not report that he has filled anything for anticoagulation especially pradaxa in recent months. Start lovenox 1mg/kg BID until we get his home meds    [facility] was [facility], will restart at d/c    1/15/19: tachycardic despite BB and now hypotensive. Holding BB and all BP meds. Sinus on telemetry right now. Cont pradaxa for now but may stop again and switch back to lovenox today if continues to worsen and pending labs     Cardiomyopathy    Cardiology is following  Diastolic CHF: TTE December 2018 with LVEF 55%, mild LVH  Was started on diuretics  BNP is only 200s now  CXR with left pleural effusion but not severe pulmonary congestion  1/15/10: BP dropping, hold ACEi, BB and diuretics for now and give back IVF---careful for volume overload though.        VTE Risk Mitigation (From admission, onward)        Ordered     dabigatran etexilate capsule 150 mg  2 times daily      01/14/19 1112     IP VTE HIGH RISK PATIENT  Once      01/09/19 1751          Addendum 1/15/19:  Patient is improving with BiPaP and IVF. BP improving. Hypothermia improving. Will change to Vanc (from clinda) and cont Cefepime for LE cellulitis which is worsening. At this time felt to be going into sepsis from LE cellulitis. Lactic acid normal.    Respiratory status improving but sepsis/LE cellulitis does not explain the left pleural effusion. If he continues to improve will try for CT chest today to chacterize further. Looking for consolidation, loculations (?empyema), mass, etc. Daily CXR to follow and if still  increasing in size would advocate for thoracentesis to determine etiology of this unilateral effusion.     LFTs are normal including bilirubin. On further assessment was getting pyridium and due to BPH/prostate cancer was urinating on himself (hands and legs/feet) which may have been giving the discoloration. Though, I feel he still has some mild peripheral icterus on exam. Last EtOH use > 7 days ago I doubt DTs.    Discussed with his legal wife as he still has some confusion and he is  FULL CODE.       Beatrice Delatorre MD  Department of Hospital Medicine   [facility]

## 2019-01-15 NOTE — ASSESSMENT & PLAN NOTE
Cardiology is following  Diastolic CHF: TTE December 2018 with LVEF 55%, mild LVH  Was started on diuretics  BNP is only 200s now  CXR with left pleural effusion but not severe pulmonary congestion  1/15/10: BP dropping, hold ACEi, BB and diuretics for now and give back IVF---careful for volume overload though.

## 2019-01-15 NOTE — ASSESSMENT & PLAN NOTE
Need to assess if truly hypoxic. No low sats charted but has been on venti and now 4L NC. Will try to wean. Repeat CXR.   1/11/19 O2 sat 95-99%     1-12 Pt is labored with his breathing and continuing to cough; Dr Robertson consulted  1-14 no longer needing O2, cont cefepime and clinda x 10 days, cont nebs,stop steroids    1/15/19: marked change in respiratory status this morning, was worsening overnight. He is oxygenating, sats good on 2L and ABG shows good sats. CXR with enlarging left pleural effusion otherwise normal. CT ordered and will do once stabilized--? Underlying consolidation with empyema. Looking for loculations, etc. Consider thoracentesis. Could explain his worsening status. He is in respiratory distress and abdominal breathing. Taking off BiPaP. He is a FULL CODE.

## 2019-01-15 NOTE — PLAN OF CARE
Received pt from 3rd floor via bed with portable CM per Rupal PENA and Julieta Flint superviser. Pt awake alert anxious.Pt disoriented to time and situation. Reoriented pt. Slurred speech at intervals. Transferred tp to CCU bed with max assist. Yellowish discoloration noted to hands, fingernails, toes and toenails. CM initiated, alarms set and on. VS stable at present. IV 22 gauge to left arm intact secured, flushed and patent mild oozing noted resecured and flushed well. HOB elevated 30 degrees. BBS with exp wheezing noted to upper lobes, diminished to bibasilar breath sounds. Scabs to bilateral knees. Bilateral legs with redness, wrinkling  And gross edema noted. kerlex drsg noted to Left foot no drainage noted. Incontinent of urine and stool. Informed pt of plan of care, Pt voiced understanding reinforcement needed. Continuing to monitor.

## 2019-01-15 NOTE — ASSESSMENT & PLAN NOTE
Getting bigger on CXR from yesterday  CT ordered--will do when stabilized  ? Underlying consolidation. ? Empyema--this why he worsening

## 2019-01-15 NOTE — ASSESSMENT & PLAN NOTE
Due to Venous stasis and wound of LLE   Start Cefepime and clinda to cover for everything including MRSA and psuedomonas.   Clinda instead of Vanc since mild CKD and we will diurese him as well   Lactic acid q 6 coming down  Bolus 2 liters overnight creat 0.9 bp 111/66 this am  woundcare consult     1-12 legs are down but still pul congestion    1-13 Not retaining as much fluids, but still SOB and coughing; burns when he urinates from his Prostate issues(Ca)    1-14 better/resolved    1-15: worsening status this AM. + confusion, hypotension, tachycardia. LLE redness is worsening from yesterday despite antibx. Should be well covered with clida and cefepime so puzzling. When stable consider imaging of leg to look for deeper infection

## 2019-01-15 NOTE — ASSESSMENT & PLAN NOTE
See severe sepsis treatment  Check u/s  Negative for bilateral lower extremity DVT.    cont cefepime and clinda x 10 days wound care Cleanse wound with NS using gauze. Apply silver alginate to wound bed, cover with ABD and secure with tape daily.      1/15/19: marked worsening of erythema from yesterday despite antibx. Puzzling why the sudden change. D-dimer negative and anticoagulated so low suspicion for DVT. Worsening vitals as well (see sepsis)

## 2019-01-15 NOTE — PLAN OF CARE
01/15/19 0952   Discharge Reassessment   Assessment Type Discharge Planning Reassessment         Patient had acute changes over night. He will transfer to ICU for closer monitoring. Transition of care explained to patient. CM will continue to follow and assist as needed.

## 2019-01-15 NOTE — ASSESSMENT & PLAN NOTE
Mild. Due to chronic ETOHism   2  liter bolus for sepsis should take care of it   CPK improved    1/15/19: not checked over last 5 days. Repeat CPK today. Restarting IVF

## 2019-01-15 NOTE — PROGRESS NOTES
Staff Handoff  Update provided to Dr Delatorre by phone on X-ray results and lab results. No new orders noted.    Resident Handoff

## 2019-01-15 NOTE — PLAN OF CARE
Problem: Adult Inpatient Plan of Care  Goal: Plan of Care Review  Outcome: Ongoing (interventions implemented as appropriate)  POC reviewed and continued. Safety of patient maintained. Wound care preformed on patient. Telemetry maintained this shift.

## 2019-01-15 NOTE — EICU
This is an eICU summary note. Video assessment done at this time. Patient resting comfortably in bed with bipap on; family and bedside RN at bedside. No distress noted. No gtts infusing. Will continue to monitor remotely.

## 2019-01-15 NOTE — SUBJECTIVE & OBJECTIVE
Interval History: worsneing clinical status, sepsis vs hepatic encephalopathy      Review of Systems   Unable to perform ROS: Mental status change     Objective:     Vital Signs (Most Recent):  Temp: 96.3 °F (35.7 °C) (01/15/19 0932)  Pulse: 103 (01/15/19 0828)  Resp: (!) 24 (01/15/19 0828)  BP: (!) 89/65 (01/15/19 0828)  SpO2: 97 % (01/15/19 0828) Vital Signs (24h Range):  Temp:  [95.6 °F (35.3 °C)-96.6 °F (35.9 °C)] 96.3 °F (35.7 °C)  Pulse:  [] 103  Resp:  [16-26] 24  SpO2:  [84 %-99 %] 97 %  BP: ()/(65-89) 89/65     Weight: 97.3 kg (214 lb 6.4 oz)  Body mass index is 28.29 kg/m².    Intake/Output Summary (Last 24 hours) at 1/15/2019 0932  Last data filed at 1/15/2019 0608  Gross per 24 hour   Intake 440 ml   Output 200 ml   Net 240 ml      Physical Exam   Constitutional: He is oriented to person, place, and time. He appears well-developed and well-nourished. No distress.   HENT:   Head: Normocephalic and atraumatic.   Right Ear: External ear normal.   Left Ear: External ear normal.   Eyes: EOM are normal. Pupils are equal, round, and reactive to light. Right eye exhibits no discharge. Left eye exhibits no discharge. Scleral icterus is present.   Neck: Neck supple. No tracheal deviation present.   Cardiovascular: Normal rate and regular rhythm.   No murmur heard.  Pulmonary/Chest: He is in respiratory distress (abdominal breathing). He has wheezes. He has no rales.   Abdominal: Soft. Bowel sounds are normal. He exhibits distension. There is no tenderness.   Musculoskeletal: He exhibits edema (b/l LE, worse from yesterday).   Neurological: He is alert and oriented to person, place, and time. No cranial nerve deficit.   Skin: Skin is warm and dry. There is erythema (LLE worsening).   jaundice   Psychiatric: He has a normal mood and affect. His behavior is normal.   Nursing note and vitals reviewed.      Significant Labs:   Blood Culture: No results for input(s): LABBLOO in the last 48 hours.  CBC:    Recent Labs   Lab 01/14/19  0532 01/15/19  0531   WBC 7.32 6.82   HGB 13.2* 12.6*   HCT 42.4 41.1    186     CMP:   Recent Labs   Lab 01/14/19  0532 01/15/19  0531    141   K 3.4* 2.9*   CL 95 96   CO2 33* 36*   * 96   BUN 26* 30*   CREATININE 1.0 1.1   CALCIUM 9.6 9.1   ANIONGAP 12 9   EGFRNONAA >60 >60     Lactic Acid: No results for input(s): LACTATE in the last 48 hours.  Urine Culture: No results for input(s): LABURIN in the last 48 hours.  Urine Studies: No results for input(s): COLORU, APPEARANCEUA, PHUR, SPECGRAV, PROTEINUA, GLUCUA, KETONESU, BILIRUBINUA, OCCULTUA, NITRITE, UROBILINOGEN, LEUKOCYTESUR, RBCUA, WBCUA, BACTERIA, SQUAMEPITHEL, HYALINECASTS in the last 48 hours.    Invalid input(s): MARY JANE  All pertinent labs within the past 24 hours have been reviewed.    Significant Imaging: I have reviewed all pertinent imaging results/findings within the past 24 hours.

## 2019-01-15 NOTE — PROGRESS NOTES
Staff Handoff  Update provided to Dr Delatorre by phone on patient's condition. New orders noted. Wants to be notified when X-ray results/report is back and if labs are abnormal.    Resident Handoff

## 2019-01-15 NOTE — PLAN OF CARE
Skin tear noted to left arm surgicil applied with transparent drsg. bloody oozing form left PIV site cleaned site and applied surgicil and transparent drsg to site. Dr Delatorre notifed of current lab values and of pt bloody oozing form IV site. New orders noted

## 2019-01-16 PROBLEM — I83.009 VENOUS STASIS ULCER: Status: ACTIVE | Noted: 2019-01-16

## 2019-01-16 PROBLEM — L97.909 VENOUS STASIS ULCER: Status: ACTIVE | Noted: 2019-01-16

## 2019-01-16 LAB
ALBUMIN SERPL BCP-MCNC: 2.7 G/DL
ALP SERPL-CCNC: 51 U/L
ALT SERPL W/O P-5'-P-CCNC: 31 U/L
ANION GAP SERPL CALC-SCNC: 8 MMOL/L
AST SERPL-CCNC: 39 U/L
BASOPHILS # BLD AUTO: 0 K/UL
BASOPHILS NFR BLD: 0 %
BILIRUB SERPL-MCNC: 1.2 MG/DL
BUN SERPL-MCNC: 27 MG/DL
CALCIUM SERPL-MCNC: 8.7 MG/DL
CHLORIDE SERPL-SCNC: 102 MMOL/L
CO2 SERPL-SCNC: 32 MMOL/L
CREAT SERPL-MCNC: 1 MG/DL
DIFFERENTIAL METHOD: ABNORMAL
EOSINOPHIL # BLD AUTO: 0.1 K/UL
EOSINOPHIL NFR BLD: 1 %
ERYTHROCYTE [DISTWIDTH] IN BLOOD BY AUTOMATED COUNT: 13.4 %
EST. GFR  (AFRICAN AMERICAN): >60 ML/MIN/1.73 M^2
EST. GFR  (NON AFRICAN AMERICAN): >60 ML/MIN/1.73 M^2
GLUCOSE SERPL-MCNC: 107 MG/DL
HCT VFR BLD AUTO: 37 %
HGB BLD-MCNC: 11.3 G/DL
INR PPP: 1.2
LYMPHOCYTES # BLD AUTO: 0.9 K/UL
LYMPHOCYTES NFR BLD: 17.8 %
MCH RBC QN AUTO: 31.1 PG
MCHC RBC AUTO-ENTMCNC: 30.5 G/DL
MCV RBC AUTO: 102 FL
MONOCYTES # BLD AUTO: 0.8 K/UL
MONOCYTES NFR BLD: 15.7 %
NEUTROPHILS # BLD AUTO: 3.2 K/UL
NEUTROPHILS NFR BLD: 65.5 %
PLATELET # BLD AUTO: 179 K/UL
PMV BLD AUTO: 9.3 FL
POTASSIUM SERPL-SCNC: 3.4 MMOL/L
PROT SERPL-MCNC: 6 G/DL
PROTHROMBIN TIME: 12.6 SEC
RBC # BLD AUTO: 3.63 M/UL
SODIUM SERPL-SCNC: 142 MMOL/L
WBC # BLD AUTO: 4.89 K/UL

## 2019-01-16 PROCEDURE — 85025 COMPLETE CBC W/AUTO DIFF WBC: CPT

## 2019-01-16 PROCEDURE — 94660 CPAP INITIATION&MGMT: CPT

## 2019-01-16 PROCEDURE — 94640 AIRWAY INHALATION TREATMENT: CPT

## 2019-01-16 PROCEDURE — 94761 N-INVAS EAR/PLS OXIMETRY MLT: CPT

## 2019-01-16 PROCEDURE — 25000003 PHARM REV CODE 250: Performed by: NURSE PRACTITIONER

## 2019-01-16 PROCEDURE — 85610 PROTHROMBIN TIME: CPT

## 2019-01-16 PROCEDURE — 97164 PT RE-EVAL EST PLAN CARE: CPT

## 2019-01-16 PROCEDURE — 63600175 PHARM REV CODE 636 W HCPCS: Performed by: FAMILY MEDICINE

## 2019-01-16 PROCEDURE — 97530 THERAPEUTIC ACTIVITIES: CPT

## 2019-01-16 PROCEDURE — 63600175 PHARM REV CODE 636 W HCPCS: Performed by: INTERNAL MEDICINE

## 2019-01-16 PROCEDURE — 25000242 PHARM REV CODE 250 ALT 637 W/ HCPCS: Performed by: NURSE PRACTITIONER

## 2019-01-16 PROCEDURE — 20000000 HC ICU ROOM

## 2019-01-16 PROCEDURE — 99233 SBSQ HOSP IP/OBS HIGH 50: CPT | Mod: ,,, | Performed by: FAMILY MEDICINE

## 2019-01-16 PROCEDURE — 99233 PR SUBSEQUENT HOSPITAL CARE,LEVL III: ICD-10-PCS | Mod: ,,, | Performed by: FAMILY MEDICINE

## 2019-01-16 PROCEDURE — 99900035 HC TECH TIME PER 15 MIN (STAT)

## 2019-01-16 PROCEDURE — 27000221 HC OXYGEN, UP TO 24 HOURS

## 2019-01-16 PROCEDURE — 36415 COLL VENOUS BLD VENIPUNCTURE: CPT

## 2019-01-16 PROCEDURE — 25000003 PHARM REV CODE 250: Performed by: INTERNAL MEDICINE

## 2019-01-16 PROCEDURE — 80053 COMPREHEN METABOLIC PANEL: CPT

## 2019-01-16 PROCEDURE — 25000003 PHARM REV CODE 250: Performed by: FAMILY MEDICINE

## 2019-01-16 RX ORDER — METHYLPREDNISOLONE SOD SUCC 125 MG
80 VIAL (EA) INJECTION EVERY 12 HOURS
Status: DISCONTINUED | OUTPATIENT
Start: 2019-01-16 | End: 2019-01-17

## 2019-01-16 RX ORDER — ACETAMINOPHEN 650 MG/1
650 SUPPOSITORY RECTAL EVERY 6 HOURS PRN
Status: DISCONTINUED | OUTPATIENT
Start: 2019-01-16 | End: 2019-01-24 | Stop reason: HOSPADM

## 2019-01-16 RX ORDER — SODIUM CHLORIDE AND POTASSIUM CHLORIDE 150; 900 MG/100ML; MG/100ML
INJECTION, SOLUTION INTRAVENOUS CONTINUOUS
Status: DISCONTINUED | OUTPATIENT
Start: 2019-01-16 | End: 2019-01-18

## 2019-01-16 RX ADMIN — METHYLPREDNISOLONE SODIUM SUCCINATE 80 MG: 125 INJECTION, POWDER, FOR SOLUTION INTRAMUSCULAR; INTRAVENOUS at 09:01

## 2019-01-16 RX ADMIN — ACETAMINOPHEN 650 MG: 650 SUPPOSITORY RECTAL at 10:01

## 2019-01-16 RX ADMIN — CEFEPIME HYDROCHLORIDE 1 G: 1 INJECTION, SOLUTION INTRAVENOUS at 02:01

## 2019-01-16 RX ADMIN — METHYLPREDNISOLONE SODIUM SUCCINATE 80 MG: 125 INJECTION, POWDER, FOR SOLUTION INTRAMUSCULAR; INTRAVENOUS at 10:01

## 2019-01-16 RX ADMIN — LEVALBUTEROL 1.25 MG: 1.25 SOLUTION, CONCENTRATE RESPIRATORY (INHALATION) at 07:01

## 2019-01-16 RX ADMIN — VANCOMYCIN 1500 MG: 1 INJECTION, SOLUTION INTRAVENOUS at 04:01

## 2019-01-16 RX ADMIN — LEVALBUTEROL 1.25 MG: 1.25 SOLUTION, CONCENTRATE RESPIRATORY (INHALATION) at 01:01

## 2019-01-16 RX ADMIN — CEFEPIME HYDROCHLORIDE 1 G: 1 INJECTION, SOLUTION INTRAVENOUS at 06:01

## 2019-01-16 RX ADMIN — SODIUM CHLORIDE AND POTASSIUM CHLORIDE: .9; .15 SOLUTION INTRAVENOUS at 09:01

## 2019-01-16 RX ADMIN — DABIGATRAN ETEXILATE MESYLATE 150 MG: 75 CAPSULE ORAL at 09:01

## 2019-01-16 RX ADMIN — LORAZEPAM 0.5 MG: 0.5 TABLET ORAL at 11:01

## 2019-01-16 RX ADMIN — SODIUM CHLORIDE 100 ML/HR: 0.9 INJECTION, SOLUTION INTRAVENOUS at 05:01

## 2019-01-16 RX ADMIN — CEFEPIME HYDROCHLORIDE 1 G: 1 INJECTION, SOLUTION INTRAVENOUS at 09:01

## 2019-01-16 NOTE — PLAN OF CARE
Problem: Adult Inpatient Plan of Care  Goal: Plan of Care Review  Outcome: Ongoing (interventions implemented as appropriate)  Patient admitted with sepsis, heart failure, cellulitis, pneumonia, pleural effusions. Fatigue and generalized weakness noted. Not alert enough to swallow this am. Swallow study was done but not successful. Oral meds and food held today. Taking in ice chips. Will repeat swallow study tomorrow with contrast. Refuses to use BiPAP mask. On O2 at 2 lpm. O2 sats %. Expiratory wheezes auscultated bilaterally. Moist non-productive cough noted. More alert this pm. Sat in chair for few minutes, sits on bedside periodically. IV fluids NS with 20mEq KCl infusing at 100 ml/hr, remains on IV antibiotics. Ecchymosis to both arms, edema to left elbow area. Discoloration to BLE's.  Wound care done to left lat venous stasis ulcer. Afebrile, VS stable. BM x1. Hein cath to urimeter draining orange urine. Output good. Fall precautions maintained. Patient remains a full code. Patient and family aware and agree with plan of care.

## 2019-01-16 NOTE — ASSESSMENT & PLAN NOTE
Mild. Due to chronic ETOHism   2  liter bolus for sepsis should take care of it   CPK improved    1/15/19: not checked over last 5 days. Repeat CPK today. Restarting IVF   1/16/19: resolved

## 2019-01-16 NOTE — PROGRESS NOTES
[facility]  Cardiology  Progress Note    Patient Name: Radhames Alonzo  MRN: [id number]  Admission Date: 1/9/2019  Hospital Length of Stay: 7 days  Code Status: Full Code   Attending Physician: Jesús Tay MD   Primary Care Physician: Pennie Jorge NP  Expected Discharge Date: 1/10/2019  Principal Problem:Severe sepsis    Subjective:     Hospital Course:  Patient is an 84 year old male with a past medical history of chronic diastolic heart failure, chronic atrial fibrillation, carotid stenosis, HHD, dyslipidemia, and chronic peripheral edema presents to the emergency room after becoming increasingly weak over the last two days. He also reports shortness of breath and coughing with white sputum. He has had a decline in status over the last couple of months.    ROS   Constitutional : Weakness, lethargy  EENT : Negative  CV : BLE edema +2 at baseline.   Respiratory : Shortness of breath, coughing with sputum production.   Gastrointestinal: Negative   Genitourinary: Negative  Musculoskeletal: Negative  Skin : Ulcer to left lateral foot  Neurological : AAO x 3     Objective:     Vital Signs (Most Recent):  Temp: 96.6 °F (35.9 °C) (01/16/19 0735)  Pulse: 101 (01/16/19 0728)  Resp: 20 (01/16/19 0728)  BP: (!) 102/59 (01/16/19 0600)  SpO2: 100 % (01/16/19 0728) Vital Signs (24h Range):  Temp:  [95.6 °F (35.3 °C)-97.7 °F (36.5 °C)] 96.6 °F (35.9 °C)  Pulse:  [] 101  Resp:  [14-25] 20  SpO2:  [84 %-100 %] 100 %  BP: ()/(49-87) 102/59     Weight: 98.9 kg (218 lb)  Body mass index is 28.76 kg/m².    SpO2: 100 %  O2 Device (Oxygen Therapy): nasal cannula      Intake/Output Summary (Last 24 hours) at 1/16/2019 0821  Last data filed at 1/16/2019 0629  Gross per 24 hour   Intake 3908.32 ml   Output 900 ml   Net 3008.32 ml       Lines/Drains/Airways     Drain                 Urethral Catheter 01/15/19 1215 Latex 16 Fr. less than 1 day          Peripheral Intravenous Line                  Peripheral IV - Single Lumen 01/15/19 1100 Anterior;Left Forearm less than 1 day         Peripheral IV - Single Lumen 01/15/19 1511 Anterior;Proximal;Right Forearm less than 1 day                Physical Exam   General appearance: alert, appears stated age and cooperative. Frail in appearance. Lethargic. Generalized weakness.   Head: Normocephalic, without obvious abnormality, atraumatic  Eyes: conjunctivae/corneas clear. PERRL  Neck: no carotid bruit, no JVD and supple, symmetrical, trachea midline  Lungs: Expiratory rhales to left upper and lower lobe  Chest Wall: no tenderness  Heart: regular rate and rhythm, S1, S2 normal, 1/6 SM, click, rub or gallop  Abdomen: Distended, soft, non-tender; bowel sounds normal; no masses,  no organomegaly  Extremities: Extremities normal, atraumatic, no cyanosis, clubbing. +2 edema to BLE.   Pulses: Dorsalis Pedis R: 2+ (normal)/L: 2+ (normal)  Skin: Skin color, texture, turgor normal. Venous stasis ulcer to left lateral foot.   Neurologic: Normal mood and affect  Alert and oriented X 3      Significant Labs:   BMP:   Recent Labs   Lab 01/15/19  0531 01/15/19  1356 01/16/19  0317   GLU 96 112* 107    143 142   K 2.9* 3.1* 3.4*   CL 96 99 102   CO2 36* 35* 32*   BUN 30* 30* 27*   CREATININE 1.1 1.0 1.0   CALCIUM 9.1 9.0 8.7   , CMP   Recent Labs   Lab 01/15/19  0531 01/15/19  0941 01/15/19  1356 01/16/19  0317     --  143 142   K 2.9*  --  3.1* 3.4*   CL 96  --  99 102   CO2 36*  --  35* 32*   GLU 96  --  112* 107   BUN 30*  --  30* 27*   CREATININE 1.1  --  1.0 1.0   CALCIUM 9.1  --  9.0 8.7   PROT  --  6.5  --  6.0   ALBUMIN  --  2.9*  --  2.7*   BILITOT  --  1.0  --  1.2*   ALKPHOS  --  58  --  51*   AST  --  42*  --  39   ALT  --  33  --  31   ANIONGAP 9  --  9 8   ESTGFRAFRICA >60  --  >60 >60   EGFRNONAA >60  --  >60 >60   , CBC   Recent Labs   Lab 01/15/19  0531 01/16/19  0317   WBC 6.82 4.89   HGB 12.6* 11.3*   HCT 41.1 37.0*    179    and Troponin No  results for input(s): TROPONINI in the last 48 hours.      Assessment and Plan:     Active Diagnoses:    Diagnosis Date Noted POA    PRINCIPAL PROBLEM:  Severe sepsis [A41.9, R65.20] 01/09/2019 Yes    Encephalopathy [G93.40] 01/15/2019 No    Pleural effusion on left [J90] 01/15/2019 Yes    Benign prostatic hyperplasia (BPH) with post-void dribbling [N40.1, N39.43] 01/11/2019 Yes    Hypokalemia [E87.6] 01/10/2019 Yes    Cellulitis of left lower extremity [L03.116] 01/10/2019 Yes    Pneumonia [J18.9] 01/10/2019 Yes    Rhabdomyolysis [M62.82] 01/09/2019 Yes    Atrial fibrillation [I48.91] 04/30/2015 Yes    Cardiomyopathy [I42.9] 04/30/2015 Yes      Problems Resolved During this Admission:       VTE Risk Mitigation (From admission, onward)        Ordered     dabigatran etexilate capsule 150 mg  2 times daily      01/14/19 1112     IP VTE HIGH RISK PATIENT  Once      01/09/19 1751        Current Facility-Administered Medications   Medication    0.9%  NaCl infusion    acetaminophen tablet 650 mg    bicalutamide tablet 50 mg    cefepime in dextrose 5 % 1 gram/50 mL IVPB 1 g    dabigatran etexilate capsule 150 mg    HYDROcodone-acetaminophen 5-325 mg per tablet 1 tablet    levalbuterol nebulizer solution 1.25 mg    levalbuterol nebulizer solution 1.25 mg    LORazepam tablet 0.5 mg    polyethylene glycol packet 17 g    tamsulosin 24 hr capsule 0.4 mg    vancomycin (VANCOCIN) 1,500 mg in dextrose 5 % 250 mL IVPB     Dx: CHF resolved but LLeg cellulitis worse     Sepsis possibly secondary to venous stasis ulcer on left lateral foot. IV abx initiated per primary.      Left sided pleural effusion and mild pulmonary edema evidenced by chest x ray. BNP moderately elevated at 659.      Acute on chronic diastolic CHF TTE December 2018 with LVEF 55%, mild LVH. Lasix IV 80 mg bid.     Rhabdomyolysis improving. Chronic ETOH use. CPK improved at 1762 from 2378.      Chronic atrial fibrillation rate controlled not  currently on anticoagulation according to last progress note in clinic but on xarelto in the past.      Chronic peripheral edema at baseline.      Dyslipidemia     Hypertension now low.       Nigel Gibbs, NP for Dr Jean-Baptiste  Cardiology  [facility]    PLAN:restart BB and ARB as tolerated  DNR; son present    I attest that I have personally seen and examined this patient. I have reviewed and discussed the management in detail as outlined above.

## 2019-01-16 NOTE — ASSESSMENT & PLAN NOTE
Resume flomax and casodex--with waxing and waning mental status may remove PO meds  Has a valdes currently

## 2019-01-16 NOTE — ASSESSMENT & PLAN NOTE
Rate controlled. On metoprolol    He reports that he is taking pradaxa but called pharmacy and they do not report that he has filled anything for anticoagulation especially pradaxa in recent months. Start lovenox 1mg/kg BID until we get his home meds    [facility] was [facility], will restart at d/c    1/15/19: tachycardic despite BB and now hypotensive. Holding BB and all BP meds. Sinus on telemetry right now. Cont pradaxa for now but may stop again and switch back to lovenox today if continues to worsen and pending labs    1/16/19  Continue pradaxa

## 2019-01-16 NOTE — ASSESSMENT & PLAN NOTE
Need to assess if truly hypoxic. No low sats charted but has been on venti and now 4L NC. Will try to wean. Repeat CXR.   1/11/19 O2 sat 95-99%     1-12 Pt is labored with his breathing and continuing to cough; Dr Robertson consulted  1-14 no longer needing O2, cont cefepime and clinda x 10 days, cont nebs,stop steroids    1/15/19: marked change in respiratory status this morning, was worsening overnight. He is oxygenating, sats good on 2L and ABG shows good sats. CXR with enlarging left pleural effusion otherwise normal. CT ordered and will do once stabilized--? Underlying consolidation with empyema. Looking for loculations, etc. Consider thoracentesis. Could explain his worsening status. He is in respiratory distress and abdominal breathing. Taking off BiPaP. He is a FULL CODE.    1/16/19:  Continue VANC/Cefepime. He is considering DNR . Told Dr. Jean-Baptiste no to resuscitation. We need to get him to sign a DNR

## 2019-01-16 NOTE — EICU
EICU video rounds done, pt awake, without complaints, no distress noted, pt with BIPAP mask on, 30% O2

## 2019-01-16 NOTE — PLAN OF CARE
Problem: Adult Inpatient Plan of Care  Goal: Plan of Care Review  Outcome: Ongoing (interventions implemented as appropriate)  Pt admitted on 01/09/19 originally to [facility] with CHF exacerbation. Transferred on yesterday morning 01/15/19 to CCU2 in respiratory distress. Bipap ordered with rate of 17, 16/5, 30% Fio2. Unable to tolerate Bipap for most of shift related to anxiety/ claustrophobic feeling despite trying alternative mask. Able to maintain sats in mid to upper 90s with 2L per NC. Abdominal breathing still noted, but breaths not labored at rate of 18-24 per minute. Lung fields remain coarse and diminished. No wheezing noted. Bilateral lower extremity cellulitis stable. Afebrile with vitals WNL. Did not require administration of PRN breathing treatments during shift. Confused to time and situation, but appropriate at times with short term memory intact. Reorientation provided when needed. Reviewed plan of care. Safety maintained. Call bell in reach. Will continue to monitor.

## 2019-01-16 NOTE — ASSESSMENT & PLAN NOTE
Waxing and waning mental status  Overnight thought to be sundowning but his AM dramatic waxing and waning just during our 10 minute conversation. Went form alert to very lethargic. + jaundice  Hypotensive, tachycardic, hypothermic (95.6 axillary)  Sepsis vs hepatic    1/1619  LFTs, lactic acid unremarkable  I think this may be from his hypercapnea and refusal to wear bipap   Will get CT head to rule out CVA

## 2019-01-16 NOTE — PLAN OF CARE
01/16/19 1150   Discharge Reassessment   Assessment Type Discharge Planning Reassessment         Patient will remain for continued treatment today. No needs or concerns voiced at this time. CM will continue to follow and assist as needed.

## 2019-01-16 NOTE — ASSESSMENT & PLAN NOTE
See severe sepsis treatment  Check u/s  Negative for bilateral lower extremity DVT.    cont cefepime and clinda x 10 days wound care Cleanse wound with NS using gauze. Apply silver alginate to wound bed, cover with ABD and secure with tape daily.      1/15/19: marked worsening of erythema from yesterday despite antibx. Puzzling why the sudden change. D-dimer negative and anticoagulated so low suspicion for DVT. Worsening vitals as well (see sepsis)    1/16/19  Leg is looking much better already

## 2019-01-16 NOTE — PROGRESS NOTES
Changed pt mask to Under the Nose mask size C due to pt claustrophobic and anxiety; pt not tolerating full face mask with BiPAP at this time.

## 2019-01-16 NOTE — NURSING
"Dr. Jean-Baptiste visits earlier, and explains to patient and son Zen, the frail condition of health that Mr Alonzo is in. Asks patient about his wishes for CPR and being placed on ventilator. Patient states, "I don't want any of that". Patient is alert and oriented at this time. Upon returning to the room for patient to sign his Advance Directive Living Will form, patient doesn't want to sign form at this time. States "I want to think about it."        Dr. Robertson visits and spoke to patient about not wanting to use BiPAP mask. The next step would be intubation and placing on the ventilator unless he chooses not to be resuscitated. Son remains at bedside and tried to explain to patient that the next step would be, patient is not answering son at this time. He is ignoring the issue at this point.   "

## 2019-01-16 NOTE — NURSING
Pt repeatedly removing Bipap stating it makes him feel claustrophobic. Only able to tolerate Bipap for 5 minute increments at this time. Lung sounds remain coarse and diminished over lung fields without any wheezing noted. Respiratory rate 18-24 breaths per minute. Abdominal breathing still noted, but breaths are unlabored. Educated patient on importance of using Bipap. Wishes to remain on nasal cannula for now stating claustrophobia worsens with Bipap even despite recent mask change.  2L per NC applied. O2 sats 97%. Will continue to encourage use of Bipap and monitor closely.

## 2019-01-16 NOTE — SUBJECTIVE & OBJECTIVE
Interval History: see      Review of Systems   Constitutional: Positive for fatigue. Negative for fever.   Respiratory: Positive for wheezing. Negative for shortness of breath.    Cardiovascular: Negative for chest pain.   Gastrointestinal: Negative for vomiting.   Genitourinary: Negative for decreased urine volume.   Skin: Positive for wound. Negative for color change.   Psychiatric/Behavioral: Negative for agitation.     Objective:     Vital Signs (Most Recent):  Temp: 96.6 °F (35.9 °C) (01/16/19 0735)  Pulse: 101 (01/16/19 0728)  Resp: 20 (01/16/19 0728)  BP: (!) 102/59 (01/16/19 0600)  SpO2: 100 % (01/16/19 0728) Vital Signs (24h Range):  Temp:  [96.2 °F (35.7 °C)-97.7 °F (36.5 °C)] 96.6 °F (35.9 °C)  Pulse:  [] 101  Resp:  [14-25] 20  SpO2:  [93 %-100 %] 100 %  BP: ()/(49-87) 102/59     Weight: 98.9 kg (218 lb)  Body mass index is 28.76 kg/m².    Intake/Output Summary (Last 24 hours) at 1/16/2019 0837  Last data filed at 1/16/2019 0629  Gross per 24 hour   Intake 3908.32 ml   Output 900 ml   Net 3008.32 ml      Physical Exam   Constitutional: He is oriented to person, place, and time. He appears well-developed and well-nourished.   HENT:   Head: Normocephalic and atraumatic.   Right Ear: External ear normal.   Left Ear: External ear normal.   Nose: Nose normal.   Mouth/Throat: Oropharynx is clear and moist.   Eyes: Conjunctivae and EOM are normal. Pupils are equal, round, and reactive to light. Right eye exhibits no discharge. Left eye exhibits no discharge. No scleral icterus.   Neck: Normal range of motion. Neck supple. No JVD present. No tracheal deviation present. No thyromegaly present.   Cardiovascular: Normal rate, regular rhythm, normal heart sounds and intact distal pulses.   No murmur heard.  Pulmonary/Chest: Effort normal. No respiratory distress. He has wheezes. He has no rales. He exhibits no tenderness.   Crackle bases  Rhonchi throughout    Abdominal: Soft. Bowel sounds are normal.  He exhibits no distension and no mass. There is no tenderness. There is no rebound and no guarding.   Periumbilical venous congestion/prominence    Musculoskeletal: Normal range of motion.   Lymphadenopathy:     He has no cervical adenopathy.   Neurological: He is oriented to person, place, and time. He has normal reflexes. He displays normal reflexes. No cranial nerve deficit. He exhibits normal muscle tone. Coordination normal.   Somnolent but arrousable and oriented   Skin: Skin is warm and dry. No erythema.   LLE dressing c/d/i   Psychiatric: He has a normal mood and affect. His behavior is normal. Judgment and thought content normal.       Significant Labs:   Blood Culture: No results for input(s): LABBLOO in the last 48 hours.  CBC:   Recent Labs   Lab 01/15/19  0531 01/16/19  0317   WBC 6.82 4.89   HGB 12.6* 11.3*   HCT 41.1 37.0*    179     CMP:   Recent Labs   Lab 01/15/19  0531 01/15/19  0941 01/15/19  1356 01/16/19  0317     --  143 142   K 2.9*  --  3.1* 3.4*   CL 96  --  99 102   CO2 36*  --  35* 32*   GLU 96  --  112* 107   BUN 30*  --  30* 27*   CREATININE 1.1  --  1.0 1.0   CALCIUM 9.1  --  9.0 8.7   PROT  --  6.5  --  6.0   ALBUMIN  --  2.9*  --  2.7*   BILITOT  --  1.0  --  1.2*   ALKPHOS  --  58  --  51*   AST  --  42*  --  39   ALT  --  33  --  31   ANIONGAP 9  --  9 8   EGFRNONAA >60  --  >60 >60       Significant Imaging: I have reviewed and interpreted all pertinent imaging results/findings within the past 24 hours.

## 2019-01-16 NOTE — PLAN OF CARE
Dr Delatorre updated on pt status, she spoke with pt and pts family at bedside. CT scan held at present and plan to do tomorrow if pt condition improves to transport. Pt continued on Bipap used abd accessory muscles in intervals.continuing to monitor.

## 2019-01-16 NOTE — ASSESSMENT & PLAN NOTE
Due to Venous stasis and wound of LLE   Start Cefepime and clinda to cover for everything including MRSA and psuedomonas.   Clinda instead of Vanc since mild CKD and we will diurese him as well   Lactic acid q 6 coming down  Bolus 2 liters overnight creat 0.9 bp 111/66 this am  woundcare consult     1-12 legs are down but still pul congestion    1-13 Not retaining as much fluids, but still SOB and coughing; burns when he urinates from his Prostate issues(Ca)    1-14 better/resolved    1-15: worsening status this AM. + confusion, hypotension, tachycardia. LLE redness is worsening from yesterday despite antibx. Should be well covered with clida and cefepime so puzzling. When stable consider imaging of leg to look for deeper infection    1/16:  Continue cefepime and VANC. CT chest today to check for empyema/underlying infiltrate. CXR not adequate to determine

## 2019-01-16 NOTE — ASSESSMENT & PLAN NOTE
Cardiology is following  Diastolic CHF: TTE December 2018 with LVEF 55%, mild LVH  Was started on diuretics  BNP is only 200s now  CXR with left pleural effusion but not severe pulmonary congestion  1/15/10: BP dropping, hold ACEi, BB and diuretics for now and give back IVF---careful for volume overload though.   1/16/19: resume BB and ACE per [facility]

## 2019-01-16 NOTE — PROGRESS NOTES
"[facility] Medicine  Progress Note    Patient Name: Radhames Alonzo  MRN: [id number]  Patient Class: IP- Inpatient   Admission Date: 1/9/2019  Length of Stay: 7 days  Attending Physician: Jesús Tay MD  Primary Care Provider: Pennie Jorge NP        Subjective:     Principal Problem:Severe sepsis    HPI:  84 year old male presents to ED b/c " I couldn't get up and walk."   SOB for about a year. Worse recently. Last night it got to the point that he was too SOB to stand up.   SOB at rest and with eating.   He also notes left foot pain. He has been seeing woundcare for a wound on this foot last 7 months   Workup in ED is significant for BNP 600s. Last BNP 200s 3 weeks ago. 160s 9 months ago.  He sees Dr. Jean-Baptiste regularly.   [facility] has seen pt and rec admit for diuresis, rule out MI   First TPN with slight bump.038  Lactic acid is 3.6. Meets I am told by ED MD that patient "has no signs of infection on physical exam." I am also told that CXR and U/A are clear.     He drinks six pack daily. Last drink 2 days ago. No h/o DT's in past         Hospital Course:  He has been started on cefepime and clinda for left lower ext cellulitis. He has been afebrile. WBC was 90836. Blood cultures NGTD. Lactate was up to 3.8, coming down.RR was 24 and had low sats on admission. Was on venti, now down to 3L NC   He is also wheezing. C/o SOB. CXR with CHF.   He does report that he takes pradaxa for his a fib at home. Not on home meds list nor reordered here. A fib on EKG. BNP was 659. Ordered for lasix 80mg IV BID. Urinating a lot. K low this am 2.8    1/11/19 Getting lasix 80mg IV bid per Cardiology; not much urine output yesterday with dribbling noted; concern for obstruction; will get bladder scan; consider renal US.   Creat 0.9 stable; K+ 3.6    CPK 2378>1762; TNI 0.032 x 1; repeat this am     1-12 Pt continues to have SOB and pul congestion; Dr Robertson consulted    1-13 Pt is fairly complex with mx " cardiac/pulmonary issues causing his coughing and SOB; has a hx of underlying prostate problems; a swing bed might be necessary; Dr Robertson and cardiology following    1/14 pt is still on clinda and cefepime for the lower ext cellulitis. Afebrile/ no elevated WBC. US negative for DVT. Still swollen but reports that it is much better than his normal   CXR 1/812 Left basilar consolidation or atelectasis and small pleural effusion. He is 95% on RA  Still weak. Having trouble standing on side bed.     1/15/19 Pt started feeling worse yesterday evening; this am noted to have LOW BP- SBP 80s, increased RR 30s, Temp 95.6,   Dr. Jean-Baptiste reports HF better, BNP 200s, still with LE edema, US negative for DVT; Also gets pradaxa  Underlying chronic prostate problems; defers any intervention or sx   Day 7/10 Cefepime and clinda for LE cellulitis and pneumonia. Blood cultures negative.   Looks jaundice with sclerema icterus; admit bilirubin - 2.0 elevated on 1/9/19 ; check LFTs/CMP/bilirubin    1/15/19: patient with worsening respiratory status overnight. He was tried on BiPaP but refused to wear it. Was agitated during the night. He was abdominal breathing but maintainings sats on 2L NC. BNP normal. D-dimer normal and anticoagulated since admit, restarted PO pradaxa yesterday. This AM, continues to decompensated. He is alert and oriented on initial eval but over the course of our conversation decompensates and becomes very lethargic. He has been waxing and waning throughout the night per RN report. Unclear if has h/o cirrhosis but on chart review this AM his bilirubin 2 and mild elevation AST--this was not trended during his hospital stay. This AM he has scleral icterus and yellowing of skin which was NOT PRESENT YESTERDAY. Also his LLE cellulitis is worsening, redness extending up the shin to the knee today which was NOT PRESENT YESTERDAY. He is hypothermic, tachypenic, hypotensive and tachycardic (despite BB on board).      1/16/19  Transferred up to ICU yesterday for hypotension and AMS on the floor. NH4 level 24. Bolused 1 liter NS. Did not require pressors. This am 120s/70s  UOP is good. Abx changed from cefepime and clinda to cefepime and VANC. Afebrile.  ABG ordered yesterday. He does have some hypercapnea. Refuses to wear bipap.     Interval History: see      Review of Systems   Constitutional: Positive for fatigue. Negative for fever.   Respiratory: Positive for wheezing. Negative for shortness of breath.    Cardiovascular: Negative for chest pain.   Gastrointestinal: Negative for vomiting.   Genitourinary: Negative for decreased urine volume.   Skin: Positive for wound. Negative for color change.   Psychiatric/Behavioral: Negative for agitation.     Objective:     Vital Signs (Most Recent):  Temp: 96.6 °F (35.9 °C) (01/16/19 0735)  Pulse: 101 (01/16/19 0728)  Resp: 20 (01/16/19 0728)  BP: (!) 102/59 (01/16/19 0600)  SpO2: 100 % (01/16/19 0728) Vital Signs (24h Range):  Temp:  [96.2 °F (35.7 °C)-97.7 °F (36.5 °C)] 96.6 °F (35.9 °C)  Pulse:  [] 101  Resp:  [14-25] 20  SpO2:  [93 %-100 %] 100 %  BP: ()/(49-87) 102/59     Weight: 98.9 kg (218 lb)  Body mass index is 28.76 kg/m².    Intake/Output Summary (Last 24 hours) at 1/16/2019 0837  Last data filed at 1/16/2019 0629  Gross per 24 hour   Intake 3908.32 ml   Output 900 ml   Net 3008.32 ml      Physical Exam   Constitutional: He is oriented to person, place, and time. He appears well-developed and well-nourished.   HENT:   Head: Normocephalic and atraumatic.   Right Ear: External ear normal.   Left Ear: External ear normal.   Nose: Nose normal.   Mouth/Throat: Oropharynx is clear and moist.   Eyes: Conjunctivae and EOM are normal. Pupils are equal, round, and reactive to light. Right eye exhibits no discharge. Left eye exhibits no discharge. No scleral icterus.   Neck: Normal range of motion. Neck supple. No JVD present. No tracheal deviation present. No  thyromegaly present.   Cardiovascular: Normal rate, regular rhythm, normal heart sounds and intact distal pulses.   No murmur heard.  Pulmonary/Chest: Effort normal. No respiratory distress. He has wheezes. He has no rales. He exhibits no tenderness.   Crackle bases  Rhonchi throughout    Abdominal: Soft. Bowel sounds are normal. He exhibits no distension and no mass. There is no tenderness. There is no rebound and no guarding.   Periumbilical venous congestion/prominence    Musculoskeletal: Normal range of motion.   Lymphadenopathy:     He has no cervical adenopathy.   Neurological: He is oriented to person, place, and time. He has normal reflexes. He displays normal reflexes. No cranial nerve deficit. He exhibits normal muscle tone. Coordination normal.   Somnolent but arrousable and oriented   Skin: Skin is warm and dry. No erythema.   LLE dressing c/d/i   Psychiatric: He has a normal mood and affect. His behavior is normal. Judgment and thought content normal.       Significant Labs:   Blood Culture: No results for input(s): LABBLOO in the last 48 hours.  CBC:   Recent Labs   Lab 01/15/19  0531 01/16/19  0317   WBC 6.82 4.89   HGB 12.6* 11.3*   HCT 41.1 37.0*    179     CMP:   Recent Labs   Lab 01/15/19  0531 01/15/19  0941 01/15/19  1356 01/16/19  0317     --  143 142   K 2.9*  --  3.1* 3.4*   CL 96  --  99 102   CO2 36*  --  35* 32*   GLU 96  --  112* 107   BUN 30*  --  30* 27*   CREATININE 1.1  --  1.0 1.0   CALCIUM 9.1  --  9.0 8.7   PROT  --  6.5  --  6.0   ALBUMIN  --  2.9*  --  2.7*   BILITOT  --  1.0  --  1.2*   ALKPHOS  --  58  --  51*   AST  --  42*  --  39   ALT  --  33  --  31   ANIONGAP 9  --  9 8   EGFRNONAA >60  --  >60 >60       Significant Imaging: I have reviewed and interpreted all pertinent imaging results/findings within the past 24 hours.    Assessment/Plan:      * Severe sepsis    Due to Venous stasis and wound of LLE   Start Cefepime and clinda to cover for everything  including MRSA and psuedomonas.   Clinda instead of Vanc since mild CKD and we will diurese him as well   Lactic acid q 6 coming down  Bolus 2 liters overnight creat 0.9 bp 111/66 this am  woundcare consult     1-12 legs are down but still pul congestion    1-13 Not retaining as much fluids, but still SOB and coughing; burns when he urinates from his Prostate issues(Ca)    1-14 better/resolved    1-15: worsening status this AM. + confusion, hypotension, tachycardia. LLE redness is worsening from yesterday despite antibx. Should be well covered with clida and cefepime so puzzling. When stable consider imaging of leg to look for deeper infection    1/16:  Continue cefepime and VANC. CT chest today to check for empyema/underlying infiltrate. CXR not adequate to determine     Pleural effusion on left    Getting bigger on CXR from yesterday  CT ordered--will do when stabilized  ? Underlying consolidation. ? Empyema--this why he worsening       Encephalopathy    Waxing and waning mental status  Overnight thought to be sundowning but his AM dramatic waxing and waning just during our 10 minute conversation. Went form alert to very lethargic. + jaundice  Hypotensive, tachycardic, hypothermic (95.6 axillary)  Sepsis vs hepatic    1/1619  LFTs, lactic acid unremarkable  I think this may be from his hypercapnea and refusal to wear bipap   Will get CT head to rule out CVA       Benign prostatic hyperplasia (BPH) with post-void dribbling      Resume flomax and casodex--with waxing and waning mental status may remove PO meds  Has a valdes currently      Pneumonia    Need to assess if truly hypoxic. No low sats charted but has been on venti and now 4L NC. Will try to wean. Repeat CXR.   1/11/19 O2 sat 95-99%     1-12 Pt is labored with his breathing and continuing to cough; Dr Robertson consulted  1-14 no longer needing O2, cont cefepime and clinda x 10 days, cont nebs,stop steroids    1/15/19: marked change in respiratory status this  morning, was worsening overnight. He is oxygenating, sats good on 2L and ABG shows good sats. CXR with enlarging left pleural effusion otherwise normal. CT ordered and will do once stabilized--? Underlying consolidation with empyema. Looking for loculations, etc. Consider thoracentesis. Could explain his worsening status. He is in respiratory distress and abdominal breathing. Taking off BiPaP. He is a FULL CODE.    1/16/19:  Continue VANC/Cefepime. He is considering DNR . Told Dr. Jean-Baptiste no to resuscitation. We need to get him to sign a DNR        Cellulitis of left lower extremity    See severe sepsis treatment  Check u/s  Negative for bilateral lower extremity DVT.    cont cefepime and clinda x 10 days wound care Cleanse wound with NS using gauze. Apply silver alginate to wound bed, cover with ABD and secure with tape daily.      1/15/19: marked worsening of erythema from yesterday despite antibx. Puzzling why the sudden change. D-dimer negative and anticoagulated so low suspicion for DVT. Worsening vitals as well (see sepsis)    1/16/19  Leg is looking much better already        Hypokalemia    Likely due to torsemide use  Improved   Replace today      Rhabdomyolysis    Mild. Due to chronic ETOHism   2  liter bolus for sepsis should take care of it   CPK improved    1/15/19: not checked over last 5 days. Repeat CPK today. Restarting IVF   1/16/19: resolved      CHF (congestive heart failure)    [facility] consulted    ECHO in their records  Continue ARB, BB    [facility] has him on torsemide 20mg po BID creat stable. Watch urine output strictly as well as weights.         Atrial fibrillation    Rate controlled. On metoprolol    He reports that he is taking pradaxa but called pharmacy and they do not report that he has filled anything for anticoagulation especially pradaxa in recent months. Start lovenox 1mg/kg BID until we get his home meds    [facility], will restart at d/c    1/15/19: tachycardic despite BB  and now hypotensive. Holding BB and all BP meds. Sinus on telemetry right now. Cont pradaxa for now but may stop again and switch back to lovenox today if continues to worsen and pending labs    1/16/19  Continue pradaxa     Cardiomyopathy    Cardiology is following  Diastolic CHF: TTE December 2018 with LVEF 55%, mild LVH  Was started on diuretics  BNP is only 200s now  CXR with left pleural effusion but not severe pulmonary congestion  1/15/10: BP dropping, hold ACEi, BB and diuretics for now and give back IVF---careful for volume overload though.   1/16/19: resume BB and ACE per [facility]        VTE Risk Mitigation (From admission, onward)        Ordered     dabigatran etexilate capsule 150 mg  2 times daily      01/14/19 1112     IP VTE HIGH RISK PATIENT  Once      01/09/19 1751          Critical care time spent on the evaluation and treatment of severe organ dysfunction, review of pertinent labs and imaging studies, discussions with consulting providers and discussions with patient/family: 40 minutes.    Jesús Tay MD  Department of Hospital Medicine   [facility]

## 2019-01-16 NOTE — PT/OT/SLP PROGRESS
Physical Therapy Treatment/Re-assessment    Patient Name:  Radhames Alonzo   MRN:  [id number]    Recommendations:     Discharge Recommendations:  other (see comments)(home with home health)   Discharge Equipment Recommendations: walker, rolling, commode   Barriers to discharge: multiple medical issues    Assessment:     Radhames Alonzo is a 84 y.o. male admitted with a medical diagnosis of Severe sepsis.  He presents with the following impairments/functional limitations:  weakness, impaired endurance, impaired self care skills, impaired functional mobilty, impaired balance, decreased lower extremity function, impaired cardiopulmonary response to activity. Pt recently transferred to ICU on 1/15/19 due to worsening respiratory status. Pt seen today and was re-assessed by PT. Pt doing better with his vitals, pt more awake, found sitting at edge of bed with nursing. Nursing reports of pt doing better and pt wanting to get out of bed. Performed bed mobility and transfers with pt able to complete task with Minimal assistance with RW with pt completing OOB activity via stand pivot tech. Pt with SpO2 at >91% during activity with the HR between 100 -106 bpm. Pt was able to follow verbal and manual cues in completing mobility task with pt reporting of feeling better sitting up in chair. Discussed plans with nursing with PT working on inc. Tolerance with sitting prior to any gait task. Nursing agreeable with having pt OOB to chair. PT reviewed initial goals and currently pt still appropriate for goals set.     Rehab Prognosis: Fair; patient would benefit from acute skilled PT services to address these deficits and reach maximum level of function.    Recent Surgery: * No surgery found *      Plan:     During this hospitalization, patient to be seen 5 x/week to address the identified rehab impairments via gait training, therapeutic activities, therapeutic exercises and progress toward the following goals:    · Plan of Care Expires:   01/23/19    Subjective     Chief Complaint: I want to stand up.  Patient/Family Comments/goals: I want to get up and sit in the chair  Pain/Comfort:  · Pain Rating 1: 0/10  · Pain Rating Post-Intervention 2: 0/10      Objective:     Communicated with patient, family and nursing prior to session.  Patient found sitting at edge of bed pulse ox (continuous), peripheral IV, telemetry, blood pressure cuff, valdes catheter, oxygen  upon PT entry to room.     General Precautions: Standard,     Orthopedic Precautions:N/A   Braces: N/A     Functional Mobility:  · Bed Mobility:     · Supine to Sit: contact guard assistance and minimum assistance  · Sit to Supine: contact guard assistance and minimum assistance  · Transfers:     · Sit to Stand:  contact guard assistance and minimum assistance with rolling walker  · Bed to Chair: contact guard assistance and minimum assistance with  rolling walker  using  Stand Pivot      AM-PAC 6 CLICK MOBILITY  Turning over in bed (including adjusting bedclothes, sheets and blankets)?: 3  Sitting down on and standing up from a chair with arms (e.g., wheelchair, bedside commode, etc.): 3  Moving from lying on back to sitting on the side of the bed?: 3  Moving to and from a bed to a chair (including a wheelchair)?: 3  Need to walk in hospital room?: 3  Climbing 3-5 steps with a railing?: 2  Basic Mobility Total Score: 17       Therapeutic Activities and Exercises:   Pt wanting to get out of bed. Appears to tolerate standing task with minimal assistance. Pt able to perform sit to stand activity x 3 trials with SpO2 staying >90% with HR around 100-104 bpm. Pt easily fatigued with standing activity.     Patient left up in chair with all lines intact, call button in reach, nursing notified and family and nurse present..    GOALS:   Multidisciplinary Problems     Physical Therapy Goals        Problem: Physical Therapy Goal    Goal Priority Disciplines Outcome Goal Variances Interventions   Physical  Therapy Goal     PT, PT/OT      Description:  Goals to be met by: 7  (reviewed 19)    Patient will increase functional independence with mobility by performin. Supine to sit with Supervision or Set-up Assistance  2. Sit to supine with Supervision or Set-up Assistance  3. Bed to chair transfer with Supervision or Set-up Assistancewith or without rolling walker using Step Transfer TECHNIQUE  4. Gait  x 100  feet with Supervision or Set-up Assistance with or without rolling walker  5. Lower extremity exercise program x10 reps per handout, with assistance as needed                       Time Tracking:     PT Received On: 19  PT Start Time: 1200     PT Stop Time: 1230  PT Total Time (min): 30 min     Billable Minutes: Re-eval 15 and Therapeutic Activity 10    Treatment Type: Treatment  PT/PTA: PT           Sukh Clancy, PT  2019

## 2019-01-17 PROBLEM — R13.10 DYSPHAGIA: Status: ACTIVE | Noted: 2019-01-17

## 2019-01-17 LAB
ALBUMIN SERPL BCP-MCNC: 2.9 G/DL
ALP SERPL-CCNC: 57 U/L
ALT SERPL W/O P-5'-P-CCNC: 33 U/L
ANION GAP SERPL CALC-SCNC: 8 MMOL/L
AST SERPL-CCNC: 37 U/L
BASOPHILS # BLD AUTO: 0.01 K/UL
BASOPHILS NFR BLD: 0.2 %
BILIRUB SERPL-MCNC: 1.2 MG/DL
BUN SERPL-MCNC: 19 MG/DL
CALCIUM SERPL-MCNC: 8.9 MG/DL
CHLORIDE SERPL-SCNC: 104 MMOL/L
CO2 SERPL-SCNC: 28 MMOL/L
CREAT SERPL-MCNC: 0.8 MG/DL
DIFFERENTIAL METHOD: ABNORMAL
EOSINOPHIL # BLD AUTO: 0 K/UL
EOSINOPHIL NFR BLD: 0 %
ERYTHROCYTE [DISTWIDTH] IN BLOOD BY AUTOMATED COUNT: 13.6 %
EST. GFR  (AFRICAN AMERICAN): >60 ML/MIN/1.73 M^2
EST. GFR  (NON AFRICAN AMERICAN): >60 ML/MIN/1.73 M^2
GLUCOSE SERPL-MCNC: 149 MG/DL
HCT VFR BLD AUTO: 38.7 %
HGB BLD-MCNC: 11.9 G/DL
INR PPP: 1.2
LYMPHOCYTES # BLD AUTO: 0.5 K/UL
LYMPHOCYTES NFR BLD: 10.8 %
MCH RBC QN AUTO: 31.4 PG
MCHC RBC AUTO-ENTMCNC: 30.7 G/DL
MCV RBC AUTO: 102 FL
MONOCYTES # BLD AUTO: 0.1 K/UL
MONOCYTES NFR BLD: 2.5 %
NEUTROPHILS # BLD AUTO: 4.2 K/UL
NEUTROPHILS NFR BLD: 86.5 %
PLATELET # BLD AUTO: 171 K/UL
PMV BLD AUTO: 9.4 FL
POTASSIUM SERPL-SCNC: 3.8 MMOL/L
PROT SERPL-MCNC: 6.5 G/DL
PROTHROMBIN TIME: 12.7 SEC
RBC # BLD AUTO: 3.79 M/UL
SODIUM SERPL-SCNC: 140 MMOL/L
VANCOMYCIN TROUGH SERPL-MCNC: 13.4 UG/ML
WBC # BLD AUTO: 4.81 K/UL

## 2019-01-17 PROCEDURE — 94668 MNPJ CHEST WALL SBSQ: CPT

## 2019-01-17 PROCEDURE — 94660 CPAP INITIATION&MGMT: CPT

## 2019-01-17 PROCEDURE — 63600175 PHARM REV CODE 636 W HCPCS: Performed by: FAMILY MEDICINE

## 2019-01-17 PROCEDURE — 25000003 PHARM REV CODE 250: Performed by: FAMILY MEDICINE

## 2019-01-17 PROCEDURE — 63600175 PHARM REV CODE 636 W HCPCS: Performed by: INTERNAL MEDICINE

## 2019-01-17 PROCEDURE — 94640 AIRWAY INHALATION TREATMENT: CPT

## 2019-01-17 PROCEDURE — 27000221 HC OXYGEN, UP TO 24 HOURS

## 2019-01-17 PROCEDURE — 94667 MNPJ CHEST WALL 1ST: CPT

## 2019-01-17 PROCEDURE — 80202 ASSAY OF VANCOMYCIN: CPT

## 2019-01-17 PROCEDURE — 99900035 HC TECH TIME PER 15 MIN (STAT)

## 2019-01-17 PROCEDURE — 94761 N-INVAS EAR/PLS OXIMETRY MLT: CPT

## 2019-01-17 PROCEDURE — 25000003 PHARM REV CODE 250: Performed by: NURSE PRACTITIONER

## 2019-01-17 PROCEDURE — 80053 COMPREHEN METABOLIC PANEL: CPT

## 2019-01-17 PROCEDURE — 97530 THERAPEUTIC ACTIVITIES: CPT

## 2019-01-17 PROCEDURE — 99233 PR SUBSEQUENT HOSPITAL CARE,LEVL III: ICD-10-PCS | Mod: ,,, | Performed by: FAMILY MEDICINE

## 2019-01-17 PROCEDURE — 85610 PROTHROMBIN TIME: CPT

## 2019-01-17 PROCEDURE — 36415 COLL VENOUS BLD VENIPUNCTURE: CPT

## 2019-01-17 PROCEDURE — 20000000 HC ICU ROOM

## 2019-01-17 PROCEDURE — 92611 MOTION FLUOROSCOPY/SWALLOW: CPT

## 2019-01-17 PROCEDURE — 25000003 PHARM REV CODE 250: Performed by: INTERNAL MEDICINE

## 2019-01-17 PROCEDURE — 25000242 PHARM REV CODE 250 ALT 637 W/ HCPCS: Performed by: NURSE PRACTITIONER

## 2019-01-17 PROCEDURE — 99233 SBSQ HOSP IP/OBS HIGH 50: CPT | Mod: ,,, | Performed by: FAMILY MEDICINE

## 2019-01-17 PROCEDURE — 85025 COMPLETE CBC W/AUTO DIFF WBC: CPT

## 2019-01-17 RX ORDER — METOPROLOL SUCCINATE 25 MG/1
25 TABLET, EXTENDED RELEASE ORAL DAILY
Status: DISCONTINUED | OUTPATIENT
Start: 2019-01-17 | End: 2019-01-18

## 2019-01-17 RX ADMIN — LEVALBUTEROL 1.25 MG: 1.25 SOLUTION, CONCENTRATE RESPIRATORY (INHALATION) at 01:01

## 2019-01-17 RX ADMIN — LORAZEPAM 0.5 MG: 0.5 TABLET ORAL at 11:01

## 2019-01-17 RX ADMIN — LEVALBUTEROL 1.25 MG: 1.25 SOLUTION, CONCENTRATE RESPIRATORY (INHALATION) at 07:01

## 2019-01-17 RX ADMIN — DABIGATRAN ETEXILATE MESYLATE 150 MG: 75 CAPSULE ORAL at 09:01

## 2019-01-17 RX ADMIN — CEFEPIME HYDROCHLORIDE 1 G: 1 INJECTION, SOLUTION INTRAVENOUS at 04:01

## 2019-01-17 RX ADMIN — BICALUTAMIDE 50 MG: 50 TABLET, FILM COATED ORAL at 09:01

## 2019-01-17 RX ADMIN — METOPROLOL SUCCINATE 25 MG: 25 TABLET, EXTENDED RELEASE ORAL at 09:01

## 2019-01-17 RX ADMIN — METHYLPREDNISOLONE SODIUM SUCCINATE 40 MG: 40 INJECTION, POWDER, FOR SOLUTION INTRAMUSCULAR; INTRAVENOUS at 03:01

## 2019-01-17 RX ADMIN — TAMSULOSIN HYDROCHLORIDE 0.4 MG: 0.4 CAPSULE ORAL at 09:01

## 2019-01-17 RX ADMIN — CEFEPIME HYDROCHLORIDE 1 G: 1 INJECTION, SOLUTION INTRAVENOUS at 12:01

## 2019-01-17 RX ADMIN — CEFEPIME HYDROCHLORIDE 1 G: 1 INJECTION, SOLUTION INTRAVENOUS at 09:01

## 2019-01-17 RX ADMIN — METHYLPREDNISOLONE SODIUM SUCCINATE 40 MG: 40 INJECTION, POWDER, FOR SOLUTION INTRAMUSCULAR; INTRAVENOUS at 09:01

## 2019-01-17 RX ADMIN — VANCOMYCIN 1500 MG: 1 INJECTION, SOLUTION INTRAVENOUS at 03:01

## 2019-01-17 RX ADMIN — SODIUM CHLORIDE AND POTASSIUM CHLORIDE: .9; .15 SOLUTION INTRAVENOUS at 08:01

## 2019-01-17 NOTE — NURSING
Called and updated Dr Tay on status and condition, Awake and alert, eating ice chips without difficulty sparingly, ate three small lemon freeze ice treats, fed self treats and ice chips without difficulty.    New orders noted, will only allow ice chips sparingly per MD and okay to take PO medications tonight (reviewed orders for pradaxa). See orders.    Patient and wife at bedside verbalized understanding of plan of care.

## 2019-01-17 NOTE — NURSING
"Patient pulled off Bipap mask, states "no" when asked if mask can be placed back on. Placed on 3L NC O2. Will continue to monitor.  "

## 2019-01-17 NOTE — NURSING
"After repositioning and pulling up patient in bed, 2 spoons of ice chips intake, re-applied Bipap mask, shortly following patient took off mask, when asked if the mask could be re-applied, states "not right now." Placed on 2L NC and will continue to monitor, drowsiness noted, states "yes" when asked if anxiety is improved post ativan.   "

## 2019-01-17 NOTE — NURSING
Bedside report received from BECKY Langston. Patient sitting up in chair at bedside. No distress noted, dozing at intervals. PCT at side for patient safety.

## 2019-01-17 NOTE — NURSING
Placed back on Bipap at ordered settings due to labored breathing noted to be worse being off Bipap, FIO2 30%. PCT at bedside to sit with patient. Re-educated on Bipap use. Will continue to monitor.

## 2019-01-17 NOTE — PT/OT/SLP PROGRESS
Physical Therapy Treatment    Patient Name:  Radhames Alonzo   MRN:  [id number]    Recommendations:     Discharge Recommendations:  other (see comments)(home with home health)   Discharge Equipment Recommendations: walker, rolling, commode   Barriers to discharge: multiple medical concerns    Assessment:     Radhames Alonzo is a 84 y.o. male admitted with a medical diagnosis of Severe sepsis.  He presents with the following impairments/functional limitations:  weakness, impaired endurance, impaired self care skills, impaired functional mobilty, gait instability, impaired balance, decreased lower extremity function, impaired cardiopulmonary response to activity. Pt able to perform bed mobility, transfers with CGA with pt being impulsive with mobility with pt needing cues to complete sit to stand activity. Pt reports of pain to lower back when in bed and is relieved with mobility and standing activity. Pt able to complete sitting activity with trunks at midline and unsupported while performing functional reaching task, static and dynamic standing activity with pt able to take 5 steps with RW and CGA. .  Currently presents with slight difficulty in completing task with pt appearing slightly SOB with each activity. Pt's SpO2 at rest is > 94% but will drop to < 88% in standing (poor signal ?) but once pt is sitting and hand with sensor is free the SpO2 reading is at > 92%. . Pt will benefit with skilled PT services to promote return to a safe functional level and decrease burden of care. .    Rehab Prognosis: Fair; patient would benefit from acute skilled PT services to address these deficits and reach maximum level of function.    Recent Surgery: * No surgery found *      Plan:     During this hospitalization, patient to be seen 5 x/week to address the identified rehab impairments via gait training, therapeutic activities, therapeutic exercises and progress toward the following goals:    · Plan of Care Expires:   01/23/19    Subjective     Chief Complaint: My back is hurting.  I've been getting in and out of bed a lot since this morning.  Patient/Family Comments/goals: I want to stand up.  Pain/Comfort:  · Pain Rating 1: other (see comments)(c/o low back pain but unable to quantify)  · Location - Orientation 1: lower  · Location 1: back  · Pain Addressed 1: Reposition(standing)  · Pain Rating Post-Intervention 1: 0/10      Objective:     Communicated with patient and nursing prior to session.  Patient found supine in bed with blood pressure cuff, valdes catheter, telemetry, pulse ox (continuous), oxygen, peripheral IV  upon PT entry to room.     General Precautions: Standard,     Orthopedic Precautions:N/A   Braces: N/A     Functional Mobility:  · Bed Mobility:     · Rolling Left:  stand by assistance and contact guard assistance  · Rolling Right: stand by assistance and contact guard assistance  · Scooting: stand by assistance and contact guard assistance  · Supine to Sit: stand by assistance and contact guard assistance  · Sit to Supine: contact guard assistance  · Transfers:     · Sit to Stand:  contact guard assistance with rolling walker  · Gait: Pt able to take 5 steps forward and side steps with RW with CGA.       AM-PAC 6 CLICK MOBILITY  Turning over in bed (including adjusting bedclothes, sheets and blankets)?: 3  Sitting down on and standing up from a chair with arms (e.g., wheelchair, bedside commode, etc.): 3  Moving from lying on back to sitting on the side of the bed?: 3  Moving to and from a bed to a chair (including a wheelchair)?: 3  Need to walk in hospital room?: 3  Climbing 3-5 steps with a railing?: 2  Basic Mobility Total Score: 17       Therapeutic Activities and Exercises:   Pt mobility limited by dec. Activity tolerance with pt able to complete bed mobility, sitting activity and standing activity with frequent rest breaks due to inc. SOB. Pt able to perform functional reaching activity in sitting and  standing with emphasis on trunk posture during out of bed activity.     Patient left supine with all lines intact, call button in reach and nursing notified..    GOALS:   Multidisciplinary Problems     Physical Therapy Goals        Problem: Physical Therapy Goal    Goal Priority Disciplines Outcome Goal Variances Interventions   Physical Therapy Goal     PT, PT/OT Ongoing (interventions implemented as appropriate)     Description:  Goals to be met by: 7  (reviewed 19)    Patient will increase functional independence with mobility by performin. Supine to sit with Supervision or Set-up Assistance  2. Sit to supine with Supervision or Set-up Assistance  3. Bed to chair transfer with Supervision or Set-up Assistancewith or without rolling walker using Step Transfer TECHNIQUE  4. Gait  x 100  feet with Supervision or Set-up Assistance with or without rolling walker  5. Lower extremity exercise program x10 reps per handout, with assistance as needed                       Time Tracking:     PT Received On: 19  PT Start Time: 1010     PT Stop Time: 1046  PT Total Time (min): 36 min     Billable Minutes: Therapeutic Activity 30    Treatment Type: Treatment  PT/PTA: PT           Sukh Clancy, PT  2019

## 2019-01-17 NOTE — PLAN OF CARE
01/17/19 1055   Discharge Reassessment   Assessment Type Discharge Planning Reassessment         Patient will remain for continued treatment today. He is sitting on side of bed enjoying visiting with family. No needs or concerns voiced at this time. CM will continue to follow and assist as needed.

## 2019-01-17 NOTE — NURSING
Modified barium swallow study done at bedside with JAMEY Keating Tolerated well. Alert and swallowed without difficulty. Passed swallow study test.

## 2019-01-17 NOTE — ASSESSMENT & PLAN NOTE
Getting bigger on CXR from yesterday  CT ordered--will do when stabilized  ? Underlying consolidation. ? Empyema--this why he worsening    U/S ordered.

## 2019-01-17 NOTE — SUBJECTIVE & OBJECTIVE
Interval History: somewhat better with SOB RR 22    Objective:     Vital Signs (Most Recent):  Temp: 96.9 °F (36.1 °C) (01/17/19 1200)  Pulse: 108 (01/17/19 1400)  Resp: (!) 22 (01/17/19 1400)  BP: 116/66 (01/17/19 1400)  SpO2: 99 % (01/17/19 1400) Vital Signs (24h Range):  Temp:  [96.1 °F (35.6 °C)-97 °F (36.1 °C)] 96.9 °F (36.1 °C)  Pulse:  [] 108  Resp:  [14-26] 22  SpO2:  [95 %-100 %] 99 %  BP: ()/() 116/66     Weight: 102 kg (224 lb 13.9 oz)  Body mass index is 29.67 kg/m².      Intake/Output Summary (Last 24 hours) at 1/17/2019 1453  Last data filed at 1/17/2019 1400  Gross per 24 hour   Intake 3890 ml   Output 1510 ml   Net 2380 ml       Physical Exam   Constitutional: He is oriented to person, place, and time. He appears well-developed and well-nourished. He is cooperative.  Non-toxic appearance. He does not appear ill. No distress.   HENT:   Head: Normocephalic and atraumatic.   Right Ear: Hearing, tympanic membrane, external ear and ear canal normal.   Left Ear: Hearing, tympanic membrane, external ear and ear canal normal.   Nose: Nose normal. No mucosal edema, rhinorrhea or nasal deformity. No epistaxis. Right sinus exhibits no maxillary sinus tenderness and no frontal sinus tenderness. Left sinus exhibits no maxillary sinus tenderness and no frontal sinus tenderness.   Mouth/Throat: Uvula is midline, oropharynx is clear and moist and mucous membranes are normal. No trismus in the jaw. Normal dentition. No uvula swelling. No posterior oropharyngeal erythema.   Eyes: Conjunctivae and lids are normal. No scleral icterus.   Sclera clear bilat   Neck: Trachea normal, full passive range of motion without pain and phonation normal. Neck supple.   Cardiovascular: Normal rate, regular rhythm, normal heart sounds, intact distal pulses and normal pulses.   Pulmonary/Chest: He is in respiratory distress (mild to moderate). He has decreased breath sounds in the right upper field, the right middle  field, the right lower field, the left upper field, the left middle field and the left lower field. He has wheezes in the right lower field and the left lower field. He has rhonchi in the right middle field, the right lower field, the left middle field and the left lower field.   Abdominal: Soft. Normal appearance and bowel sounds are normal. He exhibits no distension. There is no tenderness.   Musculoskeletal: Normal range of motion. He exhibits no edema or deformity.   Neurological: He is alert and oriented to person, place, and time. He exhibits normal muscle tone. Coordination normal.   Skin: Skin is warm, dry and intact. He is not diaphoretic. No pallor.   Psychiatric: He has a normal mood and affect. His speech is normal and behavior is normal. Judgment and thought content normal. Cognition and memory are normal.   Nursing note and vitals reviewed.      Vents:  Oxygen Concentration (%): 30 (01/17/19 0400)    Lines/Drains/Airways     Drain                 Urethral Catheter 01/15/19 1215 Latex 16 Fr. 2 days          Peripheral Intravenous Line                 Peripheral IV - Single Lumen 01/16/19 1707 Anterior;Proximal;Right Forearm less than 1 day                Significant Labs:    CBC/Anemia Profile:  Recent Labs   Lab 01/16/19  0317 01/17/19  0311   WBC 4.89 4.81   HGB 11.3* 11.9*   HCT 37.0* 38.7*    171   * 102*   RDW 13.4 13.6        Chemistries:  Recent Labs   Lab 01/16/19  0317 01/17/19  0310    140   K 3.4* 3.8    104   CO2 32* 28   BUN 27* 19   CREATININE 1.0 0.8   CALCIUM 8.7 8.9   ALBUMIN 2.7* 2.9*   PROT 6.0 6.5   BILITOT 1.2* 1.2*   ALKPHOS 51* 57   ALT 31 33   AST 39 37       All pertinent labs within the past 24 hours have been reviewed.    Significant Imaging:  I have reviewed all pertinent imaging results/findings within the past 24 hours.

## 2019-01-17 NOTE — PROCEDURES
Modified Barium Swallow    Patient Name:  Radhames Alonzo   MRN:  [id number]      Recommendations:     Recommendations:                General Recommendations:  Follow up for continued diet tolerance and use of safe swallow strategies  Diet recommendations: Solid Diet Level: Mechanical soft with chopped meats, Liquid Diet Level: Thin   Aspiration Precautions:   · Feed only when awake and alert  · Small bites/sips  · Meds whole one at a time  · 2-3 swallow for all bites/sips  · HOB at 90 degrees   General Precautions: Standard    Communication strategies:  none    Referral     Reason for Referral  Patient was referred for a Modified Barium Swallow Study to assess the efficiency of his/her swallow function, rule out aspiration and make recommendations regarding safe dietary consistencies, effective compensatory strategies, and safe eating environment.     Diagnosis: Severe sepsis       History:     Past Medical History:   Diagnosis Date    Atrial fibrillation     AVD (aortic valve disease)     Cardiomyopathy     Gout     Hypertension     Mitral valve disorder     Pure hypercholesterolemia        Objective:     Current Respiratory Status:      Alert: yes    Cooperative: yes    Follows Directions: yes    Visualization  · Patient was seen in the lateral view    Oral Peripheral Examination  ·  All oral motor structures exhibited adequate range of motion, coordination, and strength for performance of MBSS    Consistencies Assessed  · Thin - pt-fed via single and consecutive cup and straw sips  · Puree - pt-fed via single tsp of pudding  · Solids - clinician vis square inch of annette cracker    Oral Preparation/Oral Phase  · Oral phase of swallow was characterized by slightly decreased lingual coordination, resulting in decreased bolus hold with thin liquids spilling to floor of mouth. Pt exhibited adequate lip closure, bolus preparation/masticaiton, and AP transit across all consistencies trialed.    Pharyngeal Phase    · Pharyngeal phase of swallow was characterized by a delayed pharyngeal swallow, mildly decreased base of tongue (BOT) retraction, mildly decreased hyolaryngeal excursion and contraction, and decreased pharyngeal stripping, resulting in a delay to the pyriform sinus given thin liquids, a delay to the posterior surface of the epiglottis given puree and solids, decreased epiglottic inversion across all consistencies, flash penetration during trials of thin liquids, mild BOT residue across all consistencies, mild vallecular residue given thin liquids and moderate vallecular residue given puree and solids, and trace posterior pharyngeal wall and pyriform sinus residue across all consistencies. Small sips of thin liquids via open cup sips resulted in penetration to the medial portion of the laryngeal vestibule with larger open cup sips resulting in penetration to the level of the vocal folds. Pt successfully cleared material with spontaneous throat clear. Given MIN cues for 2-3 additional swallows, pt cleared all posterior pharyngeal wall and pyriform sinus residue completely and vallecular and BOT residue to trace amounts for all trials. Larger sips of thin liquids additionally resulted in slightly increased overall pharyngeal residue that required 4-5 swallows to clear to trace amounts. Pt was able to self-regulate smaller presentation sizes with MIN verbal cues.      Cervical Esophageal Phase  · Esophageal phase was characterized by adequate bolus acceptance into UES without stasis or retrograde movement observed.     Assessment:     Impressions  · Patient demonstrates mild pharyngeal dysphagia characterized by a delayed pharyngeal swallow, mildly decreased base of tongue (BOT) retraction, mildly decreased hyolaryngeal excursion and contraction, and decreased pharyngeal stripping.     Prognosis: Good    Barriers:  · Respiratory compromise    Education  Results were discussed with patient and nursing staff with all  verbalizing agreement for skilled speech recommendations.    Goals:   Multidisciplinary Problems     SLP Goals     Long Term Goals:           1. Pt will tolerate least restrictive PO diet with no s/s of aspiration in order to maintain adequate                     hydration and nutrition           2. Pt will undergo MBSS to objectively determine presence/severity of dysphagia              Plan:     · Patient to be seen: x3/x4/x5 a week     · Plan of Care expires: 1/30/19     · Plan of Care reviewed with: patient          Discharge recommendations:      Barriers to Discharge:  Level of Skilled Assistance Needed - pt continues to require skilled medical personnel for managmeent of medical diagnoses    Time Tracking:     SLP Treatment Date: 1/17/19   Speech Start Time:  0849  Speech Stop Time:  0915     Speech Total Time (min):   26 minutes      ENEIDA Reeves-SLP  01/17/2019

## 2019-01-17 NOTE — PROGRESS NOTES
[facility]  Cardiology  Progress Note    Patient Name: Radhames Alonzo  MRN: [id number]  Admission Date: 1/9/2019  Hospital Length of Stay: 8 days  Code Status: Full Code   Attending Physician: Jesús Tay MD   Primary Care Physician: Pennie Jorge NP  Expected Discharge Date: 1/10/2019  Principal Problem:Severe sepsis    Subjective:   Hospital Course:  Patient is an 84 year old male with a past medical history of chronic diastolic heart failure, chronic atrial fibrillation, carotid stenosis, HHD, dyslipidemia, and chronic peripheral edema presents to the emergency room after becoming increasingly weak over the last two days. He also reports shortness of breath and coughing with white sputum. He has had a decline in status over the last couple of months.      ROS     Constitutional : Weakness, lethargy  EENT : Negative  CV : BLE edema +2 at baseline.   Respiratory : Shortness of breath, coughing with sputum production.   Gastrointestinal: Negative   Genitourinary: Negative  Musculoskeletal: Negative  Skin : Ulcer to left lateral foot  Neurological : AAO x 3       Objective:     Vital Signs (Most Recent):  Temp: 96.1 °F (35.6 °C) (01/17/19 0735)  Pulse: 105 (01/17/19 0800)  Resp: 20 (01/17/19 0800)  BP: 114/68 (01/17/19 0800)  SpO2: 97 % (01/17/19 0800) Vital Signs (24h Range):  Temp:  [96.1 °F (35.6 °C)-97.1 °F (36.2 °C)] 96.1 °F (35.6 °C)  Pulse:  [] 105  Resp:  [14-28] 20  SpO2:  [95 %-100 %] 97 %  BP: ()/() 114/68     Weight: 102 kg (224 lb 13.9 oz)  Body mass index is 29.67 kg/m².    SpO2: 97 %  O2 Device (Oxygen Therapy): nasal cannula      Intake/Output Summary (Last 24 hours) at 1/17/2019 0933  Last data filed at 1/17/2019 0800  Gross per 24 hour   Intake 3071.67 ml   Output 1510 ml   Net 1561.67 ml       Lines/Drains/Airways     Drain                 Urethral Catheter 01/15/19 1215 Latex 16 Fr. 1 day          Peripheral Intravenous Line                  Peripheral IV - Single Lumen 01/16/19 1707 Anterior;Proximal;Right Forearm less than 1 day                Physical Exam    General appearance: alert, appears stated age and cooperative. Frail in appearance. Lethargic. Generalized weakness.   Head: Normocephalic, without obvious abnormality, atraumatic  Eyes: conjunctivae/corneas clear. PERRL  Neck: no carotid bruit, no JVD and supple, symmetrical, trachea midline  Lungs: Expiratory rhales to left upper and lower lobe  Chest Wall: no tenderness  Heart: regular rate and rhythm, S1, S2 normal, 1/6 SM, click, rub or gallop  Abdomen: Distended, soft, non-tender; bowel sounds normal; no masses,  no organomegaly  Extremities: Extremities normal, atraumatic, no cyanosis, clubbing. +2 edema to BLE.   Pulses: Dorsalis Pedis R: 2+ (normal)/L: 2+ (normal)  Skin: Skin color, texture, turgor normal. Venous stasis ulcer to left lateral foot.   Neurologic: Normal mood and affect  Alert and oriented X 3        Significant Labs:   ABG: No results for input(s): PH, PCO2, HCO3, POCSATURATED, BE in the last 48 hours., Blood Culture: No results for input(s): LABBLOO in the last 48 hours., BMP:   Recent Labs   Lab 01/15/19  1356 01/16/19  0317 01/17/19  0310   * 107 149*    142 140   K 3.1* 3.4* 3.8   CL 99 102 104   CO2 35* 32* 28   BUN 30* 27* 19   CREATININE 1.0 1.0 0.8   CALCIUM 9.0 8.7 8.9   , CMP   Recent Labs   Lab 01/15/19  0941 01/15/19  1356 01/16/19  0317 01/17/19  0310   NA  --  143 142 140   K  --  3.1* 3.4* 3.8   CL  --  99 102 104   CO2  --  35* 32* 28   GLU  --  112* 107 149*   BUN  --  30* 27* 19   CREATININE  --  1.0 1.0 0.8   CALCIUM  --  9.0 8.7 8.9   PROT 6.5  --  6.0 6.5   ALBUMIN 2.9*  --  2.7* 2.9*   BILITOT 1.0  --  1.2* 1.2*   ALKPHOS 58  --  51* 57   AST 42*  --  39 37   ALT 33  --  31 33   ANIONGAP  --  9 8 8   ESTGFRAFRICA  --  >60 >60 >60   EGFRNONAA  --  >60 >60 >60   , CBC   Recent Labs   Lab 01/16/19 0317 01/17/19 0311   WBC 4.89 4.81   HGB  11.3* 11.9*   HCT 37.0* 38.7*    171   , INR   Recent Labs   Lab 01/15/19  1356 01/16/19  0317 01/17/19  0311   INR 1.3* 1.2 1.2   , Lipid Panel No results for input(s): CHOL, HDL, LDLCALC, TRIG, CHOLHDL in the last 48 hours.,   Pathology Results  (Last 10 years)    None      , Troponin No results for input(s): TROPONINI in the last 48 hours., All pertinent lab results from the last 24 hours have been reviewed. and   Recent Lab Results       01/17/19  0311 01/17/19 0310        Albumin   2.9     Alkaline Phosphatase   57     ALT   33     Anion Gap   8     AST   37     Baso # 0.01       Basophil% 0.2       Total Bilirubin   1.2  Comment:  For infants and newborns, interpretation of results should be based  on gestational age, weight and in agreement with clinical  observations.  Premature Infant recommended reference ranges:  Up to 24 hours.............<8.0 mg/dL  Up to 48 hours............<12.0 mg/dL  3-5 days..................<15.0 mg/dL  6-29 days.................<15.0 mg/dL       BUN, Bld   19     Calcium   8.9     Chloride   104     CO2   28     Creatinine   0.8     Differential Method Automated       eGFR if    >60     eGFR if non    >60  Comment:  Calculation used to obtain the estimated glomerular filtration  rate (eGFR) is the CKD-EPI equation.        Eos # 0.0       Eosinophil% 0.0       Glucose   149     Gran # (ANC) 4.2       Gran% 86.5       Hematocrit 38.7       Hemoglobin 11.9       Coumadin Monitoring INR 1.2  Comment:  Coumadin Therapy:  2.0 - 3.0 for INR for all indicators except mechanical heart valves  and antiphospholipid syndromes which should use 2.5 - 3.5.         Lymph # 0.5       Lymph% 10.8       MCH 31.4       MCHC 30.7              Mono # 0.1       Mono% 2.5       MPV 9.4       Platelets 171       Potassium   3.8     Total Protein   6.5     Protime 12.7       RBC 3.79       RDW 13.6       Sodium   140     WBC 4.81             Significant  Imaging: CT scan: CT ABDOMEN PELVIS WITH CONTRAST: No results found for this visit on 01/09/19. and CT ABDOMEN PELVIS WITHOUT CONTRAST: No results found for this visit on 01/09/19., Echocardiogram: 2D echo with color flow doppler: No results found for this or any previous visit. and Transthoracic echo (TTE) complete (Cupid Only): No results found for this or any previous visit. and X-Ray: CXR: X-Ray Chest 1 View (CXR):   Results for orders placed or performed during the hospital encounter of 01/09/19   X-Ray Chest 1 View    Narrative    EXAMINATION:  XR CHEST 1 VIEW    CLINICAL HISTORY:  re-eval pleural effusion;    TECHNIQUE:  Single frontal view of the chest was performed.    COMPARISON:  01/14/2019    FINDINGS:  The heart shadow is severely enlarged.  There is pulmonary vascular congestion with pulmonary edema.  Small right and moderate left-sided pleural effusions are suspected.  The skeletal structures are intact.      Impression    As above.      Electronically signed by: Yanci Vazquez MD  Date:    01/16/2019  Time:    14:03    and X-Ray Chest PA and Lateral (CXR):   Results for orders placed or performed during the hospital encounter of 01/09/19   X-Ray Chest PA And Lateral    Narrative    EXAMINATION:  XR CHEST PA AND LATERAL    CLINICAL HISTORY:  LLL pneumonia vs atelectasis;    COMPARISON:  None    FINDINGS:  Cardiomegaly and left basilar infiltrate suspected.  Right lung is clear.  Small left pleural effusion.  No pneumothorax..  Aorta demonstrates atherosclerotic disease.  Bones demonstrate degenerative changes.      Impression    Cardiomegaly and left basilar infiltrate suspected.      Electronically signed by: Rickie Jones MD  Date:    01/14/2019  Time:    16:27    and KUB: X-Ray Abdomen AP 1 View (KUB): No results found for this visit on 01/09/19.  Assessment and Plan:       Active Diagnoses:    Diagnosis Date Noted POA    PRINCIPAL PROBLEM:  Severe sepsis [A41.9, R65.20] 01/09/2019 Yes     Dysphagia [R13.10] 01/17/2019 Yes    Venous stasis ulcer [I83.009, L97.909] 01/16/2019 Yes    Encephalopathy [G93.40] 01/15/2019 No    Pleural effusion on left [J90] 01/15/2019 Yes    Benign prostatic hyperplasia (BPH) with post-void dribbling [N40.1, N39.43] 01/11/2019 Yes    Hypokalemia [E87.6] 01/10/2019 Yes    Cellulitis of left lower extremity [L03.116] 01/10/2019 Yes    Pneumonia [J18.9] 01/10/2019 Yes    Rhabdomyolysis [M62.82] 01/09/2019 Yes    Atrial fibrillation [I48.91] 04/30/2015 Yes    Cardiomyopathy [I42.9] 04/30/2015 Yes      Problems Resolved During this Admission:       VTE Risk Mitigation (From admission, onward)        Ordered     dabigatran etexilate capsule 150 mg  2 times daily      01/14/19 1112     IP VTE HIGH RISK PATIENT  Once      01/09/19 1751        Current Facility-Administered Medications   Medication    0.9 % NaCl with KCl 20 mEq infusion    acetaminophen suppository 650 mg    bicalutamide tablet 50 mg    cefepime in dextrose 5 % 1 gram/50 mL IVPB 1 g    dabigatran etexilate capsule 150 mg    HYDROcodone-acetaminophen 5-325 mg per tablet 1 tablet    levalbuterol nebulizer solution 1.25 mg    levalbuterol nebulizer solution 1.25 mg    LORazepam tablet 0.5 mg    methylPREDNISolone sodium succinate injection 40 mg    metoprolol succinate (TOPROL-XL) 24 hr tablet 25 mg    polyethylene glycol packet 17 g    tamsulosin 24 hr capsule 0.4 mg    vancomycin (VANCOCIN) 1,500 mg in dextrose 5 % 250 mL IVPB       Dx: CHF resolved/ improved sepsis LLeg cellulitis     Sepsis possibly secondary to venous stasis ulcer on left lateral foot. IV abx initiated per primary.      Left sided pleural effusion and mild pulmonary edema evidenced by chest x ray. BNP moderately elevated at 659.      Acute on chronic diastolic CHF TTE December 2018 with LVEF 55%, mild LVH. Lasix IV 80 mg bid.     Rhabdomyolysis improving. Chronic ETOH use. CPK improved at 1762 from 2378.      Chronic atrial  fibrillation rate controlled not currently on anticoagulation according to last progress note in clinic but on xarelto in the past.      Chronic peripheral edema at baseline.      Dyslipidemia     Hypertension now controlled           PLAN:continue and adjust BB   Continue to hold ARB for now  DNR; son present    Transcribed by  Woodrow Butt NP for Dr ANSLEY Jean-Baptiste  Cardiology  [facility]    I attest that I have personally seen and examined this patient. I have reviewed and discussed the management in detail as outlined above.

## 2019-01-17 NOTE — NURSING
"Patient states "I am tired of fighting yall, put me back on the mask."    Placed back on Bipap at ordered settings. Will continue to monitor.  "

## 2019-01-17 NOTE — NURSING
"Despite PCT at bedside, fan in use, re-education provided, cold towels to top of head, patient pulled off Bipap and said "I did my 2 hours already, what is this some kind of joke?" Re-oriented patient to plan of care and Bipap use, agitation noted, placed Bipap on standby and placed on 3 L NC O2. Will continue to monitor closely.   "

## 2019-01-17 NOTE — NURSING
Patient noted to be anxious, compliance with Bipap improving, will administer PRN ativan as ordered. Patient verbalized agreement with this Plan of Care.

## 2019-01-17 NOTE — NURSING
"Able to place back on Bipap. Will continue to monitor. States "okay" after discussion to wear mask.  "

## 2019-01-17 NOTE — ASSESSMENT & PLAN NOTE
Need to assess if truly hypoxic. No low sats charted but has been on venti and now 4L NC. Will try to wean. Repeat CXR.   1/11/19 O2 sat 95-99%     1-12 Pt is labored with his breathing and continuing to cough; Dr Robertson consulted  1-14 no longer needing O2, cont cefepime and clinda x 10 days, cont nebs,stop steroids    1/15/19: marked change in respiratory status this morning, was worsening overnight. He is oxygenating, sats good on 2L and ABG shows good sats. CXR with enlarging left pleural effusion otherwise normal. CT ordered and will do once stabilized--? Underlying consolidation with empyema. Looking for loculations, etc. Consider thoracentesis. Could explain his worsening status. He is in respiratory distress and abdominal breathing. Taking off BiPaP. He is a FULL CODE.    1/16/19:  Continue VANC/Cefepime. He is considering DNR . Told Dr. Jean-Baptiste no to resuscitation. We need to get him to sign a DNR     1/17/19  Patient seems to have staph pneumonia as he has improved with add'l coverage. Check u/s to r/o empyema

## 2019-01-17 NOTE — ASSESSMENT & PLAN NOTE
Cardiology is following  Diastolic CHF: TTE December 2018 with LVEF 55%, mild LVH  Was started on diuretics  BNP is only 200s now  CXR with left pleural effusion but not severe pulmonary congestion  1/15/10: BP dropping, hold ACEi, BB and diuretics for now and give back IVF---careful for volume overload though.   1/16/19: resume BB and ACE per [facility]   1/17/18  Meds have not been resumed. Start at 25 of metoprolol rather than home dose of 100.

## 2019-01-17 NOTE — PLAN OF CARE
Pt was placed back on BIPAP via Kian Norman RN. Pt. Resting at this time. Machine assessed and settings documented.

## 2019-01-17 NOTE — SUBJECTIVE & OBJECTIVE
Interval History: difficulty breathing refuses bipap next step would be to intubate son wants DNR waiting on patients wife     Objective:     Vital Signs (Most Recent):  Temp: 97 °F (36.1 °C) (01/16/19 1710)  Pulse: 104 (01/16/19 1800)  Resp: (!) 24 (01/16/19 1800)  BP: 94/64 (01/16/19 1800)  SpO2: 100 % (01/16/19 1800) Vital Signs (24h Range):  Temp:  [96.6 °F (35.9 °C)-97.7 °F (36.5 °C)] 97 °F (36.1 °C)  Pulse:  [] 104  Resp:  [15-28] 24  SpO2:  [93 %-100 %] 100 %  BP: ()/(51-84) 94/64     Weight: 98.9 kg (218 lb)  Body mass index is 28.76 kg/m².      Intake/Output Summary (Last 24 hours) at 1/16/2019 1837  Last data filed at 1/16/2019 1800  Gross per 24 hour   Intake 2818.33 ml   Output 1170 ml   Net 1648.33 ml     Physical Exam   Constitutional: He appears well-developed. He appears distressed (moderate resp).   HENT:   Head: Normocephalic.   Eyes: Conjunctivae are normal.   Neck: Normal range of motion. JVD present.   Cardiovascular: Regular rhythm. Exam reveals no gallop.   Murmur heard.  Pulmonary/Chest: No stridor. He is in respiratory distress (moderate). He has wheezes (mild). He has rales (Bilat lower to mid ). He exhibits no tenderness.   Abdominal: Soft. There is no tenderness. There is no rebound and no guarding.   Musculoskeletal: He exhibits tenderness.   Lymphadenopathy:     He has no cervical adenopathy.   Neurological: He is alert.   Skin: Skin is warm. Rash noted. He is not diaphoretic. There is erythema (bilateral feet). There is pallor.   Psychiatric: Thought content normal.       Vents:  Oxygen Concentration (%): 2 (01/16/19 0600)    Lines/Drains/Airways     Drain                 Urethral Catheter 01/15/19 1215 Latex 16 Fr. 1 day          Peripheral Intravenous Line                 Peripheral IV - Single Lumen 01/16/19 1707 Anterior;Proximal;Right Forearm less than 1 day                Significant Labs:    CBC/Anemia Profile:  Recent Labs   Lab 01/15/19  0531 01/16/19  0317   WBC  6.82 4.89   HGB 12.6* 11.3*   HCT 41.1 37.0*    179   * 102*   RDW 13.1 13.4        Chemistries:  Recent Labs   Lab 01/15/19  0531 01/15/19  0941 01/15/19  1356 01/16/19  0317     --  143 142   K 2.9*  --  3.1* 3.4*   CL 96  --  99 102   CO2 36*  --  35* 32*   BUN 30*  --  30* 27*   CREATININE 1.1  --  1.0 1.0   CALCIUM 9.1  --  9.0 8.7   ALBUMIN  --  2.9*  --  2.7*   PROT  --  6.5  --  6.0   BILITOT  --  1.0  --  1.2*   ALKPHOS  --  58  --  51*   ALT  --  33  --  31   AST  --  42*  --  39       All pertinent labs within the past 24 hours have been reviewed.    Significant Imaging:  I have reviewed all pertinent imaging results/findings within the past 24 hours.

## 2019-01-17 NOTE — SUBJECTIVE & OBJECTIVE
Interval History: see      Review of Systems   Constitutional: Positive for fatigue. Negative for fever.   HENT: Negative for congestion and sore throat.    Respiratory: Positive for choking, chest tightness and wheezing. Negative for shortness of breath.    Cardiovascular: Negative for chest pain.   Gastrointestinal: Negative for vomiting.   Genitourinary: Negative for decreased urine volume.   Skin: Positive for wound. Negative for color change.   Psychiatric/Behavioral: Negative for agitation.     Objective:     Vital Signs (Most Recent):  Temp: 96.3 °F (35.7 °C) (01/17/19 0342)  Pulse: 100 (01/17/19 0720)  Resp: (!) 21 (01/17/19 0720)  BP: 128/67 (01/17/19 0600)  SpO2: 100 % (01/17/19 0720) Vital Signs (24h Range):  Temp:  [96.2 °F (35.7 °C)-97.1 °F (36.2 °C)] 96.3 °F (35.7 °C)  Pulse:  [] 100  Resp:  [14-28] 21  SpO2:  [95 %-100 %] 100 %  BP: ()/() 128/67     Weight: 102 kg (224 lb 13.9 oz)  Body mass index is 29.67 kg/m².    Intake/Output Summary (Last 24 hours) at 1/17/2019 0743  Last data filed at 1/17/2019 0500  Gross per 24 hour   Intake 2771.67 ml   Output 1510 ml   Net 1261.67 ml      Physical Exam   Constitutional: He is oriented to person, place, and time. He appears well-developed. No distress.   In chair at bedside   HENT:   Head: Normocephalic and atraumatic.   Eyes: Conjunctivae are normal. Pupils are equal, round, and reactive to light.   Neck: Normal range of motion. Neck supple. No thyromegaly present.   Cardiovascular: Normal heart sounds and intact distal pulses.   Pulmonary/Chest: Effort normal. No respiratory distress. He has wheezes. He has rales (clear with coughing).   Left base without breath sounds.  Right side with rhonchi   Abdominal: Soft. Bowel sounds are normal. There is no tenderness.   Genitourinary:   Genitourinary Comments: Hein in place   Musculoskeletal: Normal range of motion. He exhibits no edema.   Lymphadenopathy:     He has no cervical adenopathy.    Neurological: He is alert and oriented to person, place, and time.   Skin: Skin is warm and dry. No rash noted.   No erythema, chronic venous stasis, left ankle wrapped   Psychiatric: He has a normal mood and affect. His behavior is normal.   Nursing note and vitals reviewed.      Significant Labs:   Blood Culture: No results for input(s): LABBLOO in the last 48 hours.  CBC:   Recent Labs   Lab 01/16/19  0317 01/17/19  0311   WBC 4.89 4.81   HGB 11.3* 11.9*   HCT 37.0* 38.7*    171     CMP:   Recent Labs   Lab 01/15/19  0941 01/15/19  1356 01/16/19  0317 01/17/19  0310   NA  --  143 142 140   K  --  3.1* 3.4* 3.8   CL  --  99 102 104   CO2  --  35* 32* 28   GLU  --  112* 107 149*   BUN  --  30* 27* 19   CREATININE  --  1.0 1.0 0.8   CALCIUM  --  9.0 8.7 8.9   PROT 6.5  --  6.0 6.5   ALBUMIN 2.9*  --  2.7* 2.9*   BILITOT 1.0  --  1.2* 1.2*   ALKPHOS 58  --  51* 57   AST 42*  --  39 37   ALT 33  --  31 33   ANIONGAP  --  9 8 8   EGFRNONAA  --  >60 >60 >60       Significant Imaging: I have reviewed and interpreted all pertinent imaging results/findings within the past 24 hours.     CXR:  The heart shadow is severely enlarged.  There is pulmonary vascular congestion with pulmonary edema.  Small right and moderate left-sided pleural effusions are suspected.  The skeletal structures are intact.

## 2019-01-17 NOTE — ASSESSMENT & PLAN NOTE
Due to Venous stasis and wound of LLE   Start Cefepime and clinda to cover for everything including MRSA and psuedomonas.   Clinda instead of Vanc since mild CKD and we will diurese him as well   Lactic acid q 6 coming down  Bolus 2 liters overnight creat 0.9 bp 111/66 this am  woundcare consult     1-12 legs are down but still pul congestion    1-13 Not retaining as much fluids, but still SOB and coughing; burns when he urinates from his Prostate issues(Ca)    1-14 better/resolved    1-15: worsening status this AM. + confusion, hypotension, tachycardia. LLE redness is worsening from yesterday despite antibx. Should be well covered with clida and cefepime so puzzling. When stable consider imaging of leg to look for deeper infection    1/16:  Continue cefepime and VANC. CT chest today to check for empyema/underlying infiltrate. CXR not adequate to determine    1/17  At this point he was switched to vanc 1 day ago and is improved. Won't be able to lie flat for CT. Will go ahead and u/s chest to r/o empyema. But it seems as though he will need 7 days of iv vanc.

## 2019-01-17 NOTE — PLAN OF CARE
Problem: Adult Inpatient Plan of Care  Goal: Plan of Care Review  Outcome: Ongoing (interventions implemented as appropriate)  Patient is improving. Much more alert today. Bedside modified barium swallow testing done. Passed study. Eating mechanical soft, chopped meats, thin liquids with no straws. Tolerating well. Sat in chair several times today. Standing at bedside using walker with attendants x 2. Breath sounds coarse with intermittent wheezes. Cough productive of pale yellow thick sputum. O2 at 2 lpm per nc.O2 sats %. Afebrile, VS stable. Remains in a-fib, rate 's. Remains on IV antibiotics. Wound care done to left lat foot. Fall precautions followed and safety maintained. Patient and family agree with plan of care. Will continue to monitor.

## 2019-01-17 NOTE — NURSING
"Patient pulled Bipap off, states "this is not necessary" and notably more agitated.    Placed back on 3L NC O2, resting well, states "no" when asked if feels short of breath or has any complaints, able to state place correctly and gesture understanding of name, , and year/month when stated to patient. Follows commands. Television on and dimmed lights per request. Will continue to monitor.  "

## 2019-01-17 NOTE — PROGRESS NOTES
[facility]  Pulmonology  Progress Note    Patient Name: Radhames Alonzo  MRN: [id number]  Admission Date: 1/9/2019  Hospital Length of Stay: 8 days  Code Status: Full Code  Attending Provider: Jesús Tay MD  Primary Care Provider: Pennie Jorge NP   Principal Problem: Severe sepsis    Subjective:     Interval History: somewhat better with SOB RR 22    Objective:     Vital Signs (Most Recent):  Temp: 96.9 °F (36.1 °C) (01/17/19 1200)  Pulse: 108 (01/17/19 1400)  Resp: (!) 22 (01/17/19 1400)  BP: 116/66 (01/17/19 1400)  SpO2: 99 % (01/17/19 1400) Vital Signs (24h Range):  Temp:  [96.1 °F (35.6 °C)-97 °F (36.1 °C)] 96.9 °F (36.1 °C)  Pulse:  [] 108  Resp:  [14-26] 22  SpO2:  [95 %-100 %] 99 %  BP: ()/() 116/66     Weight: 102 kg (224 lb 13.9 oz)  Body mass index is 29.67 kg/m².      Intake/Output Summary (Last 24 hours) at 1/17/2019 1453  Last data filed at 1/17/2019 1400  Gross per 24 hour   Intake 3890 ml   Output 1510 ml   Net 2380 ml       Physical Exam   Constitutional: He is oriented to person, place, and time. He appears well-developed and well-nourished. He is cooperative.  Non-toxic appearance. He does not appear ill. No distress.   HENT:   Head: Normocephalic and atraumatic.   Right Ear: Hearing, tympanic membrane, external ear and ear canal normal.   Left Ear: Hearing, tympanic membrane, external ear and ear canal normal.   Nose: Nose normal. No mucosal edema, rhinorrhea or nasal deformity. No epistaxis. Right sinus exhibits no maxillary sinus tenderness and no frontal sinus tenderness. Left sinus exhibits no maxillary sinus tenderness and no frontal sinus tenderness.   Mouth/Throat: Uvula is midline, oropharynx is clear and moist and mucous membranes are normal. No trismus in the jaw. Normal dentition. No uvula swelling. No posterior oropharyngeal erythema.   Eyes: Conjunctivae and lids are normal. No scleral icterus.   Sclera clear bilat   Neck: Trachea  normal, full passive range of motion without pain and phonation normal. Neck supple.   Cardiovascular: Normal rate, regular rhythm, normal heart sounds, intact distal pulses and normal pulses.   Pulmonary/Chest: He is in respiratory distress (mild to moderate). He has decreased breath sounds in the right upper field, the right middle field, the right lower field, the left upper field, the left middle field and the left lower field. He has wheezes in the right lower field and the left lower field. He has rhonchi in the right middle field, the right lower field, the left middle field and the left lower field.   Abdominal: Soft. Normal appearance and bowel sounds are normal. He exhibits no distension. There is no tenderness.   Musculoskeletal: Normal range of motion. He exhibits no edema or deformity.   Neurological: He is alert and oriented to person, place, and time. He exhibits normal muscle tone. Coordination normal.   Skin: Skin is warm, dry and intact. He is not diaphoretic. No pallor.   Psychiatric: He has a normal mood and affect. His speech is normal and behavior is normal. Judgment and thought content normal. Cognition and memory are normal.   Nursing note and vitals reviewed.      Vents:  Oxygen Concentration (%): 30 (01/17/19 0400)    Lines/Drains/Airways     Drain                 Urethral Catheter 01/15/19 1215 Latex 16 Fr. 2 days          Peripheral Intravenous Line                 Peripheral IV - Single Lumen 01/16/19 1707 Anterior;Proximal;Right Forearm less than 1 day                Significant Labs:    CBC/Anemia Profile:  Recent Labs   Lab 01/16/19  0317 01/17/19  0311   WBC 4.89 4.81   HGB 11.3* 11.9*   HCT 37.0* 38.7*    171   * 102*   RDW 13.4 13.6        Chemistries:  Recent Labs   Lab 01/16/19  0317 01/17/19  0310    140   K 3.4* 3.8    104   CO2 32* 28   BUN 27* 19   CREATININE 1.0 0.8   CALCIUM 8.7 8.9   ALBUMIN 2.7* 2.9*   PROT 6.0 6.5   BILITOT 1.2* 1.2*   ALKPHOS  51* 57   ALT 31 33   AST 39 37       All pertinent labs within the past 24 hours have been reviewed.    Significant Imaging:  I have reviewed all pertinent imaging results/findings within the past 24 hours.    Assessment/Plan:     Dysphagia    Need swollow study     Pneumonia    Shallow breathing   Left Lower Lobe Cxr todayWill follow cxr and continue neb steroids and neb     Cellulitis of left lower extremity    Bilateral feet discolored      Atrial fibrillation    follow HR      Cardiomyopathy    Unstable now        Dicussed DNR pt make a decision    Prognosis Poor  Sukh Robertson MD  Pulmonology  [facility]

## 2019-01-17 NOTE — NURSING
"Bipap placed on at ordered settings, noted to pull off mask after a few minutes, re-educated on importance of wearing Bipap mask and compliance. Slade,RT at bedside and providing education as well for Bipap.    Patient refused bath, states "I was already bathed two times today."    Will continue to monitor closely, patient noted to be anxious but willing to try. Verbalized understanding, but needs continuous reinforcement.  "

## 2019-01-17 NOTE — NURSING
"Received bedside report from BECKY Umana. Per report patient is NPO as he failed his swallow study, hold PO medications.    Dr Tay called and spoke to BECKY Umana as well, MD aware of CT orders not completed today.    Patient resting well at this time, labored breathing noted, son at bedside, patient states "I will think about it" when asked if will wear BIPAP tonight, confirmed FULL CODE status with son and patient.    Dr Robertson called, updated on status as requested, refusal of BIPAP per report, also O2 sat 88-90% on 2L NC.    Physical to follow.  "

## 2019-01-17 NOTE — ASSESSMENT & PLAN NOTE
See severe sepsis treatment  Check u/s  Negative for bilateral lower extremity DVT.    cont cefepime and clinda x 10 days wound care Cleanse wound with NS using gauze. Apply silver alginate to wound bed, cover with ABD and secure with tape daily.      1/15/19: marked worsening of erythema from yesterday despite antibx. Puzzling why the sudden change. D-dimer negative and anticoagulated so low suspicion for DVT. Worsening vitals as well (see sepsis)    1/16/19  Leg is looking much better already     1/17/19  Much better, but still with small open wound. Wound care should see today.

## 2019-01-17 NOTE — PROGRESS NOTES
"[facility] Hospital Medicine  Progress Note    Patient Name: Radhames Alonzo  MRN: [id number]  Patient Class: IP- Inpatient   Admission Date: 1/9/2019  Length of Stay: 8 days  Attending Physician: Jesús Tay MD  Primary Care Provider: Pennie Jorge NP        Subjective:     Principal Problem:Severe sepsis    HPI:  84 year old male presents to ED b/c " I couldn't get up and walk."   SOB for about a year. Worse recently. Last night it got to the point that he was too SOB to stand up.   SOB at rest and with eating.   He also notes left foot pain. He has been seeing woundcare for a wound on this foot last 7 months   Workup in ED is significant for BNP 600s. Last BNP 200s 3 weeks ago. 160s 9 months ago.  He sees Dr. Jean-Baptiste regularly.   [facility] has seen pt and rec admit for diuresis, rule out MI   First TPN with slight bump.038  Lactic acid is 3.6. Meets I am told by ED MD that patient "has no signs of infection on physical exam." I am also told that CXR and U/A are clear.     He drinks six pack daily. Last drink 2 days ago. No h/o DT's in past         Hospital Course:  He has been started on cefepime and clinda for left lower ext cellulitis. He has been afebrile. WBC was 72890. Blood cultures NGTD. Lactate was up to 3.8, coming down.RR was 24 and had low sats on admission. Was on venti, now down to 3L NC   He is also wheezing. C/o SOB. CXR with CHF.   He does report that he takes pradaxa for his a fib at home. Not on home meds list nor reordered here. A fib on EKG. BNP was 659. Ordered for lasix 80mg IV BID. Urinating a lot. K low this am 2.8    1/11/19 Getting lasix 80mg IV bid per Cardiology; not much urine output yesterday with dribbling noted; concern for obstruction; will get bladder scan; consider renal US.   Creat 0.9 stable; K+ 3.6    CPK 2378>1762; TNI 0.032 x 1; repeat this am     1-12 Pt continues to have SOB and pul congestion; Dr Robertson consulted    1-13 Pt is fairly complex with mx " cardiac/pulmonary issues causing his coughing and SOB; has a hx of underlying prostate problems; a swing bed might be necessary; Dr Robertson and cardiology following    1/14 pt is still on clinda and cefepime for the lower ext cellulitis. Afebrile/ no elevated WBC. US negative for DVT. Still swollen but reports that it is much better than his normal   CXR 1/812 Left basilar consolidation or atelectasis and small pleural effusion. He is 95% on RA  Still weak. Having trouble standing on side bed.     1/15/19 Pt started feeling worse yesterday evening; this am noted to have LOW BP- SBP 80s, increased RR 30s, Temp 95.6,   Dr. Jean-Baptiste reports HF better, BNP 200s, still with LE edema, US negative for DVT; Also gets pradaxa  Underlying chronic prostate problems; defers any intervention or sx   Day 7/10 Cefepime and clinda for LE cellulitis and pneumonia. Blood cultures negative.   Looks jaundice with sclerema icterus; admit bilirubin - 2.0 elevated on 1/9/19 ; check LFTs/CMP/bilirubin    1/15/19: patient with worsening respiratory status overnight. He was tried on BiPaP but refused to wear it. Was agitated during the night. He was abdominal breathing but maintainings sats on 2L NC. BNP normal. D-dimer normal and anticoagulated since admit, restarted PO pradaxa yesterday. This AM, continues to decompensated. He is alert and oriented on initial eval but over the course of our conversation decompensates and becomes very lethargic. He has been waxing and waning throughout the night per RN report. Unclear if has h/o cirrhosis but on chart review this AM his bilirubin 2 and mild elevation AST--this was not trended during his hospital stay. This AM he has scleral icterus and yellowing of skin which was NOT PRESENT YESTERDAY. Also his LLE cellulitis is worsening, redness extending up the shin to the knee today which was NOT PRESENT YESTERDAY. He is hypothermic, tachypenic, hypotensive and tachycardic (despite BB on board).      1/16/19  Transferred up to ICU yesterday for hypotension and AMS on the floor. NH4 level 24. Bolused 1 liter NS. Did not require pressors. This am 120s/70s  UOP is good. Abx changed from cefepime and clinda to cefepime and VANC. Afebrile.  ABG ordered yesterday. He does have some hypercapnea. Refuses to wear bipap.    1/17/19  Patient became more alert and arousable throughout the day yesterday. Still with issues lying flat (which is why his imaging was not completed yesterday). However, his BP is improved without his home meds. He is tolerating his abx well. He did wear Bipap VERY little last night, but did not sleep because of it. UOP is good. No fevers.     Interval History: see      Review of Systems   Constitutional: Positive for fatigue. Negative for fever.   HENT: Negative for congestion and sore throat.    Respiratory: Positive for choking, chest tightness and wheezing. Negative for shortness of breath.    Cardiovascular: Negative for chest pain.   Gastrointestinal: Negative for vomiting.   Genitourinary: Negative for decreased urine volume.   Skin: Positive for wound. Negative for color change.   Psychiatric/Behavioral: Negative for agitation.     Objective:     Vital Signs (Most Recent):  Temp: 96.3 °F (35.7 °C) (01/17/19 0342)  Pulse: 100 (01/17/19 0720)  Resp: (!) 21 (01/17/19 0720)  BP: 128/67 (01/17/19 0600)  SpO2: 100 % (01/17/19 0720) Vital Signs (24h Range):  Temp:  [96.2 °F (35.7 °C)-97.1 °F (36.2 °C)] 96.3 °F (35.7 °C)  Pulse:  [] 100  Resp:  [14-28] 21  SpO2:  [95 %-100 %] 100 %  BP: ()/() 128/67     Weight: 102 kg (224 lb 13.9 oz)  Body mass index is 29.67 kg/m².    Intake/Output Summary (Last 24 hours) at 1/17/2019 0727  Last data filed at 1/17/2019 0500  Gross per 24 hour   Intake 2771.67 ml   Output 1510 ml   Net 1261.67 ml      Physical Exam   Constitutional: He is oriented to person, place, and time. He appears well-developed. No distress.   In chair at bedside    HENT:   Head: Normocephalic and atraumatic.   Eyes: Conjunctivae are normal. Pupils are equal, round, and reactive to light.   Neck: Normal range of motion. Neck supple. No thyromegaly present.   Cardiovascular: Normal heart sounds and intact distal pulses.   Pulmonary/Chest: Effort normal. No respiratory distress. He has wheezes. He has rales (clear with coughing).   Left base without breath sounds.  Right side with rhonchi   Abdominal: Soft. Bowel sounds are normal. There is no tenderness.   Genitourinary:   Genitourinary Comments: Hein in place   Musculoskeletal: Normal range of motion. He exhibits no edema.   Lymphadenopathy:     He has no cervical adenopathy.   Neurological: He is alert and oriented to person, place, and time.   Skin: Skin is warm and dry. No rash noted.   No erythema, chronic venous stasis, left ankle wrapped   Psychiatric: He has a normal mood and affect. His behavior is normal.   Nursing note and vitals reviewed.      Significant Labs:   Blood Culture: No results for input(s): LABBLOO in the last 48 hours.  CBC:   Recent Labs   Lab 01/16/19  0317 01/17/19  0311   WBC 4.89 4.81   HGB 11.3* 11.9*   HCT 37.0* 38.7*    171     CMP:   Recent Labs   Lab 01/15/19  0941 01/15/19  1356 01/16/19  0317 01/17/19  0310   NA  --  143 142 140   K  --  3.1* 3.4* 3.8   CL  --  99 102 104   CO2  --  35* 32* 28   GLU  --  112* 107 149*   BUN  --  30* 27* 19   CREATININE  --  1.0 1.0 0.8   CALCIUM  --  9.0 8.7 8.9   PROT 6.5  --  6.0 6.5   ALBUMIN 2.9*  --  2.7* 2.9*   BILITOT 1.0  --  1.2* 1.2*   ALKPHOS 58  --  51* 57   AST 42*  --  39 37   ALT 33  --  31 33   ANIONGAP  --  9 8 8   EGFRNONAA  --  >60 >60 >60       Significant Imaging: I have reviewed and interpreted all pertinent imaging results/findings within the past 24 hours.     CXR:  The heart shadow is severely enlarged.  There is pulmonary vascular congestion with pulmonary edema.  Small right and moderate left-sided pleural effusions are  suspected.  The skeletal structures are intact.    Assessment/Plan:      * Severe sepsis    Due to Venous stasis and wound of LLE   Start Cefepime and clinda to cover for everything including MRSA and psuedomonas.   Clinda instead of Vanc since mild CKD and we will diurese him as well   Lactic acid q 6 coming down  Bolus 2 liters overnight creat 0.9 bp 111/66 this am  woundcare consult     1-12 legs are down but still pul congestion    1-13 Not retaining as much fluids, but still SOB and coughing; burns when he urinates from his Prostate issues(Ca)    1-14 better/resolved    1-15: worsening status this AM. + confusion, hypotension, tachycardia. LLE redness is worsening from yesterday despite antibx. Should be well covered with clida and cefepime so puzzling. When stable consider imaging of leg to look for deeper infection    1/16:  Continue cefepime and VANC. CT chest today to check for empyema/underlying infiltrate. CXR not adequate to determine    1/17  At this point he was switched to vanc 1 day ago and is improved. Won't be able to lie flat for CT. Will go ahead and u/s chest to r/o empyema. But it seems as though he will need 7 days of iv vanc.     Dysphagia    Likely secondary to mental status on 1/16  Reassess today.       Venous stasis ulcer    Wound care.  Cont to apply silver       Pleural effusion on left    Getting bigger on CXR from yesterday  CT ordered--will do when stabilized  ? Underlying consolidation. ? Empyema--this why he worsening    U/S ordered.     Encephalopathy    Waxing and waning mental status  Overnight thought to be sundowning but his AM dramatic waxing and waning just during our 10 minute conversation. Went form alert to very lethargic. + jaundice  Hypotensive, tachycardic, hypothermic (95.6 axillary)  Sepsis vs hepatic    1/1619  LFTs, lactic acid unremarkable  I think this may be from his hypercapnea and refusal to wear bipap   Will get CT head to rule out CVA    1/17/19  Metabolic?  He is VERY alert and appropriate today. CT head on hold. Need to be cognizant of the fact that he has prostate cancer, though.     Benign prostatic hyperplasia (BPH) with post-void dribbling      Resume flomax and casodex--with waxing and waning mental status may remove PO meds  Has a valdes currently      Pneumonia    Need to assess if truly hypoxic. No low sats charted but has been on venti and now 4L NC. Will try to wean. Repeat CXR.   1/11/19 O2 sat 95-99%     1-12 Pt is labored with his breathing and continuing to cough; Dr Robertson consulted  1-14 no longer needing O2, cont cefepime and clinda x 10 days, cont nebs,stop steroids    1/15/19: marked change in respiratory status this morning, was worsening overnight. He is oxygenating, sats good on 2L and ABG shows good sats. CXR with enlarging left pleural effusion otherwise normal. CT ordered and will do once stabilized--? Underlying consolidation with empyema. Looking for loculations, etc. Consider thoracentesis. Could explain his worsening status. He is in respiratory distress and abdominal breathing. Taking off BiPaP. He is a FULL CODE.    1/16/19:  Continue VANC/Cefepime. He is considering DNR . Told Dr. Jean-Baptiste no to resuscitation. We need to get him to sign a DNR     1/17/19  Patient seems to have staph pneumonia as he has improved with add'l coverage. Check u/s to r/o empyema       Cellulitis of left lower extremity    See severe sepsis treatment  Check u/s  Negative for bilateral lower extremity DVT.    cont cefepime and clinda x 10 days wound care Cleanse wound with NS using gauze. Apply silver alginate to wound bed, cover with ABD and secure with tape daily.      1/15/19: marked worsening of erythema from yesterday despite antibx. Puzzling why the sudden change. D-dimer negative and anticoagulated so low suspicion for DVT. Worsening vitals as well (see sepsis)    1/16/19  Leg is looking much better already     1/17/19  Much better, but still with small open  wound. Wound care should see today.       Hypokalemia    Likely due to torsemide use  Improved   Replace today      Rhabdomyolysis    Mild. Due to chronic ETOHism   2  liter bolus for sepsis should take care of it   CPK improved    1/15/19: not checked over last 5 days. Repeat CPK today. Restarting IVF   1/16/19: resolved      CHF (congestive heart failure)    [facility] consulted    ECHO in their records  Continue ARB, BB    [facility] has him on torsemide 20mg po BID creat stable. Watch urine output strictly as well as weights.         Atrial fibrillation    Rate controlled. On metoprolol    He reports that he is taking pradaxa but called pharmacy and they do not report that he has filled anything for anticoagulation especially pradaxa in recent months. Start lovenox 1mg/kg BID until we get his home meds    [facility] was [facility], will restart at d/c    1/15/19: tachycardic despite BB and now hypotensive. Holding BB and all BP meds. Sinus on telemetry right now. Cont pradaxa for now but may stop again and switch back to lovenox today if continues to worsen and pending labs    1/16/19  Continue pradaxa    1/17/19  Will resume BB today. For most part he is rate controlled.     Cardiomyopathy    Cardiology is following  Diastolic CHF: TTE December 2018 with LVEF 55%, mild LVH  Was started on diuretics  BNP is only 200s now  CXR with left pleural effusion but not severe pulmonary congestion  1/15/10: BP dropping, hold ACEi, BB and diuretics for now and give back IVF---careful for volume overload though.   1/16/19: resume BB and ACE per [facility]   1/17/18  Meds have not been resumed. Start at 25 of metoprolol rather than home dose of 100.       VTE Risk Mitigation (From admission, onward)        Ordered     dabigatran etexilate capsule 150 mg  2 times daily      01/14/19 1112     IP VTE HIGH RISK PATIENT  Once      01/09/19 1751          Critical care time spent on the evaluation and treatment of severe organ dysfunction, review of  pertinent labs and imaging studies, discussions with consulting providers and discussions with patient/family: 45 minutes.    Laura Ruiz MD  Department of Hospital Medicine   [facility]

## 2019-01-17 NOTE — ASSESSMENT & PLAN NOTE
Waxing and waning mental status  Overnight thought to be sundowning but his AM dramatic waxing and waning just during our 10 minute conversation. Went form alert to very lethargic. + jaundice  Hypotensive, tachycardic, hypothermic (95.6 axillary)  Sepsis vs hepatic    1/1619  LFTs, lactic acid unremarkable  I think this may be from his hypercapnea and refusal to wear bipap   Will get CT head to rule out CVA    1/17/19  Metabolic? He is VERY alert and appropriate today. CT head on hold. Need to be cognizant of the fact that he has prostate cancer, though.

## 2019-01-17 NOTE — PROGRESS NOTES
[facility]  Pulmonology  Progress Note    Patient Name: Radhames Alonzo  MRN: [id number]  Admission Date: 1/9/2019  Hospital Length of Stay: 7 days  Code Status: Full Code  Attending Provider: Jesús Tay MD  Primary Care Provider: Pennie Jorge NP   Principal Problem: Severe sepsis    Subjective:     Interval History: difficulty breathing refuses bipap next step would be to intubate son wants DNR waiting on patients wife     Objective:     Vital Signs (Most Recent):  Temp: 97 °F (36.1 °C) (01/16/19 1710)  Pulse: 104 (01/16/19 1800)  Resp: (!) 24 (01/16/19 1800)  BP: 94/64 (01/16/19 1800)  SpO2: 100 % (01/16/19 1800) Vital Signs (24h Range):  Temp:  [96.6 °F (35.9 °C)-97.7 °F (36.5 °C)] 97 °F (36.1 °C)  Pulse:  [] 104  Resp:  [15-28] 24  SpO2:  [93 %-100 %] 100 %  BP: ()/(51-84) 94/64     Weight: 98.9 kg (218 lb)  Body mass index is 28.76 kg/m².      Intake/Output Summary (Last 24 hours) at 1/16/2019 1837  Last data filed at 1/16/2019 1800  Gross per 24 hour   Intake 2818.33 ml   Output 1170 ml   Net 1648.33 ml     Physical Exam   Constitutional: He appears well-developed. He appears distressed (moderate resp).   HENT:   Head: Normocephalic.   Eyes: Conjunctivae are normal.   Neck: Normal range of motion. JVD present.   Cardiovascular: Regular rhythm. Exam reveals no gallop.   Murmur heard.  Pulmonary/Chest: No stridor. He is in respiratory distress (moderate). He has wheezes (mild). He has rales (Bilat lower to mid ). He exhibits no tenderness.   Abdominal: Soft. There is no tenderness. There is no rebound and no guarding.   Musculoskeletal: He exhibits tenderness.   Lymphadenopathy:     He has no cervical adenopathy.   Neurological: He is alert.   Skin: Skin is warm. Rash noted. He is not diaphoretic. There is erythema (bilateral feet). There is pallor.   Psychiatric: Thought content normal.       Vents:  Oxygen Concentration (%): 2 (01/16/19 0600)    Lines/Drains/Airways      Drain                 Urethral Catheter 01/15/19 1215 Latex 16 Fr. 1 day          Peripheral Intravenous Line                 Peripheral IV - Single Lumen 01/16/19 1707 Anterior;Proximal;Right Forearm less than 1 day                Significant Labs:    CBC/Anemia Profile:  Recent Labs   Lab 01/15/19  0531 01/16/19  0317   WBC 6.82 4.89   HGB 12.6* 11.3*   HCT 41.1 37.0*    179   * 102*   RDW 13.1 13.4        Chemistries:  Recent Labs   Lab 01/15/19  0531 01/15/19  0941 01/15/19  1356 01/16/19  0317     --  143 142   K 2.9*  --  3.1* 3.4*   CL 96  --  99 102   CO2 36*  --  35* 32*   BUN 30*  --  30* 27*   CREATININE 1.1  --  1.0 1.0   CALCIUM 9.1  --  9.0 8.7   ALBUMIN  --  2.9*  --  2.7*   PROT  --  6.5  --  6.0   BILITOT  --  1.0  --  1.2*   ALKPHOS  --  58  --  51*   ALT  --  33  --  31   AST  --  42*  --  39       All pertinent labs within the past 24 hours have been reviewed.    Significant Imaging:  I have reviewed all pertinent imaging results/findings within the past 24 hours.    Assessment/Plan:     Pneumonia    Shallow breathing   Left Lower Lobe Cxr todayWill follow cxr and continue neb steroids and neb     Cellulitis of left lower extremity    Bilateral feet discolored      Atrial fibrillation    follow HR      Cardiomyopathy    Unstable now        Needs to wear bipap possible intubation      Sukh Robertson MD  Pulmonology  [facility]

## 2019-01-17 NOTE — NURSING
"Attempted to place Bipap back on patient, refuses to allow placement.     Patient also not allowing nasal cannula to be placed on, states "this is some bullshit." Able to place nasal cannula back on patient after discussing importance of compliance with O2. Will continue to monitor.  "

## 2019-01-17 NOTE — PLAN OF CARE
Problem: Adult Inpatient Plan of Care  Goal: Plan of Care Review  Outcome: Ongoing (interventions implemented as appropriate)  Plan of Care reviewed with patient via discussion, verbalized understanding, no falls or near misses for shift, no new skin issues noted, PO medications for DVT prevention, able to stand at side of bed with standby assistance, tolerates well, minimal compliance with Bipap, Dr Robertson already aware of this, antibiotics as ordered IVPB, valdes cath for urine output monitoring, IV fluids with KCL as ordered.

## 2019-01-17 NOTE — ASSESSMENT & PLAN NOTE
Rate controlled. On metoprolol    He reports that he is taking pradaxa but called pharmacy and they do not report that he has filled anything for anticoagulation especially pradaxa in recent months. Start lovenox 1mg/kg BID until we get his home meds    [facility], will restart at d/c    1/15/19: tachycardic despite BB and now hypotensive. Holding BB and all BP meds. Sinus on telemetry right now. Cont pradaxa for now but may stop again and switch back to lovenox today if continues to worsen and pending labs    1/16/19  Continue pradaxa    1/17/19  Will resume BB today. For most part he is rate controlled.

## 2019-01-17 NOTE — PLAN OF CARE
Problem: Adult Inpatient Plan of Care  Goal: Absence of Hospital-Acquired Illness or Injury    Intervention: Prevent Skin Injury  No noted new skin injuries  Intervention: Prevent Infection  Antibiotics as ordered    Goal: Optimal Comfort and Wellbeing    Intervention: Monitor Pain and Promote Comfort  Denies pain

## 2019-01-18 PROBLEM — J96.11 CHRONIC RESPIRATORY FAILURE WITH HYPOXIA: Status: ACTIVE | Noted: 2019-01-18

## 2019-01-18 PROBLEM — I27.9 PULMONARY HEART DISEASE: Status: ACTIVE | Noted: 2019-01-18

## 2019-01-18 LAB
ALBUMIN SERPL BCP-MCNC: 2.9 G/DL
ALP SERPL-CCNC: 56 U/L
ALT SERPL W/O P-5'-P-CCNC: 35 U/L
ANION GAP SERPL CALC-SCNC: 7 MMOL/L
AST SERPL-CCNC: 35 U/L
BASOPHILS # BLD AUTO: 0.01 K/UL
BASOPHILS NFR BLD: 0.1 %
BILIRUB SERPL-MCNC: 1.3 MG/DL
BUN SERPL-MCNC: 22 MG/DL
CALCIUM SERPL-MCNC: 9 MG/DL
CHLORIDE SERPL-SCNC: 103 MMOL/L
CO2 SERPL-SCNC: 27 MMOL/L
CREAT SERPL-MCNC: 1 MG/DL
DIFFERENTIAL METHOD: ABNORMAL
EOSINOPHIL # BLD AUTO: 0 K/UL
EOSINOPHIL NFR BLD: 0 %
ERYTHROCYTE [DISTWIDTH] IN BLOOD BY AUTOMATED COUNT: 13.8 %
EST. GFR  (AFRICAN AMERICAN): >60 ML/MIN/1.73 M^2
EST. GFR  (NON AFRICAN AMERICAN): >60 ML/MIN/1.73 M^2
GLUCOSE SERPL-MCNC: 156 MG/DL
HCT VFR BLD AUTO: 37.5 %
HGB BLD-MCNC: 11.5 G/DL
INR PPP: 1.4
LYMPHOCYTES # BLD AUTO: 0.6 K/UL
LYMPHOCYTES NFR BLD: 5.8 %
MCH RBC QN AUTO: 31.3 PG
MCHC RBC AUTO-ENTMCNC: 30.7 G/DL
MCV RBC AUTO: 102 FL
MONOCYTES # BLD AUTO: 0.3 K/UL
MONOCYTES NFR BLD: 3 %
NEUTROPHILS # BLD AUTO: 8.6 K/UL
NEUTROPHILS NFR BLD: 91.1 %
PLATELET # BLD AUTO: 182 K/UL
PMV BLD AUTO: 9.8 FL
POTASSIUM SERPL-SCNC: 4.6 MMOL/L
PROT SERPL-MCNC: 6.5 G/DL
PROTHROMBIN TIME: 14.8 SEC
RBC # BLD AUTO: 3.67 M/UL
SODIUM SERPL-SCNC: 137 MMOL/L
WBC # BLD AUTO: 9.46 K/UL

## 2019-01-18 PROCEDURE — 97116 GAIT TRAINING THERAPY: CPT

## 2019-01-18 PROCEDURE — 94761 N-INVAS EAR/PLS OXIMETRY MLT: CPT

## 2019-01-18 PROCEDURE — 25000003 PHARM REV CODE 250: Performed by: INTERNAL MEDICINE

## 2019-01-18 PROCEDURE — 36415 COLL VENOUS BLD VENIPUNCTURE: CPT

## 2019-01-18 PROCEDURE — 27000221 HC OXYGEN, UP TO 24 HOURS

## 2019-01-18 PROCEDURE — 99900035 HC TECH TIME PER 15 MIN (STAT)

## 2019-01-18 PROCEDURE — 25000003 PHARM REV CODE 250: Performed by: NURSE PRACTITIONER

## 2019-01-18 PROCEDURE — 97530 THERAPEUTIC ACTIVITIES: CPT

## 2019-01-18 PROCEDURE — 85610 PROTHROMBIN TIME: CPT

## 2019-01-18 PROCEDURE — 25000242 PHARM REV CODE 250 ALT 637 W/ HCPCS: Performed by: INTERNAL MEDICINE

## 2019-01-18 PROCEDURE — 20000000 HC ICU ROOM

## 2019-01-18 PROCEDURE — 85025 COMPLETE CBC W/AUTO DIFF WBC: CPT

## 2019-01-18 PROCEDURE — 63600175 PHARM REV CODE 636 W HCPCS: Performed by: FAMILY MEDICINE

## 2019-01-18 PROCEDURE — 80053 COMPREHEN METABOLIC PANEL: CPT

## 2019-01-18 PROCEDURE — 92526 ORAL FUNCTION THERAPY: CPT

## 2019-01-18 PROCEDURE — 94660 CPAP INITIATION&MGMT: CPT

## 2019-01-18 PROCEDURE — 94640 AIRWAY INHALATION TREATMENT: CPT

## 2019-01-18 PROCEDURE — 25000003 PHARM REV CODE 250: Performed by: FAMILY MEDICINE

## 2019-01-18 PROCEDURE — 25000242 PHARM REV CODE 250 ALT 637 W/ HCPCS: Performed by: NURSE PRACTITIONER

## 2019-01-18 PROCEDURE — 94668 MNPJ CHEST WALL SBSQ: CPT

## 2019-01-18 PROCEDURE — 63600175 PHARM REV CODE 636 W HCPCS: Performed by: INTERNAL MEDICINE

## 2019-01-18 RX ORDER — HYDROCODONE BITARTRATE AND ACETAMINOPHEN 5; 325 MG/1; MG/1
1 TABLET ORAL EVERY 4 HOURS PRN
Status: DISCONTINUED | OUTPATIENT
Start: 2019-01-18 | End: 2019-01-24 | Stop reason: HOSPADM

## 2019-01-18 RX ORDER — FUROSEMIDE 10 MG/ML
20 INJECTION INTRAMUSCULAR; INTRAVENOUS ONCE
Status: COMPLETED | OUTPATIENT
Start: 2019-01-18 | End: 2019-01-18

## 2019-01-18 RX ORDER — METOPROLOL SUCCINATE 50 MG/1
50 TABLET, EXTENDED RELEASE ORAL DAILY
Status: DISCONTINUED | OUTPATIENT
Start: 2019-01-18 | End: 2019-01-19

## 2019-01-18 RX ADMIN — METHYLPREDNISOLONE SODIUM SUCCINATE 40 MG: 40 INJECTION, POWDER, FOR SOLUTION INTRAMUSCULAR; INTRAVENOUS at 01:01

## 2019-01-18 RX ADMIN — LORAZEPAM 0.5 MG: 0.5 TABLET ORAL at 03:01

## 2019-01-18 RX ADMIN — HYDROCODONE BITARTRATE AND ACETAMINOPHEN 1 TABLET: 5; 325 TABLET ORAL at 10:01

## 2019-01-18 RX ADMIN — DABIGATRAN ETEXILATE MESYLATE 150 MG: 75 CAPSULE ORAL at 08:01

## 2019-01-18 RX ADMIN — BICALUTAMIDE 50 MG: 50 TABLET, FILM COATED ORAL at 08:01

## 2019-01-18 RX ADMIN — HYDROCODONE BITARTRATE AND ACETAMINOPHEN 1 TABLET: 5; 325 TABLET ORAL at 05:01

## 2019-01-18 RX ADMIN — HYDROCODONE BITARTRATE AND ACETAMINOPHEN 1 TABLET: 5; 325 TABLET ORAL at 12:01

## 2019-01-18 RX ADMIN — LEVALBUTEROL 1.25 MG: 1.25 SOLUTION, CONCENTRATE RESPIRATORY (INHALATION) at 02:01

## 2019-01-18 RX ADMIN — CEFEPIME HYDROCHLORIDE 1 G: 1 INJECTION, SOLUTION INTRAVENOUS at 08:01

## 2019-01-18 RX ADMIN — CEFEPIME HYDROCHLORIDE 1 G: 1 INJECTION, SOLUTION INTRAVENOUS at 01:01

## 2019-01-18 RX ADMIN — VANCOMYCIN 1500 MG: 1 INJECTION, SOLUTION INTRAVENOUS at 02:01

## 2019-01-18 RX ADMIN — METHYLPREDNISOLONE SODIUM SUCCINATE 40 MG: 40 INJECTION, POWDER, FOR SOLUTION INTRAMUSCULAR; INTRAVENOUS at 06:01

## 2019-01-18 RX ADMIN — LEVALBUTEROL 1.25 MG: 1.25 SOLUTION, CONCENTRATE RESPIRATORY (INHALATION) at 07:01

## 2019-01-18 RX ADMIN — CEFEPIME HYDROCHLORIDE 1 G: 1 INJECTION, SOLUTION INTRAVENOUS at 05:01

## 2019-01-18 RX ADMIN — TAMSULOSIN HYDROCHLORIDE 0.4 MG: 0.4 CAPSULE ORAL at 08:01

## 2019-01-18 RX ADMIN — LEVALBUTEROL 1.25 MG: 1.25 SOLUTION, CONCENTRATE RESPIRATORY (INHALATION) at 03:01

## 2019-01-18 RX ADMIN — METHYLPREDNISOLONE SODIUM SUCCINATE 40 MG: 40 INJECTION, POWDER, FOR SOLUTION INTRAMUSCULAR; INTRAVENOUS at 09:01

## 2019-01-18 RX ADMIN — FUROSEMIDE 20 MG: 10 INJECTION, SOLUTION INTRAMUSCULAR; INTRAVENOUS at 08:01

## 2019-01-18 RX ADMIN — LORAZEPAM 0.5 MG: 0.5 TABLET ORAL at 11:01

## 2019-01-18 RX ADMIN — METOPROLOL SUCCINATE 50 MG: 50 TABLET, EXTENDED RELEASE ORAL at 08:01

## 2019-01-18 NOTE — NURSING
Received bedside report from BECKY Soria    Patient resting well, placed Bipap on standby, placed on 2L NC O2, assisted to bedside recliner, ambulated with walker for assistance estimated 5 feet, unsteady gait noted, refuses non-slip socks, weeping edema noted to bilat upper ext., placed on dry-flow pads and elevated with pillows.    Orange juice 120 ml PO, no issues. Physical to follow, no family at bedside.

## 2019-01-18 NOTE — NURSING
Post lunch, assisted back from sitting at edge of bed to right sided-lying position, placed back on Bipap for rest period. Will continue to monitor. Resting well at this time, fed self lunch without difficulty, no c/o at this time. Warm blankets provided per request.

## 2019-01-18 NOTE — ASSESSMENT & PLAN NOTE
"Getting bigger on CXR from yesterday  CT ordered--will do when stabilized.   ? Underlying consolidation. ? Empyema--this why he worsening    U/S ordered---"no underlying debris to suggest complicated effusion."   following   "

## 2019-01-18 NOTE — SUBJECTIVE & OBJECTIVE
Interval History: see      Review of Systems   Constitutional: Positive for fatigue. Negative for fever.   Respiratory: Positive for shortness of breath. Negative for wheezing.    Cardiovascular: Positive for leg swelling. Negative for chest pain.   Gastrointestinal: Negative for vomiting.   Genitourinary: Negative for decreased urine volume.   Skin: Positive for wound. Negative for color change.   Psychiatric/Behavioral: Positive for agitation.     Objective:     Vital Signs (Most Recent):  Temp: 96.8 °F (36 °C) (01/18/19 0708)  Pulse: (!) 114 (01/18/19 0745)  Resp: 18 (01/18/19 0745)  BP: (!) 114/53 (01/18/19 0700)  SpO2: 100 % (01/18/19 0745) Vital Signs (24h Range):  Temp:  [96.2 °F (35.7 °C)-97.4 °F (36.3 °C)] 96.8 °F (36 °C)  Pulse:  [] 114  Resp:  [17-27] 18  SpO2:  [93 %-100 %] 100 %  BP: ()/(47-93) 114/53     Weight: 102 kg (224 lb 13.9 oz)  Body mass index is 29.67 kg/m².    Intake/Output Summary (Last 24 hours) at 1/18/2019 0755  Last data filed at 1/18/2019 0750  Gross per 24 hour   Intake 3783.33 ml   Output 1036 ml   Net 2747.33 ml      Physical Exam   Constitutional: He appears well-developed and well-nourished. No distress.   Sleepy    HENT:   Head: Normocephalic and atraumatic.   Right Ear: External ear normal.   Left Ear: External ear normal.   Nose: Nose normal.   Mouth/Throat: Oropharynx is clear and moist.   Eyes: Conjunctivae and EOM are normal. Pupils are equal, round, and reactive to light. Right eye exhibits no discharge. Left eye exhibits no discharge. No scleral icterus.   Neck: Normal range of motion. Neck supple. No JVD present. No tracheal deviation present. No thyromegaly present.   Cardiovascular: Normal rate, regular rhythm, normal heart sounds and intact distal pulses.   No murmur heard.  Pulmonary/Chest: Effort normal. No respiratory distress. He has wheezes. He has no rales. He exhibits no tenderness.   Very tight. Wheezing bilaterally. Decreased BS L base     Abdominal: Soft. Bowel sounds are normal. He exhibits no distension and no mass. There is no tenderness. There is no rebound and no guarding.   Periumbilical venous congestion/prominence    Musculoskeletal: Normal range of motion.   Lymphadenopathy:     He has no cervical adenopathy.   Neurological: He has normal reflexes. He displays normal reflexes. No cranial nerve deficit. He exhibits normal muscle tone. Coordination normal.   Somnolent. Opens eyes. Follows commands    Skin: Skin is warm and dry. No erythema.   LLE dressing c/d/i   Psychiatric: He has a normal mood and affect. His behavior is normal. Judgment and thought content normal.       Significant Labs:   Blood Culture: No results for input(s): LABBLOO in the last 48 hours.  CBC:   Recent Labs   Lab 01/17/19  0311 01/18/19  0308   WBC 4.81 9.46   HGB 11.9* 11.5*   HCT 38.7* 37.5*    182     CMP:   Recent Labs   Lab 01/17/19  0310 01/18/19  0308    137   K 3.8 4.6    103   CO2 28 27   * 156*   BUN 19 22   CREATININE 0.8 1.0   CALCIUM 8.9 9.0   PROT 6.5 6.5   ALBUMIN 2.9* 2.9*   BILITOT 1.2* 1.3*   ALKPHOS 57 56   AST 37 35   ALT 33 35   ANIONGAP 8 7*   EGFRNONAA >60 >60     All pertinent labs within the past 24 hours have been reviewed.    Significant Imaging: I have reviewed and interpreted all pertinent imaging results/findings within the past 24 hours.

## 2019-01-18 NOTE — PT/OT/SLP PROGRESS
Physical Therapy      Patient Name:  Radhames Alonzo   MRN:  [id number]    Patient not seen today secondary to Nursing care. Will follow-up later.    Baldomero Velázquez, PT

## 2019-01-18 NOTE — ASSESSMENT & PLAN NOTE
Waxing and waning mental status  Overnight thought to be sundowning but his AM dramatic waxing and waning just during our 10 minute conversation. Went form alert to very lethargic. + jaundice  Hypotensive, tachycardic, hypothermic (95.6 axillary)  Sepsis vs hepatic    1/1619  LFTs, lactic acid unremarkable  I think this may be from his hypercapnea and refusal to wear bipap   Will get CT head to rule out CVA    1/17/19  Metabolic? He is VERY alert and appropriate today. CT head on hold. Need to be cognizant of the fact that he has prostate cancer, though.    1/18/19  Alert this am. He wore his bipap for the better part of the night last night     1/19/19  He is lethargic this am. This is likely CO2 narcosis as he hasn't worn his bipap much overnight. Also he has some ativan on board. Will see if we can get him to wear bipap today, but I will not restrain and force him to do so. That wouldn't be humane in this 84 year old DNR patient.

## 2019-01-18 NOTE — PROGRESS NOTES
Pt resting comfortably. NAD noted. He is tolerating BiPAP well. All VS WNL. Will continue to monitor.

## 2019-01-18 NOTE — PLAN OF CARE
01/18/19 0958   Advance Directives (For Healthcare)   Advance Directive  (If Adv Dir status is received, view document under Code in header or Chart Review Media tab) Advance Directive received from patient today. (Meets all criteria- Signature, Date, 2 Witnesses)         Spoke with patient about DNR status. Pt states understanding. Patient would like to be DNR status. 2 signature paper signed. Living will done. Both in chart. Patient understands that this decision can be revoked at anytime.

## 2019-01-18 NOTE — PROGRESS NOTES
Pt reporting severe lower back pain rated as a 10/10. He is restless and unable to sleep for more than about 15-20 minutes. PRN Narco administered. He is tolerating BiPAP well. Will continue to monitor.

## 2019-01-18 NOTE — PROGRESS NOTES
Pt placed on BiPAP with settings as charted. Pt educated on importance of wearing BiPAP. No distress noted at this this time. Will continue to monitor.

## 2019-01-18 NOTE — ASSESSMENT & PLAN NOTE
Need to assess if truly hypoxic. No low sats charted but has been on venti and now 4L NC. Will try to wean. Repeat CXR.   1/11/19 O2 sat 95-99%     1-12 Pt is labored with his breathing and continuing to cough; Dr Robertson consulted  1-14 no longer needing O2, cont cefepime and clinda x 10 days, cont nebs,stop steroids    1/15/19: marked change in respiratory status this morning, was worsening overnight. He is oxygenating, sats good on 2L and ABG shows good sats. CXR with enlarging left pleural effusion otherwise normal. CT ordered and will do once stabilized--? Underlying consolidation with empyema. Looking for loculations, etc. Consider thoracentesis. Could explain his worsening status. He is in respiratory distress and abdominal breathing. Taking off BiPaP. He is a FULL CODE.    1/16/19:  Continue VANC/Cefepime. He is considering DNR . Told Dr. Jean-Baptiste no to resuscitation. We need to get him to sign a DNR     1/17/19  Patient seems to have staph pneumonia as he has improved with add'l coverage. Check u/s to r/o empyema    1/18/19  Improved clinically. Continue VANC cefepime     1/19/19  Continue VANC and Cefepime

## 2019-01-18 NOTE — ASSESSMENT & PLAN NOTE
Cardiology is following  Diastolic CHF: TTE December 2018 with LVEF 55%, mild LVH  Was started on diuretics  BNP is only 200s now  CXR with left pleural effusion but not severe pulmonary congestion  1/15/10: BP dropping, hold ACEi, BB and diuretics for now and give back IVF---careful for volume overload though.   1/16/19: resume BB and ACE per [facility]   1/17/18  Meds have not been resumed. Start at 25 of metoprolol rather than home dose of 100.    1/19/19  Increase BB to 100mg

## 2019-01-18 NOTE — ASSESSMENT & PLAN NOTE
Waxing and  Wanes in thought processes does not want bipap very verbally and physically against wearing bipap   In view of this will not pursue NIV at home   Patient now requires a  Home Ventilator due to chronic Respirator failure copd Cor pulmonale and pulmonary heart disease and  this will decrease hospitalizations ,Decrease Work of Breathing,extend his life  and improve the Paients Quality of life.Without this form of ventilation it could lead to patient harm or death.Pressure support with Safety tidal volume mode of therapy in this patient will optimize Gas Exchange. Bipap is not reliable due to patients condition.

## 2019-01-18 NOTE — ASSESSMENT & PLAN NOTE
1/18/19  [facility] consulted   ECHO in their records  Continue BB. No ACE currently. I presume this is from low BP during the week. Increasing BB. Add ACE in am if BP is stable     1/19/19  Will diurese cautiously today. Lasix 20mg IV BID

## 2019-01-18 NOTE — PLAN OF CARE
Plan of Care reviewed with patient, son, and wife via discussion, verbalized understanding, no falls or near misses for shift, no new skin issues noted, PO medications for DVT prevention, able to stand at side of bed with standby assistance and walk with walker to bedside recliner, tolerates well, minimal compliance with Bipap this shift, Dr Robertson, Dr Tay, and Dr Jean-Baptiste already aware of this, antibiotics as ordered IVPB, valdes cath for urine output monitoring, IV fluids with KCL d/c, Lasix x 1 dose administered, Toprol dose adjusted. Maintaining ICU status at this time per primary MD. DNR status now.

## 2019-01-18 NOTE — NURSING
Patient pulled Bipap off. Does not want it at this time. Placed back on 2L NC, TV on, resting well. Bipap on standby.

## 2019-01-18 NOTE — PROGRESS NOTES
Pt resting comfortably, NAD noted. He continues to tolerate BiPAP. His gown and linen was changed. Denies any pain. All VS WNL.

## 2019-01-18 NOTE — ASSESSMENT & PLAN NOTE
See severe sepsis treatment  Check u/s  Negative for bilateral lower extremity DVT.    cont cefepime and clinda x 10 days wound care Cleanse wound with NS using gauze. Apply silver alginate to wound bed, cover with ABD and secure with tape daily.      1/15/19: marked worsening of erythema from yesterday despite antibx. Puzzling why the sudden change. D-dimer negative and anticoagulated so low suspicion for DVT. Worsening vitals as well (see sepsis)        1/16/19  Leg is looking much better already     1/17/19  Much better, but still with small open wound. Wound care should see today.    1/19/19  Resolved   abx continued for LLL pneumonia

## 2019-01-18 NOTE — PROGRESS NOTES
Dr. Ruiz updated on pts status. Around 1930 pt became very anxious and tachypenic. He is unable to lay flat for any extended period of time. His breath sounds are diminished in bilateral lower lobes worse on the L. Pt educated on the importance of BiPAP, and he has agree to wear it. She was also updated to his fluid volume status. During the day shift his urinary output was 450 ml with an intake of over 2.5L. His urine is dark yellow with sediment. Instructed to monitor urine output closely and notify her if there is any further decrease.

## 2019-01-18 NOTE — ASSESSMENT & PLAN NOTE
Due to Venous stasis and wound of LLE   Start Cefepime and clinda to cover for everything including MRSA and psuedomonas.   Clinda instead of Vanc since mild CKD and we will diurese him as well   Lactic acid q 6 coming down  Bolus 2 liters overnight creat 0.9 bp 111/66 this am  woundcare consult     1-12 legs are down but still pul congestion    1-13 Not retaining as much fluids, but still SOB and coughing; burns when he urinates from his Prostate issues(Ca)    1-14 better/resolved    1-15: worsening status this AM. + confusion, hypotension, tachycardia. LLE redness is worsening from yesterday despite antibx. Should be well covered with clida and cefepime so puzzling. When stable consider imaging of leg to look for deeper infection    1/16:  Continue cefepime and VANC. CT chest today to check for empyema/underlying infiltrate. CXR not adequate to determine    1/17  At this point he was switched to vanc 1 day ago and is improved. Won't be able to lie flat for CT. Will go ahead and u/s chest to r/o empyema. But it seems as though he will need 7 days of iv vanc.    1/18  Continue VANC day 3 and cefepime day 10     1/19  Continue VANC day 4 and cefepime Day 11

## 2019-01-18 NOTE — PROGRESS NOTES
[facility]  Cardiology  Progress Note    Patient Name: Radhames Alonzo  MRN: [id number]  Admission Date: 1/9/2019  Hospital Length of Stay: 9 days  Code Status: Full Code   Attending Physician: Jesús Tay MD   Primary Care Physician: Pennie Jorge NP  Expected Discharge Date: 1/10/2019  Principal Problem:Severe sepsis    Subjective:     Hospital Course:  Patient is an 84 year old male with a past medical history of chronic diastolic heart failure, chronic atrial fibrillation, carotid stenosis, HHD, dyslipidemia, and chronic peripheral edema presents to the emergency room after becoming increasingly weak over the last two days. He also reports shortness of breath and coughing with white sputum. He has had a decline in status over the last couple of months.           ROS   Constitutional : Weakness, lethargy  EENT : Negative  CV : BLE edema +2 at baseline.   Respiratory : Shortness of breath, coughing with sputum production.   Gastrointestinal: Negative   Genitourinary: Negative  Musculoskeletal: Negative  Skin : Ulcer to left lateral foot  Neurological : AAO x 3          Objective:     Vital Signs (Most Recent):  Temp: 96.8 °F (36 °C) (01/18/19 0708)  Pulse: 109 (01/18/19 0800)  Resp: 16 (01/18/19 0800)  BP: 112/80 (01/18/19 0800)  SpO2: 100 % (01/18/19 0800) Vital Signs (24h Range):  Temp:  [96.2 °F (35.7 °C)-97.4 °F (36.3 °C)] 96.8 °F (36 °C)  Pulse:  [] 109  Resp:  [16-27] 16  SpO2:  [93 %-100 %] 100 %  BP: ()/(47-93) 112/80     Weight: 102 kg (224 lb 13.9 oz)  Body mass index is 29.67 kg/m².    SpO2: 100 %  O2 Device (Oxygen Therapy): nasal cannula      Intake/Output Summary (Last 24 hours) at 1/18/2019 0837  Last data filed at 1/18/2019 0750  Gross per 24 hour   Intake 3683.33 ml   Output 1036 ml   Net 2647.33 ml       Lines/Drains/Airways     Drain                 Urethral Catheter 01/15/19 1215 Latex 16 Fr. 2 days          Peripheral Intravenous Line                  Peripheral IV - Single Lumen 01/17/19 Anterior;Right Forearm 1 day                Physical Exam    General appearance: alert, appears stated age and cooperative. Frail in appearance. Lethargic. Generalized weakness.   Head: Normocephalic, without obvious abnormality, atraumatic  Eyes: conjunctivae/corneas clear. PERRL  Neck: no carotid bruit, no JVD and supple, symmetrical, trachea midline  Lungs: Expiratory rhales to left upper and lower lobe  Chest Wall: no tenderness  Heart: regular rate and rhythm, S1, S2 normal, 1/6 SM, click, rub or gallop  Abdomen: Distended, soft, non-tender; bowel sounds normal; no masses,  no organomegaly  Extremities: Extremities normal, atraumatic, no cyanosis, clubbing. +2 edema to BLE.   Pulses: Dorsalis Pedis R: 2+ (normal)/L: 2+ (normal)  Skin: Skin color, texture, turgor normal. Venous stasis ulcer to left lateral foot.   Neurologic: Normal mood and affect  Alert and oriented X 3        Significant Labs:   ABG: No results for input(s): PH, PCO2, HCO3, POCSATURATED, BE in the last 48 hours., Blood Culture: No results for input(s): LABBLOO in the last 48 hours., BMP:   Recent Labs   Lab 01/17/19 0310 01/18/19  0308   * 156*    137   K 3.8 4.6    103   CO2 28 27   BUN 19 22   CREATININE 0.8 1.0   CALCIUM 8.9 9.0   , CMP   Recent Labs   Lab 01/17/19 0310 01/18/19  0308    137   K 3.8 4.6    103   CO2 28 27   * 156*   BUN 19 22   CREATININE 0.8 1.0   CALCIUM 8.9 9.0   PROT 6.5 6.5   ALBUMIN 2.9* 2.9*   BILITOT 1.2* 1.3*   ALKPHOS 57 56   AST 37 35   ALT 33 35   ANIONGAP 8 7*   ESTGFRAFRICA >60 >60   EGFRNONAA >60 >60   , CBC   Recent Labs   Lab 01/17/19 0311 01/18/19  0308   WBC 4.81 9.46   HGB 11.9* 11.5*   HCT 38.7* 37.5*    182   , INR   Recent Labs   Lab 01/17/19 0311 01/18/19  0308   INR 1.2 1.4*   , Lipid Panel No results for input(s): CHOL, HDL, LDLCALC, TRIG, CHOLHDL in the last 48 hours.,   Pathology Results  (Last 10 years)     None      , Troponin No results for input(s): TROPONINI in the last 48 hours., All pertinent lab results from the last 24 hours have been reviewed. and   Recent Lab Results       01/18/19  0308   01/17/19  1450        Albumin 2.9       Alkaline Phosphatase 56       ALT 35       Anion Gap 7       AST 35       Baso # 0.01       Basophil% 0.1       Total Bilirubin 1.3  Comment:  For infants and newborns, interpretation of results should be based  on gestational age, weight and in agreement with clinical  observations.  Premature Infant recommended reference ranges:  Up to 24 hours.............<8.0 mg/dL  Up to 48 hours............<12.0 mg/dL  3-5 days..................<15.0 mg/dL  6-29 days.................<15.0 mg/dL         BUN, Bld 22       Calcium 9.0       Chloride 103       CO2 27       Creatinine 1.0       Differential Method Automated       eGFR if  >60       eGFR if non  >60  Comment:  Calculation used to obtain the estimated glomerular filtration  rate (eGFR) is the CKD-EPI equation.          Eos # 0.0       Eosinophil% 0.0       Glucose 156       Gran # (ANC) 8.6       Gran% 91.1       Hematocrit 37.5       Hemoglobin 11.5       Coumadin Monitoring INR 1.4  Comment:  Coumadin Therapy:  2.0 - 3.0 for INR for all indicators except mechanical heart valves  and antiphospholipid syndromes which should use 2.5 - 3.5.         Lymph # 0.6       Lymph% 5.8       MCH 31.3       MCHC 30.7              Mono # 0.3       Mono% 3.0       MPV 9.8       Platelets 182       Potassium 4.6       Total Protein 6.5       Protime 14.8       RBC 3.67       RDW 13.8       Sodium 137       Vancomycin-Trough   13.4     WBC 9.46             Significant Imaging: CT scan: CT ABDOMEN PELVIS WITH CONTRAST: No results found for this visit on 01/09/19. and CT ABDOMEN PELVIS WITHOUT CONTRAST: No results found for this visit on 01/09/19., Echocardiogram: 2D echo with color flow doppler: No results found  for this or any previous visit. and Transthoracic echo (TTE) complete (Cupid Only): No results found for this or any previous visit. and X-Ray: CXR: X-Ray Chest 1 View (CXR):   Results for orders placed or performed during the hospital encounter of 01/09/19   X-Ray Chest 1 View    Narrative    EXAMINATION:  XR CHEST 1 VIEW    CLINICAL HISTORY:  re-eval pleural effusion;    TECHNIQUE:  Single frontal view of the chest was performed.    COMPARISON:  01/14/2019    FINDINGS:  The heart shadow is severely enlarged.  There is pulmonary vascular congestion with pulmonary edema.  Small right and moderate left-sided pleural effusions are suspected.  The skeletal structures are intact.      Impression    As above.      Electronically signed by: Yanci Vazquez MD  Date:    01/16/2019  Time:    14:03    and X-Ray Chest PA and Lateral (CXR):   Results for orders placed or performed during the hospital encounter of 01/09/19   X-Ray Chest PA And Lateral    Narrative    EXAMINATION:  XR CHEST PA AND LATERAL    CLINICAL HISTORY:  LLL pneumonia vs atelectasis;    COMPARISON:  None    FINDINGS:  Cardiomegaly and left basilar infiltrate suspected.  Right lung is clear.  Small left pleural effusion.  No pneumothorax..  Aorta demonstrates atherosclerotic disease.  Bones demonstrate degenerative changes.      Impression    Cardiomegaly and left basilar infiltrate suspected.      Electronically signed by: Rickie Jones MD  Date:    01/14/2019  Time:    16:27    and KUB: X-Ray Abdomen AP 1 View (KUB): No results found for this visit on 01/09/19.  Assessment and Plan:         Active Diagnoses:    Diagnosis Date Noted POA    PRINCIPAL PROBLEM:  Severe sepsis [A41.9, R65.20] 01/09/2019 Yes    Dysphagia [R13.10] 01/17/2019 Yes    Venous stasis ulcer [I83.009, L97.909] 01/16/2019 Yes    Encephalopathy [G93.40] 01/15/2019 No    Pleural effusion on left [J90] 01/15/2019 Yes    Benign prostatic hyperplasia (BPH) with post-void dribbling  [N40.1, N39.43] 01/11/2019 Yes    Hypokalemia [E87.6] 01/10/2019 Yes    Cellulitis of left lower extremity [L03.116] 01/10/2019 Yes    Pneumonia [J18.9] 01/10/2019 Yes    Rhabdomyolysis [M62.82] 01/09/2019 Yes    CHF (congestive heart failure) [I50.9] 01/09/2019 Yes    Atrial fibrillation [I48.91] 04/30/2015 Yes    Cardiomyopathy [I42.9] 04/30/2015 Yes      Problems Resolved During this Admission:       VTE Risk Mitigation (From admission, onward)        Ordered     dabigatran etexilate capsule 150 mg  2 times daily      01/14/19 1112     IP VTE HIGH RISK PATIENT  Once      01/09/19 1751        Current Facility-Administered Medications   Medication    acetaminophen suppository 650 mg    bicalutamide tablet 50 mg    cefepime in dextrose 5 % 1 gram/50 mL IVPB 1 g    dabigatran etexilate capsule 150 mg    HYDROcodone-acetaminophen 5-325 mg per tablet 1 tablet    levalbuterol nebulizer solution 1.25 mg    levalbuterol nebulizer solution 1.25 mg    LORazepam tablet 0.5 mg    methylPREDNISolone sodium succinate injection 40 mg    metoprolol succinate (TOPROL-XL) 24 hr tablet 50 mg    polyethylene glycol packet 17 g    tamsulosin 24 hr capsule 0.4 mg    vancomycin (VANCOCIN) 1,500 mg in dextrose 5 % 250 mL IVPB     Dx: CHF resolved/ improved sepsis LLeg cellulitis  Continues to improve. Persistent L pleural effusion noted     Sepsis possibly secondary to venous stasis ulcer on left lateral foot. IV abx initiated per primary.      Left sided pleural effusion and mild pulmonary edema evidenced by chest x ray. BNP moderately elevated at 659.      Acute on chronic diastolic CHF TTE December 2018 with LVEF 55%, mild LVH. Lasix IV 80 mg bid.     Rhabdomyolysis improving. Chronic ETOH use. CPK improved at 1762 from 2378.      Chronic atrial fibrillation rate controlled not currently on anticoagulation according to last progress note in clinic but on xarelto in the past.      Chronic peripheral edema at  baseline.      Dyslipidemia     Hypertension now controlled           PLAN:continue to adjust BB   Continue to hold ARB for now  DNR  Continue gentle diuresis      Transcribed by  Woodrow Butt NP for Dr ANSLEY Jean-Baptiste  Cardiology  [facility]    I attest that I have personally seen and examined this patient. I have reviewed and discussed the management in detail as outlined above.

## 2019-01-18 NOTE — ASSESSMENT & PLAN NOTE
Rate controlled. On metoprolol    He reports that he is taking pradaxa but called pharmacy and they do not report that he has filled anything for anticoagulation especially pradaxa in recent months. Start lovenox 1mg/kg BID until we get his home meds    [facility], will restart at d/c    1/15/19: tachycardic despite BB and now hypotensive. Holding BB and all BP meds. Sinus on telemetry right now. Cont pradaxa for now but may stop again and switch back to lovenox today if continues to worsen and pending labs    1/16/19  Continue pradaxa    1/17/19  Will resume BB today. For most part he is rate controlled.    1/19/19  Continue BB, increase to 100mg

## 2019-01-18 NOTE — PROGRESS NOTES
Pt tolerating BiPAP well. He has been able to keep it on the majority of the time with only brief breaks. 350 ml of urinary output noted. Pt anxious, PRN ativan administered per MAR. NAD noted will continue to monitor.

## 2019-01-18 NOTE — NURSING
Dr Jean-Baptiste and Dr Tay at bedside updated providers on vitals, status, condition, significant weeping edema to bilat upper ext noted, up in chair, reports of shortness of breath intermittently, Normal Saline gtt with 20 KCL meq at 100ml/hr. Awaiting new orders. Patient noted to have frequent non-productive cough. Feeding self breakfast.

## 2019-01-18 NOTE — PROGRESS NOTES
[facility]  Pulmonology  Progress Note    Patient Name: Radhames Alonzo  MRN: [id number]  Admission Date: 1/9/2019  Hospital Length of Stay: 9 days  Code Status: DNR  Attending Provider: Jesús Tay MD  Primary Care Provider: Pennie Jorge NP   Principal Problem: Severe sepsis    Subjective:     Interval History: somewhat better    Objective:     Vital Signs (Most Recent):  Temp: 96.5 °F (35.8 °C) (01/18/19 1204)  Pulse: 91 (01/18/19 1300)  Resp: 16 (01/18/19 1300)  BP: 138/82 (01/18/19 1300)  SpO2: 100 % (01/18/19 1300) Vital Signs (24h Range):  Temp:  [96.2 °F (35.7 °C)-97.4 °F (36.3 °C)] 96.5 °F (35.8 °C)  Pulse:  [] 91  Resp:  [16-27] 16  SpO2:  [93 %-100 %] 100 %  BP: ()/(47-93) 138/82     Weight: 102 kg (224 lb 13.9 oz)  Body mass index is 29.67 kg/m².      Intake/Output Summary (Last 24 hours) at 1/18/2019 1346  Last data filed at 1/18/2019 1256  Gross per 24 hour   Intake 2965 ml   Output 1786 ml   Net 1179 ml       Physical Exam   Constitutional: He is oriented to person, place, and time. He appears well-developed and well-nourished. He is cooperative.  Non-toxic appearance. He does not appear ill. No distress.   HENT:   Head: Normocephalic and atraumatic.   Right Ear: Hearing, tympanic membrane, external ear and ear canal normal.   Left Ear: Hearing, tympanic membrane, external ear and ear canal normal.   Nose: Nose normal. No mucosal edema, rhinorrhea or nasal deformity. No epistaxis. Right sinus exhibits no maxillary sinus tenderness and no frontal sinus tenderness. Left sinus exhibits no maxillary sinus tenderness and no frontal sinus tenderness.   Mouth/Throat: Uvula is midline, oropharynx is clear and moist and mucous membranes are normal. No trismus in the jaw. Normal dentition. No uvula swelling. No posterior oropharyngeal erythema.   Eyes: Conjunctivae and lids are normal. No scleral icterus.   Sclera clear bilat   Neck: Trachea normal, full passive range of  motion without pain and phonation normal. Neck supple.   Cardiovascular: Normal rate, regular rhythm, normal heart sounds, intact distal pulses and normal pulses.   Pulmonary/Chest: No accessory muscle usage. No tachypnea. He is in respiratory distress (mild ). He has decreased breath sounds in the right upper field, the right middle field, the right lower field, the left upper field, the left middle field and the left lower field. He has no wheezes. He has no rhonchi. He has no rales.           Abdominal: Soft. Normal appearance and bowel sounds are normal. He exhibits no distension. There is no tenderness.   Musculoskeletal: Normal range of motion. He exhibits no edema or deformity.   Neurological: He is alert and oriented to person, place, and time. He exhibits normal muscle tone. Coordination normal.   Skin: Skin is warm, dry and intact. He is not diaphoretic. No pallor.   Psychiatric: He has a normal mood and affect. His speech is normal and behavior is normal. Judgment and thought content normal. Cognition and memory are normal.   Nursing note and vitals reviewed.      Vents:  Oxygen Concentration (%): 30 (01/18/19 1300)    Lines/Drains/Airways     Drain                 Urethral Catheter 01/15/19 1215 Latex 16 Fr. 3 days          Peripheral Intravenous Line                 Peripheral IV - Single Lumen 01/17/19 Anterior;Right Forearm 1 day                Significant Labs:    CBC/Anemia Profile:  Recent Labs   Lab 01/17/19  0311 01/18/19  0308   WBC 4.81 9.46   HGB 11.9* 11.5*   HCT 38.7* 37.5*    182   * 102*   RDW 13.6 13.8        Chemistries:  Recent Labs   Lab 01/17/19  0310 01/18/19  0308    137   K 3.8 4.6    103   CO2 28 27   BUN 19 22   CREATININE 0.8 1.0   CALCIUM 8.9 9.0   ALBUMIN 2.9* 2.9*   PROT 6.5 6.5   BILITOT 1.2* 1.3*   ALKPHOS 57 56   ALT 33 35   AST 37 35       All pertinent labs within the past 24 hours have been reviewed.    Significant Imaging:  I have reviewed all  pertinent imaging results/findings within the past 24 hours.    Assessment/Plan:     Dysphagia    Need swollow study     Pneumonia    Shallow breathing   Left Lower Lobe Cxr todayWill follow cxr and continue neb steroids and neb     CHF (congestive heart failure)    Stable      Atrial fibrillation    follow HR      Cardiomyopathy    Unstable now        Chronic Respiratory Failure  COPD     Patient now requires a  Home Ventilator due to chronic Respirator failure copd Cor pulmonale and pulmonary heart disease and  this will decrease hospitalizations ,Decrease Work of Breathing,extend his life  and improve the Paients Quality of life.Without this form of ventilation it could lead to patient harm or death.Pressure support with Safety tidal volume mode of therapy in this patient will optimize Gas Exchange. Home BIPAP is no longer a viable option for this patient as there was not enough improvement in view of their multiple Medical problems.     Sukh Robertson MD  Pulmonology  [facility]

## 2019-01-18 NOTE — SUBJECTIVE & OBJECTIVE
Interval History: somewhat better    Objective:     Vital Signs (Most Recent):  Temp: 96.5 °F (35.8 °C) (01/18/19 1204)  Pulse: 91 (01/18/19 1300)  Resp: 16 (01/18/19 1300)  BP: 138/82 (01/18/19 1300)  SpO2: 100 % (01/18/19 1300) Vital Signs (24h Range):  Temp:  [96.2 °F (35.7 °C)-97.4 °F (36.3 °C)] 96.5 °F (35.8 °C)  Pulse:  [] 91  Resp:  [16-27] 16  SpO2:  [93 %-100 %] 100 %  BP: ()/(47-93) 138/82     Weight: 102 kg (224 lb 13.9 oz)  Body mass index is 29.67 kg/m².      Intake/Output Summary (Last 24 hours) at 1/18/2019 1346  Last data filed at 1/18/2019 1256  Gross per 24 hour   Intake 2965 ml   Output 1786 ml   Net 1179 ml       Physical Exam   Constitutional: He is oriented to person, place, and time. He appears well-developed and well-nourished. He is cooperative.  Non-toxic appearance. He does not appear ill. No distress.   HENT:   Head: Normocephalic and atraumatic.   Right Ear: Hearing, tympanic membrane, external ear and ear canal normal.   Left Ear: Hearing, tympanic membrane, external ear and ear canal normal.   Nose: Nose normal. No mucosal edema, rhinorrhea or nasal deformity. No epistaxis. Right sinus exhibits no maxillary sinus tenderness and no frontal sinus tenderness. Left sinus exhibits no maxillary sinus tenderness and no frontal sinus tenderness.   Mouth/Throat: Uvula is midline, oropharynx is clear and moist and mucous membranes are normal. No trismus in the jaw. Normal dentition. No uvula swelling. No posterior oropharyngeal erythema.   Eyes: Conjunctivae and lids are normal. No scleral icterus.   Sclera clear bilat   Neck: Trachea normal, full passive range of motion without pain and phonation normal. Neck supple.   Cardiovascular: Normal rate, regular rhythm, normal heart sounds, intact distal pulses and normal pulses.   Pulmonary/Chest: No accessory muscle usage. No tachypnea. He is in respiratory distress (mild ). He has decreased breath sounds in the right upper field, the  right middle field, the right lower field, the left upper field, the left middle field and the left lower field. He has no wheezes. He has no rhonchi. He has no rales.           Abdominal: Soft. Normal appearance and bowel sounds are normal. He exhibits no distension. There is no tenderness.   Musculoskeletal: Normal range of motion. He exhibits no edema or deformity.   Neurological: He is alert and oriented to person, place, and time. He exhibits normal muscle tone. Coordination normal.   Skin: Skin is warm, dry and intact. He is not diaphoretic. No pallor.   Psychiatric: He has a normal mood and affect. His speech is normal and behavior is normal. Judgment and thought content normal. Cognition and memory are normal.   Nursing note and vitals reviewed.      Vents:  Oxygen Concentration (%): 30 (01/18/19 1300)    Lines/Drains/Airways     Drain                 Urethral Catheter 01/15/19 1215 Latex 16 Fr. 3 days          Peripheral Intravenous Line                 Peripheral IV - Single Lumen 01/17/19 Anterior;Right Forearm 1 day                Significant Labs:    CBC/Anemia Profile:  Recent Labs   Lab 01/17/19  0311 01/18/19  0308   WBC 4.81 9.46   HGB 11.9* 11.5*   HCT 38.7* 37.5*    182   * 102*   RDW 13.6 13.8        Chemistries:  Recent Labs   Lab 01/17/19  0310 01/18/19  0308    137   K 3.8 4.6    103   CO2 28 27   BUN 19 22   CREATININE 0.8 1.0   CALCIUM 8.9 9.0   ALBUMIN 2.9* 2.9*   PROT 6.5 6.5   BILITOT 1.2* 1.3*   ALKPHOS 57 56   ALT 33 35   AST 37 35       All pertinent labs within the past 24 hours have been reviewed.    Significant Imaging:  I have reviewed all pertinent imaging results/findings within the past 24 hours.

## 2019-01-18 NOTE — PT/OT/SLP PROGRESS
Physical Therapy Treatment    Patient Name:  Radhames Alonzo   MRN:  [id number]    Recommendations:     Discharge Recommendations:  other (see comments)(home with home health)   Discharge Equipment Recommendations: walker, rolling, commode   Barriers to discharge: None    Assessment:     Radhames Alonzo is a 84 y.o. male admitted with a medical diagnosis of Severe sepsis.  He presents with the following impairments/functional limitations:  weakness, impaired endurance, impaired self care skills, impaired functional mobilty, impaired balance, decreased lower extremity function, impaired cardiopulmonary response to activity Patient seen at bed side chair with son's presence. Nurse notified PT tx  To perform and patient agreed. Patient just sat up  With nurse assistance. Patient reports aching pain at lower back but unable to quantify pain. Explained to patient the importance of body repositioning to relieve body pressure and discomfort. Trained and patient performed the proper sit to/fromstand transfers using RW infront for supporr requiring mod/min assistance with verbal and manual cues for proper hands/feet placment and correct body sequence. Gait training performed with RW x 25 feet with min assistance for balance and safety. Exhibits dec floor clearance, dec uli, dec wt shifting and dec swing to stand ratio. Noted slight shortness of breath upon exertion. Provided patient with rest periods.Educated with proper diaphragmatic deep breathing ex with return demonstration.    Rehab Prognosis: Good; patient would benefit from acute skilled PT services to address these deficits and reach maximum level of function.    Recent Surgery: * No surgery found *      Plan:     During this hospitalization, patient to be seen 5 x/week to address the identified rehab impairments via gait training, therapeutic activities, therapeutic exercises and progress toward the following goals:    · Plan of Care Expires:  01/23/19    Subjective      Chief Complaint: patietn complain back ache upon lying on bed too much  Patient/Family Comments/goals: non stated  Pain/Comfort:  · Pain Rating 1: other (see comments)(unable to quantify aching pain at lower back.)  · Location - Orientation 1: lower  · Location 1: back  · Pain Addressed 1: Cessation of Activity, Reposition  · Pain Rating Post-Intervention 1: 0/10      Objective:     Communicated with patient and nurse prior to session.  Patient found all lines intact, call button in reach, nurse notified and son present blood pressure cuff, valdes catheter, pulse ox (continuous), peripheral IV, oxygen  upon PT entry to room.     General Precautions: Standard,     Orthopedic Precautions:N/A   Braces: N/A     Functional Mobility:  · Transfers:     · Sit to Stand:  minimum assistance with rolling walker  · Gait: RW ambulation x 25 feet with min assistance and verbal and manual cues.       AM-PAC 6 CLICK MOBILITY  Turning over in bed (including adjusting bedclothes, sheets and blankets)?: 3  Sitting down on and standing up from a chair with arms (e.g., wheelchair, bedside commode, etc.): 3  Moving from lying on back to sitting on the side of the bed?: 3  Moving to and from a bed to a chair (including a wheelchair)?: 3  Need to walk in hospital room?: 3  Climbing 3-5 steps with a railing?: 2  Basic Mobility Total Score: 17       Therapeutic Activities and Exercises:    Trained and patient performed the proper sit to/fromstand transfers using RW infront for supporr requiring mod/min assistance with verbal and manual cues for proper hands/feet placment and correct body sequence. Gait training performed with RW x 25 feet with min assistance for balance and safety. Exhibits dec floor clearance, dec uli, dec wt shifting and dec swing to stand ratio.    Patient left up in chair with all lines intact, call button in reach and nurse notified..    GOALS:   Multidisciplinary Problems     Physical Therapy Goals         Problem: Physical Therapy Goal    Goal Priority Disciplines Outcome Goal Variances Interventions   Physical Therapy Goal     PT, PT/OT Ongoing (interventions implemented as appropriate)     Description:  Goals to be met by: 7  (reviewed 19)    Patient will increase functional independence with mobility by performin. Supine to sit with Supervision or Set-up Assistance  2. Sit to supine with Supervision or Set-up Assistance  3. Bed to chair transfer with Supervision or Set-up Assistancewith or without rolling walker using Step Transfer TECHNIQUE  4. Gait  x 100  feet with Supervision or Set-up Assistance with or without rolling walker  5. Lower extremity exercise program x10 reps per handout, with assistance as needed                       Time Tracking:     PT Received On: 19  PT Start Time: 1530     PT Stop Time: 1547  PT Total Time (min): 17 min     Billable Minutes: Gait Training 10 and Therapeutic Activity 7    Treatment Type: Treatment  PT/PTA: PT           Baldomero Velázquez, PT  2019

## 2019-01-19 PROBLEM — J43.9 EMPHYSEMA/COPD: Status: ACTIVE | Noted: 2019-01-19

## 2019-01-19 PROBLEM — J96.12 CHRONIC RESPIRATORY FAILURE WITH HYPOXIA AND HYPERCAPNIA: Status: ACTIVE | Noted: 2019-01-18

## 2019-01-19 PROBLEM — G93.41 ENCEPHALOPATHY, METABOLIC: Status: ACTIVE | Noted: 2019-01-19

## 2019-01-19 LAB
ALBUMIN SERPL BCP-MCNC: 2.9 G/DL
ALP SERPL-CCNC: 55 U/L
ALT SERPL W/O P-5'-P-CCNC: 38 U/L
ANION GAP SERPL CALC-SCNC: 6 MMOL/L
AST SERPL-CCNC: 31 U/L
BASOPHILS # BLD AUTO: 0 K/UL
BASOPHILS NFR BLD: 0 %
BILIRUB SERPL-MCNC: 1.4 MG/DL
BUN SERPL-MCNC: 28 MG/DL
CALCIUM SERPL-MCNC: 9.3 MG/DL
CHLORIDE SERPL-SCNC: 102 MMOL/L
CO2 SERPL-SCNC: 29 MMOL/L
CREAT SERPL-MCNC: 1 MG/DL
DIFFERENTIAL METHOD: ABNORMAL
EOSINOPHIL # BLD AUTO: 0 K/UL
EOSINOPHIL NFR BLD: 0 %
ERYTHROCYTE [DISTWIDTH] IN BLOOD BY AUTOMATED COUNT: 13.8 %
EST. GFR  (AFRICAN AMERICAN): >60 ML/MIN/1.73 M^2
EST. GFR  (NON AFRICAN AMERICAN): >60 ML/MIN/1.73 M^2
GLUCOSE SERPL-MCNC: 129 MG/DL
HCT VFR BLD AUTO: 36.6 %
HGB BLD-MCNC: 11.1 G/DL
LYMPHOCYTES # BLD AUTO: 0.6 K/UL
LYMPHOCYTES NFR BLD: 5.1 %
MCH RBC QN AUTO: 31.4 PG
MCHC RBC AUTO-ENTMCNC: 30.3 G/DL
MCV RBC AUTO: 103 FL
MONOCYTES # BLD AUTO: 0.4 K/UL
MONOCYTES NFR BLD: 3.6 %
NEUTROPHILS # BLD AUTO: 11.2 K/UL
NEUTROPHILS NFR BLD: 91.3 %
PLATELET # BLD AUTO: 179 K/UL
PMV BLD AUTO: 9.9 FL
POTASSIUM SERPL-SCNC: 5.2 MMOL/L
PROT SERPL-MCNC: 6.3 G/DL
RBC # BLD AUTO: 3.54 M/UL
SODIUM SERPL-SCNC: 137 MMOL/L
VANCOMYCIN TROUGH SERPL-MCNC: 18.8 UG/ML
WBC # BLD AUTO: 12.24 K/UL

## 2019-01-19 PROCEDURE — 27000221 HC OXYGEN, UP TO 24 HOURS

## 2019-01-19 PROCEDURE — 85025 COMPLETE CBC W/AUTO DIFF WBC: CPT

## 2019-01-19 PROCEDURE — 94660 CPAP INITIATION&MGMT: CPT

## 2019-01-19 PROCEDURE — 63600175 PHARM REV CODE 636 W HCPCS: Performed by: INTERNAL MEDICINE

## 2019-01-19 PROCEDURE — 25000003 PHARM REV CODE 250: Performed by: INTERNAL MEDICINE

## 2019-01-19 PROCEDURE — 94761 N-INVAS EAR/PLS OXIMETRY MLT: CPT

## 2019-01-19 PROCEDURE — 25000003 PHARM REV CODE 250: Performed by: FAMILY MEDICINE

## 2019-01-19 PROCEDURE — 25000242 PHARM REV CODE 250 ALT 637 W/ HCPCS: Performed by: NURSE PRACTITIONER

## 2019-01-19 PROCEDURE — 63600175 PHARM REV CODE 636 W HCPCS: Performed by: FAMILY MEDICINE

## 2019-01-19 PROCEDURE — 36415 COLL VENOUS BLD VENIPUNCTURE: CPT

## 2019-01-19 PROCEDURE — 99233 SBSQ HOSP IP/OBS HIGH 50: CPT | Mod: ,,, | Performed by: FAMILY MEDICINE

## 2019-01-19 PROCEDURE — 94640 AIRWAY INHALATION TREATMENT: CPT

## 2019-01-19 PROCEDURE — 80202 ASSAY OF VANCOMYCIN: CPT

## 2019-01-19 PROCEDURE — 99233 PR SUBSEQUENT HOSPITAL CARE,LEVL III: ICD-10-PCS | Mod: ,,, | Performed by: FAMILY MEDICINE

## 2019-01-19 PROCEDURE — 80053 COMPREHEN METABOLIC PANEL: CPT

## 2019-01-19 PROCEDURE — 25000242 PHARM REV CODE 250 ALT 637 W/ HCPCS: Performed by: INTERNAL MEDICINE

## 2019-01-19 PROCEDURE — 20000000 HC ICU ROOM

## 2019-01-19 PROCEDURE — 97802 MEDICAL NUTRITION INDIV IN: CPT

## 2019-01-19 PROCEDURE — 94668 MNPJ CHEST WALL SBSQ: CPT

## 2019-01-19 PROCEDURE — 99900035 HC TECH TIME PER 15 MIN (STAT)

## 2019-01-19 PROCEDURE — 25000003 PHARM REV CODE 250: Performed by: NURSE PRACTITIONER

## 2019-01-19 RX ORDER — FUROSEMIDE 10 MG/ML
20 INJECTION INTRAMUSCULAR; INTRAVENOUS 2 TIMES DAILY
Status: DISCONTINUED | OUTPATIENT
Start: 2019-01-19 | End: 2019-01-20

## 2019-01-19 RX ORDER — KETOROLAC TROMETHAMINE 15 MG/ML
15 INJECTION, SOLUTION INTRAMUSCULAR; INTRAVENOUS EVERY 6 HOURS PRN
Status: DISPENSED | OUTPATIENT
Start: 2019-01-19 | End: 2019-01-22

## 2019-01-19 RX ORDER — HALOPERIDOL 5 MG/ML
5 INJECTION INTRAMUSCULAR EVERY 4 HOURS PRN
Status: DISCONTINUED | OUTPATIENT
Start: 2019-01-19 | End: 2019-01-24 | Stop reason: HOSPADM

## 2019-01-19 RX ORDER — METOPROLOL SUCCINATE 50 MG/1
100 TABLET, EXTENDED RELEASE ORAL DAILY
Status: DISCONTINUED | OUTPATIENT
Start: 2019-01-19 | End: 2019-01-19

## 2019-01-19 RX ORDER — METOPROLOL TARTRATE 1 MG/ML
5 INJECTION, SOLUTION INTRAVENOUS EVERY 6 HOURS
Status: DISCONTINUED | OUTPATIENT
Start: 2019-01-19 | End: 2019-01-20

## 2019-01-19 RX ADMIN — KETOROLAC TROMETHAMINE 15 MG: 15 INJECTION, SOLUTION INTRAMUSCULAR; INTRAVENOUS at 03:01

## 2019-01-19 RX ADMIN — FUROSEMIDE 20 MG: 10 INJECTION, SOLUTION INTRAMUSCULAR; INTRAVENOUS at 10:01

## 2019-01-19 RX ADMIN — VANCOMYCIN 1500 MG: 1 INJECTION, SOLUTION INTRAVENOUS at 03:01

## 2019-01-19 RX ADMIN — CEFEPIME HYDROCHLORIDE 1 G: 1 INJECTION, SOLUTION INTRAVENOUS at 01:01

## 2019-01-19 RX ADMIN — LEVALBUTEROL 1.25 MG: 1.25 SOLUTION, CONCENTRATE RESPIRATORY (INHALATION) at 07:01

## 2019-01-19 RX ADMIN — METOPROLOL TARTRATE 5 MG: 5 INJECTION, SOLUTION INTRAVENOUS at 05:01

## 2019-01-19 RX ADMIN — HYDROCODONE BITARTRATE AND ACETAMINOPHEN 1 TABLET: 5; 325 TABLET ORAL at 04:01

## 2019-01-19 RX ADMIN — CEFEPIME HYDROCHLORIDE 1 G: 1 INJECTION, SOLUTION INTRAVENOUS at 04:01

## 2019-01-19 RX ADMIN — METHYLPREDNISOLONE SODIUM SUCCINATE 40 MG: 40 INJECTION, POWDER, FOR SOLUTION INTRAMUSCULAR; INTRAVENOUS at 05:01

## 2019-01-19 RX ADMIN — LEVALBUTEROL 1.25 MG: 1.25 SOLUTION, CONCENTRATE RESPIRATORY (INHALATION) at 01:01

## 2019-01-19 RX ADMIN — METHYLPREDNISOLONE SODIUM SUCCINATE 80 MG: 40 INJECTION, POWDER, FOR SOLUTION INTRAMUSCULAR; INTRAVENOUS at 01:01

## 2019-01-19 RX ADMIN — DABIGATRAN ETEXILATE MESYLATE 150 MG: 75 CAPSULE ORAL at 08:01

## 2019-01-19 RX ADMIN — METOPROLOL TARTRATE 5 MG: 5 INJECTION, SOLUTION INTRAVENOUS at 10:01

## 2019-01-19 RX ADMIN — KETOROLAC TROMETHAMINE 15 MG: 15 INJECTION, SOLUTION INTRAMUSCULAR; INTRAVENOUS at 12:01

## 2019-01-19 RX ADMIN — LEVALBUTEROL 1.25 MG: 1.25 SOLUTION, CONCENTRATE RESPIRATORY (INHALATION) at 04:01

## 2019-01-19 RX ADMIN — METHYLPREDNISOLONE SODIUM SUCCINATE 80 MG: 40 INJECTION, POWDER, FOR SOLUTION INTRAMUSCULAR; INTRAVENOUS at 09:01

## 2019-01-19 RX ADMIN — CEFEPIME HYDROCHLORIDE 1 G: 1 INJECTION, SOLUTION INTRAVENOUS at 08:01

## 2019-01-19 RX ADMIN — FUROSEMIDE 20 MG: 10 INJECTION, SOLUTION INTRAMUSCULAR; INTRAVENOUS at 05:01

## 2019-01-19 RX ADMIN — LEVALBUTEROL 1.25 MG: 1.25 SOLUTION, CONCENTRATE RESPIRATORY (INHALATION) at 11:01

## 2019-01-19 NOTE — PROGRESS NOTES
Pt temp slowly climbing. Room was cold and pt was not covered. Blankets were added and room temp adjusted.

## 2019-01-19 NOTE — PLAN OF CARE
Pt on 2LNC pt refusing to eat, altered LOC, oriented to name and place reoriented to time and situation.

## 2019-01-19 NOTE — PLAN OF CARE
Problem: Adult Inpatient Plan of Care  Goal: Plan of Care Review  Outcome: Ongoing (interventions implemented as appropriate)  Vitals remained stable, afebrile. 95.5 temp this AM. Room temp adjusted and blanket added to bed. Will recheck. Wound care done this AM with Hibiclens bath. Dressing to arm skin tears changed and cleansed as well. Pt pulled bipap off several times during the night. Only slept for about 45 minutes. Very restless all night. Hein patent ans draining clear yellow urine with sediment. Lung sounds coarse and expiratory wheezing. IV solumedrol given. PRN respiratory treatment required this AM due to expiratory wheezing. Confused to time. IV abx given. IV toradol relieved testicle discomfort. Ativan and hydrocodone given as well. All extremities edematous with weeping to arms at times. Left leg pain at beginning of shift. Discussed plan of care with pt, stated understanding

## 2019-01-19 NOTE — SUBJECTIVE & OBJECTIVE
Interval History: looks not as well as yesterday      Objective:     Vital Signs (Most Recent):  Temp: 96.7 °F (35.9 °C) (01/19/19 1115)  Pulse: 100 (01/19/19 1315)  Resp: 15 (01/19/19 1315)  BP: 121/67 (01/19/19 1300)  SpO2: 100 % (01/19/19 1315) Vital Signs (24h Range):  Temp:  [95.5 °F (35.3 °C)-96.9 °F (36.1 °C)] 96.7 °F (35.9 °C)  Pulse:  [] 100  Resp:  [14-29] 15  SpO2:  [74 %-100 %] 100 %  BP: ()/() 121/67     Weight: 102 kg (224 lb 13.9 oz)(RD reviewed)  Body mass index is 29.67 kg/m².      Intake/Output Summary (Last 24 hours) at 1/19/2019 1328  Last data filed at 1/19/2019 1315  Gross per 24 hour   Intake 890 ml   Output 1300 ml   Net -410 ml       Physical Exam   Constitutional: He is oriented to person, place, and time. He appears well-developed and well-nourished. He is cooperative.  Non-toxic appearance. He does not appear ill. No distress.   HENT:   Head: Normocephalic and atraumatic.   Right Ear: Hearing, tympanic membrane, external ear and ear canal normal.   Left Ear: Hearing, tympanic membrane, external ear and ear canal normal.   Nose: Nose normal. No mucosal edema, rhinorrhea or nasal deformity. No epistaxis. Right sinus exhibits no maxillary sinus tenderness and no frontal sinus tenderness. Left sinus exhibits no maxillary sinus tenderness and no frontal sinus tenderness.   Mouth/Throat: Uvula is midline, oropharynx is clear and moist and mucous membranes are normal. No trismus in the jaw. Normal dentition. No uvula swelling. No posterior oropharyngeal erythema.   Eyes: Conjunctivae and lids are normal. No scleral icterus.   Sclera clear bilat   Neck: Trachea normal, full passive range of motion without pain and phonation normal. Neck supple.   Cardiovascular: Normal rate, regular rhythm, normal heart sounds, intact distal pulses and normal pulses.   Pulmonary/Chest: He is in respiratory distress (mild to moderate). He has decreased breath sounds in the right middle field, the  right lower field, the left middle field and the left lower field. He has wheezes in the right lower field and the left lower field. He has rhonchi in the right lower field and the left lower field.   Abdominal: Soft. Normal appearance and bowel sounds are normal. He exhibits no distension. There is no tenderness.   Musculoskeletal: Normal range of motion. He exhibits no edema or deformity.   Neurological: He is alert and oriented to person, place, and time. He exhibits normal muscle tone. Coordination normal.   Skin: Skin is warm, dry and intact. He is not diaphoretic. No pallor.   Psychiatric: He has a normal mood and affect. His speech is normal and behavior is normal. Judgment and thought content normal. Cognition and memory are normal.   Nursing note and vitals reviewed.      Vents:  Oxygen Concentration (%): 30 (01/19/19 0701)    Lines/Drains/Airways     Drain                 Urethral Catheter 01/15/19 1215 Latex 16 Fr. 4 days          Peripheral Intravenous Line                 Peripheral IV - Single Lumen 01/17/19 Anterior;Right Forearm 2 days                Significant Labs:    CBC/Anemia Profile:  Recent Labs   Lab 01/18/19  0308 01/19/19  0345   WBC 9.46 12.24   HGB 11.5* 11.1*   HCT 37.5* 36.6*    179   * 103*   RDW 13.8 13.8        Chemistries:  Recent Labs   Lab 01/18/19  0308 01/19/19  0345    137   K 4.6 5.2*    102   CO2 27 29   BUN 22 28*   CREATININE 1.0 1.0   CALCIUM 9.0 9.3   ALBUMIN 2.9* 2.9*   PROT 6.5 6.3   BILITOT 1.3* 1.4*   ALKPHOS 56 55   ALT 35 38   AST 35 31       All pertinent labs within the past 24 hours have been reviewed.    Significant Imaging:  I have reviewed all pertinent imaging results/findings within the past 24 hours.

## 2019-01-19 NOTE — PLAN OF CARE
To bed with assist of walker and 2 staff members, generalized weakness remains evident. VS remain stable. Audible wheezing noted. Monitoring closely.

## 2019-01-19 NOTE — PLAN OF CARE
Dr Tay reviewed chart, updated on pt current status. Dr Tay assessed pt, attempted to place pt on Bipap, pt pulling on mask, not tolerating it. Stated to leave him alone and he didn't want the mask on.  New orders noted.

## 2019-01-19 NOTE — PROGRESS NOTES
Spoke with both of his sons, Lele and Dequan. They absolutely do not want him restrained and forced to wear bipap. They understand that by doing so he will likely die. They understand that. They just want him to be comfortable. His wife is absent, but we will inform her of this decision as I understand she is coming in later today. I told them that we will see how he does today. If he continues to deteriorate by tomorrow we can consult hospice, but we will give him a chance to see if things turn around. Once ativan wears off, possibly he will be more alert and agreeable to bipap.   Pt is requesting to go outside. He is likely near the end of his life. I will not deny him this request. The nurse is available to go down with him as this is her only pt. I will allow this.

## 2019-01-19 NOTE — PLAN OF CARE
Placed pt on Bipap, Pt became very agitated, pulled mask apart and used foul language stating he didn't want it. attempted to place pt on 2LNC pt pulled off and became agitated. Pt aware of his name and where he is at present. Dr Tay notified.

## 2019-01-19 NOTE — PROGRESS NOTES
Pt finally resting better. No more complaints of pain for now. Very restless when sleeping, constantly moves

## 2019-01-19 NOTE — PLAN OF CARE
Pt uncooperative, pulling at valdes and IV, Pt remains oriented to person and place, Pt using foul language stating he wants to be left alone. Dr Tay notified, new order noted.

## 2019-01-19 NOTE — PROGRESS NOTES
"[facility] Medicine  Progress Note    Patient Name: Radhames Alonzo  MRN: [id number]  Patient Class: IP- Inpatient   Admission Date: 1/9/2019  Length of Stay: 10 days  Attending Physician: Jesús Tay MD  Primary Care Provider: Pennie Jorge NP        Subjective:     Principal Problem:Severe sepsis    HPI:  84 year old male presents to ED b/c " I couldn't get up and walk."   SOB for about a year. Worse recently. Last night it got to the point that he was too SOB to stand up.   SOB at rest and with eating.   He also notes left foot pain. He has been seeing woundcare for a wound on this foot last 7 months   Workup in ED is significant for BNP 600s. Last BNP 200s 3 weeks ago. 160s 9 months ago.  He sees Dr. Jean-Baptiste regularly.   [facility] has seen pt and rec admit for diuresis, rule out MI   First TPN with slight bump.038  Lactic acid is 3.6. Meets I am told by ED MD that patient "has no signs of infection on physical exam." I am also told that CXR and U/A are clear.     He drinks six pack daily. Last drink 2 days ago. No h/o DT's in past         Hospital Course:  He has been started on cefepime and clinda for left lower ext cellulitis. He has been afebrile. WBC was 94480. Blood cultures NGTD. Lactate was up to 3.8, coming down.RR was 24 and had low sats on admission. Was on venti, now down to 3L NC   He is also wheezing. C/o SOB. CXR with CHF.   He does report that he takes pradaxa for his a fib at home. Not on home meds list nor reordered here. A fib on EKG. BNP was 659. Ordered for lasix 80mg IV BID. Urinating a lot. K low this am 2.8    1/11/19 Getting lasix 80mg IV bid per Cardiology; not much urine output yesterday with dribbling noted; concern for obstruction; will get bladder scan; consider renal US.   Creat 0.9 stable; K+ 3.6    CPK 2378>1762; TNI 0.032 x 1; repeat this am     1-12 Pt continues to have SOB and pul congestion; Dr Robertson consulted    1-13 Pt is fairly complex with mx " cardiac/pulmonary issues causing his coughing and SOB; has a hx of underlying prostate problems; a swing bed might be necessary; Dr Robertson and cardiology following    1/14 pt is still on clinda and cefepime for the lower ext cellulitis. Afebrile/ no elevated WBC. US negative for DVT. Still swollen but reports that it is much better than his normal   CXR 1/812 Left basilar consolidation or atelectasis and small pleural effusion. He is 95% on RA  Still weak. Having trouble standing on side bed.     1/15/19 Pt started feeling worse yesterday evening; this am noted to have LOW BP- SBP 80s, increased RR 30s, Temp 95.6,   Dr. Jean-Baptiste reports HF better, BNP 200s, still with LE edema, US negative for DVT; Also gets pradaxa  Underlying chronic prostate problems; defers any intervention or sx   Day 7/10 Cefepime and clinda for LE cellulitis and pneumonia. Blood cultures negative.   Looks jaundice with sclerema icterus; admit bilirubin - 2.0 elevated on 1/9/19 ; check LFTs/CMP/bilirubin    1/15/19: patient with worsening respiratory status overnight. He was tried on BiPaP but refused to wear it. Was agitated during the night. He was abdominal breathing but maintainings sats on 2L NC. BNP normal. D-dimer normal and anticoagulated since admit, restarted PO pradaxa yesterday. This AM, continues to decompensated. He is alert and oriented on initial eval but over the course of our conversation decompensates and becomes very lethargic. He has been waxing and waning throughout the night per RN report. Unclear if has h/o cirrhosis but on chart review this AM his bilirubin 2 and mild elevation AST--this was not trended during his hospital stay. This AM he has scleral icterus and yellowing of skin which was NOT PRESENT YESTERDAY. Also his LLE cellulitis is worsening, redness extending up the shin to the knee today which was NOT PRESENT YESTERDAY. He is hypothermic, tachypenic, hypotensive and tachycardic (despite BB on board).      1/16/19  Transferred up to ICU yesterday for hypotension and AMS on the floor. NH4 level 24. Bolused 1 liter NS. Did not require pressors. This am 120s/70s  UOP is good. Abx changed from cefepime and clinda to cefepime and VANC. Afebrile.  ABG ordered yesterday. He does have some hypercapnea. Refuses to wear bipap.    1/17/19  Patient became more alert and arousable throughout the day yesterday. Still with issues lying flat (which is why his imaging was not completed yesterday). However, his BP is improved without his home meds. He is tolerating his abx well. He did wear Bipap VERY little last night, but did not sleep because of it. UOP is good. No fevers.     1/18/19  He is awake and alert this am. He wore his bipap overnight   Tachy but BP is stable   Increased edema this am     1/19/19  He is very somnolent this am. Fought with him overnight to get bipap. Required ativan. He is sleepy this am   He is now a DNR       Interval History: see      Review of Systems   Constitutional: Positive for fatigue. Negative for fever.   Respiratory: Positive for shortness of breath. Negative for wheezing.    Cardiovascular: Positive for leg swelling. Negative for chest pain.   Gastrointestinal: Negative for vomiting.   Genitourinary: Negative for decreased urine volume.   Skin: Positive for wound. Negative for color change.   Psychiatric/Behavioral: Positive for agitation.     Objective:     Vital Signs (Most Recent):  Temp: 96.8 °F (36 °C) (01/18/19 0708)  Pulse: (!) 114 (01/18/19 0745)  Resp: 18 (01/18/19 0745)  BP: (!) 114/53 (01/18/19 0700)  SpO2: 100 % (01/18/19 0745) Vital Signs (24h Range):  Temp:  [96.2 °F (35.7 °C)-97.4 °F (36.3 °C)] 96.8 °F (36 °C)  Pulse:  [] 114  Resp:  [17-27] 18  SpO2:  [93 %-100 %] 100 %  BP: ()/(47-93) 114/53     Weight: 102 kg (224 lb 13.9 oz)  Body mass index is 29.67 kg/m².    Intake/Output Summary (Last 24 hours) at 1/18/2019 7900  Last data filed at 1/18/2019 5590  Gross  per 24 hour   Intake 3783.33 ml   Output 1036 ml   Net 2747.33 ml      Physical Exam   Constitutional: He appears well-developed and well-nourished. No distress.   Sleepy    HENT:   Head: Normocephalic and atraumatic.   Right Ear: External ear normal.   Left Ear: External ear normal.   Nose: Nose normal.   Mouth/Throat: Oropharynx is clear and moist.   Eyes: Conjunctivae and EOM are normal. Pupils are equal, round, and reactive to light. Right eye exhibits no discharge. Left eye exhibits no discharge. No scleral icterus.   Neck: Normal range of motion. Neck supple. No JVD present. No tracheal deviation present. No thyromegaly present.   Cardiovascular: Normal rate, regular rhythm, normal heart sounds and intact distal pulses.   No murmur heard.  Pulmonary/Chest: Effort normal. No respiratory distress. He has wheezes. He has no rales. He exhibits no tenderness.   Very tight. Wheezing bilaterally. Decreased BS L base    Abdominal: Soft. Bowel sounds are normal. He exhibits no distension and no mass. There is no tenderness. There is no rebound and no guarding.   Periumbilical venous congestion/prominence    Musculoskeletal: Normal range of motion.   Lymphadenopathy:     He has no cervical adenopathy.   Neurological: He has normal reflexes. He displays normal reflexes. No cranial nerve deficit. He exhibits normal muscle tone. Coordination normal.   Somnolent. Opens eyes. Follows commands    Skin: Skin is warm and dry. No erythema.   LLE dressing c/d/i   Psychiatric: He has a normal mood and affect. His behavior is normal. Judgment and thought content normal.       Significant Labs:   Blood Culture: No results for input(s): LABBLOO in the last 48 hours.  CBC:   Recent Labs   Lab 01/17/19  0311 01/18/19  0308   WBC 4.81 9.46   HGB 11.9* 11.5*   HCT 38.7* 37.5*    182     CMP:   Recent Labs   Lab 01/17/19  0310 01/18/19  0308    137   K 3.8 4.6    103   CO2 28 27   * 156*   BUN 19 22   CREATININE  0.8 1.0   CALCIUM 8.9 9.0   PROT 6.5 6.5   ALBUMIN 2.9* 2.9*   BILITOT 1.2* 1.3*   ALKPHOS 57 56   AST 37 35   ALT 33 35   ANIONGAP 8 7*   EGFRNONAA >60 >60     All pertinent labs within the past 24 hours have been reviewed.    Significant Imaging: I have reviewed and interpreted all pertinent imaging results/findings within the past 24 hours.    Assessment/Plan:      * Severe sepsis    Due to Venous stasis and wound of LLE   Start Cefepime and clinda to cover for everything including MRSA and psuedomonas.   Clinda instead of Vanc since mild CKD and we will diurese him as well   Lactic acid q 6 coming down  Bolus 2 liters overnight creat 0.9 bp 111/66 this am  woundcare consult     1-12 legs are down but still pul congestion    1-13 Not retaining as much fluids, but still SOB and coughing; burns when he urinates from his Prostate issues(Ca)    1-14 better/resolved    1-15: worsening status this AM. + confusion, hypotension, tachycardia. LLE redness is worsening from yesterday despite antibx. Should be well covered with clida and cefepime so puzzling. When stable consider imaging of leg to look for deeper infection    1/16:  Continue cefepime and VANC. CT chest today to check for empyema/underlying infiltrate. CXR not adequate to determine    1/17  At this point he was switched to vanc 1 day ago and is improved. Won't be able to lie flat for CT. Will go ahead and u/s chest to r/o empyema. But it seems as though he will need 7 days of iv vanc.    1/18  Continue VANC day 3 and cefepime day 10     1/19  Continue VANC day 4 and cefepime Day 11      Emphysema/COPD    Increase solumedrol to 80 q 8  Encourage bipap use        Chronic respiratory failure with hypoxia and hypercapnia    He has bipap ordered. He is mostly refusing. I will not restrain this 84 year old man and force him to wear bipap if he is not willing. He is a DNR.   Will see how the day goes and see if he complies with bipap. However, if he continues to  "refuse, we need to talk to his family about hospice to make him comfortable        Dysphagia    Likely secondary to mental status on 1/16 ST following. On a diet      Venous stasis ulcer    Wound care.  Cont to apply silver       Pleural effusion on left    Getting bigger on CXR from yesterday  CT ordered--will do when stabilized.   ? Underlying consolidation. ? Empyema--this why he worsening    U/S ordered---"no underlying debris to suggest complicated effusion."   following      Encephalopathy    Waxing and waning mental status  Overnight thought to be sundowning but his AM dramatic waxing and waning just during our 10 minute conversation. Went form alert to very lethargic. + jaundice  Hypotensive, tachycardic, hypothermic (95.6 axillary)  Sepsis vs hepatic    1/1619  LFTs, lactic acid unremarkable  I think this may be from his hypercapnea and refusal to wear bipap   Will get CT head to rule out CVA    1/17/19  Metabolic? He is VERY alert and appropriate today. CT head on hold. Need to be cognizant of the fact that he has prostate cancer, though.    1/18/19  Alert this am. He wore his bipap for the better part of the night last night     1/19/19  He is lethargic this am. This is likely CO2 narcosis as he hasn't worn his bipap much overnight. Also he has some ativan on board. Will see if we can get him to wear bipap today, but I will not restrain and force him to do so. That wouldn't be humane in this 84 year old DNR patient.        Benign prostatic hyperplasia (BPH) with post-void dribbling      Resume flomax and casodex--with waxing and waning mental status may remove PO meds  Has a valdes currently      Pneumonia    Need to assess if truly hypoxic. No low sats charted but has been on venti and now 4L NC. Will try to wean. Repeat CXR.   1/11/19 O2 sat 95-99%     1-12 Pt is labored with his breathing and continuing to cough; Dr Robertson consulted  1-14 no longer needing O2, cont cefepime and clinda x 10 days, cont " nebs,stop steroids    1/15/19: marked change in respiratory status this morning, was worsening overnight. He is oxygenating, sats good on 2L and ABG shows good sats. CXR with enlarging left pleural effusion otherwise normal. CT ordered and will do once stabilized--? Underlying consolidation with empyema. Looking for loculations, etc. Consider thoracentesis. Could explain his worsening status. He is in respiratory distress and abdominal breathing. Taking off BiPaP. He is a FULL CODE.    1/16/19:  Continue VANC/Cefepime. He is considering DNR . Told Dr. Jean-Baptiste no to resuscitation. We need to get him to sign a DNR     1/17/19  Patient seems to have staph pneumonia as he has improved with add'l coverage. Check u/s to r/o empyema    1/18/19  Improved clinically. Continue VANC cefepime     1/19/19  Continue VANC and Cefepime        Cellulitis of left lower extremity    See severe sepsis treatment  Check u/s  Negative for bilateral lower extremity DVT.    cont cefepime and clinda x 10 days wound care Cleanse wound with NS using gauze. Apply silver alginate to wound bed, cover with ABD and secure with tape daily.      1/15/19: marked worsening of erythema from yesterday despite antibx. Puzzling why the sudden change. D-dimer negative and anticoagulated so low suspicion for DVT. Worsening vitals as well (see sepsis)        1/16/19  Leg is looking much better already     1/17/19  Much better, but still with small open wound. Wound care should see today.    1/19/19  Resolved   abx continued for LLL pneumonia          Hypokalemia    Likely due to torsemide use  Resolved      Rhabdomyolysis    Mild. Due to chronic ETOHism   2  liter bolus for sepsis should take care of it   CPK improved    1/15/19: not checked over last 5 days. Repeat CPK today. Restarting IVF   1/16/19: resolved      CHF (congestive heart failure)    1/18/19  [facility] consulted   ECHO in their records  Continue BB. No ACE currently. I presume this is from low BP  during the week. Increasing BB. Add ACE in am if BP is stable     1/19/19  Will diurese cautiously today. Lasix 20mg IV BID              Atrial fibrillation    Rate controlled. On metoprolol    He reports that he is taking pradaxa but called pharmacy and they do not report that he has filled anything for anticoagulation especially pradaxa in recent months. Start lovenox 1mg/kg BID until we get his home meds    He was [facility], will restart at d/c    1/15/19: tachycardic despite BB and now hypotensive. Holding BB and all BP meds. Sinus on telemetry right now. Cont pradaxa for now but may stop again and switch back to lovenox today if continues to worsen and pending labs    1/16/19  Continue pradaxa    1/17/19  Will resume BB today. For most part he is rate controlled.    1/19/19  Continue BB, increase to 100mg      Cardiomyopathy    Cardiology is following  Diastolic CHF: TTE December 2018 with LVEF 55%, mild LVH  Was started on diuretics  BNP is only 200s now  CXR with left pleural effusion but not severe pulmonary congestion  1/15/10: BP dropping, hold ACEi, BB and diuretics for now and give back IVF---careful for volume overload though.   1/16/19: resume BB and ACE per [facility]   1/17/18  Meds have not been resumed. Start at 25 of metoprolol rather than home dose of 100.    1/19/19  Increase BB to 100mg        VTE Risk Mitigation (From admission, onward)        Ordered     dabigatran etexilate capsule 150 mg  2 times daily      01/14/19 1112     IP VTE HIGH RISK PATIENT  Once      01/09/19 1751          Critical care time spent on the evaluation and treatment of severe organ dysfunction, review of pertinent labs and imaging studies, discussions with consulting providers and discussions with patient/family: 35 minutes.    Jesús Tay MD  Department of Hospital Medicine   [facility]

## 2019-01-19 NOTE — PLAN OF CARE
Pt presents supine with HOB elevated 30 degrees/ Bipap in use see flowsheet for settings. Pt oriented to person, place and situation reoriented to time. CM in progress alarms set and on. NAD. Follows simple commands. Restless. Mild exp wheezing to upper lobes noted, diminished bilateral bases. PIV to right PIV intact secured and flushed well.  duoderm sponge drsgs to left and right arm skin tear sites. Bilateral legs with redness, wrinkling and drsg to left foot intact. scabs noted to bilateral knees and left 1st toe, no drainage noted from sites. Attempted to discuss plan of care with pt, pt confused at times, reinforcement needed. Monitoring closeley.

## 2019-01-19 NOTE — PLAN OF CARE
Pt agitated, stated he doesn't want Bipap and wants to be left alone. Dequan at bedside, explained pt behavior to son and pt told son his feelings at present time. Restraints removed and evaluating pt behavior. Pt remains anxious, monitoring closely. Dr Tay updated and stated he will come to speak to pt and pts son concerning pt condition.

## 2019-01-19 NOTE — PLAN OF CARE
Pt remains on current therapy. Pt wore BiPAP on and off throughout the night, but continuously pulls at mask. Pt very agitated. Will continue to monitor and educate pt on importance of wearing BiPAP.

## 2019-01-19 NOTE — ASSESSMENT & PLAN NOTE
Contributing Nutrition Diagnosis  Inadequate Energy Intake    Related to (etiology):   decreased intake of meals    Signs and Symptoms (as evidenced by):   Pt refusal of intake this am     Interventions  Mechanically Altered Diet  Decreased Sodium Diet  Medical Food Supplement Therapy    Recommendations (treatment strategy):  Dysphagia Mechanical Soft Diet  Cardiac Diet  Boost Plus with meals    Nutrition Diagnosis Status:   New

## 2019-01-19 NOTE — PROGRESS NOTES
Called Dr. Tay. Pt still complaining of pain to testicles but is now pointing to pubic area as well. Will give toradol and continue to monitor

## 2019-01-19 NOTE — PLAN OF CARE
Pt began complaining about testicle pain. No meds due at this time. Called Dr. Tay. Norco order adjusted to Q4 PRN

## 2019-01-19 NOTE — PROGRESS NOTES
[facility]  Pulmonology  Progress Note    Patient Name: Radhames Alonzo  MRN: [id number]  Admission Date: 1/9/2019  Hospital Length of Stay: 10 days  Code Status: DNR  Attending Provider: Jesús Tay MD  Primary Care Provider: Pennie Jorge NP   Principal Problem: Severe sepsis    Subjective:     Interval History: looks not as well as yesterday      Objective:     Vital Signs (Most Recent):  Temp: 96.7 °F (35.9 °C) (01/19/19 1115)  Pulse: 100 (01/19/19 1315)  Resp: 15 (01/19/19 1315)  BP: 121/67 (01/19/19 1300)  SpO2: 100 % (01/19/19 1315) Vital Signs (24h Range):  Temp:  [95.5 °F (35.3 °C)-96.9 °F (36.1 °C)] 96.7 °F (35.9 °C)  Pulse:  [] 100  Resp:  [14-29] 15  SpO2:  [74 %-100 %] 100 %  BP: ()/() 121/67     Weight: 102 kg (224 lb 13.9 oz)(RD reviewed)  Body mass index is 29.67 kg/m².      Intake/Output Summary (Last 24 hours) at 1/19/2019 1328  Last data filed at 1/19/2019 1315  Gross per 24 hour   Intake 890 ml   Output 1300 ml   Net -410 ml       Physical Exam   Constitutional: He is oriented to person, place, and time. He appears well-developed and well-nourished. He is cooperative.  Non-toxic appearance. He does not appear ill. No distress.   HENT:   Head: Normocephalic and atraumatic.   Right Ear: Hearing, tympanic membrane, external ear and ear canal normal.   Left Ear: Hearing, tympanic membrane, external ear and ear canal normal.   Nose: Nose normal. No mucosal edema, rhinorrhea or nasal deformity. No epistaxis. Right sinus exhibits no maxillary sinus tenderness and no frontal sinus tenderness. Left sinus exhibits no maxillary sinus tenderness and no frontal sinus tenderness.   Mouth/Throat: Uvula is midline, oropharynx is clear and moist and mucous membranes are normal. No trismus in the jaw. Normal dentition. No uvula swelling. No posterior oropharyngeal erythema.   Eyes: Conjunctivae and lids are normal. No scleral icterus.   Sclera clear bilat   Neck:  Trachea normal, full passive range of motion without pain and phonation normal. Neck supple.   Cardiovascular: Normal rate, regular rhythm, normal heart sounds, intact distal pulses and normal pulses.   Pulmonary/Chest: He is in respiratory distress (mild to moderate). He has decreased breath sounds in the right middle field, the right lower field, the left middle field and the left lower field. He has wheezes in the right lower field and the left lower field. He has rhonchi in the right lower field and the left lower field.   Abdominal: Soft. Normal appearance and bowel sounds are normal. He exhibits no distension. There is no tenderness.   Musculoskeletal: Normal range of motion. He exhibits no edema or deformity.   Neurological: He is alert and oriented to person, place, and time. He exhibits normal muscle tone. Coordination normal.   Skin: Skin is warm, dry and intact. He is not diaphoretic. No pallor.   Psychiatric: He has a normal mood and affect. His speech is normal and behavior is normal. Judgment and thought content normal. Cognition and memory are normal.   Nursing note and vitals reviewed.      Vents:  Oxygen Concentration (%): 30 (01/19/19 0701)    Lines/Drains/Airways     Drain                 Urethral Catheter 01/15/19 1215 Latex 16 Fr. 4 days          Peripheral Intravenous Line                 Peripheral IV - Single Lumen 01/17/19 Anterior;Right Forearm 2 days                Significant Labs:    CBC/Anemia Profile:  Recent Labs   Lab 01/18/19  0308 01/19/19  0345   WBC 9.46 12.24   HGB 11.5* 11.1*   HCT 37.5* 36.6*    179   * 103*   RDW 13.8 13.8        Chemistries:  Recent Labs   Lab 01/18/19  0308 01/19/19  0345    137   K 4.6 5.2*    102   CO2 27 29   BUN 22 28*   CREATININE 1.0 1.0   CALCIUM 9.0 9.3   ALBUMIN 2.9* 2.9*   PROT 6.5 6.3   BILITOT 1.3* 1.4*   ALKPHOS 56 55   ALT 35 38   AST 35 31       All pertinent labs within the past 24 hours have been  reviewed.    Significant Imaging:  I have reviewed all pertinent imaging results/findings within the past 24 hours.    Assessment/Plan:     Encephalopathy, metabolic    Yesterday pt was very appreciative and interactive was going and did wear bipap but todAY is not without change in his metabolicsttais except WBC up 4000     Emphysema/COPD    moderaTE COPD      Pulmonary heart disease    Third spacing fluid in arms and legs due to profound Right sided heart failure      Chronic respiratory failure with hypoxia and hypercapnia    Waxing and  Wanes in thought processes does not want bipap very verbally and physically against wearing bipap   In view of this will not pursue NIV at home   Patient now requires a  Home Ventilator due to chronic Respirator failure copd Cor pulmonale and pulmonary heart disease and  this will decrease hospitalizations ,Decrease Work of Breathing,extend his life  and improve the Paients Quality of life.Without this form of ventilation it could lead to patient harm or death.Pressure support with Safety tidal volume mode of therapy in this patient will optimize Gas Exchange. Bipap is not reliable due to patients condition.           Now wants home ventilator and is willing to use   Patient is compliant with home respiratory regimen of Duoneb but remains symptomatic. As stated above patient was initially against trying hospital BiPap, but did and tolerated mask well but not completely improved on bipap. However patient has not had sufficient improvement on Bipap with symptoms and thus requiring noninvasive home ventilator      Sukh Robertson MD  Pulmonology  [facility]

## 2019-01-19 NOTE — PLAN OF CARE
Problem: Adult Inpatient Plan of Care  Goal: Plan of Care Review  Outcome: Ongoing (interventions implemented as appropriate)  Nutrition Goals: adequate po intake >=75% while admitted providing comfort  Nutrition Goal Status: new  Communication of RD Recs: reviewed with RN    Nutrition Discharge Planning: Comfort care with cardiac diet with consistencies per SLP

## 2019-01-19 NOTE — NURSING
Report received from Kian PENA. Pt resting in bed with NC at 2L. Wife at bedside as well. Respiratory in room setting up Bipap for the night. No needs at this time. Call bell at bedside. Hein patent and hanging below bladder level. Bed alarm on.

## 2019-01-19 NOTE — ASSESSMENT & PLAN NOTE
Yesterday pt was very appreciative and interactive was going and did wear bipap but todAY is not without change in his metabolicsttais except WBC up 4000

## 2019-01-19 NOTE — ASSESSMENT & PLAN NOTE
He has bipap ordered. He is mostly refusing. I will not restrain this 84 year old man and force him to wear bipap if he is not willing. He is a DNR.   Will see how the day goes and see if he complies with bipap. However, if he continues to refuse, we need to talk to his family about hospice to make him comfortable

## 2019-01-19 NOTE — PROGRESS NOTES
"[facility]  Adult Nutrition  Progress Note    SUMMARY       Recommendations    Recommendation/Intervention: Continue current diet per SLP encouraging intake. Thickener added to trays per RN request. Add Boost Plus with meals for additional nutrition support. RD to monitor  Goals: adequate po intake >=75% while admitted providing comfort  Nutrition Goal Status: new  Communication of RD Recs: reviewed with RN    Nutrition Discharge Planning: Comfort care with cardiac diet with consistencies per SLP    Reason for Assessment    Reason For Assessment: length of stay  Diagnosis: cardiac disease, pulmonary disease  Relevant Medical History: Afib, HTN, GOUT, Cardiomyopathy  General Information Comments: Pt lethargic this am, refusing to eat per RN, pt now more alert will try lunch today. Rn reported pt has been refusing Bipap and requesting to be left alone. Prior to this AM, pt consumed 100% of meals. RN requested thickener for liquids as pt having difficulty with thin liquids. +wound left foot. now DNR. NFPE not appropriate due to pt refusing care and request for comfort care.      Nutrition Risk Screen    Nutrition Risk Screen: no indicators present    Nutrition/Diet History    Spiritual, Cultural Beliefs, Faith Practices, Values that Affect Care: no  Food Allergies: NKFA  Factors Affecting Nutritional Intake: chewing difficulties/inability to chew food, difficulty/impaired swallowing, behavioral (mealtime)    Anthropometrics    Temp: 96.7 °F (35.9 °C)  Height Method: Stated  Height: 6' 1" (185.4 cm)  Height (inches): 73 in  Weight Method: Bed Scale  Weight: 102 kg (224 lb 13.9 oz)(RD reviewed)  Weight (lb): 224.87 lb  Ideal Body Weight (IBW), Male: 184 lb  % Ideal Body Weight, Male (lb): 122.21 lb  BMI (Calculated): 29.7  BMI Grade: 25 - 29.9 - overweight  Usual Body Weight (UBW), k.8 kg(admit wt)  % Usual Body Weight: 107.82  % Weight Change From Usual Weight: 7.59 %   "     Lab/Procedures/Meds    Pertinent Labs Reviewed: reviewed  Pertinent Medications Reviewed: reviewed    Scheduled Meds:   bicalutamide  50 mg Oral Daily    ceFEPime (MAXIPIME) IVPB  1 g Intravenous Q8H    dabigatran etexilate  150 mg Oral BID    furosemide  20 mg Intravenous BID    levalbuterol  1.25 mg Nebulization Q6H WAKE    methylPREDNISolone sodium succinate  80 mg Intravenous Q8H    metoprolol  5 mg Intravenous Q6H    tamsulosin  0.4 mg Oral Daily    vancomycin (VANCOCIN) IVPB  1,500 mg Intravenous Q24H     Continuous Infusions:  PRN Meds:.acetaminophen, haloperidol lactate, HYDROcodone-acetaminophen, ketorolac, levalbuterol, LORazepam, polyethylene glycol     Recent Labs   Lab 01/19/19  0345   CALCIUM 9.3   PROT 6.3      K 5.2*   CO2 29      BUN 28*   CREATININE 1.0   ALKPHOS 55   ALT 38   AST 31   BILITOT 1.4*       Estimated/Assessed Needs    Weight Used For Calorie Calculations: 102 kg (224 lb 13.9 oz)  Energy Calorie Requirements (kcal): 2116  Energy Need Method: [place](x1.2)  Protein Requirements: 102-122(1.0-1.2)  Weight Used For Protein Calculations: 102 kg (224 lb 13.9 oz)     Estimated Fluid Requirement Method: RDA Method  RDA Method (mL): 2116         Nutrition Prescription Ordered    Current Diet Order: Dysphagia Mechanical Soft, Cardiac  Nutrition Order Comments: Thin Liquids, No Straws    Evaluation of Received Nutrient/Fluid Intake    Energy Calories Required: not meeting needs  Protein Required: not meeting needs  Fluid Required: not meeting needs  Comments: refusal of intake this AM  % Intake of Estimated Energy Needs: 0 - 25 %  % Meal Intake: 0 - 25 %    Nutrition Risk    Level of Risk/Frequency of Follow-up: comfort care     Assessment and Plan    Cardiomyopathy    Contributing Nutrition Diagnosis  Inadequate Energy Intake    Related to (etiology):   decreased intake of meals    Signs and Symptoms (as evidenced by):   Pt refusal of intake this am      Interventions  Mechanically Altered Diet  Decreased Sodium Diet  Medical Food Supplement Therapy    Recommendations (treatment strategy):  Dysphagia Mechanical Soft Diet  Cardiac Diet  Boost Plus with meals    Nutrition Diagnosis Status:   New            Monitor and Evaluation    Food and Nutrient Intake: energy intake  Food and Nutrient Adminstration: diet order  Knowledge/Beliefs/Attitudes: food and nutrition knowledge/skill, beliefs and attitudes  Physical Activity and Function: nutrition-related ADLs and IADLs  Anthropometric Measurements: weight, weight change  Biochemical Data, Medical Tests and Procedures: electrolyte and renal panel  Nutrition-Focused Physical Findings: overall appearance     Malnutrition Assessment  Will attempt at follow-up    Nutrition Follow-Up    RD Follow-up?: Yes

## 2019-01-20 LAB
ALBUMIN SERPL BCP-MCNC: 2.7 G/DL
ALP SERPL-CCNC: 49 U/L
ALT SERPL W/O P-5'-P-CCNC: 35 U/L
ANION GAP SERPL CALC-SCNC: 8 MMOL/L
AST SERPL-CCNC: 24 U/L
BASOPHILS # BLD AUTO: 0 K/UL
BASOPHILS NFR BLD: 0 %
BILIRUB SERPL-MCNC: 1.2 MG/DL
BNP SERPL-MCNC: 227 PG/ML
BUN SERPL-MCNC: 36 MG/DL
CALCIUM SERPL-MCNC: 8.9 MG/DL
CHLORIDE SERPL-SCNC: 103 MMOL/L
CO2 SERPL-SCNC: 26 MMOL/L
CREAT SERPL-MCNC: 1.4 MG/DL
DIFFERENTIAL METHOD: ABNORMAL
EOSINOPHIL # BLD AUTO: 0 K/UL
EOSINOPHIL NFR BLD: 0 %
ERYTHROCYTE [DISTWIDTH] IN BLOOD BY AUTOMATED COUNT: 13.7 %
EST. GFR  (AFRICAN AMERICAN): 53 ML/MIN/1.73 M^2
EST. GFR  (NON AFRICAN AMERICAN): 46 ML/MIN/1.73 M^2
GLUCOSE SERPL-MCNC: 149 MG/DL
HCT VFR BLD AUTO: 32.9 %
HGB BLD-MCNC: 10.3 G/DL
LYMPHOCYTES # BLD AUTO: 0.5 K/UL
LYMPHOCYTES NFR BLD: 4.1 %
MCH RBC QN AUTO: 32 PG
MCHC RBC AUTO-ENTMCNC: 31.3 G/DL
MCV RBC AUTO: 102 FL
MONOCYTES # BLD AUTO: 0.4 K/UL
MONOCYTES NFR BLD: 3.5 %
NEUTROPHILS # BLD AUTO: 10.6 K/UL
NEUTROPHILS NFR BLD: 92.4 %
PLATELET # BLD AUTO: 173 K/UL
PMV BLD AUTO: 9.7 FL
POTASSIUM SERPL-SCNC: 4.6 MMOL/L
PROT SERPL-MCNC: 5.7 G/DL
RBC # BLD AUTO: 3.22 M/UL
SODIUM SERPL-SCNC: 137 MMOL/L
WBC # BLD AUTO: 11.44 K/UL

## 2019-01-20 PROCEDURE — 99233 SBSQ HOSP IP/OBS HIGH 50: CPT | Mod: ,,, | Performed by: FAMILY MEDICINE

## 2019-01-20 PROCEDURE — 25000003 PHARM REV CODE 250: Performed by: NURSE PRACTITIONER

## 2019-01-20 PROCEDURE — 25000242 PHARM REV CODE 250 ALT 637 W/ HCPCS: Performed by: NURSE PRACTITIONER

## 2019-01-20 PROCEDURE — 25000003 PHARM REV CODE 250: Performed by: FAMILY MEDICINE

## 2019-01-20 PROCEDURE — 94761 N-INVAS EAR/PLS OXIMETRY MLT: CPT

## 2019-01-20 PROCEDURE — 85025 COMPLETE CBC W/AUTO DIFF WBC: CPT

## 2019-01-20 PROCEDURE — 99900035 HC TECH TIME PER 15 MIN (STAT)

## 2019-01-20 PROCEDURE — 80053 COMPREHEN METABOLIC PANEL: CPT

## 2019-01-20 PROCEDURE — 63600175 PHARM REV CODE 636 W HCPCS: Performed by: INTERNAL MEDICINE

## 2019-01-20 PROCEDURE — 27000221 HC OXYGEN, UP TO 24 HOURS

## 2019-01-20 PROCEDURE — 63600175 PHARM REV CODE 636 W HCPCS: Performed by: FAMILY MEDICINE

## 2019-01-20 PROCEDURE — 99233 PR SUBSEQUENT HOSPITAL CARE,LEVL III: ICD-10-PCS | Mod: ,,, | Performed by: FAMILY MEDICINE

## 2019-01-20 PROCEDURE — 11000001 HC ACUTE MED/SURG PRIVATE ROOM

## 2019-01-20 PROCEDURE — 36415 COLL VENOUS BLD VENIPUNCTURE: CPT

## 2019-01-20 PROCEDURE — 94668 MNPJ CHEST WALL SBSQ: CPT

## 2019-01-20 PROCEDURE — 25000003 PHARM REV CODE 250: Performed by: INTERNAL MEDICINE

## 2019-01-20 PROCEDURE — 83880 ASSAY OF NATRIURETIC PEPTIDE: CPT

## 2019-01-20 PROCEDURE — 94640 AIRWAY INHALATION TREATMENT: CPT

## 2019-01-20 PROCEDURE — 94660 CPAP INITIATION&MGMT: CPT

## 2019-01-20 RX ORDER — METOPROLOL SUCCINATE 50 MG/1
100 TABLET, EXTENDED RELEASE ORAL DAILY
Status: DISCONTINUED | OUTPATIENT
Start: 2019-01-20 | End: 2019-01-24 | Stop reason: HOSPADM

## 2019-01-20 RX ADMIN — TAMSULOSIN HYDROCHLORIDE 0.4 MG: 0.4 CAPSULE ORAL at 08:01

## 2019-01-20 RX ADMIN — METOPROLOL TARTRATE 5 MG: 5 INJECTION, SOLUTION INTRAVENOUS at 12:01

## 2019-01-20 RX ADMIN — BICALUTAMIDE 50 MG: 50 TABLET, FILM COATED ORAL at 08:01

## 2019-01-20 RX ADMIN — CEFEPIME HYDROCHLORIDE 1 G: 1 INJECTION, SOLUTION INTRAVENOUS at 08:01

## 2019-01-20 RX ADMIN — METHYLPREDNISOLONE SODIUM SUCCINATE 80 MG: 40 INJECTION, POWDER, FOR SOLUTION INTRAMUSCULAR; INTRAVENOUS at 01:01

## 2019-01-20 RX ADMIN — DABIGATRAN ETEXILATE MESYLATE 150 MG: 75 CAPSULE ORAL at 08:01

## 2019-01-20 RX ADMIN — LEVALBUTEROL 1.25 MG: 1.25 SOLUTION, CONCENTRATE RESPIRATORY (INHALATION) at 07:01

## 2019-01-20 RX ADMIN — CEFEPIME HYDROCHLORIDE 1 G: 1 INJECTION, SOLUTION INTRAVENOUS at 05:01

## 2019-01-20 RX ADMIN — CEFEPIME HYDROCHLORIDE 1 G: 1 INJECTION, SOLUTION INTRAVENOUS at 01:01

## 2019-01-20 RX ADMIN — METHYLPREDNISOLONE SODIUM SUCCINATE 80 MG: 40 INJECTION, POWDER, FOR SOLUTION INTRAMUSCULAR; INTRAVENOUS at 08:01

## 2019-01-20 RX ADMIN — HYDROCODONE BITARTRATE AND ACETAMINOPHEN 1 TABLET: 5; 325 TABLET ORAL at 01:01

## 2019-01-20 RX ADMIN — VANCOMYCIN 1500 MG: 1 INJECTION, SOLUTION INTRAVENOUS at 04:01

## 2019-01-20 RX ADMIN — KETOROLAC TROMETHAMINE 15 MG: 15 INJECTION, SOLUTION INTRAMUSCULAR; INTRAVENOUS at 02:01

## 2019-01-20 RX ADMIN — FUROSEMIDE 20 MG: 10 INJECTION, SOLUTION INTRAMUSCULAR; INTRAVENOUS at 08:01

## 2019-01-20 RX ADMIN — METOPROLOL SUCCINATE 100 MG: 50 TABLET, EXTENDED RELEASE ORAL at 09:01

## 2019-01-20 RX ADMIN — METOPROLOL TARTRATE 5 MG: 5 INJECTION, SOLUTION INTRAVENOUS at 05:01

## 2019-01-20 RX ADMIN — HYDROCODONE BITARTRATE AND ACETAMINOPHEN 1 TABLET: 5; 325 TABLET ORAL at 09:01

## 2019-01-20 RX ADMIN — METHYLPREDNISOLONE SODIUM SUCCINATE 80 MG: 40 INJECTION, POWDER, FOR SOLUTION INTRAMUSCULAR; INTRAVENOUS at 05:01

## 2019-01-20 RX ADMIN — LEVALBUTEROL 1.25 MG: 1.25 SOLUTION, CONCENTRATE RESPIRATORY (INHALATION) at 01:01

## 2019-01-20 NOTE — ASSESSMENT & PLAN NOTE
"Getting bigger on CXR from 1/14/20  CT ordered--will do when stabilized. I think today(1/20/19) is the first day he is well enough to try and get this done. Will attempt    ? Underlying consolidation. ? Empyema  U/S ordered---"no underlying debris to suggest complicated effusion."  King ewa   Will continue VANC( day 6) as he has shown some improvement when this was started on the 15th.  Cefepime day 12. Initially started for LE cellulitis that has since resolved. Will continue for presumed LLL pneumonia, but need a CT to further characterize this and the associated effusion on CXR.   "

## 2019-01-20 NOTE — ASSESSMENT & PLAN NOTE
Rate controlled. On metoprolol    He reports that he is taking pradaxa but called pharmacy and they do not report that he has filled anything for anticoagulation especially pradaxa in recent months. Start lovenox 1mg/kg BID until we get his home meds    [facility] was [facility], will restart at d/c    1/15/19: tachycardic despite BB and now hypotensive. Holding BB and all BP meds. Sinus on telemetry right now. Cont pradaxa for now but may stop again and switch back to lovenox today if continues to worsen and pending labs      1/16/19  Continue pradaxa    1/17/19  Will resume BB today. For most part he is rate controlled.    1/19/19  Continue BB, increase to 100mg     1/20  Metoprolol switched to IV yesterday b/c was somnolent. Switch back to po, 100mg daily

## 2019-01-20 NOTE — ASSESSMENT & PLAN NOTE
Waxing and waning mental status  Overnight thought to be sundowning but his AM dramatic waxing and waning just during our 10 minute conversation. Went form alert to very lethargic. + jaundice  Hypotensive, tachycardic, hypothermic (95.6 axillary)  Sepsis vs hepatic    1/1619  LFTs, lactic acid unremarkable  I think this may be from his hypercapnea and refusal to wear bipap   Will get CT head to rule out CVA    1/17/19  Metabolic? He is VERY alert and appropriate today. CT head on hold. Need to be cognizant of the fact that he has prostate cancer, though.    1/18/19  Alert this am. He wore his bipap for the better part of the night last night     1/19/19  He is lethargic this am. This is likely CO2 narcosis as he hasn't worn his bipap much overnight. Also he has some ativan on board. Will see if we can get him to wear bipap today, but I will not restrain and force him to do so. That wouldn't be humane in this 84 year old DNR patient.     1/20/19  He waxes and wanes. Today is a good day. I think ativan had a lot to do with his altered mental state yesterday. Ativan has been discontinued. Haldol prn agitation, but honestly, if we dont force the bipap on him, he doesn't get agitated.

## 2019-01-20 NOTE — PROGRESS NOTES
"[facility] Medicine  Progress Note    Patient Name: Radhames Alonzo  MRN: [id number]  Patient Class: IP- Inpatient   Admission Date: 1/9/2019  Length of Stay: 11 days  Attending Physician: Jesús Tay MD  Primary Care Provider: Pennie Jorge NP        Subjective:     Principal Problem:Severe sepsis    HPI:  84 year old male presents to ED b/c " I couldn't get up and walk."   SOB for about a year. Worse recently. Last night it got to the point that he was too SOB to stand up.   SOB at rest and with eating.   He also notes left foot pain. He has been seeing woundcare for a wound on this foot last 7 months   Workup in ED is significant for BNP 600s. Last BNP 200s 3 weeks ago. 160s 9 months ago.  He sees Dr. Jean-Baptiste regularly.   [facility] has seen pt and rec admit for diuresis, rule out MI   First TPN with slight bump.038  Lactic acid is 3.6. Meets I am told by ED MD that patient "has no signs of infection on physical exam." I am also told that CXR and U/A are clear.     He drinks six pack daily. Last drink 2 days ago. No h/o DT's in past         Hospital Course:  He has been started on cefepime and clinda for left lower ext cellulitis. He has been afebrile. WBC was 06865. Blood cultures NGTD. Lactate was up to 3.8, coming down.RR was 24 and had low sats on admission. Was on venti, now down to 3L NC   He is also wheezing. C/o SOB. CXR with CHF.   He does report that he takes pradaxa for his a fib at home. Not on home meds list nor reordered here. A fib on EKG. BNP was 659. Ordered for lasix 80mg IV BID. Urinating a lot. K low this am 2.8    1/11/19 Getting lasix 80mg IV bid per Cardiology; not much urine output yesterday with dribbling noted; concern for obstruction; will get bladder scan; consider renal US.   Creat 0.9 stable; K+ 3.6    CPK 2378>1762; TNI 0.032 x 1; repeat this am     1-12 Pt continues to have SOB and pul congestion; Dr Robertson consulted    1-13 Pt is fairly complex with mx " cardiac/pulmonary issues causing his coughing and SOB; has a hx of underlying prostate problems; a swing bed might be necessary; Dr Robertson and cardiology following    1/14 pt is still on clinda and cefepime for the lower ext cellulitis. Afebrile/ no elevated WBC. US negative for DVT. Still swollen but reports that it is much better than his normal   CXR 1/812 Left basilar consolidation or atelectasis and small pleural effusion. He is 95% on RA  Still weak. Having trouble standing on side bed.     1/15/19 Pt started feeling worse yesterday evening; this am noted to have LOW BP- SBP 80s, increased RR 30s, Temp 95.6,   Dr. Jean-Baptiste reports HF better, BNP 200s, still with LE edema, US negative for DVT; Also gets pradaxa  Underlying chronic prostate problems; defers any intervention or sx   Day 7/10 Cefepime and clinda for LE cellulitis and pneumonia. Blood cultures negative.   Looks jaundice with sclerema icterus; admit bilirubin - 2.0 elevated on 1/9/19 ; check LFTs/CMP/bilirubin    1/15/19: patient with worsening respiratory status overnight. He was tried on BiPaP but refused to wear it. Was agitated during the night. He was abdominal breathing but maintainings sats on 2L NC. BNP normal. D-dimer normal and anticoagulated since admit, restarted PO pradaxa yesterday. This AM, continues to decompensated. He is alert and oriented on initial eval but over the course of our conversation decompensates and becomes very lethargic. He has been waxing and waning throughout the night per RN report. Unclear if has h/o cirrhosis but on chart review this AM his bilirubin 2 and mild elevation AST--this was not trended during his hospital stay. This AM he has scleral icterus and yellowing of skin which was NOT PRESENT YESTERDAY. Also his LLE cellulitis is worsening, redness extending up the shin to the knee today which was NOT PRESENT YESTERDAY. He is hypothermic, tachypenic, hypotensive and tachycardic (despite BB on board).      1/16/19  Transferred up to ICU yesterday for hypotension and AMS on the floor. NH4 level 24. Bolused 1 liter NS. Did not require pressors. This am 120s/70s  UOP is good. Abx changed from cefepime and clinda to cefepime and VANC. Afebrile.  ABG ordered yesterday. He does have some hypercapnea. Refuses to wear bipap.    1/17/19  Patient became more alert and arousable throughout the day yesterday. Still with issues lying flat (which is why his imaging was not completed yesterday). However, his BP is improved without his home meds. He is tolerating his abx well. He did wear Bipap VERY little last night, but did not sleep because of it. UOP is good. No fevers.     1/18/19  He is awake and alert this am. He wore his bipap overnight   Tachy but BP is stable   Increased edema this am     1/19/19  He is very somnolent this am. Fought with him overnight to get bipap. Required ativan. He is sleepy this am   He is now a DNR     1/20/19  Steroids increased and IV Lasix added yesterday  He wore bipap about an hour last night   He is more awake today, no ativan given.   Spoke about hospice yesterday since he largely refuses the Bipap. Pt family is in agreement with hospice if he deteriorates or fails to improve   Out of bed and eating breakfast today        Interval History: see HC     Review of Systems   Constitutional: Positive for fatigue. Negative for fever.   Respiratory: Positive for shortness of breath. Negative for wheezing.    Cardiovascular: Positive for leg swelling. Negative for chest pain.   Gastrointestinal: Negative for vomiting.   Genitourinary: Negative for decreased urine volume.   Skin: Positive for wound. Negative for color change.   Psychiatric/Behavioral: Negative for agitation.     Objective:     Vital Signs (Most Recent):  Temp: 96.3 °F (35.7 °C) (01/20/19 0736)  Pulse: 103 (01/20/19 0900)  Resp: (!) 23 (01/20/19 0900)  BP: 115/75 (01/20/19 0800)  SpO2: 98 % (01/20/19 0800) Vital Signs (24h  Range):  Temp:  [95.9 °F (35.5 °C)-96.7 °F (35.9 °C)] 96.3 °F (35.7 °C)  Pulse:  [] 103  Resp:  [14-29] 23  SpO2:  [74 %-100 %] 98 %  BP: (108-135)/(64-95) 115/75     Weight: 102 kg (224 lb 13.9 oz)(RD reviewed)  Body mass index is 29.67 kg/m².    Intake/Output Summary (Last 24 hours) at 1/20/2019 0911  Last data filed at 1/20/2019 0700  Gross per 24 hour   Intake 1050 ml   Output 1660 ml   Net -610 ml      Physical Exam   Constitutional: He is oriented to person, place, and time. He appears well-developed and well-nourished. No distress.   Awake. Eating breakfast    HENT:   Head: Normocephalic and atraumatic.   Right Ear: External ear normal.   Left Ear: External ear normal.   Nose: Nose normal.   Mouth/Throat: Oropharynx is clear and moist.   Eyes: Conjunctivae and EOM are normal. Pupils are equal, round, and reactive to light. Right eye exhibits no discharge. Left eye exhibits no discharge. No scleral icterus.   Neck: Normal range of motion. Neck supple. No JVD present. No tracheal deviation present. No thyromegaly present.   Cardiovascular: Normal rate, regular rhythm, normal heart sounds and intact distal pulses.   No murmur heard.  Pulmonary/Chest: Effort normal. No respiratory distress. He has no wheezes. He has no rales. He exhibits no tenderness.       Abdominal: Soft. Bowel sounds are normal. He exhibits no distension and no mass. There is no tenderness. There is no rebound and no guarding.   Periumbilical venous congestion/prominence    Musculoskeletal: Normal range of motion.   Lymphadenopathy:     He has no cervical adenopathy.   Neurological: He is alert and oriented to person, place, and time. He has normal reflexes. He displays normal reflexes. No cranial nerve deficit. He exhibits normal muscle tone. Coordination normal.   Skin: Skin is warm and dry. No erythema.   LLE dressing c/d/i   Psychiatric: He has a normal mood and affect. His behavior is normal. Judgment and thought content normal.        Significant Labs:   CBC:   Recent Labs   Lab 01/19/19  0345 01/20/19  0410   WBC 12.24 11.44   HGB 11.1* 10.3*   HCT 36.6* 32.9*    173     CMP:   Recent Labs   Lab 01/19/19  0345 01/20/19 0410    137   K 5.2* 4.6    103   CO2 29 26   * 149*   BUN 28* 36*   CREATININE 1.0 1.4   CALCIUM 9.3 8.9   PROT 6.3 5.7*   ALBUMIN 2.9* 2.7*   BILITOT 1.4* 1.2*   ALKPHOS 55 49*   AST 31 24   ALT 38 35   ANIONGAP 6* 8   EGFRNONAA >60 46*     Cardiac Markers:   Recent Labs   Lab 01/20/19 0410   *     Magnesium: No results for input(s): MG in the last 48 hours.  All pertinent labs within the past 24 hours have been reviewed.    Significant Imaging: I have reviewed and interpreted all pertinent imaging results/findings within the past 24 hours.    Assessment/Plan:      * Severe sepsis    Due to Venous stasis and wound of LLE   Start Cefepime and clinda to cover for everything including MRSA and psuedomonas.   Clinda instead of Vanc since mild CKD and we will diurese him as well   Lactic acid q 6 coming down  Bolus 2 liters overnight creat 0.9 bp 111/66 this am  woundcare consult     1-12 legs are down but still pul congestion    1-13 Not retaining as much fluids, but still SOB and coughing; burns when he urinates from his Prostate issues(Ca)    1-14 better/resolved    1-15: worsening status this AM. + confusion, hypotension, tachycardia. LLE redness is worsening from yesterday despite antibx. Should be well covered with clida and cefepime so puzzling. When stable consider imaging of leg to look for deeper infection    1/16:  Continue cefepime and VANC. CT chest today to check for empyema/underlying infiltrate. CXR not adequate to determine    1/17  At this point he was switched to vanc 1 day ago and is improved. Won't be able to lie flat for CT. Will go ahead and u/s chest to r/o empyema. But it seems as though he will need 7 days of iv vanc.    1/18  Continue VANC day 4 and cefepime day 10  "    1/19  Continue VANC day 5 and cefepime Day 11     1/20  Continue VANC day 6 and cefepime Day 12      Encephalopathy, metabolic           Emphysema/COPD    Increased solumedrol to 80 q 8 1/19/20---This has helped significantly, will continue this dose for now.   Encourage bipap use        Pulmonary heart disease           Chronic respiratory failure with hypoxia and hypercapnia    He has bipap ordered. He is mostly refusing. I will not restrain this 84 year old man and force him to wear bipap if he is not willing. He is a DNR.   1/20/19---Since he is a DNR and mostly refusing bipap, the plan was to start hospice today unless he showed improvement. Remarkably, he has. He looks much better today. We will downgrade him to the floor later today.  However, if he deteriorates or fails to continue to show improvement, I strongly recommend getting hospice involved. This was discussed with wife and his sons Dequan and Lele.  They are in agreement with this. He remains a DNR.      Dysphagia    Likely secondary to mental status on 1/16 ST following. On a diet      Venous stasis ulcer    Wound care.  Cont to apply silver       Pleural effusion on left    Getting bigger on CXR from 1/14/20  CT ordered--will do when stabilized. I think today(1/20/19) is the first day he is well enough to try and get this done. Will attempt    ? Underlying consolidation. ? Empyema  U/S ordered---"no underlying debris to suggest complicated effusion."  King ewa   Will continue VANC( day 6) as he has shown some improvement when this was started on the 15th.  Cefepime day 12. Initially started for LE cellulitis that has since resolved. Will continue for presumed LLL pneumonia, but need a CT to further characterize this and the associated effusion on CXR.      Encephalopathy    Waxing and waning mental status  Overnight thought to be sundowning but his AM dramatic waxing and waning just during our 10 minute conversation. Went form alert " to very lethargic. + jaundice  Hypotensive, tachycardic, hypothermic (95.6 axillary)  Sepsis vs hepatic    1/1619  LFTs, lactic acid unremarkable  I think this may be from his hypercapnea and refusal to wear bipap   Will get CT head to rule out CVA    1/17/19  Metabolic? He is VERY alert and appropriate today. CT head on hold. Need to be cognizant of the fact that he has prostate cancer, though.    1/18/19  Alert this am. He wore his bipap for the better part of the night last night     1/19/19  He is lethargic this am. This is likely CO2 narcosis as he hasn't worn his bipap much overnight. Also he has some ativan on board. Will see if we can get him to wear bipap today, but I will not restrain and force him to do so. That wouldn't be humane in this 84 year old DNR patient.     1/20/19  He waxes and wanes. Today is a good day. I think ativan had a lot to do with his altered mental state yesterday. Ativan has been discontinued. Haldol prn agitation, but honestly, if we dont force the bipap on him, he doesn't get agitated.        Benign prostatic hyperplasia (BPH) with post-void dribbling      Resume flomax and casodex--with waxing and waning mental status may remove PO meds  Has a valdes currently. Will continue      Pneumonia    Need to assess if truly hypoxic. No low sats charted but has been on venti and now 4L NC. Will try to wean. Repeat CXR.   1/11/19 O2 sat 95-99%     1-12 Pt is labored with his breathing and continuing to cough; Dr Robertson consulted  1-14 no longer needing O2, cont cefepime and clinda x 10 days, cont nebs,stop steroids    1/15/19: marked change in respiratory status this morning, was worsening overnight. He is oxygenating, sats good on 2L and ABG shows good sats. CXR with enlarging left pleural effusion otherwise normal. CT ordered and will do once stabilized--? Underlying consolidation with empyema. Looking for loculations, etc. Consider thoracentesis. Could explain his worsening status. He is  in respiratory distress and abdominal breathing. Taking off BiPaP. He is a FULL CODE.    1/16/19:  Continue VANC/Cefepime. He is considering DNR . Told Dr. Jean-Baptiste no to resuscitation. We need to get him to sign a DNR     1/17/19  Patient seems to have staph pneumonia as he has improved with add'l coverage. Check u/s to r/o empyema    1/18/19  Improved clinically. Continue VANC cefepime     1/19/19  Continue VANC and Cefepime     1/20/20  Continue VANC(day6) and cefepime(day 12, initially started for LLE cellulitis at admit but treating pneumonia at least since the 10th). Hoping to get CT chest done today to further characterize this effusion and infiltrate which is poorly characterized on x ray.        Cellulitis of left lower extremity    See severe sepsis treatment  Check u/s  Negative for bilateral lower extremity DVT.    cont cefepime and clinda x 10 days wound care Cleanse wound with NS using gauze. Apply silver alginate to wound bed, cover with ABD and secure with tape daily.      1/15/19: marked worsening of erythema from yesterday despite antibx. Puzzling why the sudden change. D-dimer negative and anticoagulated so low suspicion for DVT. Worsening vitals as well (see sepsis)        1/16/19  Leg is looking much better already     1/17/19  Much better, but still with small open wound. Wound care should see today.    1/19/19  Resolved   abx continued for LLL pneumonia          Hypokalemia    Likely due to torsemide use  Resolved      Rhabdomyolysis    Mild. Due to chronic ETOHism   2  liter bolus for sepsis should take care of it   CPK improved    1/15/19: not checked over last 5 days. Repeat CPK today. Restarting IVF   1/16/19: resolved      CHF (congestive heart failure)    1/18/19  [facility] consulted   ECHO in their records  Continue BB. No ACE currently. I presume this is from low BP during the week. Increasing BB. Add ACE in am if BP is stable     1/19/19  Will diurese cautiously today. Lasix 20mg IV BID      1/20  BB. Negative fluid balance. Creatinine bumping up slightly. D/c low dose IV Lasix. BNP, BMP daily   Add ARB when able              Atrial fibrillation    Rate controlled. On metoprolol    He reports that he is taking pradaxa but called pharmacy and they do not report that he has filled anything for anticoagulation especially pradaxa in recent months. Start lovenox 1mg/kg BID until we get his home meds    [facility] was [facility], will restart at d/c    1/15/19: tachycardic despite BB and now hypotensive. Holding BB and all BP meds. Sinus on telemetry right now. Cont pradaxa for now but may stop again and switch back to lovenox today if continues to worsen and pending labs      1/16/19  Continue pradaxa    1/17/19  Will resume BB today. For most part he is rate controlled.    1/19/19  Continue BB, increase to 100mg     1/20  Metoprolol switched to IV yesterday b/c was somnolent. Switch back to po, 100mg daily      Cardiomyopathy    Cardiology is following  Diastolic CHF: TTE December 2018 with LVEF 55%, mild LVH  Was started on diuretics  BNP is only 200s now  CXR with left pleural effusion but not severe pulmonary congestion  1/15/10: BP dropping, hold ACEi, BB and diuretics for now and give back IVF---careful for volume overload though.   1/16/19: resume BB and ACE per [facility]   1/17/18  Meds have not been resumed. Start at 25 of metoprolol rather than home dose of 100.    1/19/19  Increase BB to 100mg     1/20  Continue metoprolol 100mg. Resume ARB when BP able to tolerate        VTE Risk Mitigation (From admission, onward)        Ordered     dabigatran etexilate capsule 150 mg  2 times daily      01/14/19 1112     IP VTE HIGH RISK PATIENT  Once      01/09/19 1751          Critical care time spent on the evaluation and treatment of severe organ dysfunction, review of pertinent labs and imaging studies, discussions with consulting providers and discussions with patient/family: 40 minutes.    Jesús HYLTON  MD Maggi  Department of Hospital Medicine   [facility]

## 2019-01-20 NOTE — PROGRESS NOTES
Bipap removed per pt request. Placed on 2L NC sats holding in high 90s. Audibly wheezing but seems comfortable. Will continue to monitor

## 2019-01-20 NOTE — ASSESSMENT & PLAN NOTE
Due to Venous stasis and wound of LLE   Start Cefepime and clinda to cover for everything including MRSA and psuedomonas.   Clinda instead of Vanc since mild CKD and we will diurese him as well   Lactic acid q 6 coming down  Bolus 2 liters overnight creat 0.9 bp 111/66 this am  woundcare consult     1-12 legs are down but still pul congestion    1-13 Not retaining as much fluids, but still SOB and coughing; burns when he urinates from his Prostate issues(Ca)    1-14 better/resolved    1-15: worsening status this AM. + confusion, hypotension, tachycardia. LLE redness is worsening from yesterday despite antibx. Should be well covered with clida and cefepime so puzzling. When stable consider imaging of leg to look for deeper infection    1/16:  Continue cefepime and VANC. CT chest today to check for empyema/underlying infiltrate. CXR not adequate to determine    1/17  At this point he was switched to vanc 1 day ago and is improved. Won't be able to lie flat for CT. Will go ahead and u/s chest to r/o empyema. But it seems as though he will need 7 days of iv vanc.    1/18  Continue VANC day 4 and cefepime day 10     1/19  Continue VANC day 5 and cefepime Day 11     1/20  Continue VANC day 6 and cefepime Day 12

## 2019-01-20 NOTE — PROGRESS NOTES
Report received from Ree PENA. Pt requesting bipap at this time, stated he is having trouble breathing. More comfortable after bipap on. No needs at this time. Call bell at bedside.

## 2019-01-20 NOTE — NURSING
Arrived back with patient to ICU bed 2 from CT, placed back on bedside cardiac monitor and wall O2 at 2L NC. Tolerated procedure fair. Changed linens and also gown on arrival, also changed foam dressings/gauze roll to bilateral upper arms due to excessive weeping edema, serous fluids noted, dry flows changed and placed under bilateral arms, lower ext./heels elevated off bed as well. After discussing Bipap, patient agreed to wear it at this time, Placed on Bipap for rest period at ordered settings. Will continue to monitor. Transfer orders to Med/Surg noted per primary MD.

## 2019-01-20 NOTE — ASSESSMENT & PLAN NOTE
Tolerating bipap now calm 1/20/19  Waxing and  Wanes in thought processes does not want bipap very verbally and physically against wearing bipap   In view of this will not pursue NIV at home   Patient now requires a  Home Ventilator due to chronic Respirator failure copd Cor pulmonale and pulmonary heart disease and  this will decrease hospitalizations ,Decrease Work of Breathing,extend his life  and improve the Paients Quality of life.Without this form of ventilation it could lead to patient harm or death.Pressure support with Safety tidal volume mode of therapy in this patient will optimize Gas Exchange. Bipap is not reliable due to patients condition.

## 2019-01-20 NOTE — NURSING
Received bedside report from BECKY Breaux    Patient resting well at this time, physical to follow.

## 2019-01-20 NOTE — NURSING
Plan of Care reviewed with patient, son, and wife via discussion, verbalized understanding, no falls or near misses for shift, no new skin issues noted, PO medications for DVT prevention, able to stand at side of bed with standby assistance and walk with walker to bedside recliner, tolerates well, compliance with Bipap this shift, Dr Robertson, Dr Tay rounded and are aware of status, antibiotics as ordered IVPB, valdes cath for urine output monitoring, Lasix x 1 dose administered then was d/c by primary MD, Toprol XL dose re-ordered. Transferring to Med/Surg at this time per primary MD. DNR status.

## 2019-01-20 NOTE — ASSESSMENT & PLAN NOTE
Cor pulmonale secondary to chronic resp failure Third spacing fluid in arms and legs due to profound Right sided heart failure

## 2019-01-20 NOTE — NURSING
Resting well at this time, wife and son at bedside, updated as requested, also updated on Plan of Care and transfer pending to 3rd floor unit bed 304. Patient remains on Bipap at this time and tolerating well.

## 2019-01-20 NOTE — PLAN OF CARE
Pt awake alert and calm, tolerated po intake well. Denies c/o at present. VS remain stable. Wife at bedside questions answered. Continuing to monitor. No distress noted.

## 2019-01-20 NOTE — ASSESSMENT & PLAN NOTE
Cardiology is following  Diastolic CHF: TTE December 2018 with LVEF 55%, mild LVH  Was started on diuretics  BNP is only 200s now  CXR with left pleural effusion but not severe pulmonary congestion  1/15/10: BP dropping, hold ACEi, BB and diuretics for now and give back IVF---careful for volume overload though.   1/16/19: resume BB and ACE per [facility]   1/17/18  Meds have not been resumed. Start at 25 of metoprolol rather than home dose of 100.    1/19/19  Increase BB to 100mg     1/20  Continue metoprolol 100mg. Resume ARB when BP able to tolerate

## 2019-01-20 NOTE — SUBJECTIVE & OBJECTIVE
Interval History: see      Review of Systems   Constitutional: Positive for fatigue. Negative for fever.   Respiratory: Positive for shortness of breath. Negative for wheezing.    Cardiovascular: Positive for leg swelling. Negative for chest pain.   Gastrointestinal: Negative for vomiting.   Genitourinary: Negative for decreased urine volume.   Skin: Positive for wound. Negative for color change.   Psychiatric/Behavioral: Negative for agitation.     Objective:     Vital Signs (Most Recent):  Temp: 96.3 °F (35.7 °C) (01/20/19 0736)  Pulse: 103 (01/20/19 0900)  Resp: (!) 23 (01/20/19 0900)  BP: 115/75 (01/20/19 0800)  SpO2: 98 % (01/20/19 0800) Vital Signs (24h Range):  Temp:  [95.9 °F (35.5 °C)-96.7 °F (35.9 °C)] 96.3 °F (35.7 °C)  Pulse:  [] 103  Resp:  [14-29] 23  SpO2:  [74 %-100 %] 98 %  BP: (108-135)/(64-95) 115/75     Weight: 102 kg (224 lb 13.9 oz)(RD reviewed)  Body mass index is 29.67 kg/m².    Intake/Output Summary (Last 24 hours) at 1/20/2019 0911  Last data filed at 1/20/2019 0700  Gross per 24 hour   Intake 1050 ml   Output 1660 ml   Net -610 ml      Physical Exam   Constitutional: He is oriented to person, place, and time. He appears well-developed and well-nourished. No distress.   Awake. Eating breakfast    HENT:   Head: Normocephalic and atraumatic.   Right Ear: External ear normal.   Left Ear: External ear normal.   Nose: Nose normal.   Mouth/Throat: Oropharynx is clear and moist.   Eyes: Conjunctivae and EOM are normal. Pupils are equal, round, and reactive to light. Right eye exhibits no discharge. Left eye exhibits no discharge. No scleral icterus.   Neck: Normal range of motion. Neck supple. No JVD present. No tracheal deviation present. No thyromegaly present.   Cardiovascular: Normal rate, regular rhythm, normal heart sounds and intact distal pulses.   No murmur heard.  Pulmonary/Chest: Effort normal. No respiratory distress. He has no wheezes. He has no rales. He exhibits no  tenderness.       Abdominal: Soft. Bowel sounds are normal. He exhibits no distension and no mass. There is no tenderness. There is no rebound and no guarding.   Periumbilical venous congestion/prominence    Musculoskeletal: Normal range of motion.   Lymphadenopathy:     He has no cervical adenopathy.   Neurological: He is alert and oriented to person, place, and time. He has normal reflexes. He displays normal reflexes. No cranial nerve deficit. He exhibits normal muscle tone. Coordination normal.   Skin: Skin is warm and dry. No erythema.   LLE dressing c/d/i   Psychiatric: He has a normal mood and affect. His behavior is normal. Judgment and thought content normal.       Significant Labs:   CBC:   Recent Labs   Lab 01/19/19 0345 01/20/19 0410   WBC 12.24 11.44   HGB 11.1* 10.3*   HCT 36.6* 32.9*    173     CMP:   Recent Labs   Lab 01/19/19 0345 01/20/19 0410    137   K 5.2* 4.6    103   CO2 29 26   * 149*   BUN 28* 36*   CREATININE 1.0 1.4   CALCIUM 9.3 8.9   PROT 6.3 5.7*   ALBUMIN 2.9* 2.7*   BILITOT 1.4* 1.2*   ALKPHOS 55 49*   AST 31 24   ALT 38 35   ANIONGAP 6* 8   EGFRNONAA >60 46*     Cardiac Markers:   Recent Labs   Lab 01/20/19  0410   *     Magnesium: No results for input(s): MG in the last 48 hours.  All pertinent labs within the past 24 hours have been reviewed.    Significant Imaging: I have reviewed and interpreted all pertinent imaging results/findings within the past 24 hours.

## 2019-01-20 NOTE — ASSESSMENT & PLAN NOTE
Resume flomax and casodex--with waxing and waning mental status may remove PO meds  Has a valdes currently. Will continue

## 2019-01-20 NOTE — PLAN OF CARE
Dr Tay spoke with wife Susana Baires and Lele concerning pt condition and plan of care including Hospice if pt doesn't improve or deterioriates, they verbalized  Understanding and are in agreement with this plan.

## 2019-01-20 NOTE — NURSING
Assisted to sitting at edge of bed for breakfast, also provided coffee to patient. Son at bedside, updated patient and family on Plan of Care, verbalized understanding and both parties showed evidence of learning.

## 2019-01-20 NOTE — ASSESSMENT & PLAN NOTE
Atelectasis compressive on ct today so do not think pneumonia but he has improved on vanc and cefepime.   Shallow breathing   Left Lower Lobe Cxr todayWill follow cxr and continue neb steroids and neb

## 2019-01-20 NOTE — ASSESSMENT & PLAN NOTE
Increased solumedrol to 80 q 8 1/19/20---This has helped significantly, will continue this dose for now.   Encourage bipap use

## 2019-01-20 NOTE — PLAN OF CARE
Problem: Adult Inpatient Plan of Care  Goal: Plan of Care Review  Outcome: Ongoing (interventions implemented as appropriate)  Vitals stable, afebrile. Pt was on NC at 2L most of the night. Audible expiratory wheezing at times. Congestive cough that is nonproductive. Using abdominal muscles. IV steroids and abx given. Upper extremities still weeping. Pt complained of back pain, toradol was given with some relief. Slept for a good hour but still restless. Sat up at bedside twice. Tolerated well. Hibiclens bath this AM. Dressing on Lfoot intact and dry. IV site dressing changed due to weeping skin. Hein patent and draining well. Heart rate stayed in 100s for most of the night. Discussed plan of care with pt, stated understanding.

## 2019-01-20 NOTE — NURSING
"Dr Tay at bedside, updated MD on vitals, status, condition, shortness of breath, wheezing, labored breathing, resting calm at edge of bed at this time with son at bedside, BM noted, able to feed self breakfast without assistance, Bipap on standby, offered to patient and refused to wear at this time, remains on 2L NC O2. New orders noted, MD states "okay to give toprol xl this am as ordered" MD aware of /54. Patient will be transferred to Med/Surg per MD.  "

## 2019-01-20 NOTE — SUBJECTIVE & OBJECTIVE
Interval History: looks great today wearing mask and calm     Objective:     Vital Signs (Most Recent):  Temp: 96.3 °F (35.7 °C) (01/20/19 0736)  Pulse: 89 (01/20/19 1121)  Resp: 19 (01/20/19 1121)  BP: 109/61 (01/20/19 1100)  SpO2: 100 % (01/20/19 1121) Vital Signs (24h Range):  Temp:  [95.9 °F (35.5 °C)-96.3 °F (35.7 °C)] 96.3 °F (35.7 °C)  Pulse:  [] 89  Resp:  [14-24] 19  SpO2:  [74 %-100 %] 100 %  BP: (104-134)/(54-95) 109/61     Weight: 102 kg (224 lb 13.9 oz)(RD reviewed)  Body mass index is 29.67 kg/m².      Intake/Output Summary (Last 24 hours) at 1/20/2019 1139  Last data filed at 1/20/2019 1000  Gross per 24 hour   Intake 1290 ml   Output 1700 ml   Net -410 ml       Physical Exam   Constitutional: He is oriented to person, place, and time. He appears well-developed and well-nourished. He is cooperative.  Non-toxic appearance. He does not appear ill. No distress.   HENT:   Head: Normocephalic and atraumatic.   Right Ear: Hearing, tympanic membrane, external ear and ear canal normal.   Left Ear: Hearing, tympanic membrane, external ear and ear canal normal.   Nose: Nose normal. No mucosal edema, rhinorrhea or nasal deformity. No epistaxis. Right sinus exhibits no maxillary sinus tenderness and no frontal sinus tenderness. Left sinus exhibits no maxillary sinus tenderness and no frontal sinus tenderness.   Mouth/Throat: Uvula is midline, oropharynx is clear and moist and mucous membranes are normal. No trismus in the jaw. Normal dentition. No uvula swelling. No posterior oropharyngeal erythema.   Eyes: Conjunctivae and lids are normal. No scleral icterus.   Sclera clear bilat   Neck: Trachea normal, full passive range of motion without pain and phonation normal. Neck supple.   Cardiovascular: Normal rate, regular rhythm, normal heart sounds, intact distal pulses and normal pulses.   Pulmonary/Chest: Accessory muscle usage present. No stridor. He is in respiratory distress (mild to moderate). He has  decreased breath sounds in the right lower field and the left lower field. He has no wheezes. He has rhonchi in the right lower field and the left lower field.   Abdominal: Soft. Normal appearance and bowel sounds are normal. He exhibits no distension. There is no tenderness.   Musculoskeletal: Normal range of motion. He exhibits no edema or deformity.   Neurological: He is alert and oriented to person, place, and time. He exhibits normal muscle tone. Coordination normal.   Skin: Skin is warm, dry and intact. He is not diaphoretic. No pallor.   Psychiatric: He has a normal mood and affect. His speech is normal and behavior is normal. Judgment and thought content normal. Cognition and memory are normal.   Nursing note and vitals reviewed.      Vents:  Oxygen Concentration (%): 30 (01/20/19 1121)    Lines/Drains/Airways     Drain                 Urethral Catheter 01/15/19 1215 Latex 16 Fr. 4 days          Peripheral Intravenous Line                 Peripheral IV - Single Lumen 01/17/19 Anterior;Right Forearm 3 days                Significant Labs:    CBC/Anemia Profile:  Recent Labs   Lab 01/19/19  0345 01/20/19  0410   WBC 12.24 11.44   HGB 11.1* 10.3*   HCT 36.6* 32.9*    173   * 102*   RDW 13.8 13.7        Chemistries:  Recent Labs   Lab 01/19/19  0345 01/20/19  0410    137   K 5.2* 4.6    103   CO2 29 26   BUN 28* 36*   CREATININE 1.0 1.4   CALCIUM 9.3 8.9   ALBUMIN 2.9* 2.7*   PROT 6.3 5.7*   BILITOT 1.4* 1.2*   ALKPHOS 55 49*   ALT 38 35   AST 31 24       All pertinent labs within the past 24 hours have been reviewed.    Significant Imaging:  I have reviewed all pertinent imaging results/findings within the past 24 hours.

## 2019-01-20 NOTE — PROGRESS NOTES
[facility]  Pulmonology  Progress Note    Patient Name: Radhames Alonzo  MRN: [id number]  Admission Date: 1/9/2019  Hospital Length of Stay: 11 days  Code Status: DNR  Attending Provider: Jesús Tay MD  Primary Care Provider: Pennie Jorge NP   Principal Problem: Severe sepsis    Subjective:     Interval History: looks great today wearing mask and calm     Objective:     Vital Signs (Most Recent):  Temp: 96.3 °F (35.7 °C) (01/20/19 0736)  Pulse: 89 (01/20/19 1121)  Resp: 19 (01/20/19 1121)  BP: 109/61 (01/20/19 1100)  SpO2: 100 % (01/20/19 1121) Vital Signs (24h Range):  Temp:  [95.9 °F (35.5 °C)-96.3 °F (35.7 °C)] 96.3 °F (35.7 °C)  Pulse:  [] 89  Resp:  [14-24] 19  SpO2:  [74 %-100 %] 100 %  BP: (104-134)/(54-95) 109/61     Weight: 102 kg (224 lb 13.9 oz)(RD reviewed)  Body mass index is 29.67 kg/m².      Intake/Output Summary (Last 24 hours) at 1/20/2019 1139  Last data filed at 1/20/2019 1000  Gross per 24 hour   Intake 1290 ml   Output 1700 ml   Net -410 ml       Physical Exam   Constitutional: He is oriented to person, place, and time. He appears well-developed and well-nourished. He is cooperative.  Non-toxic appearance. He does not appear ill. No distress.   HENT:   Head: Normocephalic and atraumatic.   Right Ear: Hearing, tympanic membrane, external ear and ear canal normal.   Left Ear: Hearing, tympanic membrane, external ear and ear canal normal.   Nose: Nose normal. No mucosal edema, rhinorrhea or nasal deformity. No epistaxis. Right sinus exhibits no maxillary sinus tenderness and no frontal sinus tenderness. Left sinus exhibits no maxillary sinus tenderness and no frontal sinus tenderness.   Mouth/Throat: Uvula is midline, oropharynx is clear and moist and mucous membranes are normal. No trismus in the jaw. Normal dentition. No uvula swelling. No posterior oropharyngeal erythema.   Eyes: Conjunctivae and lids are normal. No scleral icterus.   Sclera clear bilat    Neck: Trachea normal, full passive range of motion without pain and phonation normal. Neck supple.   Cardiovascular: Normal rate, regular rhythm, normal heart sounds, intact distal pulses and normal pulses.   Pulmonary/Chest: Accessory muscle usage present. No stridor. He is in respiratory distress (mild to moderate). He has decreased breath sounds in the right lower field and the left lower field. He has no wheezes. He has rhonchi in the right lower field and the left lower field.   Abdominal: Soft. Normal appearance and bowel sounds are normal. He exhibits no distension. There is no tenderness.   Musculoskeletal: Normal range of motion. He exhibits no edema or deformity.   Neurological: He is alert and oriented to person, place, and time. He exhibits normal muscle tone. Coordination normal.   Skin: Skin is warm, dry and intact. He is not diaphoretic. No pallor.   Psychiatric: He has a normal mood and affect. His speech is normal and behavior is normal. Judgment and thought content normal. Cognition and memory are normal.   Nursing note and vitals reviewed.      Vents:  Oxygen Concentration (%): 30 (01/20/19 1121)    Lines/Drains/Airways     Drain                 Urethral Catheter 01/15/19 1215 Latex 16 Fr. 4 days          Peripheral Intravenous Line                 Peripheral IV - Single Lumen 01/17/19 Anterior;Right Forearm 3 days                Significant Labs:    CBC/Anemia Profile:  Recent Labs   Lab 01/19/19  0345 01/20/19  0410   WBC 12.24 11.44   HGB 11.1* 10.3*   HCT 36.6* 32.9*    173   * 102*   RDW 13.8 13.7        Chemistries:  Recent Labs   Lab 01/19/19  0345 01/20/19  0410    137   K 5.2* 4.6    103   CO2 29 26   BUN 28* 36*   CREATININE 1.0 1.4   CALCIUM 9.3 8.9   ALBUMIN 2.9* 2.7*   PROT 6.3 5.7*   BILITOT 1.4* 1.2*   ALKPHOS 55 49*   ALT 38 35   AST 31 24       All pertinent labs within the past 24 hours have been reviewed.    Significant Imaging:  I have reviewed all  pertinent imaging results/findings within the past 24 hours.    Assessment/Plan:     Emphysema/COPD    moderaTE COPD      Pulmonary heart disease    Cor pulmonale secondary to chronic resp failure Third spacing fluid in arms and legs due to profound Right sided heart failure      Chronic respiratory failure with hypoxia and hypercapnia    Tolerating bipap now calm 1/20/19  Waxing and  Wanes in thought processes does not want bipap very verbally and physically against wearing bipap   In view of this will not pursue NIV at home   Patient now requires a  Home Ventilator due to chronic Respirator failure copd Cor pulmonale and pulmonary heart disease and  this will decrease hospitalizations ,Decrease Work of Breathing,extend his life  and improve the Paients Quality of life.Without this form of ventilation it could lead to patient harm or death.Pressure support with Safety tidal volume mode of therapy in this patient will optimize Gas Exchange. Bipap is not reliable due to patients condition.         Dysphagia    Need swollow study     Pleural effusion on left    On ct pleural effusion bilateral L >> Right      Pneumonia    Atelectasis compressive on ct today so do not think pneumonia but he has improved on vanc and cefepime.   Shallow breathing   Left Lower Lobe Cxr todayWill follow cxr and continue neb steroids and neb     CHF (congestive heart failure)    Stable      Atrial fibrillation    Follow HR      Cardiomyopathy    Unstable now   ++ pericardial effusion on ct             Sukh Robertson MD  Pulmonology  [facility]

## 2019-01-20 NOTE — ASSESSMENT & PLAN NOTE
Need to assess if truly hypoxic. No low sats charted but has been on venti and now 4L NC. Will try to wean. Repeat CXR.   1/11/19 O2 sat 95-99%     1-12 Pt is labored with his breathing and continuing to cough; Dr Robertson consulted  1-14 no longer needing O2, cont cefepime and clinda x 10 days, cont nebs,stop steroids    1/15/19: marked change in respiratory status this morning, was worsening overnight. He is oxygenating, sats good on 2L and ABG shows good sats. CXR with enlarging left pleural effusion otherwise normal. CT ordered and will do once stabilized--? Underlying consolidation with empyema. Looking for loculations, etc. Consider thoracentesis. Could explain his worsening status. He is in respiratory distress and abdominal breathing. Taking off BiPaP. He is a FULL CODE.    1/16/19:  Continue VANC/Cefepime. He is considering DNR . Told Dr. Jean-Baptiste no to resuscitation. We need to get him to sign a DNR     1/17/19  Patient seems to have staph pneumonia as he has improved with add'l coverage. Check u/s to r/o empyema    1/18/19  Improved clinically. Continue VANC cefepime     1/19/19  Continue VANC and Cefepime     1/20/20  Continue VANC(day6) and cefepime(day 12, initially started for LLE cellulitis at admit but treating pneumonia at least since the 10th). Hoping to get CT chest done today to further characterize this effusion and infiltrate which is poorly characterized on x ray.

## 2019-01-20 NOTE — NURSING
Placed on portable telemetry and 2L NC O2, placed on wheelchair, transferred to CT scan as ordered, son at bedside and ruthie, BECKY Rendon (house supervisor) at ICU monitor to monitor patient while in CT.

## 2019-01-20 NOTE — ASSESSMENT & PLAN NOTE
He has bipap ordered. He is mostly refusing. I will not restrain this 84 year old man and force him to wear bipap if he is not willing. He is a DNR.   1/20/19---Since he is a DNR and mostly refusing bipap, the plan was to start hospice today unless he showed improvement. Remarkably, he has. He looks much better today. We will downgrade him to the floor later today.  However, if he deteriorates or fails to continue to show improvement, I strongly recommend getting hospice involved. This was discussed with wife and his sons Dequan and Lele.  They are in agreement with this. He remains a DNR.

## 2019-01-20 NOTE — ASSESSMENT & PLAN NOTE
1/18/19  Cis consulted   ECHO in their records  Continue BB. No ACE currently. I presume this is from low BP during the week. Increasing BB. Add ACE in am if BP is stable     1/19/19  Will diurese cautiously today. Lasix 20mg IV BID     1/20  BB. Negative fluid balance. Creatinine bumping up slightly. D/c low dose IV Lasix. BNP, BMP daily   Add ARB when able

## 2019-01-21 PROBLEM — M62.82 RHABDOMYOLYSIS: Status: RESOLVED | Noted: 2019-01-09 | Resolved: 2019-01-21

## 2019-01-21 LAB
ALBUMIN SERPL BCP-MCNC: 2.8 G/DL
ALP SERPL-CCNC: 51 U/L
ALT SERPL W/O P-5'-P-CCNC: 41 U/L
ANION GAP SERPL CALC-SCNC: 7 MMOL/L
AST SERPL-CCNC: 27 U/L
BASOPHILS # BLD AUTO: 0.01 K/UL
BASOPHILS NFR BLD: 0.1 %
BILIRUB SERPL-MCNC: 1.3 MG/DL
BNP SERPL-MCNC: 257 PG/ML
BUN SERPL-MCNC: 48 MG/DL
CALCIUM SERPL-MCNC: 9.3 MG/DL
CHLORIDE SERPL-SCNC: 102 MMOL/L
CO2 SERPL-SCNC: 27 MMOL/L
CREAT SERPL-MCNC: 1.4 MG/DL
DIFFERENTIAL METHOD: ABNORMAL
EOSINOPHIL # BLD AUTO: 0 K/UL
EOSINOPHIL NFR BLD: 0 %
ERYTHROCYTE [DISTWIDTH] IN BLOOD BY AUTOMATED COUNT: 13.6 %
EST. GFR  (AFRICAN AMERICAN): 53 ML/MIN/1.73 M^2
EST. GFR  (NON AFRICAN AMERICAN): 46 ML/MIN/1.73 M^2
GLUCOSE SERPL-MCNC: 138 MG/DL
HCT VFR BLD AUTO: 34.9 %
HGB BLD-MCNC: 10.9 G/DL
LYMPHOCYTES # BLD AUTO: 0.5 K/UL
LYMPHOCYTES NFR BLD: 3.7 %
MCH RBC QN AUTO: 31.7 PG
MCHC RBC AUTO-ENTMCNC: 31.2 G/DL
MCV RBC AUTO: 102 FL
MONOCYTES # BLD AUTO: 0.5 K/UL
MONOCYTES NFR BLD: 3.5 %
NEUTROPHILS # BLD AUTO: 12.2 K/UL
NEUTROPHILS NFR BLD: 92.7 %
PLATELET # BLD AUTO: 189 K/UL
PMV BLD AUTO: 10.1 FL
POTASSIUM SERPL-SCNC: 4.8 MMOL/L
PROT SERPL-MCNC: 5.9 G/DL
RBC # BLD AUTO: 3.44 M/UL
SODIUM SERPL-SCNC: 136 MMOL/L
WBC # BLD AUTO: 13.1 K/UL

## 2019-01-21 PROCEDURE — 94668 MNPJ CHEST WALL SBSQ: CPT

## 2019-01-21 PROCEDURE — 99900031 HC PATIENT EDUCATION (STAT)

## 2019-01-21 PROCEDURE — 94761 N-INVAS EAR/PLS OXIMETRY MLT: CPT

## 2019-01-21 PROCEDURE — 25000003 PHARM REV CODE 250: Performed by: NURSE PRACTITIONER

## 2019-01-21 PROCEDURE — 25000242 PHARM REV CODE 250 ALT 637 W/ HCPCS: Performed by: NURSE PRACTITIONER

## 2019-01-21 PROCEDURE — 99233 SBSQ HOSP IP/OBS HIGH 50: CPT | Mod: ,,, | Performed by: INTERNAL MEDICINE

## 2019-01-21 PROCEDURE — 97116 GAIT TRAINING THERAPY: CPT

## 2019-01-21 PROCEDURE — 97110 THERAPEUTIC EXERCISES: CPT

## 2019-01-21 PROCEDURE — 92526 ORAL FUNCTION THERAPY: CPT

## 2019-01-21 PROCEDURE — 63600175 PHARM REV CODE 636 W HCPCS: Performed by: FAMILY MEDICINE

## 2019-01-21 PROCEDURE — 11000001 HC ACUTE MED/SURG PRIVATE ROOM

## 2019-01-21 PROCEDURE — 92610 EVALUATE SWALLOWING FUNCTION: CPT

## 2019-01-21 PROCEDURE — 83880 ASSAY OF NATRIURETIC PEPTIDE: CPT

## 2019-01-21 PROCEDURE — 25000003 PHARM REV CODE 250: Performed by: FAMILY MEDICINE

## 2019-01-21 PROCEDURE — 27000221 HC OXYGEN, UP TO 24 HOURS

## 2019-01-21 PROCEDURE — 94660 CPAP INITIATION&MGMT: CPT

## 2019-01-21 PROCEDURE — 80053 COMPREHEN METABOLIC PANEL: CPT

## 2019-01-21 PROCEDURE — 99900035 HC TECH TIME PER 15 MIN (STAT)

## 2019-01-21 PROCEDURE — 63600175 PHARM REV CODE 636 W HCPCS: Performed by: NURSE PRACTITIONER

## 2019-01-21 PROCEDURE — 94640 AIRWAY INHALATION TREATMENT: CPT

## 2019-01-21 PROCEDURE — 85025 COMPLETE CBC W/AUTO DIFF WBC: CPT

## 2019-01-21 PROCEDURE — 99233 PR SUBSEQUENT HOSPITAL CARE,LEVL III: ICD-10-PCS | Mod: ,,, | Performed by: INTERNAL MEDICINE

## 2019-01-21 PROCEDURE — 36415 COLL VENOUS BLD VENIPUNCTURE: CPT

## 2019-01-21 RX ORDER — TORSEMIDE 20 MG/1
20 TABLET ORAL DAILY
Status: DISCONTINUED | OUTPATIENT
Start: 2019-01-21 | End: 2019-01-22

## 2019-01-21 RX ORDER — SPIRONOLACTONE 25 MG/1
25 TABLET ORAL DAILY
Status: DISCONTINUED | OUTPATIENT
Start: 2019-01-21 | End: 2019-01-24 | Stop reason: HOSPADM

## 2019-01-21 RX ADMIN — METHYLPREDNISOLONE SODIUM SUCCINATE 80 MG: 40 INJECTION, POWDER, FOR SOLUTION INTRAMUSCULAR; INTRAVENOUS at 05:01

## 2019-01-21 RX ADMIN — DABIGATRAN ETEXILATE MESYLATE 150 MG: 75 CAPSULE ORAL at 08:01

## 2019-01-21 RX ADMIN — KETOROLAC TROMETHAMINE 15 MG: 15 INJECTION, SOLUTION INTRAMUSCULAR; INTRAVENOUS at 02:01

## 2019-01-21 RX ADMIN — LEVALBUTEROL 1.25 MG: 1.25 SOLUTION, CONCENTRATE RESPIRATORY (INHALATION) at 01:01

## 2019-01-21 RX ADMIN — LEVALBUTEROL 1.25 MG: 1.25 SOLUTION, CONCENTRATE RESPIRATORY (INHALATION) at 07:01

## 2019-01-21 RX ADMIN — TORSEMIDE 20 MG: 20 TABLET ORAL at 04:01

## 2019-01-21 RX ADMIN — METHYLPREDNISOLONE SODIUM SUCCINATE 40 MG: 40 INJECTION, POWDER, FOR SOLUTION INTRAMUSCULAR; INTRAVENOUS at 08:01

## 2019-01-21 RX ADMIN — TAMSULOSIN HYDROCHLORIDE 0.4 MG: 0.4 CAPSULE ORAL at 08:01

## 2019-01-21 RX ADMIN — METOPROLOL SUCCINATE 100 MG: 50 TABLET, EXTENDED RELEASE ORAL at 08:01

## 2019-01-21 RX ADMIN — SPIRONOLACTONE 25 MG: 25 TABLET ORAL at 04:01

## 2019-01-21 RX ADMIN — BICALUTAMIDE 50 MG: 50 TABLET, FILM COATED ORAL at 08:01

## 2019-01-21 RX ADMIN — CEFEPIME HYDROCHLORIDE 1 G: 1 INJECTION, SOLUTION INTRAVENOUS at 05:01

## 2019-01-21 NOTE — PROGRESS NOTES
"[facility] Medicine  Progress Note    Patient Name: Radhames Alonzo  MRN: [id number]  Patient Class: IP- Inpatient   Admission Date: 1/9/2019  Length of Stay: 12 days  Attending Physician: Jesús Tay MD  Primary Care Provider: Pennie Jorge NP        Subjective:     Principal Problem:Severe sepsis    HPI:  84 year old male presents to ED b/c " I couldn't get up and walk."   SOB for about a year. Worse recently. Last night it got to the point that he was too SOB to stand up.   SOB at rest and with eating.   He also notes left foot pain. He has been seeing woundcare for a wound on this foot last 7 months   Workup in ED is significant for BNP 600s. Last BNP 200s 3 weeks ago. 160s 9 months ago.  He sees Dr. Jean-Baptiste regularly.   [facility] has seen pt and rec admit for diuresis, rule out MI   First TPN with slight bump.038  Lactic acid is 3.6. Meets I am told by ED MD that patient "has no signs of infection on physical exam." I am also told that CXR and U/A are clear.     He drinks six pack daily. Last drink 2 days ago. No h/o DT's in past         Hospital Course:  He has been started on cefepime and clinda for left lower ext cellulitis. He has been afebrile. WBC was 18922. Blood cultures NGTD. Lactate was up to 3.8, coming down.RR was 24 and had low sats on admission. Was on venti, now down to 3L NC   He is also wheezing. C/o SOB. CXR with CHF.   He does report that he takes pradaxa for his a fib at home. Not on home meds list nor reordered here. A fib on EKG. BNP was 659. Ordered for lasix 80mg IV BID. Urinating a lot. K low this am 2.8    1/11/19 Getting lasix 80mg IV bid per Cardiology; not much urine output yesterday with dribbling noted; concern for obstruction; will get bladder scan; consider renal US.   Creat 0.9 stable; K+ 3.6    CPK 2378>1762; TNI 0.032 x 1; repeat this am     1-12 Pt continues to have SOB and pul congestion; Dr Robertson consulted    1-13 Pt is fairly complex with mx " cardiac/pulmonary issues causing his coughing and SOB; has a hx of underlying prostate problems; a swing bed might be necessary; Dr Robertson and cardiology following    1/14 pt is still on clinda and cefepime for the lower ext cellulitis. Afebrile/ no elevated WBC. US negative for DVT. Still swollen but reports that it is much better than his normal   CXR 1/812 Left basilar consolidation or atelectasis and small pleural effusion. He is 95% on RA  Still weak. Having trouble standing on side bed.     1/15/19 Pt started feeling worse yesterday evening; this am noted to have LOW BP- SBP 80s, increased RR 30s, Temp 95.6,   Dr. Jean-Baptiste reports HF better, BNP 200s, still with LE edema, US negative for DVT; Also gets pradaxa  Underlying chronic prostate problems; defers any intervention or sx   Day 7/10 Cefepime and clinda for LE cellulitis and pneumonia. Blood cultures negative.   Looks jaundice with sclerema icterus; admit bilirubin - 2.0 elevated on 1/9/19 ; check LFTs/CMP/bilirubin    1/15/19: patient with worsening respiratory status overnight. He was tried on BiPaP but refused to wear it. Was agitated during the night. He was abdominal breathing but maintainings sats on 2L NC. BNP normal. D-dimer normal and anticoagulated since admit, restarted PO pradaxa yesterday. This AM, continues to decompensated. He is alert and oriented on initial eval but over the course of our conversation decompensates and becomes very lethargic. He has been waxing and waning throughout the night per RN report. Unclear if has h/o cirrhosis but on chart review this AM his bilirubin 2 and mild elevation AST--this was not trended during his hospital stay. This AM he has scleral icterus and yellowing of skin which was NOT PRESENT YESTERDAY. Also his LLE cellulitis is worsening, redness extending up the shin to the knee today which was NOT PRESENT YESTERDAY. He is hypothermic, tachypenic, hypotensive and tachycardic (despite BB on board).      1/16/19  Transferred up to ICU yesterday for hypotension and AMS on the floor. NH4 level 24. Bolused 1 liter NS. Did not require pressors. This am 120s/70s  UOP is good. Abx changed from cefepime and clinda to cefepime and VANC. Afebrile.  ABG ordered yesterday. He does have some hypercapnea. Refuses to wear bipap.    1/17/19  Patient became more alert and arousable throughout the day yesterday. Still with issues lying flat (which is why his imaging was not completed yesterday). However, his BP is improved without his home meds. He is tolerating his abx well. He did wear Bipap VERY little last night, but did not sleep because of it. UOP is good. No fevers.     1/18/19  He is awake and alert this am. He wore his bipap overnight   Tachy but BP is stable   Increased edema this am     1/19/19  He is very somnolent this am. Fought with him overnight to get bipap. Required ativan. He is sleepy this am   He is now a DNR     1/20/19  Steroids increased and IV Lasix added yesterday  He wore bipap about an hour last night   He is more awake today, no ativan given.   Spoke about hospice yesterday since he largely refuses the Bipap. Pt family is in agreement with hospice if he deteriorates or fails to improve   Out of bed and eating breakfast today    1/21 he is awake and alert again today. Did wear bipap a couple hours over evening yesterday (5 1/2hrs). Ct shows pleural effusion and pericardial effusion. Not on diuresis due to creat inability to tolerate. D/julia IV yesterday. Creat 1.4 today.     He is still on vanc (7days) and cefepime (13 days) (and s/p 1 week clinda) for the lower leg cellulitis. Afebrile WBC did go up as well but on steroids dose up and down over last week. No fevers. BP stable     TTE December 2018 with LVEF 55%, mild LVH        Interval History: see      Review of Systems   Constitutional: Negative for activity change, fatigue, fever and unexpected weight change.   HENT: Negative for congestion, ear  pain, hearing loss, rhinorrhea and sore throat.    Eyes: Negative for pain, redness and visual disturbance.   Respiratory: Positive for shortness of breath. Negative for cough and wheezing.    Cardiovascular: Positive for leg swelling (improving). Negative for chest pain and palpitations.   Gastrointestinal: Negative for abdominal pain, constipation, diarrhea, nausea and vomiting.   Genitourinary: Positive for difficulty urinating (due to BPH). Negative for decreased urine volume, dysuria, frequency, hematuria and urgency.   Musculoskeletal: Negative for back pain, joint swelling and neck pain.   Skin: Positive for wound (improving). Negative for color change (erythema resolved b/l LE) and rash.   Neurological: Negative for dizziness, weakness, light-headedness and headaches.   Psychiatric/Behavioral: Negative for agitation.     Objective:     Vital Signs (Most Recent):  Temp: 97.2 °F (36.2 °C) (01/21/19 0721)  Pulse: 84 (01/21/19 0721)  Resp: (!) 21 (01/21/19 0721)  BP: 122/68 (01/21/19 0721)  SpO2: 100 % (01/21/19 0721) Vital Signs (24h Range):  Temp:  [96 °F (35.6 °C)-97.4 °F (36.3 °C)] 97.2 °F (36.2 °C)  Pulse:  [] 84  Resp:  [15-24] 21  SpO2:  [94 %-100 %] 100 %  BP: (104-126)/(54-83) 122/68     Weight: 102 kg (224 lb 13.9 oz)(RD reviewed)  Body mass index is 29.67 kg/m².    Intake/Output Summary (Last 24 hours) at 1/21/2019 0832  Last data filed at 1/21/2019 0400  Gross per 24 hour   Intake 1660 ml   Output 1600 ml   Net 60 ml      Physical Exam   Constitutional: He is oriented to person, place, and time. He appears well-developed and well-nourished. No distress.    Eating breakfast- alert and oriented   HENT:   Head: Normocephalic and atraumatic.   Right Ear: External ear normal.   Left Ear: External ear normal.   Nose: Nose normal.   Eyes: Conjunctivae and EOM are normal. Pupils are equal, round, and reactive to light. Right eye exhibits no discharge. Left eye exhibits no discharge.   Neck: Normal  range of motion. Neck supple. No tracheal deviation present.   Cardiovascular: Normal rate, regular rhythm and normal heart sounds.   No murmur heard.  Pulmonary/Chest: Effort normal. No respiratory distress. He has wheezes (b/l). He has no rales.       Abdominal: Soft. Bowel sounds are normal. He exhibits no distension. There is no tenderness.   Musculoskeletal: Normal range of motion. He exhibits edema (1+ b/l LE edema to thighs but this is improving from my last evaluation 1 week ago).   Neurological: He is alert and oriented to person, place, and time. He has normal reflexes. No cranial nerve deficit.   Skin: Skin is warm and dry. No erythema.   LLE dressing c/d/i  Erythema of b/l LE is resolved     Psychiatric: He has a normal mood and affect. His behavior is normal. Judgment and thought content normal.   Nursing note and vitals reviewed.      Significant Labs:   CBC:   Recent Labs   Lab 01/20/19 0410 01/21/19  0526   WBC 11.44 13.10*   HGB 10.3* 10.9*   HCT 32.9* 34.9*    189     CMP:   Recent Labs   Lab 01/20/19 0410 01/21/19  0526    136   K 4.6 4.8    102   CO2 26 27   * 138*   BUN 36* 48*   CREATININE 1.4 1.4   CALCIUM 8.9 9.3   PROT 5.7* 5.9*   ALBUMIN 2.7* 2.8*   BILITOT 1.2* 1.3*   ALKPHOS 49* 51*   AST 24 27   ALT 35 41   ANIONGAP 8 7*   EGFRNONAA 46* 46*     Cardiac Markers:   Recent Labs   Lab 01/21/19  0526   *       vanc trough 18.8    Lab Results   Component Value Date    INR 1.4 (H) 01/18/2019    INR 1.2 01/17/2019    INR 1.2 01/16/2019     Blood cultures 1/9/19 NGTD x 2    US chest mediastinum   There is a moderate left-sided pleural effusion.  A measurable portion of the collection measures 10.8 x 5.6 x 4.7 cm.    No evidence of internal echogenicity or debris to suggest a complicated effusion.        1/20 chest CT   1. There are changes of left greater than right pleural effusions with associated left compressive atelectasis.  Similar findings of left pleural  effusion and compressive atelectasis were present on a prior CTA chest dated 10/04/2017.  There is no obvious loculation within the left pleural effusion.  2. There is cardiomegaly.  Redemonstrated is a pericardial effusion measuring approximately 1.7 cm in greatest thickness.  This was present previously as well.  3. There is atherosclerosis in addition to coronary artery calcifications.    1/16 CXR   The heart shadow is severely enlarged.  There is pulmonary vascular congestion with pulmonary edema.  Small right and moderate left-sided pleural effusions are suspected.  The skeletal structures are intact.    US lower ext Negative for bilateral lower extremity DVT.    Assessment/Plan:      * Severe sepsis    Due to Venous stasis and wound of LLE   Start Cefepime and clinda to cover for everything including MRSA and psuedomonas.   Clinda instead of Vanc since mild CKD and we will diurese him as well   Lactic acid q 6 coming down  Bolus 2 liters overnight creat 0.9 bp 111/66 this am  woundcare consult     1-12 legs are down but still pul congestion    1-13 Not retaining as much fluids, but still SOB and coughing; burns when he urinates from his Prostate issues(Ca)    1-14 better/resolved    1-15: worsening status this AM. + confusion, hypotension, tachycardia. LLE redness is worsening from yesterday despite antibx. Should be well covered with clida and cefepime so puzzling. When stable consider imaging of leg to look for deeper infection    1/16:  Continue cefepime and VANC. CT chest today to check for empyema/underlying infiltrate. CXR not adequate to determine    1/17  At this point he was switched to vanc 1 day ago and is improved. Won't be able to lie flat for CT. Will go ahead and u/s chest to r/o empyema. But it seems as though he will need 7 days of iv vanc.    1/18  Continue VANC day 4 and cefepime day 10     1/19  Continue VANC day 5 and cefepime Day 11     1/20  Continue VANC day 6 and cefepime Day 12      1/21   On vanc and cefepime, cellulitis appears to have resolved. Will d/c abx, completed 1 week clinda then vanc, respectively and cefepime 2 weeks. LE erythema resolved. Stop today and monitor to ensure does not clinically begin to worsen as he did last week.      Encephalopathy, metabolic    resolved       Emphysema/COPD    Increased solumedrol to 80 q 8 1/19/20---This has helped significantly, will continue this dose for now.   Encourage bipap use   wean    1/21/19: still wheezing. Cont steroids for now     Pulmonary heart disease           Chronic respiratory failure with hypoxia and hypercapnia    He has bipap ordered. He is mostly refusing. I will not restrain this 84 year old man and force him to wear bipap if he is not willing. He is a DNR.   1/20/19---Since he is a DNR and mostly refusing bipap, the plan was to start hospice today unless he showed improvement. Remarkably, he has. He looks much better today. We will downgrade him to the floor later today.  However, if he deteriorates or fails to continue to show improvement, I strongly recommend getting hospice involved. This was discussed with wife and his sons Dequan and Lele.  They are in agreement with this. He remains a DNR.     1/21 he is tolerating the bipap a little more. He is O2 dep which is new for him this admission. Will need to qualify home for O2 and a bipap at home pt is considering thoracentesis to assess the effusion and see if it improves his breathing     Dysphagia    Likely secondary to mental status on 1/16  ST was following ands reports patient has ability to identify safe swallow strategies and good independent implementation of compensatories to minimize risk of aspiration on least restrictive diet         Venous stasis ulcer    Wound care.  Cont to apply silver       Pleural effusion    Getting bigger on CXR from 1/14/20  CT ordered--will do when stabilized. I think today(1/20/19) is the first day he is well enough to try  "and get this done. Will attempt    ? Underlying consolidation. ? Empyema  U/S ordered---"no underlying debris to suggest complicated effusion."  King ewa   Will continue VANC( day 6) as he has shown some improvement when this was started on the 15th.  Cefepime day 12. Initially started for LE cellulitis that has since resolved. Will continue for presumed LLL pneumonia, but need a CT to further characterize this and the associated effusion on CXR.     1/21 1/21 Ct does not show infiltrate. Will d/c abx as he has had 1 week vanc and clida and 2 weeks cefepime. Consider pleural effusion as cause for resp issue. Discussed thoracentesis      Encephalopathy    Waxing and waning mental status  Overnight thought to be sundowning but his AM dramatic waxing and waning just during our 10 minute conversation. Went form alert to very lethargic. + jaundice  Hypotensive, tachycardic, hypothermic (95.6 axillary)  Sepsis vs hepatic    1/1619  LFTs, lactic acid unremarkable  I think this may be from his hypercapnea and refusal to wear bipap   Will get CT head to rule out CVA    1/17/19  Metabolic? He is VERY alert and appropriate today. CT head on hold. Need to be cognizant of the fact that he has prostate cancer, though.    1/18/19  Alert this am. He wore his bipap for the better part of the night last night     1/19/19  He is lethargic this am. This is likely CO2 narcosis as he hasn't worn his bipap much overnight. Also he has some ativan on board. Will see if we can get him to wear bipap today, but I will not restrain and force him to do so. That wouldn't be humane in this 84 year old DNR patient.     1/20/19  He waxes and wanes. Today is a good day. I think ativan had a lot to do with his altered mental state yesterday. Ativan has been discontinued. Haldol prn agitation, but honestly, if we dont force the bipap on him, he doesn't get agitated.     1/21/19 resolved, very AAOx3     Benign prostatic hyperplasia (BPH) with " post-void dribbling      Resume flomax and casodex--with waxing and waning mental status may remove PO meds  Has a valdes currently. Will continue      Pneumonia    Need to assess if truly hypoxic. No low sats charted but has been on venti and now 4L NC. Will try to wean. Repeat CXR.   1/11/19 O2 sat 95-99%     1-12 Pt is labored with his breathing and continuing to cough; Dr Robertson consulted  1-14 no longer needing O2, cont cefepime and clinda x 10 days, cont nebs,stop steroids    1/15/19: marked change in respiratory status this morning, was worsening overnight. He is oxygenating, sats good on 2L and ABG shows good sats. CXR with enlarging left pleural effusion otherwise normal. CT ordered and will do once stabilized--? Underlying consolidation with empyema. Looking for loculations, etc. Consider thoracentesis. Could explain his worsening status. He is in respiratory distress and abdominal breathing. Taking off BiPaP. He is a FULL CODE.    1/16/19:  Continue VANC/Cefepime. He is considering DNR . Told Dr. Jean-Baptiste no to resuscitation. We need to get him to sign a DNR     1/17/19  Patient seems to have staph pneumonia as he has improved with add'l coverage. Check u/s to r/o empyema    1/18/19  Improved clinically. Continue VANC cefepime     1/19/19  Continue VANC and Cefepime     1/20/20  Continue VANC(day6) and cefepime(day 12, initially started for LLE cellulitis at admit but treating pneumonia at least since the 10th). Hoping to get CT chest done today to further characterize this effusion and infiltrate which is poorly characterized on x ray.     1/21 Ct does not show infiltrate. Will d/c abx as he has had 1 week vanc and clida and 2 weeks cefepime. Consider pleural effusion as cause for resp issue. Discussed thoracentesis and will see what Dr. Robertson thinks. Is this exudate or transudate? Also thoracentesis may help with his breathing issues.      Cellulitis of left lower extremity    See severe sepsis treatment  Check  u/s  Negative for bilateral lower extremity DVT.    cont cefepime and clinda x 10 days wound care Cleanse wound with NS using gauze. Apply silver alginate to wound bed, cover with ABD and secure with tape daily.      1/15/19: marked worsening of erythema from yesterday despite antibx. Puzzling why the sudden change. D-dimer negative and anticoagulated so low suspicion for DVT. Worsening vitals as well (see sepsis)        1/16/19  Leg is looking much better already     1/17/19  Much better, but still with small open wound. Wound care should see today.    1/19/19  Resolved   abx continued for LLL pneumonia     1/21/19   D/c abx today (see sepsis plan)     Hypokalemia    Likely due to torsemide use  Resolved      Rhabdomyolysis    Mild. Due to chronic ETOHism   2  liter bolus for sepsis should take care of it   CPK improved    1/15/19: not checked over last 5 days. Repeat CPK today. Restarting IVF   1/16/19: resolved      CHF (congestive heart failure)    1/18/19  [facility] consulted   ECHO in their records  Continue BB. No ACE currently. I presume this is from low BP during the week. Increasing BB. Add ACE in am if BP is stable     1/19/19  Will diurese cautiously today. Lasix 20mg IV BID     1/20  BB. Negative fluid balance. Creatinine bumping up slightly. D/c low dose IV Lasix. BNP, BMP daily   Add ARB when able     1/21 cont BB. Lasix stopped yesterday as creat bumped. Still with cough and O2 dependent due to pleural effusion and atelectasis. [facility] consulted. Will need recommendation on diuresis and if they feel this effusion of CHF related vs other etiology. He does still have some LE edema but this is GREATLY improved from my last evaluation and at this point I do not think he is having acute exacerbation of his CHF.            Atrial fibrillation    Rate controlled. On metoprolol    He reports that he is taking pradaxa but called pharmacy and they do not report that he has filled anything for anticoagulation especially  pradaxa in recent months. Start lovenox 1mg/kg BID until we get his home meds    [facility] was [facility], will restart at d/c    1/15/19: tachycardic despite BB and now hypotensive. Holding BB and all BP meds. Sinus on telemetry right now. Cont pradaxa for now but may stop again and switch back to lovenox today if continues to worsen and pending labs      1/16/19  Continue pradaxa    1/17/19  Will resume BB today. For most part he is rate controlled.    1/19/19  Continue BB, increase to 100mg     1/20  Metoprolol switched to IV yesterday b/c was somnolent. Switch back to po, 100mg daily     1/21   Cont metoprolol and pradaxa HR 84     Cardiomyopathy    Cardiology is following  Diastolic CHF: TTE December 2018 with LVEF 55%, mild LVH  Was started on diuretics  BNP is only 200s now  CXR with left pleural effusion but not severe pulmonary congestion  1/15/10: BP dropping, hold ACEi, BB and diuretics for now and give back IVF---careful for volume overload though.   1/16/19: resume BB and ACE per [facility]   1/17/18  Meds have not been resumed. Start at 25 of metoprolol rather than home dose of 100.    1/19/19  Increase BB to 100mg     1/20  Continue metoprolol 100mg. Resume ARB when BP able to tolerate     1/21 now on metoprolol 100XL daily BP stable 122/68 still holding arb creat 1.4       VTE Risk Mitigation (From admission, onward)        Ordered     dabigatran etexilate capsule 150 mg  2 times daily      01/14/19 1112     IP VTE HIGH RISK PATIENT  Once      01/09/19 1751              Beatrice Delatorre MD  Department of Hospital Medicine   [facility]

## 2019-01-21 NOTE — PT/OT/SLP PROGRESS
Speech Language Pathology      Radhames Alonzo  MRN: [id number]    Patient not seen today secondary to working with nursing staff. Will follow-up as schedule permits.      ENEIDA Reeves-SLP  1/21/2019

## 2019-01-21 NOTE — ASSESSMENT & PLAN NOTE
Due to Venous stasis and wound of LLE   Start Cefepime and clinda to cover for everything including MRSA and psuedomonas.   Clinda instead of Vanc since mild CKD and we will diurese him as well   Lactic acid q 6 coming down  Bolus 2 liters overnight creat 0.9 bp 111/66 this am  woundcare consult     1-12 legs are down but still pul congestion    1-13 Not retaining as much fluids, but still SOB and coughing; burns when he urinates from his Prostate issues(Ca)    1-14 better/resolved    1-15: worsening status this AM. + confusion, hypotension, tachycardia. LLE redness is worsening from yesterday despite antibx. Should be well covered with clida and cefepime so puzzling. When stable consider imaging of leg to look for deeper infection    1/16:  Continue cefepime and VANC. CT chest today to check for empyema/underlying infiltrate. CXR not adequate to determine    1/17  At this point he was switched to vanc 1 day ago and is improved. Won't be able to lie flat for CT. Will go ahead and u/s chest to r/o empyema. But it seems as though he will need 7 days of iv vanc.    1/18  Continue VANC day 4 and cefepime day 10     1/19  Continue VANC day 5 and cefepime Day 11     1/20  Continue VANC day 6 and cefepime Day 12     1/21   On vanc and cefepime, cellulitis appears to have resolved. Will d/c abx, completed 1 week clinda then vanc, respectively and cefepime 2 weeks. LE erythema resolved. Stop today and monitor to ensure does not clinically begin to worsen as he did last week.

## 2019-01-21 NOTE — ASSESSMENT & PLAN NOTE
He has bipap ordered. He is mostly refusing. I will not restrain this 84 year old man and force him to wear bipap if he is not willing. He is a DNR.   1/20/19---Since he is a DNR and mostly refusing bipap, the plan was to start hospice today unless he showed improvement. Remarkably, he has. He looks much better today. We will downgrade him to the floor later today.  However, if he deteriorates or fails to continue to show improvement, I strongly recommend getting hospice involved. This was discussed with wife and his sons Dequan and Lele.  They are in agreement with this. He remains a DNR.     1/21 he is tolerating the bipap a little more. He is O2 dep which is new for him this admission. Will need to qualify home for O2 and a bipap at home pt is considering thoracentesis to assess the effusion and see if it improves his breathing

## 2019-01-21 NOTE — PT/OT/SLP PROGRESS
Speech Language Pathology Treatment    Patient Name:  Radhames Alonzo   MRN:  [id number]  Admitting Diagnosis: Severe sepsis    Recommendations:                 General Recommendations:  Follow-up not indicated  Diet recommendations: Solid Diet Level: Mechanical soft with chopped meat, Liquid Diet Level: Thin   Aspiration Precautions:   · Feed only when awake and alert  · Small bites/sips  · Meds whole one at a time  · 2-3 swallow for all bites/sips  · HOB at 90 degrees   General Precautions: Standard    Communication strategies:  none    Subjective     Pt found sitting at edge of bed. Pt was alert and cooperative throughout all PO trials.     Patient goals: none stated     Pain/Comfort:  · Pain Rating 1: 0/10    Objective:     Has the patient been evaluated by SLP for swallowing?   Yes  Keep patient NPO? No   Current Respiratory Status: nasal cannula      Upon initiation fo session, pt remarked that he was choking on his food recently. Probing revealed that pt admits to episodes of coughing episodes throughout the day. This was determined to be brought on by pt's current respiratory status. Pt tolerated all bites of pamela annette crackers and sips of apple juice via self-regulated open cup sips without s/s of aspiration; while pt continues to exhibit non-productive coughing throughout session, it was determined that coughing was not brought on by swallow dysfunction. Pt was able to recall safe swallow strategies with MIN verbal cueing and required inconsistent MIN cues for implementation. Pt was re-educated regarding importance of compliance with safe swallow strategies and verbalized understanding.     Assessment:     Radhames Alonzo is a 84 y.o. male with an SLP diagnosis of mild pharyngeal dysphagia.  He presents with consistent tolerance of mechanical soft diet with chopped meats and thin liqudis. Recommend continuation of current diet with safe swallow strategies. Additional skilled speech services are no longer  required as pt has demonstrated consistent tolerance of least restrictive PO diet. Please re-consult if a significant change in status affecting swallow function occurs.     Goals:   Multidisciplinary Problems     SLP Goals     Not on file          Multidisciplinary Problems (Resolved)        Problem: SLP Goal    Goal Priority Disciplines Outcome   SLP Goal   (Resolved)     SLP Outcome(s) achieved   Description:    Long Term Goals:   1. Pt will tolerate least restrictive PO diet with no s/s of aspiration in order to maintain adequate hydration and nutrition 1/21/19  2. Pt will undergo MBSS to objectively determine the presence/severity of dysphagia MET 1/17/19                       Plan:     · Patient to be seen:  3 x/week, 4 x/week, 5 x/week   · Plan of Care expires:  01/30/19  · Plan of Care reviewed with:  patient   · SLP Follow-Up:  No       Discharge recommendations:  (home per speech)   Barriers to Discharge:  none per speech    Time Tracking:     SLP Treatment Date:   01/21/19  Speech Start Time:  0932  Speech Stop Time:  0941     Speech Total Time (min):  9 min    Billable Minutes: Treatment Swallowing Dysfunction - 9 minutes       ENEIDA Reeves-SLP  01/21/2019

## 2019-01-21 NOTE — ASSESSMENT & PLAN NOTE
Much improved for last 3 days    pt aware of person place time and surroundings   Yesterday pt was very appreciative and interactive was going and did wear bipap but todAY is not without change in his metabolicsttais except WBC up 4000

## 2019-01-21 NOTE — PT/OT/SLP PROGRESS
Physical Therapy Treatment    Patient Name:  Radhames Alonzo   MRN:  [id number]    Recommendations:     Discharge Recommendations:  other (see comments)(home with home health)   Discharge Equipment Recommendations: walker, rolling, commode   Barriers to discharge: None    Assessment:     Radhames Alonzo is a 84 y.o. male admitted with a medical diagnosis of Severe sepsis.  He presents with the following impairments/functional limitations:  weakness, impaired self care skills, impaired endurance, impaired balance, decreased lower extremity function, impaired cardiopulmonary response to activity, impaired functional mobilty. Patient noted increasing body strength with inc ability to perform sit to stand with lesser physical assistance needed and greater stability with gait functions using RW at room distances. Patient will benefit for continue PT tx with inc general endurance and to impact performance with mobility and self care activities towards level of modified independence.    Rehab Prognosis: Good; patient would benefit from acute skilled PT services to address these deficits and reach maximum level of function.    Recent Surgery: * No surgery found *      Plan:     During this hospitalization, patient to be seen 5 x/week to address the identified rehab impairments via gait training, therapeutic activities, therapeutic exercises and progress toward the following goals:    · Plan of Care Expires:  01/23/19    Subjective     Chief Complaint: no pain   complain  Patient/Family Comments/goals:  To get up with lesser assistance needed.  Pain/Comfort:  · Pain Rating 1: 0/10      Objective:     Communicated with patient and wife prior to session.  Patient found all lines intact and call button in reach valdes catheter, oxygen  upon PT entry to room.     General Precautions: Standard, fall   Orthopedic Precautions:N/A   Braces: N/A     Functional Mobility:  · Bed Mobility:     · Rolling Left:  contact guard  assistance  · Rolling Right: contact guard assistance  · Supine to Sit: contact guard assistance  · Sit to Supine: contact guard assistance  · Transfers:     · Sit to Stand:  minimum assistance with rolling walker  · Bed to Chair: minimum assistance with  rolling walker  using  Step Transfer  · Gait: RW ambulation x50 feet with ccg assistance for balance and safety.      AM-PAC 6 CLICK MOBILITY  Turning over in bed (including adjusting bedclothes, sheets and blankets)?: 3  Sitting down on and standing up from a chair with arms (e.g., wheelchair, bedside commode, etc.): 3  Moving from lying on back to sitting on the side of the bed?: 3  Moving to and from a bed to a chair (including a wheelchair)?: 3  Need to walk in hospital room?: 3  Climbing 3-5 steps with a railing?: 2  Basic Mobility Total Score: 17       Therapeutic Activities and Exercises:   Pt performed bilateral  LE exercises consisting of active range of motion exercises x 10 reps consisting of Ankle DF, Ankle PF, Marching in Place, Heel slides, ABD/ADD, LAQ with pt able to tolerate activities well no sign of distress and no sign of SOB.GAIT: Pt able to ambulate with rolling walker with Contact Guard Assistance x 50 ft with the following gait deviation(s): decreased uli, decreased swing-to-stance ratio, decreased toe-to-floor clearance and decreased weight-shifting ability    PT discussed importance of patient's performance of Home Exercise Program (HEP) with pt verbalizing understanding.     Patient left up in chair with all lines intact, call button in reach and wife present..    GOALS:   Multidisciplinary Problems     Physical Therapy Goals        Problem: Physical Therapy Goal    Goal Priority Disciplines Outcome Goal Variances Interventions   Physical Therapy Goal     PT, PT/OT Ongoing (interventions implemented as appropriate)     Description:  Goals to be met by: 7  (reviewed 1/16/19)    Patient will increase functional independence with  mobility by performin. Supine to sit with Supervision or Set-up Assistance  2. Sit to supine with Supervision or Set-up Assistance  3. Bed to chair transfer with Supervision or Set-up Assistancewith or without rolling walker using Step Transfer TECHNIQUE  4. Gait  x 100  feet with Supervision or Set-up Assistance with or without rolling walker  5. Lower extremity exercise program x10 reps per handout, with assistance as needed                       Time Tracking:     PT Received On: 19  PT Start Time: 1409     PT Stop Time: 1430  PT Total Time (min): 21 min     Billable Minutes: Gait Training 10 and Therapeutic Exercise 10    Treatment Type: Treatment  PT/PTA: PT           Baldomero Velázquez, PT  2019

## 2019-01-21 NOTE — ASSESSMENT & PLAN NOTE
Rate controlled. On metoprolol    He reports that he is taking pradaxa but called pharmacy and they do not report that he has filled anything for anticoagulation especially pradaxa in recent months. Start lovenox 1mg/kg BID until we get his home meds    [facility], will restart at d/c    1/15/19: tachycardic despite BB and now hypotensive. Holding BB and all BP meds. Sinus on telemetry right now. Cont pradaxa for now but may stop again and switch back to lovenox today if continues to worsen and pending labs      1/16/19  Continue pradaxa    1/17/19  Will resume BB today. For most part he is rate controlled.    1/19/19  Continue BB, increase to 100mg     1/20  Metoprolol switched to IV yesterday b/c was somnolent. Switch back to po, 100mg daily     1/21   Cont metoprolol and pradaxa HR 84

## 2019-01-21 NOTE — ASSESSMENT & PLAN NOTE
Waxing and waning mental status  Overnight thought to be sundowning but his AM dramatic waxing and waning just during our 10 minute conversation. Went form alert to very lethargic. + jaundice  Hypotensive, tachycardic, hypothermic (95.6 axillary)  Sepsis vs hepatic    1/1619  LFTs, lactic acid unremarkable  I think this may be from his hypercapnea and refusal to wear bipap   Will get CT head to rule out CVA    1/17/19  Metabolic? He is VERY alert and appropriate today. CT head on hold. Need to be cognizant of the fact that he has prostate cancer, though.    1/18/19  Alert this am. He wore his bipap for the better part of the night last night     1/19/19  He is lethargic this am. This is likely CO2 narcosis as he hasn't worn his bipap much overnight. Also he has some ativan on board. Will see if we can get him to wear bipap today, but I will not restrain and force him to do so. That wouldn't be humane in this 84 year old DNR patient.     1/20/19  He waxes and wanes. Today is a good day. I think ativan had a lot to do with his altered mental state yesterday. Ativan has been discontinued. Haldol prn agitation, but honestly, if we dont force the bipap on him, he doesn't get agitated.     1/21/19 resolved, very AAOx3

## 2019-01-21 NOTE — ASSESSMENT & PLAN NOTE
Likely secondary to mental status on 1/16  ST was following ands reports patient has ability to identify safe swallow strategies and good independent implementation of compensatories to minimize risk of aspiration on least restrictive diet

## 2019-01-21 NOTE — ASSESSMENT & PLAN NOTE
Increased solumedrol to 80 q 8 1/19/20---This has helped significantly, will continue this dose for now.   Encourage bipap use   wean    1/21/19: still wheezing. Cont steroids for now

## 2019-01-21 NOTE — PROGRESS NOTES
[facility]  Pulmonology  Progress Note    Patient Name: Radhames Alonzo  MRN: [id number]  Admission Date: 1/9/2019  Hospital Length of Stay: 12 days  Code Status: DNR  Attending Provider: Jesús Tay MD  Primary Care Provider: Pennie Jorge NP   Principal Problem: Severe sepsis    Subjective:     Interval History: pt mentally much improved very alert on neb wife trying to convince pt to give her his finances     Objective:     Vital Signs (Most Recent):  Temp: 96.4 °F (35.8 °C) (01/21/19 1203)  Pulse: 79 (01/21/19 1333)  Resp: 18 (01/21/19 1333)  BP: 101/70 (01/21/19 1203)  SpO2: 97 % (01/21/19 1333) Vital Signs (24h Range):  Temp:  [96.2 °F (35.7 °C)-97.4 °F (36.3 °C)] 96.4 °F (35.8 °C)  Pulse:  [] 79  Resp:  [15-23] 18  SpO2:  [94 %-100 %] 97 %  BP: (101-126)/(54-83) 101/70     Weight: 102 kg (224 lb 13.9 oz)(RD reviewed)  Body mass index is 29.67 kg/m².      Intake/Output Summary (Last 24 hours) at 1/21/2019 1347  Last data filed at 1/21/2019 0820  Gross per 24 hour   Intake 970 ml   Output 1175 ml   Net -205 ml       Physical Exam   Constitutional: He is oriented to person, place, and time. He appears well-developed and well-nourished. He is cooperative.  Non-toxic appearance. He does not appear ill. No distress.   HENT:   Head: Normocephalic and atraumatic.   Right Ear: Hearing, tympanic membrane, external ear and ear canal normal.   Left Ear: Hearing, tympanic membrane, external ear and ear canal normal.   Nose: Nose normal. No mucosal edema, rhinorrhea or nasal deformity. No epistaxis. Right sinus exhibits no maxillary sinus tenderness and no frontal sinus tenderness. Left sinus exhibits no maxillary sinus tenderness and no frontal sinus tenderness.   Mouth/Throat: Uvula is midline, oropharynx is clear and moist and mucous membranes are normal. No trismus in the jaw. Normal dentition. No uvula swelling. No posterior oropharyngeal erythema.   Eyes: Conjunctivae and lids are  normal. No scleral icterus.   Sclera clear bilat   Neck: Trachea normal, full passive range of motion without pain and phonation normal. Neck supple.   Cardiovascular: Normal rate, regular rhythm, normal heart sounds, intact distal pulses and normal pulses.   Pulmonary/Chest: Respiratory distress: mild to moderatemild. He has decreased breath sounds in the right lower field and the left lower field. He has wheezes in the right lower field and the left lower field. He has rhonchi in the right middle field, the right lower field, the left middle field and the left lower field.           Abdominal: Soft. Normal appearance and bowel sounds are normal. He exhibits no distension. There is no tenderness.   Musculoskeletal: Normal range of motion. He exhibits no edema or deformity.   Neurological: He is alert and oriented to person, place, and time. He exhibits normal muscle tone. Coordination normal.   Skin: Skin is warm, dry and intact. He is not diaphoretic. No pallor.   Psychiatric: He has a normal mood and affect. His speech is normal and behavior is normal. Judgment and thought content normal. Cognition and memory are normal.   Nursing note and vitals reviewed.      Vents:  Oxygen Concentration (%): 28 (01/21/19 0309)    Lines/Drains/Airways     Drain                 Urethral Catheter 01/15/19 1215 Latex 16 Fr. 6 days          Peripheral Intravenous Line                 Peripheral IV - Single Lumen 01/17/19 Anterior;Right Forearm 4 days                Significant Labs:    CBC/Anemia Profile:  Recent Labs   Lab 01/20/19  0410 01/21/19  0526   WBC 11.44 13.10*   HGB 10.3* 10.9*   HCT 32.9* 34.9*    189   * 102*   RDW 13.7 13.6        Chemistries:  Recent Labs   Lab 01/20/19  0410 01/21/19  0526    136   K 4.6 4.8    102   CO2 26 27   BUN 36* 48*   CREATININE 1.4 1.4   CALCIUM 8.9 9.3   ALBUMIN 2.7* 2.8*   PROT 5.7* 5.9*   BILITOT 1.2* 1.3*   ALKPHOS 49* 51*   ALT 35 41   AST 24 27       All  pertinent labs within the past 24 hours have been reviewed.    Significant Imaging:  I have reviewed all pertinent imaging results/findings within the past 24 hours.    Assessment/Plan:     Encephalopathy, metabolic    Much improved for last 3 days    pt aware of person place time and surroundings   Yesterday pt was very appreciative and interactive was going and did wear bipap but todAY is not without change in his metabolicsttais except WBC up 4000     Emphysema/COPD    moderaTE COPD      Chronic respiratory failure with hypoxia and hypercapnia    Tolerating bipap now calm 1/20/19  Waxing and  Wanes in thought processes does not want bipap very verbally and physically against wearing bipap   In view of this will not pursue NIV at home   Patient now requires a  Home Ventilator due to chronic Respirator failure copd Cor pulmonale and pulmonary heart disease and  this will decrease hospitalizations ,Decrease Work of Breathing,extend his life  and improve the Paients Quality of life.Without this form of ventilation it could lead to patient harm or death.Pressure support with Safety tidal volume mode of therapy in this patient will optimize Gas Exchange. Bipap is not reliable due to patients condition.         Dysphagia    swallow study done      Pleural effusion    No increase in pleural effusion risk of complication of thoracentesis vs benefit both diagnostic or therapeutic is not warranted unless effusion begins to recollect    On ct pleural effusion bilateral L >> Right      Pneumonia    Atelectasis compressive on ct today so do not think pneumonia but he has improved on vanc and cefepime.   Shallow breathing   Left Lower Lobe Cxr todayWill follow cxr and continue neb steroids and neb     Cellulitis of left lower extremity    Bilateral feet discolored      CHF (congestive heart failure)    stable      Atrial fibrillation    Follow HR      Cardiomyopathy    Improving less swelling everywhere   Unstable now   ++  pericardial effusion on ct             Sukh Robertson MD  Pulmonology  [facility]

## 2019-01-21 NOTE — PT/OT/SLP DISCHARGE
Speech Language Pathology Discharge Summary    Radahmes Alonzo  MRN: [id number]   Severe sepsis     Date of Last Treatment Session: 1/21/19    Past Medical History:   Diagnosis Date    Atrial fibrillation     AVD (aortic valve disease)     Cardiomyopathy     Gout     Hypertension     Mitral valve disorder     Pure hypercholesterolemia        Treatment Area(s):  Swallow    Goals:   Multidisciplinary Problems     SLP Goals     Not on file          Multidisciplinary Problems (Resolved)        Problem: SLP Goal    Goal Priority Disciplines Outcome   SLP Goal   (Resolved)     SLP Outcome(s) achieved   Description:    Long Term Goals:   1. Pt will tolerate least restrictive PO diet with no s/s of aspiration in order to maintain adequate hydration and nutrition 1/21/19  2. Pt will undergo MBSS to objectively determine the presence/severity of dysphagia MET 1/17/19                       Participation in Treatment (at discharge):  Cooperative    Functional Status at time of Discharge:      Swallow: Patient demonstrates mild pharyngeal dysphagia.                     Clinical Bedside assessment was completed on 1/16/19                       Instrumental assessment was completed on 1/17/19                                Swallowing Guidelines:     General Recommendations:  Follow-up not indicated  Diet recommendations: Solid Diet Level: Mechanical soft with chopped meat, Liquid Diet Level: Thin     Aspiration Precautions:   · Feed only when awake and alert  · Small bites/sips  · Meds whole one at a time  · 2-3 swallow for all bites/sips  · HOB at 90 degrees       Patient is discharged to same level of care                 ENEIDA Reeves-SLP  1/21/2019

## 2019-01-21 NOTE — NURSING
Transferred via wheelchair to bed 304 per MD orders with portable O2 and telemetry in use, 2L NC, all personal items with patient, family, wife all at bedside, stable condition noted. Bedside report given to BECKY Dixon on Med/Surg (3rd floor). Confirmed telemetry monitoring on arrival as well, remains in A-fib. Changed Right FA 20G IV dressing, saline locked with blood return noted, vancomycin abx completed, changed bilateral FA dressings due to weeping edema continuously noted, saturated dressings, serous fluid, applied ABD pads wrapped with Kerlex to both arms. Report given at bedside on arrival to room.

## 2019-01-21 NOTE — SUBJECTIVE & OBJECTIVE
Interval History: pt mentally much improved very alert on neb wife trying to convince pt to give her his finances     Objective:     Vital Signs (Most Recent):  Temp: 96.4 °F (35.8 °C) (01/21/19 1203)  Pulse: 79 (01/21/19 1333)  Resp: 18 (01/21/19 1333)  BP: 101/70 (01/21/19 1203)  SpO2: 97 % (01/21/19 1333) Vital Signs (24h Range):  Temp:  [96.2 °F (35.7 °C)-97.4 °F (36.3 °C)] 96.4 °F (35.8 °C)  Pulse:  [] 79  Resp:  [15-23] 18  SpO2:  [94 %-100 %] 97 %  BP: (101-126)/(54-83) 101/70     Weight: 102 kg (224 lb 13.9 oz)(RD reviewed)  Body mass index is 29.67 kg/m².      Intake/Output Summary (Last 24 hours) at 1/21/2019 1347  Last data filed at 1/21/2019 0820  Gross per 24 hour   Intake 970 ml   Output 1175 ml   Net -205 ml       Physical Exam   Constitutional: He is oriented to person, place, and time. He appears well-developed and well-nourished. He is cooperative.  Non-toxic appearance. He does not appear ill. No distress.   HENT:   Head: Normocephalic and atraumatic.   Right Ear: Hearing, tympanic membrane, external ear and ear canal normal.   Left Ear: Hearing, tympanic membrane, external ear and ear canal normal.   Nose: Nose normal. No mucosal edema, rhinorrhea or nasal deformity. No epistaxis. Right sinus exhibits no maxillary sinus tenderness and no frontal sinus tenderness. Left sinus exhibits no maxillary sinus tenderness and no frontal sinus tenderness.   Mouth/Throat: Uvula is midline, oropharynx is clear and moist and mucous membranes are normal. No trismus in the jaw. Normal dentition. No uvula swelling. No posterior oropharyngeal erythema.   Eyes: Conjunctivae and lids are normal. No scleral icterus.   Sclera clear bilat   Neck: Trachea normal, full passive range of motion without pain and phonation normal. Neck supple.   Cardiovascular: Normal rate, regular rhythm, normal heart sounds, intact distal pulses and normal pulses.   Pulmonary/Chest: Respiratory distress: mild to moderatemild. He has  decreased breath sounds in the right lower field and the left lower field. He has wheezes in the right lower field and the left lower field. He has rhonchi in the right middle field, the right lower field, the left middle field and the left lower field.           Abdominal: Soft. Normal appearance and bowel sounds are normal. He exhibits no distension. There is no tenderness.   Musculoskeletal: Normal range of motion. He exhibits no edema or deformity.   Neurological: He is alert and oriented to person, place, and time. He exhibits normal muscle tone. Coordination normal.   Skin: Skin is warm, dry and intact. He is not diaphoretic. No pallor.   Psychiatric: He has a normal mood and affect. His speech is normal and behavior is normal. Judgment and thought content normal. Cognition and memory are normal.   Nursing note and vitals reviewed.      Vents:  Oxygen Concentration (%): 28 (01/21/19 0309)    Lines/Drains/Airways     Drain                 Urethral Catheter 01/15/19 1215 Latex 16 Fr. 6 days          Peripheral Intravenous Line                 Peripheral IV - Single Lumen 01/17/19 Anterior;Right Forearm 4 days                Significant Labs:    CBC/Anemia Profile:  Recent Labs   Lab 01/20/19  0410 01/21/19  0526   WBC 11.44 13.10*   HGB 10.3* 10.9*   HCT 32.9* 34.9*    189   * 102*   RDW 13.7 13.6        Chemistries:  Recent Labs   Lab 01/20/19  0410 01/21/19  0526    136   K 4.6 4.8    102   CO2 26 27   BUN 36* 48*   CREATININE 1.4 1.4   CALCIUM 8.9 9.3   ALBUMIN 2.7* 2.8*   PROT 5.7* 5.9*   BILITOT 1.2* 1.3*   ALKPHOS 49* 51*   ALT 35 41   AST 24 27       All pertinent labs within the past 24 hours have been reviewed.    Significant Imaging:  I have reviewed all pertinent imaging results/findings within the past 24 hours.

## 2019-01-21 NOTE — SUBJECTIVE & OBJECTIVE
Interval History: see      Review of Systems   Constitutional: Negative for activity change, fatigue, fever and unexpected weight change.   HENT: Negative for congestion, ear pain, hearing loss, rhinorrhea and sore throat.    Eyes: Negative for pain, redness and visual disturbance.   Respiratory: Positive for shortness of breath. Negative for cough and wheezing.    Cardiovascular: Positive for leg swelling (improving). Negative for chest pain and palpitations.   Gastrointestinal: Negative for abdominal pain, constipation, diarrhea, nausea and vomiting.   Genitourinary: Positive for difficulty urinating (due to BPH). Negative for decreased urine volume, dysuria, frequency, hematuria and urgency.   Musculoskeletal: Negative for back pain, joint swelling and neck pain.   Skin: Positive for wound (improving). Negative for color change (erythema resolved b/l LE) and rash.   Neurological: Negative for dizziness, weakness, light-headedness and headaches.   Psychiatric/Behavioral: Negative for agitation.     Objective:     Vital Signs (Most Recent):  Temp: 97.2 °F (36.2 °C) (01/21/19 0721)  Pulse: 84 (01/21/19 0721)  Resp: (!) 21 (01/21/19 0721)  BP: 122/68 (01/21/19 0721)  SpO2: 100 % (01/21/19 0721) Vital Signs (24h Range):  Temp:  [96 °F (35.6 °C)-97.4 °F (36.3 °C)] 97.2 °F (36.2 °C)  Pulse:  [] 84  Resp:  [15-24] 21  SpO2:  [94 %-100 %] 100 %  BP: (104-126)/(54-83) 122/68     Weight: 102 kg (224 lb 13.9 oz)(RD reviewed)  Body mass index is 29.67 kg/m².    Intake/Output Summary (Last 24 hours) at 1/21/2019 0832  Last data filed at 1/21/2019 0400  Gross per 24 hour   Intake 1660 ml   Output 1600 ml   Net 60 ml      Physical Exam   Constitutional: He is oriented to person, place, and time. He appears well-developed and well-nourished. No distress.    Eating breakfast- alert and oriented   HENT:   Head: Normocephalic and atraumatic.   Right Ear: External ear normal.   Left Ear: External ear normal.   Nose: Nose  normal.   Eyes: Conjunctivae and EOM are normal. Pupils are equal, round, and reactive to light. Right eye exhibits no discharge. Left eye exhibits no discharge.   Neck: Normal range of motion. Neck supple. No tracheal deviation present.   Cardiovascular: Normal rate, regular rhythm and normal heart sounds.   No murmur heard.  Pulmonary/Chest: Effort normal. No respiratory distress. He has wheezes (b/l). He has no rales.       Abdominal: Soft. Bowel sounds are normal. He exhibits no distension. There is no tenderness.   Musculoskeletal: Normal range of motion. He exhibits edema (1+ b/l LE edema to thighs but this is improving from my last evaluation 1 week ago).   Neurological: He is alert and oriented to person, place, and time. He has normal reflexes. No cranial nerve deficit.   Skin: Skin is warm and dry. No erythema.   LLE dressing c/d/i  Erythema of b/l LE is resolved     Psychiatric: He has a normal mood and affect. His behavior is normal. Judgment and thought content normal.   Nursing note and vitals reviewed.      Significant Labs:   CBC:   Recent Labs   Lab 01/20/19 0410 01/21/19  0526   WBC 11.44 13.10*   HGB 10.3* 10.9*   HCT 32.9* 34.9*    189     CMP:   Recent Labs   Lab 01/20/19 0410 01/21/19  0526    136   K 4.6 4.8    102   CO2 26 27   * 138*   BUN 36* 48*   CREATININE 1.4 1.4   CALCIUM 8.9 9.3   PROT 5.7* 5.9*   ALBUMIN 2.7* 2.8*   BILITOT 1.2* 1.3*   ALKPHOS 49* 51*   AST 24 27   ALT 35 41   ANIONGAP 8 7*   EGFRNONAA 46* 46*     Cardiac Markers:   Recent Labs   Lab 01/21/19  0526   *       vanc trough 18.8    Lab Results   Component Value Date    INR 1.4 (H) 01/18/2019    INR 1.2 01/17/2019    INR 1.2 01/16/2019     Blood cultures 1/9/19 NGTD x 2    US chest mediastinum   There is a moderate left-sided pleural effusion.  A measurable portion of the collection measures 10.8 x 5.6 x 4.7 cm.    No evidence of internal echogenicity or debris to suggest a complicated  effusion.        1/20 chest CT   1. There are changes of left greater than right pleural effusions with associated left compressive atelectasis.  Similar findings of left pleural effusion and compressive atelectasis were present on a prior CTA chest dated 10/04/2017.  There is no obvious loculation within the left pleural effusion.  2. There is cardiomegaly.  Redemonstrated is a pericardial effusion measuring approximately 1.7 cm in greatest thickness.  This was present previously as well.  3. There is atherosclerosis in addition to coronary artery calcifications.    1/16 CXR   The heart shadow is severely enlarged.  There is pulmonary vascular congestion with pulmonary edema.  Small right and moderate left-sided pleural effusions are suspected.  The skeletal structures are intact.    US lower ext Negative for bilateral lower extremity DVT.

## 2019-01-21 NOTE — ASSESSMENT & PLAN NOTE
"Getting bigger on CXR from 1/14/20  CT ordered--will do when stabilized. I think today(1/20/19) is the first day he is well enough to try and get this done. Will attempt    ? Underlying consolidation. ? Empyema  U/S ordered---"no underlying debris to suggest complicated effusion."  King ewa   Will continue VANC( day 6) as he has shown some improvement when this was started on the 15th.  Cefepime day 12. Initially started for LE cellulitis that has since resolved. Will continue for presumed LLL pneumonia, but need a CT to further characterize this and the associated effusion on CXR.     1/21 1/21 Ct does not show infiltrate. Will d/c abx as he has had 1 week vanc and clida and 2 weeks cefepime. Consider pleural effusion as cause for resp issue. Discussed thoracentesis   "

## 2019-01-21 NOTE — ASSESSMENT & PLAN NOTE
1/18/19  [facility] consulted   ECHO in their records  Continue BB. No ACE currently. I presume this is from low BP during the week. Increasing BB. Add ACE in am if BP is stable     1/19/19  Will diurese cautiously today. Lasix 20mg IV BID     1/20  BB. Negative fluid balance. Creatinine bumping up slightly. D/c low dose IV Lasix. BNP, BMP daily   Add ARB when able     1/21 cont BB. Lasix stopped yesterday as creat bumped. Still with cough and O2 dependent due to pleural effusion and atelectasis. [facility] consulted. Will need recommendation on diuresis and if they feel this effusion of CHF related vs other etiology. He does still have some LE edema but this is GREATLY improved from my last evaluation and at this point I do not think he is having acute exacerbation of his CHF.

## 2019-01-21 NOTE — PLAN OF CARE
Problem: Adult Inpatient Plan of Care  Goal: Plan of Care Review  Outcome: Ongoing (interventions implemented as appropriate)  Patient resting with some complaints of lower abdominal pain. PRN pain meds stated to help. Patient wore Bipap for 5 hours while sleeping otherwise on 2L NC. Breath sounds wheezy and coarse. IV antibiotics and steroids administered. Hein patent and draining clear yellow urine with sediment. VS stable, A&O, A-fib on monitor. No acute changes noted. POC reviewed and agreed upon with patient.

## 2019-01-21 NOTE — PROGRESS NOTES
Home Oxygen Evaluation    Date Performed: 2019    1) Patient's O2 Sat on room air, while at rest: 95%        If O2 sats on room air at rest are 88% or below, patient qualifies. No additional testing needed. Document N/A in steps 2 and 3. If 89% or above, complete steps 2.      2) Patient's O2 Sat on room air while exercisin%        If O2 sats on room air while exercising remain 89% or above patient does not qualify, no further testing needed Document N/A in step 3. If O2 sats on room air while exercising are 88% or below, continue to step 3.      3) Patient's O2 Sat while exercising on O2: 96% at 2/LPM         (Must show improvement from #2 for patients to qualify)    If O2 sats improve on oxygen, patient qualifies for portable oxygen. If not, the patient does not qualify.

## 2019-01-21 NOTE — ASSESSMENT & PLAN NOTE
Need to assess if truly hypoxic. No low sats charted but has been on venti and now 4L NC. Will try to wean. Repeat CXR.   1/11/19 O2 sat 95-99%     1-12 Pt is labored with his breathing and continuing to cough; Dr Robertson consulted  1-14 no longer needing O2, cont cefepime and clinda x 10 days, cont nebs,stop steroids    1/15/19: marked change in respiratory status this morning, was worsening overnight. He is oxygenating, sats good on 2L and ABG shows good sats. CXR with enlarging left pleural effusion otherwise normal. CT ordered and will do once stabilized--? Underlying consolidation with empyema. Looking for loculations, etc. Consider thoracentesis. Could explain his worsening status. He is in respiratory distress and abdominal breathing. Taking off BiPaP. He is a FULL CODE.    1/16/19:  Continue VANC/Cefepime. He is considering DNR . Told Dr. Jean-Baptiste no to resuscitation. We need to get him to sign a DNR     1/17/19  Patient seems to have staph pneumonia as he has improved with add'l coverage. Check u/s to r/o empyema    1/18/19  Improved clinically. Continue VANC cefepime     1/19/19  Continue VANC and Cefepime     1/20/20  Continue VANC(day6) and cefepime(day 12, initially started for LLE cellulitis at admit but treating pneumonia at least since the 10th). Hoping to get CT chest done today to further characterize this effusion and infiltrate which is poorly characterized on x ray.     1/21 Ct does not show infiltrate. Will d/c abx as he has had 1 week vanc and clida and 2 weeks cefepime. Consider pleural effusion as cause for resp issue. Discussed thoracentesis and will see what Dr. Robertson thinks. Is this exudate or transudate? Also thoracentesis may help with his breathing issues.

## 2019-01-21 NOTE — PT/OT/SLP EVAL
Speech Language Pathology Evaluation  Bedside Swallow    Patient Name:  Radhames Alonzo   MRN:  [id number]  Admitting Diagnosis: Severe sepsis    Recommendations:                 General Recommendations:  Modified barium swallow study  Diet recommendations:  NPO, NPO   Aspiration Precautions: Ice chips sparingly and Strict aspiration precautions   General Precautions: Standard,    Communication strategies:  none    History:     Past Medical History:   Diagnosis Date    Atrial fibrillation     AVD (aortic valve disease)     Cardiomyopathy     Gout     Hypertension     Mitral valve disorder     Pure hypercholesterolemia        History reviewed. No pertinent surgical history.      Modified Barium Swallow: n/a    Prior diet: NPO.    Occupation/hobbies/homemaking: NPO.    Subjective     Pt found reclining in bed with significantly decreased levels of alertness. Pt required consistent MIN verbal and tactile cues for completion of PO trials    Patient goals: none stated     Pain/Comfort:  · Pain Rating 1: 0/10    Objective:     Oral Musculature Evaluation  · Oral Musculature: WFL  · Dentition: present and adequate  · Mucosal Quality: good  · Mandibular Strength and Mobility: WFL  · Oral Labial Strength and Mobility: WFL  · Lingual Strength and Mobility: WFL  · Buccal Strength and Mobility: WFL    Bedside Swallow Eval:   Consistencies Assessed:  · Thin liquids - pt-fed via tsp and small open cup sips  · Nectar thick liquids - pt-fed via small open cup sips  · Puree - pt-fed via 1/2 tsp amount     Oral Phase:   · WFL    Pharyngeal Phase:   · Coughing/choking across all trialed consistencies  · Delayed coughing during nectar thick liquids and puree  · Change in vocal quality during thin liquids    Compensatory Strategies  · None    Treatment: SLP discussed NPO status with pt and pt's son. Discussed encouragement of ice chips to maintain swallow patterns and plans for completion of MBSS tomorrow. All parties verbalized  understanding.    Assessment:     Radhames Alonzo is a 84 y.o. male who presents with s/s of aspiraiton across all consistencies trialed. Recommend strict NPO status with MBSS to be completed to determine presence/severity of dyspahgia.    Goals:   Multidisciplinary Problems     SLP Goals     Not on file          Multidisciplinary Problems (Resolved)        Problem: SLP Goal    Goal Priority Disciplines Outcome   SLP Goal   (Resolved)     SLP Outcome(s) ongoing   Description:    Long Term Goals:   1. Pt will tolerate least restrictive PO diet with no s/s of aspiration in order to maintain adequate hydration and nutrition   2. Pt will undergo MBSS to objectively determine the presence/severity of dysphagia                       Plan:     · Patient to be seen:  3 x/week, 4 x/week, 5 x/week   · Plan of Care expires:  01/30/19  · Plan of Care reviewed with:  patient   · SLP Follow-Up:  No       Discharge recommendations:  (nursing facility, skilled)   Barriers to Discharge:  Level of Skilled Assistance Needed     Time Tracking:     SLP Treatment Date:   01/16/19  Speech Start Time:  0847  Speech Stop Time:  0921     Speech Total Time (min):  34 min    Billable Minutes: Eval Swallow and Oral Function - 34 minutes       ENEIDA Reeves-SLP  01/16/2019

## 2019-01-21 NOTE — PLAN OF CARE
01/21/19 0928   Discharge Reassessment   Assessment Type Discharge Planning Reassessment         Patient will remain for continued treatment today. MD spoke with patient about possible Tap to drain fluid. He states he wants to talk with family first. No needs or concerns voiced at this time. CM will continue to follow and assist as needed.

## 2019-01-21 NOTE — PLAN OF CARE
Problem: Adult Inpatient Plan of Care  Goal: Plan of Care Review  Outcome: Ongoing (interventions implemented as appropriate)  Patient aware of plan of care. VS stable. Afebrile. Free from falls/injuries. No questions or concerns at this time. Agrees with plan of care.

## 2019-01-21 NOTE — PROGRESS NOTES
[facility]  Cardiology  Progress Note    Patient Name: Radhames Alonzo  MRN: [id number]  Admission Date: 1/9/2019  Hospital Length of Stay: 12 days  Code Status: DNR   Attending Physician: Jesús Tay MD   Primary Care Physician: Pennie Jorge NP  Expected Discharge Date: 1/10/2019  Principal Problem:Severe sepsis    Subjective:     Hospital Course:  Patient is an 84 year old male with a past medical history of chronic diastolic heart failure, chronic atrial fibrillation, carotid stenosis, HHD, dyslipidemia, and chronic peripheral edema presents to the emergency room after becoming increasingly weak over the last two days. He also reports shortness of breath and coughing with white sputum. He has had a decline in status over the last couple of months.      ROS   Constitutional : Weakness, lethargy  EENT : Negative  CV : BLE edema +2 at baseline.   Respiratory : Shortness of breath, coughing with sputum production.   Gastrointestinal: Negative   Genitourinary: Negative  Musculoskeletal: Negative  Skin : Ulcer to left lateral foot  Neurological : AAO x 3                Objective:     Vital Signs (Most Recent):  Temp: 97.2 °F (36.2 °C) (01/21/19 0721)  Pulse: 84 (01/21/19 0721)  Resp: (!) 21 (01/21/19 0721)  BP: 122/68 (01/21/19 0721)  SpO2: 100 % (01/21/19 0721) Vital Signs (24h Range):  Temp:  [96 °F (35.6 °C)-97.4 °F (36.3 °C)] 97.2 °F (36.2 °C)  Pulse:  [] 84  Resp:  [15-24] 21  SpO2:  [94 %-100 %] 100 %  BP: (104-126)/(54-83) 122/68     Weight: 102 kg (224 lb 13.9 oz)(RD reviewed)  Body mass index is 29.67 kg/m².    SpO2: 100 %  O2 Device (Oxygen Therapy): nasal cannula      Intake/Output Summary (Last 24 hours) at 1/21/2019 7188  Last data filed at 1/21/2019 0400  Gross per 24 hour   Intake 1660 ml   Output 1600 ml   Net 60 ml       Lines/Drains/Airways     Drain                 Urethral Catheter 01/15/19 1215 Latex 16 Fr. 5 days          Peripheral Intravenous Line                  Peripheral IV - Single Lumen 01/17/19 Anterior;Right Forearm 4 days                Physical Exam     General appearance: alert, appears stated age and cooperative. Frail in appearance. Lethargic. Generalized weakness.   Head: Normocephalic, without obvious abnormality, atraumatic  Eyes: conjunctivae/corneas clear. PERRL  Neck: no carotid bruit, no JVD and supple, symmetrical, trachea midline  Lungs: Expiratory rhales to left upper and lower lobe  Chest Wall: no tenderness  Heart: regular rate and rhythm, S1, S2 normal, 1/6 SM, click, rub or gallop  Abdomen: Distended, soft, non-tender; bowel sounds normal; no masses,  no organomegaly  Extremities: Extremities normal, atraumatic, no cyanosis, clubbing. +2 edema to BLE.   Pulses: Dorsalis Pedis R: 2+ (normal)/L: 2+ (normal)  Skin: Skin color, texture, turgor normal. Venous stasis ulcer to left lateral foot.   Neurologic: Normal mood and affect  Alert and oriented X 3            Significant Labs:   ABG: No results for input(s): PH, PCO2, HCO3, POCSATURATED, BE in the last 48 hours., Blood Culture: No results for input(s): LABBLOO in the last 48 hours., BMP:   Recent Labs   Lab 01/20/19 0410 01/21/19  0526   * 138*    136   K 4.6 4.8    102   CO2 26 27   BUN 36* 48*   CREATININE 1.4 1.4   CALCIUM 8.9 9.3   , CMP   Recent Labs   Lab 01/20/19 0410 01/21/19  0526    136   K 4.6 4.8    102   CO2 26 27   * 138*   BUN 36* 48*   CREATININE 1.4 1.4   CALCIUM 8.9 9.3   PROT 5.7* 5.9*   ALBUMIN 2.7* 2.8*   BILITOT 1.2* 1.3*   ALKPHOS 49* 51*   AST 24 27   ALT 35 41   ANIONGAP 8 7*   ESTGFRAFRICA 53* 53*   EGFRNONAA 46* 46*   , CBC   Recent Labs   Lab 01/20/19  0410 01/21/19  0526   WBC 11.44 13.10*   HGB 10.3* 10.9*   HCT 32.9* 34.9*    189   , INR No results for input(s): INR, PROTIME in the last 48 hours., Lipid Panel No results for input(s): CHOL, HDL, LDLCALC, TRIG, CHOLHDL in the last 48 hours.,   Pathology Results  (Last 10  years)    None      , Troponin No results for input(s): TROPONINI in the last 48 hours., All pertinent lab results from the last 24 hours have been reviewed. and   Recent Lab Results       01/21/19  0526        Albumin 2.8     Alkaline Phosphatase 51     ALT 41     Anion Gap 7     AST 27     Baso # 0.01     Basophil% 0.1     Total Bilirubin 1.3  Comment:  For infants and newborns, interpretation of results should be based  on gestational age, weight and in agreement with clinical  observations.  Premature Infant recommended reference ranges:  Up to 24 hours.............<8.0 mg/dL  Up to 48 hours............<12.0 mg/dL  3-5 days..................<15.0 mg/dL  6-29 days.................<15.0 mg/dL         Comment:  Values of less than 100 pg/ml are consistent with non-CHF populations.     BUN, Bld 48     Calcium 9.3     Chloride 102     CO2 27     Creatinine 1.4     Differential Method Automated     eGFR if  53     eGFR if non  46  Comment:  Calculation used to obtain the estimated glomerular filtration  rate (eGFR) is the CKD-EPI equation.        Eos # 0.0     Eosinophil% 0.0     Glucose 138     Gran # (ANC) 12.2     Gran% 92.7     Hematocrit 34.9     Hemoglobin 10.9     Lymph # 0.5     Lymph% 3.7     MCH 31.7     MCHC 31.2          Mono # 0.5     Mono% 3.5     MPV 10.1     Platelets 189     Potassium 4.8     Total Protein 5.9     RBC 3.44     RDW 13.6     Sodium 136     WBC 13.10           Significant Imaging: CT scan: CT ABDOMEN PELVIS WITH CONTRAST: No results found for this visit on 01/09/19. and CT ABDOMEN PELVIS WITHOUT CONTRAST: No results found for this visit on 01/09/19., Echocardiogram: 2D echo with color flow doppler: No results found for this or any previous visit. and Transthoracic echo (TTE) complete (Cupid Only): No results found for this or any previous visit. and X-Ray: CXR: X-Ray Chest 1 View (CXR):   Results for orders placed or performed during the  hospital encounter of 01/09/19   X-Ray Chest 1 View    Narrative    EXAMINATION:  XR CHEST 1 VIEW    CLINICAL HISTORY:  re-eval pleural effusion;    TECHNIQUE:  Single frontal view of the chest was performed.    COMPARISON:  01/14/2019    FINDINGS:  The heart shadow is severely enlarged.  There is pulmonary vascular congestion with pulmonary edema.  Small right and moderate left-sided pleural effusions are suspected.  The skeletal structures are intact.      Impression    As above.      Electronically signed by: Yanci Vazquez MD  Date:    01/16/2019  Time:    14:03    and X-Ray Chest PA and Lateral (CXR):   Results for orders placed or performed during the hospital encounter of 01/09/19   X-Ray Chest PA And Lateral    Narrative    EXAMINATION:  XR CHEST PA AND LATERAL    CLINICAL HISTORY:  LLL pneumonia vs atelectasis;    COMPARISON:  None    FINDINGS:  Cardiomegaly and left basilar infiltrate suspected.  Right lung is clear.  Small left pleural effusion.  No pneumothorax..  Aorta demonstrates atherosclerotic disease.  Bones demonstrate degenerative changes.      Impression    Cardiomegaly and left basilar infiltrate suspected.      Electronically signed by: Rickie Jones MD  Date:    01/14/2019  Time:    16:27    and KUB: X-Ray Abdomen AP 1 View (KUB): No results found for this visit on 01/09/19.  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] 01/18/2019 Yes    Dysphagia [R13.10] 01/17/2019 Yes    Venous stasis ulcer [I83.009, L97.909] 01/16/2019 Yes    Encephalopathy [G93.40] 01/15/2019 No    Pleural effusion on left [J90] 01/15/2019 Yes    Benign prostatic hyperplasia (BPH) with  post-void dribbling [N40.1, N39.43] 01/11/2019 Yes    Hypokalemia [E87.6] 01/10/2019 Yes    Cellulitis of left lower extremity [L03.116] 01/10/2019 Yes    Pneumonia [J18.9] 01/10/2019 Yes    Rhabdomyolysis [M62.82] 01/09/2019 Yes    CHF (congestive heart failure) [I50.9] 01/09/2019 Yes    Atrial fibrillation [I48.91] 04/30/2015 Yes    Cardiomyopathy [I42.9] 04/30/2015 Yes      Problems Resolved During this Admission:       VTE Risk Mitigation (From admission, onward)        Ordered     dabigatran etexilate capsule 150 mg  2 times daily      01/14/19 1112     IP VTE HIGH RISK PATIENT  Once      01/09/19 1751        Current Facility-Administered Medications   Medication    acetaminophen suppository 650 mg    bicalutamide tablet 50 mg    cefepime in dextrose 5 % 1 gram/50 mL IVPB 1 g    dabigatran etexilate capsule 150 mg    haloperidol lactate injection 5 mg    HYDROcodone-acetaminophen 5-325 mg per tablet 1 tablet    ketorolac injection 15 mg    levalbuterol nebulizer solution 1.25 mg    levalbuterol nebulizer solution 1.25 mg    methylPREDNISolone sodium succinate injection 80 mg    metoprolol succinate (TOPROL-XL) 24 hr tablet 100 mg    polyethylene glycol packet 17 g    tamsulosin 24 hr capsule 0.4 mg    vancomycin (VANCOCIN) 1,500 mg in dextrose 5 % 250 mL IVPB        Dx: CHF resolved/ improved sepsis LLeg cellulitis  Continues to improve. Persistent L pleural effusion noted     Sepsis possibly secondary to venous stasis ulcer on left lateral foot. IV abx initiated per primary.      Left sided pleural effusion and mild pulmonary edema evidenced by chest x ray. BNP moderately elevated at 659.      Acute on chronic diastolic CHF TTE December 2018 with LVEF 55%, mild LVH. Lasix IV 80 mg bid.     Rhabdomyolysis improving. Chronic ETOH use. CPK improved at 1762 from 2378.      Chronic atrial fibrillation rate controlled not currently on anticoagulation according to last progress note in clinic but  on xarelto in the past.      Chronic peripheral edema at baseline.      Dyslipidemia     Hypertension now controlled           PLAN:continue to adjust BB   Continue to hold ARB for now  DNR  Continue gentle diuresis   Patient with acute on chronic diastolic congestive heart failure with evidence of volume expansion.  Will add Aldactone 25mg po daily.  Add Demadex 20mg po twice daily.  Daily BMP    Transcribed by  Woodrow Butt NP for Dr MARISSA Donald  Cardiology  [facility]

## 2019-01-21 NOTE — ASSESSMENT & PLAN NOTE
No increase in pleural effusion risk of complication of thoracentesis vs benefit both diagnostic or therapeutic is not warranted unless effusion begins to recollect    On ct pleural effusion bilateral L >> Right

## 2019-01-21 NOTE — ASSESSMENT & PLAN NOTE
See severe sepsis treatment  Check u/s  Negative for bilateral lower extremity DVT.    cont cefepime and clinda x 10 days wound care Cleanse wound with NS using gauze. Apply silver alginate to wound bed, cover with ABD and secure with tape daily.      1/15/19: marked worsening of erythema from yesterday despite antibx. Puzzling why the sudden change. D-dimer negative and anticoagulated so low suspicion for DVT. Worsening vitals as well (see sepsis)        1/16/19  Leg is looking much better already     1/17/19  Much better, but still with small open wound. Wound care should see today.    1/19/19  Resolved   abx continued for LLL pneumonia     1/21/19   D/c abx today (see sepsis plan)

## 2019-01-21 NOTE — ASSESSMENT & PLAN NOTE
Cardiology is following  Diastolic CHF: TTE December 2018 with LVEF 55%, mild LVH  Was started on diuretics  BNP is only 200s now  CXR with left pleural effusion but not severe pulmonary congestion  1/15/10: BP dropping, hold ACEi, BB and diuretics for now and give back IVF---careful for volume overload though.   1/16/19: resume BB and ACE per [facility]   1/17/18  Meds have not been resumed. Start at 25 of metoprolol rather than home dose of 100.    1/19/19  Increase BB to 100mg     1/20  Continue metoprolol 100mg. Resume ARB when BP able to tolerate     1/21 now on metoprolol 100XL daily BP stable 122/68 still holding arb creat 1.4

## 2019-01-21 NOTE — PROGRESS NOTES
Staff Handoff    Bedside report received from BECKY Joe. VS stable. Afebrile. No distress noted. Assessment complete per flowsheet. Bed alarm engaged. Call bell in reach. Instructed to call for any needs. Will continue to monitor for any change in status.  Resident Handoff

## 2019-01-21 NOTE — PLAN OF CARE
01/21/19 1158   Post-Acute Status   Post-Acute Authorization E   E Status Referrals Sent       Left Cherelle, with Mk, a message for the [facility]. Waiting on return call.         1406: Spoke with cherelle, signed order, demographics, and notes sent to [facility] for approval/denial of astral.

## 2019-01-22 LAB
ALBUMIN SERPL BCP-MCNC: 2.7 G/DL
ALP SERPL-CCNC: 51 U/L
ALT SERPL W/O P-5'-P-CCNC: 42 U/L
ANION GAP SERPL CALC-SCNC: 5 MMOL/L
AST SERPL-CCNC: 26 U/L
BASOPHILS # BLD AUTO: 0 K/UL
BASOPHILS NFR BLD: 0 %
BILIRUB SERPL-MCNC: 1.3 MG/DL
BNP SERPL-MCNC: 181 PG/ML
BUN SERPL-MCNC: 58 MG/DL
CALCIUM SERPL-MCNC: 9.1 MG/DL
CHLORIDE SERPL-SCNC: 103 MMOL/L
CO2 SERPL-SCNC: 29 MMOL/L
CREAT SERPL-MCNC: 1.3 MG/DL
DIFFERENTIAL METHOD: ABNORMAL
EOSINOPHIL # BLD AUTO: 0 K/UL
EOSINOPHIL NFR BLD: 0 %
ERYTHROCYTE [DISTWIDTH] IN BLOOD BY AUTOMATED COUNT: 13.7 %
EST. GFR  (AFRICAN AMERICAN): 58 ML/MIN/1.73 M^2
EST. GFR  (NON AFRICAN AMERICAN): 50 ML/MIN/1.73 M^2
GLUCOSE SERPL-MCNC: 140 MG/DL
HCT VFR BLD AUTO: 33.6 %
HGB BLD-MCNC: 10.5 G/DL
LYMPHOCYTES # BLD AUTO: 0.5 K/UL
LYMPHOCYTES NFR BLD: 3.9 %
MCH RBC QN AUTO: 31.5 PG
MCHC RBC AUTO-ENTMCNC: 31.3 G/DL
MCV RBC AUTO: 101 FL
MONOCYTES # BLD AUTO: 0.7 K/UL
MONOCYTES NFR BLD: 5.4 %
NEUTROPHILS # BLD AUTO: 11.7 K/UL
NEUTROPHILS NFR BLD: 90.7 %
PLATELET # BLD AUTO: 212 K/UL
PMV BLD AUTO: 10 FL
POTASSIUM SERPL-SCNC: 4.4 MMOL/L
PROT SERPL-MCNC: 5.8 G/DL
RBC # BLD AUTO: 3.33 M/UL
SODIUM SERPL-SCNC: 137 MMOL/L
WBC # BLD AUTO: 12.94 K/UL

## 2019-01-22 PROCEDURE — 99900035 HC TECH TIME PER 15 MIN (STAT)

## 2019-01-22 PROCEDURE — 94660 CPAP INITIATION&MGMT: CPT

## 2019-01-22 PROCEDURE — 94668 MNPJ CHEST WALL SBSQ: CPT

## 2019-01-22 PROCEDURE — 36415 COLL VENOUS BLD VENIPUNCTURE: CPT

## 2019-01-22 PROCEDURE — 11000001 HC ACUTE MED/SURG PRIVATE ROOM

## 2019-01-22 PROCEDURE — 97530 THERAPEUTIC ACTIVITIES: CPT

## 2019-01-22 PROCEDURE — 25000003 PHARM REV CODE 250: Performed by: FAMILY MEDICINE

## 2019-01-22 PROCEDURE — 63600175 PHARM REV CODE 636 W HCPCS: Performed by: NURSE PRACTITIONER

## 2019-01-22 PROCEDURE — 94640 AIRWAY INHALATION TREATMENT: CPT

## 2019-01-22 PROCEDURE — 83880 ASSAY OF NATRIURETIC PEPTIDE: CPT

## 2019-01-22 PROCEDURE — 85025 COMPLETE CBC W/AUTO DIFF WBC: CPT

## 2019-01-22 PROCEDURE — 93005 ELECTROCARDIOGRAM TRACING: CPT

## 2019-01-22 PROCEDURE — 99233 PR SUBSEQUENT HOSPITAL CARE,LEVL III: ICD-10-PCS | Mod: ,,, | Performed by: INTERNAL MEDICINE

## 2019-01-22 PROCEDURE — 94761 N-INVAS EAR/PLS OXIMETRY MLT: CPT

## 2019-01-22 PROCEDURE — 99233 SBSQ HOSP IP/OBS HIGH 50: CPT | Mod: ,,, | Performed by: INTERNAL MEDICINE

## 2019-01-22 PROCEDURE — 93010 ELECTROCARDIOGRAM REPORT: CPT | Mod: ,,, | Performed by: INTERNAL MEDICINE

## 2019-01-22 PROCEDURE — 25000003 PHARM REV CODE 250: Performed by: NURSE PRACTITIONER

## 2019-01-22 PROCEDURE — 80053 COMPREHEN METABOLIC PANEL: CPT

## 2019-01-22 PROCEDURE — 93010 EKG 12-LEAD: ICD-10-PCS | Mod: ,,, | Performed by: INTERNAL MEDICINE

## 2019-01-22 PROCEDURE — 27000221 HC OXYGEN, UP TO 24 HOURS

## 2019-01-22 PROCEDURE — 97110 THERAPEUTIC EXERCISES: CPT

## 2019-01-22 PROCEDURE — 25000003 PHARM REV CODE 250: Performed by: INTERNAL MEDICINE

## 2019-01-22 PROCEDURE — 25000242 PHARM REV CODE 250 ALT 637 W/ HCPCS: Performed by: NURSE PRACTITIONER

## 2019-01-22 RX ORDER — TORSEMIDE 20 MG/1
20 TABLET ORAL 2 TIMES DAILY
Status: DISCONTINUED | OUTPATIENT
Start: 2019-01-22 | End: 2019-01-23

## 2019-01-22 RX ORDER — PREDNISONE 20 MG/1
40 TABLET ORAL DAILY
Status: DISCONTINUED | OUTPATIENT
Start: 2019-01-22 | End: 2019-01-24 | Stop reason: HOSPADM

## 2019-01-22 RX ADMIN — LEVALBUTEROL 1.25 MG: 1.25 SOLUTION, CONCENTRATE RESPIRATORY (INHALATION) at 07:01

## 2019-01-22 RX ADMIN — BICALUTAMIDE 50 MG: 50 TABLET, FILM COATED ORAL at 09:01

## 2019-01-22 RX ADMIN — DABIGATRAN ETEXILATE MESYLATE 150 MG: 75 CAPSULE ORAL at 09:01

## 2019-01-22 RX ADMIN — DABIGATRAN ETEXILATE MESYLATE 150 MG: 75 CAPSULE ORAL at 08:01

## 2019-01-22 RX ADMIN — RANITIDINE 300 MG: 15 SYRUP ORAL at 09:01

## 2019-01-22 RX ADMIN — METOPROLOL SUCCINATE 100 MG: 50 TABLET, EXTENDED RELEASE ORAL at 09:01

## 2019-01-22 RX ADMIN — TAMSULOSIN HYDROCHLORIDE 0.4 MG: 0.4 CAPSULE ORAL at 09:01

## 2019-01-22 RX ADMIN — PREDNISONE 40 MG: 20 TABLET ORAL at 09:01

## 2019-01-22 RX ADMIN — HYDROCODONE BITARTRATE AND ACETAMINOPHEN 1 TABLET: 5; 325 TABLET ORAL at 05:01

## 2019-01-22 RX ADMIN — SPIRONOLACTONE 25 MG: 25 TABLET ORAL at 09:01

## 2019-01-22 RX ADMIN — TORSEMIDE 20 MG: 20 TABLET ORAL at 08:01

## 2019-01-22 RX ADMIN — LEVALBUTEROL 1.25 MG: 1.25 SOLUTION, CONCENTRATE RESPIRATORY (INHALATION) at 02:01

## 2019-01-22 RX ADMIN — TORSEMIDE 20 MG: 20 TABLET ORAL at 09:01

## 2019-01-22 NOTE — PROGRESS NOTES
"[facility] Medicine  Progress Note    Patient Name: Radhames Alonzo  MRN: [id number]  Patient Class: IP- Inpatient   Admission Date: 1/9/2019  Length of Stay: 13 days  Attending Physician: Jesús Tay MD  Primary Care Provider: Pennie Jorge NP        Subjective:     Principal Problem:Severe sepsis    HPI:  84 year old male presents to ED b/c " I couldn't get up and walk."   SOB for about a year. Worse recently. Last night it got to the point that he was too SOB to stand up.   SOB at rest and with eating.   He also notes left foot pain. He has been seeing woundcare for a wound on this foot last 7 months   Workup in ED is significant for BNP 600s. Last BNP 200s 3 weeks ago. 160s 9 months ago.  He sees Dr. Jean-Baptiste regularly.   [facility] has seen pt and rec admit for diuresis, rule out MI   First TPN with slight bump.038  Lactic acid is 3.6. Meets I am told by ED MD that patient "has no signs of infection on physical exam." I am also told that CXR and U/A are clear.     He drinks six pack daily. Last drink 2 days ago. No h/o DT's in past         Hospital Course:  He has been started on cefepime and clinda for left lower ext cellulitis. He has been afebrile. WBC was 40432. Blood cultures NGTD. Lactate was up to 3.8, coming down.RR was 24 and had low sats on admission. Was on venti, now down to 3L NC   He is also wheezing. C/o SOB. CXR with CHF.   He does report that he takes pradaxa for his a fib at home. Not on home meds list nor reordered here. A fib on EKG. BNP was 659. Ordered for lasix 80mg IV BID. Urinating a lot. K low this am 2.8    1/11/19 Getting lasix 80mg IV bid per Cardiology; not much urine output yesterday with dribbling noted; concern for obstruction; will get bladder scan; consider renal US.   Creat 0.9 stable; K+ 3.6    CPK 2378>1762; TNI 0.032 x 1; repeat this am     1-12 Pt continues to have SOB and pul congestion; Dr Robertson consulted    1-13 Pt is fairly complex with mx " cardiac/pulmonary issues causing his coughing and SOB; has a hx of underlying prostate problems; a swing bed might be necessary; Dr Robertson and cardiology following    1/14 pt is still on clinda and cefepime for the lower ext cellulitis. Afebrile/ no elevated WBC. US negative for DVT. Still swollen but reports that it is much better than his normal   CXR 1/812 Left basilar consolidation or atelectasis and small pleural effusion. He is 95% on RA  Still weak. Having trouble standing on side bed.     1/15/19 Pt started feeling worse yesterday evening; this am noted to have LOW BP- SBP 80s, increased RR 30s, Temp 95.6,   Dr. Jean-Baptiste reports HF better, BNP 200s, still with LE edema, US negative for DVT; Also gets pradaxa  Underlying chronic prostate problems; defers any intervention or sx   Day 7/10 Cefepime and clinda for LE cellulitis and pneumonia. Blood cultures negative.   Looks jaundice with sclerema icterus; admit bilirubin - 2.0 elevated on 1/9/19 ; check LFTs/CMP/bilirubin    1/15/19: patient with worsening respiratory status overnight. He was tried on BiPaP but refused to wear it. Was agitated during the night. He was abdominal breathing but maintainings sats on 2L NC. BNP normal. D-dimer normal and anticoagulated since admit, restarted PO pradaxa yesterday. This AM, continues to decompensated. He is alert and oriented on initial eval but over the course of our conversation decompensates and becomes very lethargic. He has been waxing and waning throughout the night per RN report. Unclear if has h/o cirrhosis but on chart review this AM his bilirubin 2 and mild elevation AST--this was not trended during his hospital stay. This AM he has scleral icterus and yellowing of skin which was NOT PRESENT YESTERDAY. Also his LLE cellulitis is worsening, redness extending up the shin to the knee today which was NOT PRESENT YESTERDAY. He is hypothermic, tachypenic, hypotensive and tachycardic (despite BB on board).      1/16/19  Transferred up to ICU yesterday for hypotension and AMS on the floor. NH4 level 24. Bolused 1 liter NS. Did not require pressors. This am 120s/70s  UOP is good. Abx changed from cefepime and clinda to cefepime and VANC. Afebrile.  ABG ordered yesterday. He does have some hypercapnea. Refuses to wear bipap.    1/17/19  Patient became more alert and arousable throughout the day yesterday. Still with issues lying flat (which is why his imaging was not completed yesterday). However, his BP is improved without his home meds. He is tolerating his abx well. He did wear Bipap VERY little last night, but did not sleep because of it. UOP is good. No fevers.     1/18/19  He is awake and alert this am. He wore his bipap overnight   Tachy but BP is stable   Increased edema this am     1/19/19  He is very somnolent this am. Fought with him overnight to get bipap. Required ativan. He is sleepy this am   He is now a DNR     1/20/19  Steroids increased and IV Lasix added yesterday  He wore bipap about an hour last night   He is more awake today, no ativan given.   Spoke about hospice yesterday since he largely refuses the Bipap. Pt family is in agreement with hospice if he deteriorates or fails to improve   Out of bed and eating breakfast today    1/21 he is awake and alert again today. Did wear bipap a couple hours over evening yesterday (5 1/2hrs). Ct shows pleural effusion and pericardial effusion. Not on diuresis due to creat inability to tolerate. D/julia IV yesterday. Creat 1.4 today.     He is still on vanc (7days) and cefepime (13 days) (and s/p 1 week clinda) for the lower leg cellulitis. Afebrile WBC did go up as well but on steroids dose up and down over last week. No fevers. BP stable     TTE December 2018 with LVEF 55%, mild LVH    1/22   Abx were d/julia yesterday as he has had 1 week vanc and clinda as well as 2 weeks cefepime and cellulitis had resolved yesterday. He remains a febrile. WBC stable (slightly  lower than yesterday) legs without increase redness or swelling    Dr tom following. Does not feel that a throcentesis is warranted at this time. Will order patient CPAP for home use as he is tolerating it more each day. Pleural effusion stable. On 2L NC and sats 96%. Also noted pericardial effusion. [facility] following as well. Added aldactone and demadex 20mg po daily yesterday.  GFR stable. He is out -1420 on yesterday I/O. Reports breathing is getting better    Will need to have diuretics BID     Interval History: see HC     Review of Systems   Constitutional: Negative for activity change, fatigue, fever and unexpected weight change.   HENT: Negative for congestion, ear pain, hearing loss, rhinorrhea and sore throat.    Eyes: Negative for pain, redness and visual disturbance.   Respiratory: Positive for shortness of breath. Negative for cough and wheezing.    Cardiovascular: Positive for leg swelling (improving). Negative for chest pain and palpitations.   Gastrointestinal: Negative for abdominal pain, constipation, diarrhea, nausea and vomiting.   Genitourinary: Negative for decreased urine volume, dysuria, frequency, hematuria and urgency. Difficulty urinating: due to BPH.   Musculoskeletal: Negative for back pain, joint swelling and neck pain.   Skin: Positive for wound (improving). Negative for color change (erythema resolved b/l LE) and rash.   Neurological: Negative for dizziness, weakness, light-headedness and headaches.   Psychiatric/Behavioral: Negative for agitation.     Objective:     Vital Signs (Most Recent):  Temp: 96.1 °F (35.6 °C) (01/22/19 0403)  Pulse: 80 (01/22/19 0715)  Resp: 20 (01/22/19 0715)  BP: 113/66 (01/22/19 0403)  SpO2: 96 % (01/22/19 0715) Vital Signs (24h Range):  Temp:  [96.1 °F (35.6 °C)-96.8 °F (36 °C)] 96.1 °F (35.6 °C)  Pulse:  [] 80  Resp:  [16-24] 20  SpO2:  [95 %-100 %] 96 %  BP: (101-116)/(65-83) 113/66     Weight: 102 kg (224 lb 13.9 oz)(RD reviewed)  Body mass index is  29.67 kg/m².    Intake/Output Summary (Last 24 hours) at 1/22/2019 0754  Last data filed at 1/22/2019 0400  Gross per 24 hour   Intake 480 ml   Output 1900 ml   Net -1420 ml      Physical Exam   Constitutional: He is oriented to person, place, and time. He appears well-developed and well-nourished. No distress.    Eating breakfast- alert and oriented   HENT:   Head: Normocephalic and atraumatic.   Right Ear: External ear normal.   Left Ear: External ear normal.   Nose: Nose normal.   Eyes: Conjunctivae and EOM are normal. Pupils are equal, round, and reactive to light. Right eye exhibits no discharge. Left eye exhibits no discharge.   Neck: Normal range of motion. Neck supple. No tracheal deviation present.   Cardiovascular: Normal rate, regular rhythm and normal heart sounds.   No murmur heard.  Pulmonary/Chest: Effort normal. No respiratory distress. Wheezes: b/l. He has no rales.       Abdominal: Soft. Bowel sounds are normal. He exhibits no distension. There is no tenderness.   Musculoskeletal: Normal range of motion. He exhibits edema (1+ b/l LE edema to thighs but this is improving from my last evaluation 1 week ago).   Neurological: He is alert and oriented to person, place, and time. He has normal reflexes. No cranial nerve deficit.   Skin: Skin is warm and dry. No erythema.   LLE dressing c/d/i  Erythema of b/l LE is resolved     Psychiatric: He has a normal mood and affect. His behavior is normal. Judgment and thought content normal.   Nursing note and vitals reviewed.      Significant Labs:   CBC:   Recent Labs   Lab 01/21/19  0526 01/22/19  0455   WBC 13.10* 12.94*   HGB 10.9* 10.5*   HCT 34.9* 33.6*    212     CMP:   Recent Labs   Lab 01/21/19  0526 01/22/19  0455    137   K 4.8 4.4    103   CO2 27 29   * 140*   BUN 48* 58*   CREATININE 1.4 1.3   CALCIUM 9.3 9.1   PROT 5.9* 5.8*   ALBUMIN 2.8* 2.7*   BILITOT 1.3* 1.3*   ALKPHOS 51* 51*   AST 27 26   ALT 41 42   ANIONGAP 7* 5*    EGFRNONAA 46* 50*     Cardiac Markers:   Recent Labs   Lab 01/22/19  0455   *       vanc trough 18.8    Lab Results   Component Value Date    INR 1.4 (H) 01/18/2019    INR 1.2 01/17/2019    INR 1.2 01/16/2019     Blood cultures 1/9/19 NGTD x 2    US chest mediastinum   There is a moderate left-sided pleural effusion.  A measurable portion of the collection measures 10.8 x 5.6 x 4.7 cm.    No evidence of internal echogenicity or debris to suggest a complicated effusion.        1/20 chest CT   1. There are changes of left greater than right pleural effusions with associated left compressive atelectasis.  Similar findings of left pleural effusion and compressive atelectasis were present on a prior CTA chest dated 10/04/2017.  There is no obvious loculation within the left pleural effusion.  2. There is cardiomegaly.  Redemonstrated is a pericardial effusion measuring approximately 1.7 cm in greatest thickness.  This was present previously as well.  3. There is atherosclerosis in addition to coronary artery calcifications.    1/16 CXR   The heart shadow is severely enlarged.  There is pulmonary vascular congestion with pulmonary edema.  Small right and moderate left-sided pleural effusions are suspected.  The skeletal structures are intact.    US lower ext Negative for bilateral lower extremity DVT.    Assessment/Plan:      * Severe sepsis    Due to Venous stasis and wound of LLE   Start Cefepime and clinda to cover for everything including MRSA and psuedomonas.   Clinda instead of Vanc since mild CKD and we will diurese him as well   Lactic acid q 6 coming down  Bolus 2 liters overnight creat 0.9 bp 111/66 this am  woundcare consult     1-12 legs are down but still pul congestion.    1-13 Not retaining as much fluids, but still SOB and coughing; burns when he urinates from his Prostate issues(Ca)    1-14 better/resolved    1-15: worsening status this AM. + confusion, hypotension, tachycardia. LLE redness is  worsening from yesterday despite antibx. Should be well covered with clida and cefepime so puzzling. When stable consider imaging of leg to look for deeper infection    1/16:  Continue cefepime and VANC. CT chest today to check for empyema/underlying infiltrate. CXR not adequate to determine    1/17  At this point he was switched to vanc 1 day ago and is improved. Won't be able to lie flat for CT. Will go ahead and u/s chest to r/o empyema. But it seems as though he will need 7 days of iv vanc.    1/18  Continue VANC day 4 and cefepime day 10     1/19  Continue VANC day 5 and cefepime Day 11     1/20  Continue VANC day 6 and cefepime Day 12     1/21   On vanc and cefepime, cellulitis appears to have resolved. Will d/c abx, completed 1 week clinda then vanc, respectively and cefepime 2 weeks. LE erythema resolved. Stop today and monitor to ensure does not clinically begin to worsen as he did last week.     1/22 resolved     Encephalopathy, metabolic    Resolved.       Emphysema/COPD    Increased solumedrol to 80 q 8 1/19/20---This has helped significantly, will continue this dose for now.   Encourage bipap use   wean    1/21/19: still wheezing. Cont steroids for now    1/22: reduced steroids yesterday from 80bid to 40 bid, cont nebs.    1/23 wean steroids again. To prednisone 40mgpo daily     Pulmonary heart disease           Chronic respiratory failure with hypoxia and hypercapnia    He has bipap ordered. He is mostly refusing. I will not restrain this 84 year old man and force him to wear bipap if he is not willing. He is a DNR.   1/20/19---Since he is a DNR and mostly refusing bipap, the plan was to start hospice today unless he showed improvement. Remarkably, he has. He looks much better today. We will downgrade him to the floor later today.  However, if he deteriorates or fails to continue to show improvement, I strongly recommend getting hospice involved. This was discussed with wife and his sons Dequan and  "Lele.  They are in agreement with this. He remains a DNR.     1/21 he is tolerating the bipap a little more. He is O2 dep which is new for him this admission. Will need to qualify home for O2 and a bipap at home pt is considering thoracentesis to assess the effusion and see if it improves his breathing    1/22 pt had ambulating pox assessed yesterday 86% on RA, qualified for home O2.     Dysphagia    Likely secondary to mental status on 1/16  ST was following ands reports patient has ability to identify safe swallow strategies and good independent implementation of compensatories to minimize risk of aspiration on least restrictive diet.         Venous stasis ulcer    Wound care.  Cont to apply silver.       Pleural effusion    Getting bigger on CXR from 1/14/20  CT ordered--will do when stabilized. I think today(1/20/19) is the first day he is well enough to try and get this done. Will attempt    ? Underlying consolidation. ? Empyema  U/S ordered---"no underlying debris to suggest complicated effusion."   following   Will continue VANC( day 6) as he has shown some improvement when this was started on the 15th.  Cefepime day 12. Initially started for LE cellulitis that has since resolved. Will continue for presumed LLL pneumonia, but need a CT to further characterize this and the associated effusion on CXR.     1/21 1/21 Ct does not show infiltrate. Will d/c abx as he has had 1 week vanc and clida and 2 weeks cefepime. Consider pleural effusion as cause for resp issue. Discussed thoracentesis .    1/22 No pneumonia, abx stopped. NIV ordered for home use per Dr Robertson. Dr robertson does not feel like throcentesis warranted at this time. CT reports no change from 2017 imaging. diuresing       Encephalopathy    Waxing and waning mental status  Overnight thought to be sundowning but his AM dramatic waxing and waning just during our 10 minute conversation. Went form alert to very lethargic. + jaundice  Hypotensive, " tachycardic, hypothermic (95.6 axillary)  Sepsis vs hepatic    1/1619  LFTs, lactic acid unremarkable.  I think this may be from his hypercapnea and refusal to wear bipap   Will get CT head to rule out CVA    1/17/19  Metabolic? He is VERY alert and appropriate today. CT head on hold. Need to be cognizant of the fact that he has prostate cancer, though.    1/18/19  Alert this am. He wore his bipap for the better part of the night last night     1/19/19  He is lethargic this am. This is likely CO2 narcosis as he hasn't worn his bipap much overnight. Also he has some ativan on board. Will see if we can get him to wear bipap today, but I will not restrain and force him to do so. That wouldn't be humane in this 84 year old DNR patient.     1/20/19  He waxes and wanes. Today is a good day. I think ativan had a lot to do with his altered mental state yesterday. Ativan has been discontinued. Haldol prn agitation, but honestly, if we dont force the bipap on him, he doesn't get agitated.     1/21/19 resolved, very AAOx3     Benign prostatic hyperplasia (BPH) with post-void dribbling    Resume flomax and casodex--with waxing and waning mental status may remove PO meds  Has a valdes currently. Will continue .     Pneumonia    Need to assess if truly hypoxic. No low sats charted but has been on venti and now 4L NC. Will try to wean. Repeat CXR.   1/11/19 O2 sat 95-99%     1-12 Pt is labored with his breathing and continuing to cough; Dr Robertson consulted  1-14 no longer needing O2, cont cefepime and clinda x 10 days, cont nebs,stop steroids.    1/15/19: marked change in respiratory status this morning, was worsening overnight. He is oxygenating, sats good on 2L and ABG shows good sats. CXR with enlarging left pleural effusion otherwise normal. CT ordered and will do once stabilized--? Underlying consolidation with empyema. Looking for loculations, etc. Consider thoracentesis. Could explain his worsening status. He is in respiratory  distress and abdominal breathing. Taking off BiPaP. He is a FULL CODE.    1/16/19:  Continue VANC/Cefepime. He is considering DNR . Told Dr. Jean-Baptiste no to resuscitation. We need to get him to sign a DNR     1/17/19  Patient seems to have staph pneumonia as he has improved with add'l coverage. Check u/s to r/o empyema    1/18/19  Improved clinically. Continue VANC cefepime     1/19/19  Continue VANC and Cefepime     1/20/20  Continue VANC(day6) and cefepime(day 12, initially started for LLE cellulitis at admit but treating pneumonia at least since the 10th). Hoping to get CT chest done today to further characterize this effusion and infiltrate which is poorly characterized on x ray.     1/21 Ct does not show infiltrate. Will d/c abx as he has had 1 week vanc and clida and 2 weeks cefepime. Consider pleural effusion as cause for resp issue. Discussed thoracentesis and will see what Dr. Tom thinks. Is this exudate or transudate? Also thoracentesis may help with his breathing issues.     1/22 No pneumonia, abx stopped. NIV ordered for home use per Dr Tom. Dr tom does not feel like throcentesis warranted at this time. CT reports no change from 2017 imaging      Cellulitis of left lower extremity    See severe sepsis treatment  Check u/s  Negative for bilateral lower extremity DVT.    cont cefepime and clinda x 10 days wound care Cleanse wound with NS using gauze. Apply silver alginate to wound bed, cover with ABD and secure with tape daily.      1/15/19: marked worsening of erythema from yesterday despite antibx. Puzzling why the sudden change. D-dimer negative and anticoagulated so low suspicion for DVT. Worsening vitals as well (see sepsis).        1/16/19  Leg is looking much better already     1/17/19  Much better, but still with small open wound. Wound care should see today.    1/19/19  Resolved   abx continued for LLL pneumonia     1/21/19   D/c abx today (see sepsis plan)    1/22 cont to hold abx. Cellulitis seems  to have resolved     Hypokalemia    Likely due to torsemide use  Resolved .  Aldactone ordered     CHF (congestive heart failure)    1/18/19  [facility] consulted   ECHO in their records  Continue BB. No ACE currently. I presume this is from low BP during the week. Increasing BB. Add ACE in am if BP is stable     1/19/19  Will diurese cautiously today. Lasix 20mg IV BID     1/20  BB. Negative fluid balance. Creatinine bumping up slightly. D/c low dose IV Lasix. BNP, BMP daily .  Add ARB when able     1/21 cont BB. Lasix stopped yesterday as creat bumped. Still with cough and O2 dependent due to pleural effusion and atelectasis. [facility] consulted. Will need recommendation on diuresis and if they feel this effusion of CHF related vs other etiology. He does still have some LE edema but this is GREATLY improved from my last evaluation and at this point I do not think he is having acute exacerbation of his CHF.     1/22cont BB, [facility] added aldactone and demadex, creat/GFR stable. Holding ARB for now. StillO2 dependent at this time       Atrial fibrillation    Rate controlled. On metoprolol    He reports that he is taking pradaxa but called pharmacy and they do not report that he has filled anything for anticoagulation especially pradaxa in recent months. Start lovenox 1mg/kg BID until we get his home meds    [facility] was [facility], will restart at d/c    1/15/19: tachycardic despite BB and now hypotensive. Holding BB and all BP meds. Sinus on telemetry right now. Cont pradaxa for now but may stop again and switch back to lovenox today if continues to worsen and pending labs      1/16/19  Continue pradaxa    1/17/19  Will resume BB today. For most part he is rate controlled.    1/19/19  Continue BB, increase to 100mg     1/20  Metoprolol switched to IV yesterday b/c was somnolent. Switch back to po, 100mg daily     1/21   Cont metoprolol and pradaxa HR 84.    1/22 no changes     Cardiomyopathy    Cardiology is  following  Diastolic CHF: TTE December 2018 with LVEF 55%, mild LVH  Was started on diuretics  BNP is only 200s now  CXR with left pleural effusion but not severe pulmonary congestion  1/15/10: BP dropping, hold ACEi, BB and diuretics for now and give back IVF---careful for volume overload though.   1/16/19: resume BB and ACE per [facility]   1/17/18.  Meds have not been resumed. Start at 25 of metoprolol rather than home dose of 100.    1/19/19  Increase BB to 100mg     1/20  Continue metoprolol 100mg. Resume ARB when BP able to tolerate     1/21 now on metoprolol 100XL daily BP stable 122/68 still holding arb creat 1.4    1/22 arb still on hold as we are adding diuresis and watching kidneys closely. Cont toprol XL 100mg po daily BP stable       VTE Risk Mitigation (From admission, onward)        Ordered     dabigatran etexilate capsule 150 mg  2 times daily      01/14/19 1112     IP VTE HIGH RISK PATIENT  Once      01/09/19 1751              Kodi Rojas MD  Department of Hospital Medicine   [facility]

## 2019-01-22 NOTE — SUBJECTIVE & OBJECTIVE
Interval History: tolerating bipap but only raising sat to 95 %    Objective:     Vital Signs (Most Recent):  Temp: 96.2 °F (35.7 °C) (01/22/19 0755)  Pulse: 87 (01/22/19 0755)  Resp: 20 (01/22/19 0755)  BP: 111/67 (01/22/19 0755)  SpO2: 100 % (01/22/19 0755) Vital Signs (24h Range):  Temp:  [96.1 °F (35.6 °C)-96.8 °F (36 °C)] 96.2 °F (35.7 °C)  Pulse:  [] 87  Resp:  [16-24] 20  SpO2:  [95 %-100 %] 100 %  BP: (101-116)/(65-83) 111/67     Weight: 102 kg (224 lb 13.9 oz)(RD reviewed)  Body mass index is 29.67 kg/m².      Intake/Output Summary (Last 24 hours) at 1/22/2019 1052  Last data filed at 1/22/2019 0845  Gross per 24 hour   Intake 480 ml   Output 1900 ml   Net -1420 ml       Physical Exam   Constitutional: He is oriented to person, place, and time. He appears well-developed and well-nourished. He is cooperative.  Non-toxic appearance. He does not appear ill. No distress.   HENT:   Head: Normocephalic and atraumatic.   Right Ear: Hearing, tympanic membrane, external ear and ear canal normal.   Left Ear: Hearing, tympanic membrane, external ear and ear canal normal.   Nose: Nose normal. No mucosal edema, rhinorrhea or nasal deformity. No epistaxis. Right sinus exhibits no maxillary sinus tenderness and no frontal sinus tenderness. Left sinus exhibits no maxillary sinus tenderness and no frontal sinus tenderness.   Mouth/Throat: Uvula is midline, oropharynx is clear and moist and mucous membranes are normal. No trismus in the jaw. Normal dentition. No uvula swelling. No posterior oropharyngeal erythema.   Eyes: Conjunctivae and lids are normal. No scleral icterus.   Sclera clear bilat   Neck: Trachea normal, full passive range of motion without pain and phonation normal. Neck supple.   Cardiovascular: Normal rate, regular rhythm, normal heart sounds, intact distal pulses and normal pulses.   Pulmonary/Chest: He is in respiratory distress (mild to moderate). He has decreased breath sounds in the right lower  field, the left upper field and the left lower field. He has no wheezes. He has rhonchi in the right lower field and the left lower field.   Abdominal: Soft. Normal appearance and bowel sounds are normal. He exhibits no distension. There is no tenderness.   Musculoskeletal: Normal range of motion. He exhibits no edema or deformity.   Neurological: He is alert and oriented to person, place, and time. He exhibits normal muscle tone. Coordination normal.   Skin: Skin is warm, dry and intact. Capillary refill takes less than 2 seconds. He is not diaphoretic. No pallor.   Psychiatric: He has a normal mood and affect. His speech is normal and behavior is normal. Judgment and thought content normal. Cognition and memory are normal.   Nursing note and vitals reviewed.      Vents:  Oxygen Concentration (%): 28 (01/22/19 0006)    Lines/Drains/Airways     Drain                 Urethral Catheter 01/15/19 1215 Latex 16 Fr. 6 days          Peripheral Intravenous Line                 Peripheral IV - Single Lumen 01/17/19 Anterior;Right Forearm 5 days                Significant Labs:    CBC/Anemia Profile:  Recent Labs   Lab 01/21/19  0526 01/22/19  0455   WBC 13.10* 12.94*   HGB 10.9* 10.5*   HCT 34.9* 33.6*    212   * 101*   RDW 13.6 13.7        Chemistries:  Recent Labs   Lab 01/21/19  0526 01/22/19  0455    137   K 4.8 4.4    103   CO2 27 29   BUN 48* 58*   CREATININE 1.4 1.3   CALCIUM 9.3 9.1   ALBUMIN 2.8* 2.7*   PROT 5.9* 5.8*   BILITOT 1.3* 1.3*   ALKPHOS 51* 51*   ALT 41 42   AST 27 26       All pertinent labs within the past 24 hours have been reviewed.    Significant Imaging:  I have reviewed all pertinent imaging results/findings within the past 24 hours.     Patient now requires a  Home Ventilator due to chronic Respirator failure copd Cor pulmonale and pulmonary heart disease and  this will decrease hospitalizations ,Decrease Work of Breathing,extend his life  and improve the Paients Quality  of life.Without this form of ventilation it could lead to patient harm or death.Pressure support with Safety tidal volume mode of therapy in this patient will optimize Gas Exchange. Bipap is not reliable due to patients condition.               Now wants home ventilator and is willing to use   Patient is compliant with home respiratory regimen of Duoneb but remains symptomatic. As stated above patient was initially against trying hospital BiPap, but did and tolerated mask well but not completely improved on bipap. However patient has not had sufficient improvement on Bipap with symptoms and thus requiring noninvasive home ventilator

## 2019-01-22 NOTE — ASSESSMENT & PLAN NOTE
Cardiology is following  Diastolic CHF: TTE December 2018 with LVEF 55%, mild LVH  Was started on diuretics  BNP is only 200s now  CXR with left pleural effusion but not severe pulmonary congestion  1/15/10: BP dropping, hold ACEi, BB and diuretics for now and give back IVF---careful for volume overload though.   1/16/19: resume BB and ACE per [facility]   1/17/18.  Meds have not been resumed. Start at 25 of metoprolol rather than home dose of 100.    1/19/19  Increase BB to 100mg     1/20  Continue metoprolol 100mg. Resume ARB when BP able to tolerate     1/21 now on metoprolol 100XL daily BP stable 122/68 still holding arb creat 1.4    1/22 arb still on hold as we are adding diuresis and watching kidneys closely. Cont toprol XL 100mg po daily BP stable

## 2019-01-22 NOTE — ASSESSMENT & PLAN NOTE
Due to Venous stasis and wound of LLE   Start Cefepime and clinda to cover for everything including MRSA and psuedomonas.   Clinda instead of Vanc since mild CKD and we will diurese him as well   Lactic acid q 6 coming down  Bolus 2 liters overnight creat 0.9 bp 111/66 this am  woundcare consult     1-12 legs are down but still pul congestion.    1-13 Not retaining as much fluids, but still SOB and coughing; burns when he urinates from his Prostate issues(Ca)    1-14 better/resolved    1-15: worsening status this AM. + confusion, hypotension, tachycardia. LLE redness is worsening from yesterday despite antibx. Should be well covered with clida and cefepime so puzzling. When stable consider imaging of leg to look for deeper infection    1/16:  Continue cefepime and VANC. CT chest today to check for empyema/underlying infiltrate. CXR not adequate to determine    1/17  At this point he was switched to vanc 1 day ago and is improved. Won't be able to lie flat for CT. Will go ahead and u/s chest to r/o empyema. But it seems as though he will need 7 days of iv vanc.    1/18  Continue VANC day 4 and cefepime day 10     1/19  Continue VANC day 5 and cefepime Day 11     1/20  Continue VANC day 6 and cefepime Day 12     1/21   On vanc and cefepime, cellulitis appears to have resolved. Will d/c abx, completed 1 week clinda then vanc, respectively and cefepime 2 weeks. LE erythema resolved. Stop today and monitor to ensure does not clinically begin to worsen as he did last week.     1/22 resolved

## 2019-01-22 NOTE — ASSESSMENT & PLAN NOTE
1/18/19  [facility] consulted   ECHO in their records  Continue BB. No ACE currently. I presume this is from low BP during the week. Increasing BB. Add ACE in am if BP is stable     1/19/19  Will diurese cautiously today. Lasix 20mg IV BID     1/20  BB. Negative fluid balance. Creatinine bumping up slightly. D/c low dose IV Lasix. BNP, BMP daily .  Add ARB when able     1/21 cont BB. Lasix stopped yesterday as creat bumped. Still with cough and O2 dependent due to pleural effusion and atelectasis. [facility] consulted. Will need recommendation on diuresis and if they feel this effusion of CHF related vs other etiology. He does still have some LE edema but this is GREATLY improved from my last evaluation and at this point I do not think he is having acute exacerbation of his CHF.     1/22cont BB, [facility] added aldactone and demadex, creat/GFR stable. Holding ARB for now. StillO2 dependent at this time

## 2019-01-22 NOTE — PLAN OF CARE
Problem: Adult Inpatient Plan of Care  Goal: Plan of Care Review  Outcome: Ongoing (interventions implemented as appropriate)  Pt doing well. No question/concerns at this time. Agrees with poc. No pain/falls.  Watching labs today. Decreased steroids again.  Increased fluid medicine also today. Patient put out 2000ml today. Hein bag changed d/t it being hard to get urine out of bag  Should go home tomorrow .

## 2019-01-22 NOTE — PLAN OF CARE
Problem: Adult Inpatient Plan of Care  Goal: Plan of Care Review  Outcome: Ongoing (interventions implemented as appropriate)  Vitals stable, afebrile. No complaints of pain. Sat at bedside this AM to drink coffee. Bilateral legs and arms weeping more this shift compared to this weekend. Lungs diminished. Productive cough at times. Discussed plan of care with pt, stated understanding

## 2019-01-22 NOTE — ASSESSMENT & PLAN NOTE
"Getting bigger on CXR from 1/14/20  CT ordered--will do when stabilized. I think today(1/20/19) is the first day he is well enough to try and get this done. Will attempt    ? Underlying consolidation. ? Empyema  U/S ordered---"no underlying debris to suggest complicated effusion."   following   Will continue VANC( day 6) as he has shown some improvement when this was started on the 15th.  Cefepime day 12. Initially started for LE cellulitis that has since resolved. Will continue for presumed LLL pneumonia, but need a CT to further characterize this and the associated effusion on CXR.     1/21 1/21 Ct does not show infiltrate. Will d/c abx as he has had 1 week vanc and clida and 2 weeks cefepime. Consider pleural effusion as cause for resp issue. Discussed thoracentesis .    1/22 No pneumonia, abx stopped. NIV ordered for home use per Dr Robertson. Dr robertson does not feel like throcentesis warranted at this time. CT reports no change from 2017 imaging. diuresing    "

## 2019-01-22 NOTE — ASSESSMENT & PLAN NOTE
Likely secondary to mental status on 1/16  ST was following ands reports patient has ability to identify safe swallow strategies and good independent implementation of compensatories to minimize risk of aspiration on least restrictive diet.

## 2019-01-22 NOTE — ASSESSMENT & PLAN NOTE
Resume flomax and casodex--with waxing and waning mental status may remove PO meds  Has a valdes currently. Will continue .

## 2019-01-22 NOTE — PROGRESS NOTES
[facility]  Pulmonology  Progress Note    Patient Name: Radhames Alonzo  MRN: [id number]  Admission Date: 1/9/2019  Hospital Length of Stay: 13 days  Code Status: DNR  Attending Provider: Jesús Tay MD  Primary Care Provider: Pennie Jorge NP   Principal Problem: Severe sepsis    Subjective:     Interval History: tolerating bipap but only raising sat to 95 %    Objective:     Vital Signs (Most Recent):  Temp: 96.2 °F (35.7 °C) (01/22/19 0755)  Pulse: 87 (01/22/19 0755)  Resp: 20 (01/22/19 0755)  BP: 111/67 (01/22/19 0755)  SpO2: 100 % (01/22/19 0755) Vital Signs (24h Range):  Temp:  [96.1 °F (35.6 °C)-96.8 °F (36 °C)] 96.2 °F (35.7 °C)  Pulse:  [] 87  Resp:  [16-24] 20  SpO2:  [95 %-100 %] 100 %  BP: (101-116)/(65-83) 111/67     Weight: 102 kg (224 lb 13.9 oz)(RD reviewed)  Body mass index is 29.67 kg/m².      Intake/Output Summary (Last 24 hours) at 1/22/2019 1052  Last data filed at 1/22/2019 0845  Gross per 24 hour   Intake 480 ml   Output 1900 ml   Net -1420 ml       Physical Exam   Constitutional: He is oriented to person, place, and time. He appears well-developed and well-nourished. He is cooperative.  Non-toxic appearance. He does not appear ill. No distress.   HENT:   Head: Normocephalic and atraumatic.   Right Ear: Hearing, tympanic membrane, external ear and ear canal normal.   Left Ear: Hearing, tympanic membrane, external ear and ear canal normal.   Nose: Nose normal. No mucosal edema, rhinorrhea or nasal deformity. No epistaxis. Right sinus exhibits no maxillary sinus tenderness and no frontal sinus tenderness. Left sinus exhibits no maxillary sinus tenderness and no frontal sinus tenderness.   Mouth/Throat: Uvula is midline, oropharynx is clear and moist and mucous membranes are normal. No trismus in the jaw. Normal dentition. No uvula swelling. No posterior oropharyngeal erythema.   Eyes: Conjunctivae and lids are normal. No scleral icterus.   Sclera clear bilat    Neck: Trachea normal, full passive range of motion without pain and phonation normal. Neck supple.   Cardiovascular: Normal rate, regular rhythm, normal heart sounds, intact distal pulses and normal pulses.   Pulmonary/Chest: He is in respiratory distress (mild to moderate). He has decreased breath sounds in the right lower field, the left upper field and the left lower field. He has no wheezes. He has rhonchi in the right lower field and the left lower field.   Abdominal: Soft. Normal appearance and bowel sounds are normal. He exhibits no distension. There is no tenderness.   Musculoskeletal: Normal range of motion. He exhibits no edema or deformity.   Neurological: He is alert and oriented to person, place, and time. He exhibits normal muscle tone. Coordination normal.   Skin: Skin is warm, dry and intact. Capillary refill takes less than 2 seconds. He is not diaphoretic. No pallor.   Psychiatric: He has a normal mood and affect. His speech is normal and behavior is normal. Judgment and thought content normal. Cognition and memory are normal.   Nursing note and vitals reviewed.      Vents:  Oxygen Concentration (%): 28 (01/22/19 0006)    Lines/Drains/Airways     Drain                 Urethral Catheter 01/15/19 1215 Latex 16 Fr. 6 days          Peripheral Intravenous Line                 Peripheral IV - Single Lumen 01/17/19 Anterior;Right Forearm 5 days                Significant Labs:    CBC/Anemia Profile:  Recent Labs   Lab 01/21/19  0526 01/22/19  0455   WBC 13.10* 12.94*   HGB 10.9* 10.5*   HCT 34.9* 33.6*    212   * 101*   RDW 13.6 13.7        Chemistries:  Recent Labs   Lab 01/21/19  0526 01/22/19  0455    137   K 4.8 4.4    103   CO2 27 29   BUN 48* 58*   CREATININE 1.4 1.3   CALCIUM 9.3 9.1   ALBUMIN 2.8* 2.7*   PROT 5.9* 5.8*   BILITOT 1.3* 1.3*   ALKPHOS 51* 51*   ALT 41 42   AST 27 26       All pertinent labs within the past 24 hours have been reviewed.    Significant  Imaging:  I have reviewed all pertinent imaging results/findings within the past 24 hours.     Patient now requires a  Home Ventilator due to chronic Respirator failure copd Cor pulmonale and pulmonary heart disease and  this will decrease hospitalizations ,Decrease Work of Breathing,extend his life  and improve the Paients Quality of life.Without this form of ventilation it could lead to patient harm or death.Pressure support with Safety tidal volume mode of therapy in this patient will optimize Gas Exchange. Bipap is not reliable due to patients condition.               Now wants home ventilator and is willing to use   Patient is compliant with home respiratory regimen of Duoneb but remains symptomatic. As stated above patient was initially against trying hospital BiPap, but did and tolerated mask well but not completely improved on bipap. However patient has not had sufficient improvement on Bipap with symptoms and thus requiring noninvasive home ventilator           Assessment/Plan:     Encephalopathy, metabolic    No issues now completely lucid  Much improved for last 3 days    pt aware of person place time and surroundings   Yesterday pt was very appreciative and interactive was going and did wear bipap but todAY is not without change in his metabolicsttais except WBC up 4000     Emphysema/COPD    moderaTE COPD      Chronic respiratory failure with hypoxia    Patient now requires a  Home Ventilator due to chronic Respirator failure copd Cor pulmonale and pulmonary heart disease and  this will decrease hospitalizations ,Decrease Work of Breathing,extend his life  and improve the Paients Quality of life.Without this form of ventilation it could lead to patient harm or death.Pressure support with Safety tidal volume mode of therapy in this patient will optimize Gas Exchange. Bipap is not reliable due to patients condition.               Now wants home ventilator and is willing to use   Patient is compliant with  home respiratory regimen of Duoneb but remains symptomatic. As stated above patient was initially against trying hospital BiPap, but did and tolerated mask well but not completely improved on bipap. However patient has not had sufficient improvement on Bipap with symptoms and thus requiring noninvasive home ventilator       Tolerating bipap now calm 1/20/19  Waxing and  Wanes in thought processes does not want bipap very verbally and physically against wearing bipap   In view of this will not pursue NIV at home   Patient now requires a  Home Ventilator due to chronic Respirator failure copd Cor pulmonale and pulmonary heart disease and  this will decrease hospitalizations ,Decrease Work of Breathing,extend his life  and improve the Paients Quality of life.Without this form of ventilation it could lead to patient harm or death.Pressure support with Safety tidal volume mode of therapy in this patient will optimize Gas Exchange. Bipap is not reliable due to patients condition.                Sukh Robertson MD  Pulmonology  [facility]

## 2019-01-22 NOTE — ASSESSMENT & PLAN NOTE
Waxing and waning mental status  Overnight thought to be sundowning but his AM dramatic waxing and waning just during our 10 minute conversation. Went form alert to very lethargic. + jaundice  Hypotensive, tachycardic, hypothermic (95.6 axillary)  Sepsis vs hepatic    1/1619  LFTs, lactic acid unremarkable.  I think this may be from his hypercapnea and refusal to wear bipap   Will get CT head to rule out CVA    1/17/19  Metabolic? He is VERY alert and appropriate today. CT head on hold. Need to be cognizant of the fact that he has prostate cancer, though.    1/18/19  Alert this am. He wore his bipap for the better part of the night last night     1/19/19  He is lethargic this am. This is likely CO2 narcosis as he hasn't worn his bipap much overnight. Also he has some ativan on board. Will see if we can get him to wear bipap today, but I will not restrain and force him to do so. That wouldn't be humane in this 84 year old DNR patient.     1/20/19  He waxes and wanes. Today is a good day. I think ativan had a lot to do with his altered mental state yesterday. Ativan has been discontinued. Haldol prn agitation, but honestly, if we dont force the bipap on him, he doesn't get agitated.     1/21/19 resolved, very AAOx3

## 2019-01-22 NOTE — ASSESSMENT & PLAN NOTE
Patient now requires a  Home Ventilator due to chronic Respirator failure copd Cor pulmonale and pulmonary heart disease and  this will decrease hospitalizations ,Decrease Work of Breathing,extend his life  and improve the Paients Quality of life.Without this form of ventilation it could lead to patient harm or death.Pressure support with Safety tidal volume mode of therapy in this patient will optimize Gas Exchange. Bipap is not reliable due to patients condition.               Now wants home ventilator and is willing to use   Patient is compliant with home respiratory regimen of Duoneb but remains symptomatic. As stated above patient was initially against trying hospital BiPap, but did and tolerated mask well but not completely improved on bipap. However patient has not had sufficient improvement on Bipap with symptoms and thus requiring noninvasive home ventilator       Tolerating bipap now calm 1/20/19  Waxing and  Wanes in thought processes does not want bipap very verbally and physically against wearing bipap   In view of this will not pursue NIV at home   Patient now requires a  Home Ventilator due to chronic Respirator failure copd Cor pulmonale and pulmonary heart disease and  this will decrease hospitalizations ,Decrease Work of Breathing,extend his life  and improve the Paients Quality of life.Without this form of ventilation it could lead to patient harm or death.Pressure support with Safety tidal volume mode of therapy in this patient will optimize Gas Exchange. Bipap is not reliable due to patients condition.

## 2019-01-22 NOTE — ASSESSMENT & PLAN NOTE
No issues now completely lucid  Much improved for last 3 days    pt aware of person place time and surroundings   Yesterday pt was very appreciative and interactive was going and did wear bipap but todAY is not without change in his metabolicsttais except WBC up 4000

## 2019-01-22 NOTE — SUBJECTIVE & OBJECTIVE
Interval History: see      Review of Systems   Constitutional: Negative for activity change, fatigue, fever and unexpected weight change.   HENT: Negative for congestion, ear pain, hearing loss, rhinorrhea and sore throat.    Eyes: Negative for pain, redness and visual disturbance.   Respiratory: Positive for shortness of breath. Negative for cough and wheezing.    Cardiovascular: Positive for leg swelling (improving). Negative for chest pain and palpitations.   Gastrointestinal: Negative for abdominal pain, constipation, diarrhea, nausea and vomiting.   Genitourinary: Negative for decreased urine volume, dysuria, frequency, hematuria and urgency. Difficulty urinating: due to BPH.   Musculoskeletal: Negative for back pain, joint swelling and neck pain.   Skin: Positive for wound (improving). Negative for color change (erythema resolved b/l LE) and rash.   Neurological: Negative for dizziness, weakness, light-headedness and headaches.   Psychiatric/Behavioral: Negative for agitation.     Objective:     Vital Signs (Most Recent):  Temp: 96.1 °F (35.6 °C) (01/22/19 0403)  Pulse: 80 (01/22/19 0715)  Resp: 20 (01/22/19 0715)  BP: 113/66 (01/22/19 0403)  SpO2: 96 % (01/22/19 0715) Vital Signs (24h Range):  Temp:  [96.1 °F (35.6 °C)-96.8 °F (36 °C)] 96.1 °F (35.6 °C)  Pulse:  [] 80  Resp:  [16-24] 20  SpO2:  [95 %-100 %] 96 %  BP: (101-116)/(65-83) 113/66     Weight: 102 kg (224 lb 13.9 oz)(RD reviewed)  Body mass index is 29.67 kg/m².    Intake/Output Summary (Last 24 hours) at 1/22/2019 0754  Last data filed at 1/22/2019 0400  Gross per 24 hour   Intake 480 ml   Output 1900 ml   Net -1420 ml      Physical Exam   Constitutional: He is oriented to person, place, and time. He appears well-developed and well-nourished. No distress.    Eating breakfast- alert and oriented   HENT:   Head: Normocephalic and atraumatic.   Right Ear: External ear normal.   Left Ear: External ear normal.   Nose: Nose normal.   Eyes:  Conjunctivae and EOM are normal. Pupils are equal, round, and reactive to light. Right eye exhibits no discharge. Left eye exhibits no discharge.   Neck: Normal range of motion. Neck supple. No tracheal deviation present.   Cardiovascular: Normal rate, regular rhythm and normal heart sounds.   No murmur heard.  Pulmonary/Chest: Effort normal. No respiratory distress. Wheezes: b/l. He has no rales.       Abdominal: Soft. Bowel sounds are normal. He exhibits no distension. There is no tenderness.   Musculoskeletal: Normal range of motion. He exhibits edema (1+ b/l LE edema to thighs but this is improving from my last evaluation 1 week ago).   Neurological: He is alert and oriented to person, place, and time. He has normal reflexes. No cranial nerve deficit.   Skin: Skin is warm and dry. No erythema.   LLE dressing c/d/i  Erythema of b/l LE is resolved     Psychiatric: He has a normal mood and affect. His behavior is normal. Judgment and thought content normal.   Nursing note and vitals reviewed.      Significant Labs:   CBC:   Recent Labs   Lab 01/21/19 0526 01/22/19  0455   WBC 13.10* 12.94*   HGB 10.9* 10.5*   HCT 34.9* 33.6*    212     CMP:   Recent Labs   Lab 01/21/19 0526 01/22/19  0455    137   K 4.8 4.4    103   CO2 27 29   * 140*   BUN 48* 58*   CREATININE 1.4 1.3   CALCIUM 9.3 9.1   PROT 5.9* 5.8*   ALBUMIN 2.8* 2.7*   BILITOT 1.3* 1.3*   ALKPHOS 51* 51*   AST 27 26   ALT 41 42   ANIONGAP 7* 5*   EGFRNONAA 46* 50*     Cardiac Markers:   Recent Labs   Lab 01/22/19  0455   *       vanc trough 18.8    Lab Results   Component Value Date    INR 1.4 (H) 01/18/2019    INR 1.2 01/17/2019    INR 1.2 01/16/2019     Blood cultures 1/9/19 NGTD x 2    US chest mediastinum   There is a moderate left-sided pleural effusion.  A measurable portion of the collection measures 10.8 x 5.6 x 4.7 cm.    No evidence of internal echogenicity or debris to suggest a complicated effusion.        1/20  chest CT   1. There are changes of left greater than right pleural effusions with associated left compressive atelectasis.  Similar findings of left pleural effusion and compressive atelectasis were present on a prior CTA chest dated 10/04/2017.  There is no obvious loculation within the left pleural effusion.  2. There is cardiomegaly.  Redemonstrated is a pericardial effusion measuring approximately 1.7 cm in greatest thickness.  This was present previously as well.  3. There is atherosclerosis in addition to coronary artery calcifications.    1/16 CXR   The heart shadow is severely enlarged.  There is pulmonary vascular congestion with pulmonary edema.  Small right and moderate left-sided pleural effusions are suspected.  The skeletal structures are intact.    US lower ext Negative for bilateral lower extremity DVT.

## 2019-01-22 NOTE — ASSESSMENT & PLAN NOTE
He has bipap ordered. He is mostly refusing. I will not restrain this 84 year old man and force him to wear bipap if he is not willing. He is a DNR.   1/20/19---Since he is a DNR and mostly refusing bipap, the plan was to start hospice today unless he showed improvement. Remarkably, he has. He looks much better today. We will downgrade him to the floor later today.  However, if he deteriorates or fails to continue to show improvement, I strongly recommend getting hospice involved. This was discussed with wife and his sons Dequan and Lele.  They are in agreement with this. He remains a DNR.     1/21 he is tolerating the bipap a little more. He is O2 dep which is new for him this admission. Will need to qualify home for O2 and a bipap at home pt is considering thoracentesis to assess the effusion and see if it improves his breathing    1/22 pt had ambulating pox assessed yesterday 86% on RA, qualified for home O2.

## 2019-01-22 NOTE — ASSESSMENT & PLAN NOTE
Increased solumedrol to 80 q 8 1/19/20---This has helped significantly, will continue this dose for now.   Encourage bipap use   wean    1/21/19: still wheezing. Cont steroids for now    1/22: reduced steroids yesterday from 80bid to 40 bid, cont nebs.    1/23 wean steroids again. To prednisone 40mgpo daily

## 2019-01-22 NOTE — ASSESSMENT & PLAN NOTE
See severe sepsis treatment  Check u/s  Negative for bilateral lower extremity DVT.    cont cefepime and clinda x 10 days wound care Cleanse wound with NS using gauze. Apply silver alginate to wound bed, cover with ABD and secure with tape daily.      1/15/19: marked worsening of erythema from yesterday despite antibx. Puzzling why the sudden change. D-dimer negative and anticoagulated so low suspicion for DVT. Worsening vitals as well (see sepsis).        1/16/19  Leg is looking much better already     1/17/19  Much better, but still with small open wound. Wound care should see today.    1/19/19  Resolved   abx continued for LLL pneumonia     1/21/19   D/c abx today (see sepsis plan)    1/22 cont to hold abx. Cellulitis seems to have resolved

## 2019-01-22 NOTE — PT/OT/SLP PROGRESS
Physical Therapy Treatment    Patient Name:  Radhames Alonzo   MRN:  [id number]    Recommendations:     Discharge Recommendations:  other (see comments)(home with home health)   Discharge Equipment Recommendations: walker, rolling, commode   Barriers to discharge: None    Assessment:     Radhames Alonzo is a 84 y.o. male admitted with a medical diagnosis of Severe sepsis.  He presents with the following impairments/functional limitations:  weakness, impaired self care skills, impaired endurance, decreased lower extremity function, impaired cardiopulmonary response to activity, impaired functional mobilty Patient continue showing good motivation to make progress in Physical Therapy. Noted improvement in supine to/from sit activities with lesser physical assistance needed  towards level of supervision. No sign of distress during therapeutic ex and activities. Patient will benefit for continue PT to improve mobility and self care activities to maximum level of independence.    Rehab Prognosis: Good; patient would benefit from acute skilled PT services to address these deficits and reach maximum level of function.    Recent Surgery: * No surgery found *      Plan:     During this hospitalization, patient to be seen 5 x/week to address the identified rehab impairments via gait training, therapeutic activities, therapeutic exercises and progress toward the following goals:    · Plan of Care Expires:  01/23/19    Subjective     Chief Complaint: complain left him pain upon movt  Patient/Family Comments/goals: able to get up by myself with no problem  Pain/Comfort:  · Pain Rating 1: 5/10  · Location - Side 1: Left  · Location 1: hip  · Pain Addressed 1: Cessation of Activity, Reposition  · Pain Rating Post-Intervention 1: 0/10      Objective:     Communicated with patient prior to session.  Patient found all lines intact, call button in reach and bed alarm on valdes catheter, oxygen  upon PT entry to room.     General Precautions:  Standard, fall   Orthopedic Precautions:N/A   Braces: N/A     Functional Mobility:  · Bed Mobility:     · Rolling Left:  supervision  · Rolling Right: supervision  · Supine to Sit: contact guard assistance  · Sit to Supine: supervision and contact guard assistance  · Transfers:     · Sit to Stand:  contact guard assistance with rolling walker  · Bed to Chair: contact guard assistance with  rolling walker  using  Step Transfer  · Gait: Ambulated with RW x 80 feet with cg assistance      AM-PAC 6 CLICK MOBILITY  Turning over in bed (including adjusting bedclothes, sheets and blankets)?: 3  Sitting down on and standing up from a chair with arms (e.g., wheelchair, bedside commode, etc.): 3  Moving from lying on back to sitting on the side of the bed?: 3  Moving to and from a bed to a chair (including a wheelchair)?: 3  Need to walk in hospital room?: 3  Climbing 3-5 steps with a railing?: 2  Basic Mobility Total Score: 17       Therapeutic Activities and Exercises:  Pt performed bilat LE exercises consisting of active range of motion exercises x 10 reps consisting of Ankle DF, Ankle PF, Quad Sets, Heel slides, ABD/ADD, LAQ with pt able to tolerate activities well.   PT discussed importance of patient's performance of Home Exercise Program (HEP) with pt verbalizing understanding. Pt able to perform bed mobility with supervision and , transfers with cg assistance.  Currently presents with slight difficulty in completing task due to limited endurance and dependent with oxygen. Pt will benefit with skilled PT services to promote return to a safe functional level and decrease burden of care. Patient ambulated using RW with cg assistance at room distances with oxygen line intact.    Patient left HOB elevated with all lines intact, call button in reach and bed alarm on..    GOALS:   Multidisciplinary Problems     Physical Therapy Goals        Problem: Physical Therapy Goal    Goal Priority Disciplines Outcome Goal Variances  Interventions   Physical Therapy Goal     PT, PT/OT Ongoing (interventions implemented as appropriate)     Description:  Goals to be met by: 7  (reviewed 19)    Patient will increase functional independence with mobility by performin. Supine to sit with Supervision or Set-up Assistance - met 19  2. Sit to supine with Supervision or Set-up Assistance - met 19  3. Bed to chair transfer with Supervision or Set-up Assistancewith or without rolling walker using Step Transfer TECHNIQUE  4. Gait  x 100  feet with Supervision or Set-up Assistance with or without rolling walker  5. Lower extremity exercise program x10 reps per handout, with assistance as needed                        Time Tracking:     PT Received On: 19  PT Start Time: 1314     PT Stop Time: 1346  PT Total Time (min): 32 min     Billable Minutes: Therapeutic Activity 15 and Therapeutic Exercise 8    Treatment Type: Treatment  PT/PTA: PT           Baldomero Velázquez, PT  2019

## 2019-01-22 NOTE — ASSESSMENT & PLAN NOTE
Rate controlled. On metoprolol    He reports that he is taking pradaxa but called pharmacy and they do not report that he has filled anything for anticoagulation especially pradaxa in recent months. Start lovenox 1mg/kg BID until we get his home meds    [facility], will restart at d/c    1/15/19: tachycardic despite BB and now hypotensive. Holding BB and all BP meds. Sinus on telemetry right now. Cont pradaxa for now but may stop again and switch back to lovenox today if continues to worsen and pending labs      1/16/19  Continue pradaxa    1/17/19  Will resume BB today. For most part he is rate controlled.    1/19/19  Continue BB, increase to 100mg     1/20  Metoprolol switched to IV yesterday b/c was somnolent. Switch back to po, 100mg daily     1/21   Cont metoprolol and pradaxa HR 84.    1/22 no changes

## 2019-01-22 NOTE — PLAN OF CARE
01/22/19 1100   Discharge Assessment   Assessment Type Discharge Planning Reassessment     Waiting for Home ventilator approval before patient can be discharged.

## 2019-01-22 NOTE — ASSESSMENT & PLAN NOTE
Need to assess if truly hypoxic. No low sats charted but has been on venti and now 4L NC. Will try to wean. Repeat CXR.   1/11/19 O2 sat 95-99%     1-12 Pt is labored with his breathing and continuing to cough; Dr Robertson consulted  1-14 no longer needing O2, cont cefepime and clinda x 10 days, cont nebs,stop steroids.    1/15/19: marked change in respiratory status this morning, was worsening overnight. He is oxygenating, sats good on 2L and ABG shows good sats. CXR with enlarging left pleural effusion otherwise normal. CT ordered and will do once stabilized--? Underlying consolidation with empyema. Looking for loculations, etc. Consider thoracentesis. Could explain his worsening status. He is in respiratory distress and abdominal breathing. Taking off BiPaP. He is a FULL CODE.    1/16/19:  Continue VANC/Cefepime. He is considering DNR . Told Dr. Jea-nBaptiste no to resuscitation. We need to get him to sign a DNR     1/17/19  Patient seems to have staph pneumonia as he has improved with add'l coverage. Check u/s to r/o empyema    1/18/19  Improved clinically. Continue VANC cefepime     1/19/19  Continue VANC and Cefepime     1/20/20  Continue VANC(day6) and cefepime(day 12, initially started for LLE cellulitis at admit but treating pneumonia at least since the 10th). Hoping to get CT chest done today to further characterize this effusion and infiltrate which is poorly characterized on x ray.     1/21 Ct does not show infiltrate. Will d/c abx as he has had 1 week vanc and clida and 2 weeks cefepime. Consider pleural effusion as cause for resp issue. Discussed thoracentesis and will see what Dr. Robertson thinks. Is this exudate or transudate? Also thoracentesis may help with his breathing issues.     1/22 No pneumonia, abx stopped. NIV ordered for home use per Dr Robertson. Dr robertson does not feel like throcentesis warranted at this time. CT reports no change from 2017 imaging

## 2019-01-22 NOTE — PROGRESS NOTES
[facility]  Cardiology  Progress Note    Patient Name: Radhames Alonzo  MRN: [id number]  Admission Date: 1/9/2019  Hospital Length of Stay: 13 days  Code Status: DNR   Attending Physician: Jesús Tay MD   Primary Care Physician: Pennie Jorge NP  Expected Discharge Date: 1/10/2019  Principal Problem:Severe sepsis    Subjective:     Hospital Course: patient remains moderately volume overloaded. Continues to endorse SOB.     Interval History:  Patient is an 84 year old male with a past medical history of chronic diastolic heart failure, chronic atrial fibrillation, carotid stenosis, HHD, dyslipidemia, and chronic peripheral edema presents to the emergency room after becoming increasingly weak over the last two days. He also reports shortness of breath and coughing with white sputum. He has had a decline in status over the last couple of months.           ROS   Constitutional : Weakness, lethargy  EENT : Negative  CV : BLE edema +2 at baseline.   Respiratory : Shortness of breath, coughing with sputum production.   Gastrointestinal: Negative   Genitourinary: Negative  Musculoskeletal: Negative  Skin : Ulcer to left lateral foot  Neurological : AAO x 3          Objective:     Vital Signs (Most Recent):  Temp: 96.2 °F (35.7 °C) (01/22/19 0755)  Pulse: 87 (01/22/19 0755)  Resp: 20 (01/22/19 0755)  BP: 111/67 (01/22/19 0755)  SpO2: 100 % (01/22/19 0755) Vital Signs (24h Range):  Temp:  [96.1 °F (35.6 °C)-96.8 °F (36 °C)] 96.2 °F (35.7 °C)  Pulse:  [] 87  Resp:  [16-24] 20  SpO2:  [95 %-100 %] 100 %  BP: (101-116)/(65-83) 111/67     Weight: 102 kg (224 lb 13.9 oz)(RD reviewed)  Body mass index is 29.67 kg/m².    SpO2: 100 %  O2 Device (Oxygen Therapy): nasal cannula      Intake/Output Summary (Last 24 hours) at 1/22/2019 1052  Last data filed at 1/22/2019 0845  Gross per 24 hour   Intake 480 ml   Output 1900 ml   Net -1420 ml       Lines/Drains/Airways     Drain                 Urethral  Catheter 01/15/19 1215 Latex 16 Fr. 6 days          Peripheral Intravenous Line                 Peripheral IV - Single Lumen 01/17/19 Anterior;Right Forearm 5 days                Physical Exam  General appearance: alert, appears stated age and cooperative. Frail in appearance. Lethargic. Generalized weakness.   Head: Normocephalic, without obvious abnormality, atraumatic  Eyes: conjunctivae/corneas clear. PERRL  Neck: no carotid bruit, no JVD and supple, symmetrical, trachea midline  Lungs: Expiratory rhales to left upper and lower lobe  Chest Wall: no tenderness  Heart: regular rate and rhythm, S1, S2 normal, 1/6 SM, click, rub or gallop  Abdomen: Distended, soft, non-tender; bowel sounds normal; no masses,  no organomegaly  Extremities: Extremities normal, atraumatic, no cyanosis, clubbing. +2 edema to BLE.   Pulses: Dorsalis Pedis R: 2+ (normal)/L: 2+ (normal)  Skin: Skin color, texture, turgor normal. Venous stasis ulcer to left lateral foot.   Neurologic: Normal mood and affect  Alert and oriented X 3            Significant Labs:   ABG: No results for input(s): PH, PCO2, HCO3, POCSATURATED, BE in the last 48 hours., Blood Culture: No results for input(s): LABBLOO in the last 48 hours., BMP:   Recent Labs   Lab 01/21/19 0526 01/22/19  0455   * 140*    137   K 4.8 4.4    103   CO2 27 29   BUN 48* 58*   CREATININE 1.4 1.3   CALCIUM 9.3 9.1   , CMP   Recent Labs   Lab 01/21/19 0526 01/22/19  0455    137   K 4.8 4.4    103   CO2 27 29   * 140*   BUN 48* 58*   CREATININE 1.4 1.3   CALCIUM 9.3 9.1   PROT 5.9* 5.8*   ALBUMIN 2.8* 2.7*   BILITOT 1.3* 1.3*   ALKPHOS 51* 51*   AST 27 26   ALT 41 42   ANIONGAP 7* 5*   ESTGFRAFRICA 53* 58*   EGFRNONAA 46* 50*   , CBC   Recent Labs   Lab 01/21/19 0526 01/22/19  0455   WBC 13.10* 12.94*   HGB 10.9* 10.5*   HCT 34.9* 33.6*    212   , INR No results for input(s): INR, PROTIME in the last 48 hours., Lipid Panel No results for  input(s): CHOL, HDL, LDLCALC, TRIG, CHOLHDL in the last 48 hours.,   Pathology Results  (Last 10 years)    None       and Troponin No results for input(s): TROPONINI in the last 48 hours.    Significant Imaging: Cardiac Cath: , CT scan: CT ABDOMEN PELVIS WITH CONTRAST: No results found for this visit on 01/09/19. and CT ABDOMEN PELVIS WITHOUT CONTRAST: No results found for this visit on 01/09/19., Echocardiogram: 2D echo with color flow doppler: No results found for this or any previous visit. and Transthoracic echo (TTE) complete (Cupid Only): No results found for this or any previous visit., EKG: , Stress Test:  and X-Ray: CXR: X-Ray Chest 1 View (CXR):   Results for orders placed or performed during the hospital encounter of 01/09/19   X-Ray Chest 1 View    Narrative    EXAMINATION:  XR CHEST 1 VIEW    CLINICAL HISTORY:  re-eval pleural effusion;    TECHNIQUE:  Single frontal view of the chest was performed.    COMPARISON:  01/14/2019    FINDINGS:  The heart shadow is severely enlarged.  There is pulmonary vascular congestion with pulmonary edema.  Small right and moderate left-sided pleural effusions are suspected.  The skeletal structures are intact.      Impression    As above.      Electronically signed by: Yanci Vazquez MD  Date:    01/16/2019  Time:    14:03    and X-Ray Chest PA and Lateral (CXR):   Results for orders placed or performed during the hospital encounter of 01/09/19   X-Ray Chest PA And Lateral    Narrative    EXAMINATION:  XR CHEST PA AND LATERAL    CLINICAL HISTORY:  LLL pneumonia vs atelectasis;    COMPARISON:  None    FINDINGS:  Cardiomegaly and left basilar infiltrate suspected.  Right lung is clear.  Small left pleural effusion.  No pneumothorax..  Aorta demonstrates atherosclerotic disease.  Bones demonstrate degenerative changes.      Impression    Cardiomegaly and left basilar infiltrate suspected.      Electronically signed by: Rickie Jones MD  Date:    01/14/2019  Time:    16:27     and KUB: X-Ray Abdomen AP 1 View (KUB): No results found for this visit on 01/09/19.  Assessment and Plan:       Active Diagnoses:    Diagnosis Date Noted POA    PRINCIPAL PROBLEM:  Severe sepsis [A41.9, R65.20] 01/09/2019 Yes    Emphysema/COPD [J43.9] 01/19/2019 Yes    Encephalopathy, metabolic [G93.41] 01/19/2019 No    Chronic respiratory failure with hypoxia and hypercapnia [J96.11, J96.12] 01/18/2019 Yes    Dysphagia [R13.10] 01/17/2019 Yes    Venous stasis ulcer [I83.009, L97.909] 01/16/2019 Yes    Encephalopathy [G93.40] 01/15/2019 No    Pleural effusion [J90] 01/15/2019 Yes    Benign prostatic hyperplasia (BPH) with post-void dribbling [N40.1, N39.43] 01/11/2019 Yes    Hypokalemia [E87.6] 01/10/2019 Yes    Cellulitis of left lower extremity [L03.116] 01/10/2019 Yes    Pneumonia [J18.9] 01/10/2019 Yes    CHF (congestive heart failure) [I50.9] 01/09/2019 Yes    Atrial fibrillation [I48.91] 04/30/2015 Yes    Cardiomyopathy [I42.9] 04/30/2015 Yes      Problems Resolved During this Admission:    Diagnosis Date Noted Date Resolved POA    Rhabdomyolysis [M62.82] 01/09/2019 01/21/2019 Yes       VTE Risk Mitigation (From admission, onward)        Ordered     dabigatran etexilate capsule 150 mg  2 times daily      01/14/19 1112     IP VTE HIGH RISK PATIENT  Once      01/09/19 1751        Current Facility-Administered Medications   Medication    acetaminophen suppository 650 mg    bicalutamide tablet 50 mg    dabigatran etexilate capsule 150 mg    haloperidol lactate injection 5 mg    HYDROcodone-acetaminophen 5-325 mg per tablet 1 tablet    levalbuterol nebulizer solution 1.25 mg    levalbuterol nebulizer solution 1.25 mg    metoprolol succinate (TOPROL-XL) 24 hr tablet 100 mg    polyethylene glycol packet 17 g    predniSONE tablet 40 mg    spironolactone tablet 25 mg    tamsulosin 24 hr capsule 0.4 mg    torsemide tablet 20 mg     Dx: CHF  sepsis LLeg cellulitis  Continues to improve.  Persistent L pleural effusion noted     Sepsis possibly secondary to venous stasis ulcer on left lateral foot. IV abx initiated per primary.      Left sided pleural effusion and mild pulmonary edema evidenced by chest x ray. BNP moderately elevated at 659.      Acute on chronic diastolic CHF TTE December 2018 with LVEF 55%, mild LVH. Lasix IV 80 mg bid.     Rhabdomyolysis improving. Chronic ETOH use. CPK improved at 1762 from 2378.      Chronic atrial fibrillation rate controlled not currently on anticoagulation according to last progress note in clinic but on xarelto in the past.      Chronic peripheral edema at baseline.      Dyslipidemia     Hypertension now controlled           PLAN:continue to adjust BB   Continue to hold ARB for now  DNR  Continue gentle diuresis   Patient with acute on chronic diastolic congestive heart failure with evidence of volume expansion.  Will add Aldactone 25mg po daily.  Add Demadex 20mg po twice daily.  Daily BMP  Patient with continued evidence of volume expansion but good urine output and stable renal function over the last 24 hours.  Continue Demadex and daily labs.    Miah Osuna, VANESSA  Cardiology  [facility]

## 2019-01-23 PROBLEM — J44.1 COPD EXACERBATION: Status: ACTIVE | Noted: 2019-01-23

## 2019-01-23 LAB
ALBUMIN SERPL BCP-MCNC: 2.5 G/DL
ALP SERPL-CCNC: 46 U/L
ALT SERPL W/O P-5'-P-CCNC: 42 U/L
ANION GAP SERPL CALC-SCNC: 4 MMOL/L
AST SERPL-CCNC: 25 U/L
BASOPHILS # BLD AUTO: 0 K/UL
BASOPHILS NFR BLD: 0 %
BILIRUB SERPL-MCNC: 1.3 MG/DL
BNP SERPL-MCNC: 132 PG/ML
BUN SERPL-MCNC: 59 MG/DL
CALCIUM SERPL-MCNC: 8.7 MG/DL
CHLORIDE SERPL-SCNC: 103 MMOL/L
CO2 SERPL-SCNC: 33 MMOL/L
CREAT SERPL-MCNC: 1.2 MG/DL
DIFFERENTIAL METHOD: ABNORMAL
EOSINOPHIL # BLD AUTO: 0 K/UL
EOSINOPHIL NFR BLD: 0.1 %
ERYTHROCYTE [DISTWIDTH] IN BLOOD BY AUTOMATED COUNT: 13.8 %
EST. GFR  (AFRICAN AMERICAN): >60 ML/MIN/1.73 M^2
EST. GFR  (NON AFRICAN AMERICAN): 55 ML/MIN/1.73 M^2
GLUCOSE SERPL-MCNC: 116 MG/DL
HCT VFR BLD AUTO: 31.9 %
HGB BLD-MCNC: 9.9 G/DL
LYMPHOCYTES # BLD AUTO: 0.9 K/UL
LYMPHOCYTES NFR BLD: 7.2 %
MCH RBC QN AUTO: 31.4 PG
MCHC RBC AUTO-ENTMCNC: 31 G/DL
MCV RBC AUTO: 101 FL
MONOCYTES # BLD AUTO: 1.1 K/UL
MONOCYTES NFR BLD: 9.3 %
NEUTROPHILS # BLD AUTO: 10.2 K/UL
NEUTROPHILS NFR BLD: 83.4 %
PLATELET # BLD AUTO: 210 K/UL
PMV BLD AUTO: 9.8 FL
POTASSIUM SERPL-SCNC: 4 MMOL/L
PROT SERPL-MCNC: 5.3 G/DL
RBC # BLD AUTO: 3.15 M/UL
SODIUM SERPL-SCNC: 140 MMOL/L
WBC # BLD AUTO: 12.18 K/UL

## 2019-01-23 PROCEDURE — 25000003 PHARM REV CODE 250: Performed by: NURSE PRACTITIONER

## 2019-01-23 PROCEDURE — 25000003 PHARM REV CODE 250: Performed by: FAMILY MEDICINE

## 2019-01-23 PROCEDURE — 94640 AIRWAY INHALATION TREATMENT: CPT

## 2019-01-23 PROCEDURE — 11000001 HC ACUTE MED/SURG PRIVATE ROOM

## 2019-01-23 PROCEDURE — 36415 COLL VENOUS BLD VENIPUNCTURE: CPT

## 2019-01-23 PROCEDURE — 85025 COMPLETE CBC W/AUTO DIFF WBC: CPT

## 2019-01-23 PROCEDURE — 80053 COMPREHEN METABOLIC PANEL: CPT

## 2019-01-23 PROCEDURE — 83880 ASSAY OF NATRIURETIC PEPTIDE: CPT

## 2019-01-23 PROCEDURE — 97116 GAIT TRAINING THERAPY: CPT

## 2019-01-23 PROCEDURE — 99233 SBSQ HOSP IP/OBS HIGH 50: CPT | Mod: ,,, | Performed by: FAMILY MEDICINE

## 2019-01-23 PROCEDURE — 99233 PR SUBSEQUENT HOSPITAL CARE,LEVL III: ICD-10-PCS | Mod: ,,, | Performed by: FAMILY MEDICINE

## 2019-01-23 PROCEDURE — 94618 PULMONARY STRESS TESTING: CPT

## 2019-01-23 PROCEDURE — 25000003 PHARM REV CODE 250: Performed by: INTERNAL MEDICINE

## 2019-01-23 PROCEDURE — 94761 N-INVAS EAR/PLS OXIMETRY MLT: CPT

## 2019-01-23 PROCEDURE — 99900035 HC TECH TIME PER 15 MIN (STAT)

## 2019-01-23 PROCEDURE — 97530 THERAPEUTIC ACTIVITIES: CPT

## 2019-01-23 PROCEDURE — 94668 MNPJ CHEST WALL SBSQ: CPT

## 2019-01-23 PROCEDURE — 27000221 HC OXYGEN, UP TO 24 HOURS

## 2019-01-23 PROCEDURE — 25000242 PHARM REV CODE 250 ALT 637 W/ HCPCS: Performed by: NURSE PRACTITIONER

## 2019-01-23 PROCEDURE — 63600175 PHARM REV CODE 636 W HCPCS: Performed by: NURSE PRACTITIONER

## 2019-01-23 RX ORDER — TORSEMIDE 20 MG/1
20 TABLET ORAL DAILY
Status: DISCONTINUED | OUTPATIENT
Start: 2019-01-23 | End: 2019-01-24 | Stop reason: HOSPADM

## 2019-01-23 RX ADMIN — BICALUTAMIDE 50 MG: 50 TABLET, FILM COATED ORAL at 10:01

## 2019-01-23 RX ADMIN — TORSEMIDE 20 MG: 20 TABLET ORAL at 12:01

## 2019-01-23 RX ADMIN — DABIGATRAN ETEXILATE MESYLATE 150 MG: 75 CAPSULE ORAL at 08:01

## 2019-01-23 RX ADMIN — DABIGATRAN ETEXILATE MESYLATE 150 MG: 75 CAPSULE ORAL at 10:01

## 2019-01-23 RX ADMIN — TAMSULOSIN HYDROCHLORIDE 0.4 MG: 0.4 CAPSULE ORAL at 10:01

## 2019-01-23 RX ADMIN — LEVALBUTEROL 1.25 MG: 1.25 SOLUTION, CONCENTRATE RESPIRATORY (INHALATION) at 02:01

## 2019-01-23 RX ADMIN — SODIUM CHLORIDE 500 ML: 0.9 INJECTION, SOLUTION INTRAVENOUS at 08:01

## 2019-01-23 RX ADMIN — HYDROCODONE BITARTRATE AND ACETAMINOPHEN 1 TABLET: 5; 325 TABLET ORAL at 11:01

## 2019-01-23 RX ADMIN — PREDNISONE 40 MG: 20 TABLET ORAL at 10:01

## 2019-01-23 RX ADMIN — RANITIDINE 300 MG: 15 SYRUP ORAL at 08:01

## 2019-01-23 RX ADMIN — METOPROLOL SUCCINATE 100 MG: 50 TABLET, EXTENDED RELEASE ORAL at 10:01

## 2019-01-23 RX ADMIN — HYDROCODONE BITARTRATE AND ACETAMINOPHEN 1 TABLET: 5; 325 TABLET ORAL at 03:01

## 2019-01-23 RX ADMIN — SPIRONOLACTONE 25 MG: 25 TABLET ORAL at 10:01

## 2019-01-23 RX ADMIN — LEVALBUTEROL 1.25 MG: 1.25 SOLUTION, CONCENTRATE RESPIRATORY (INHALATION) at 08:01

## 2019-01-23 RX ADMIN — LEVALBUTEROL 1.25 MG: 1.25 SOLUTION, CONCENTRATE RESPIRATORY (INHALATION) at 07:01

## 2019-01-23 NOTE — PROGRESS NOTES
"[facility] Medicine  Progress Note    Patient Name: Radhames Alonzo  MRN: [id number]  Patient Class: IP- Inpatient   Admission Date: 1/9/2019  Length of Stay: 14 days  Attending Physician: Jesús Tay MD  Primary Care Provider: Pennie Jorge NP        Subjective:     Principal Problem:Severe sepsis    HPI:  84 year old male presents to ED b/c " I couldn't get up and walk."   SOB for about a year. Worse recently. Last night it got to the point that he was too SOB to stand up.   SOB at rest and with eating.   He also notes left foot pain. He has been seeing woundcare for a wound on this foot last 7 months   Workup in ED is significant for BNP 600s. Last BNP 200s 3 weeks ago. 160s 9 months ago.  He sees Dr. Jean-Baptiste regularly.   [facility] has seen pt and rec admit for diuresis, rule out MI   First TPN with slight bump.038  Lactic acid is 3.6. Meets I am told by ED MD that patient "has no signs of infection on physical exam." I am also told that CXR and U/A are clear.     He drinks six pack daily. Last drink 2 days ago. No h/o DT's in past         Hospital Course:  He has been started on cefepime and clinda for left lower ext cellulitis. He has been afebrile. WBC was 17717. Blood cultures NGTD. Lactate was up to 3.8, coming down.RR was 24 and had low sats on admission. Was on venti, now down to 3L NC   He is also wheezing. C/o SOB. CXR with CHF.   He does report that he takes pradaxa for his a fib at home. Not on home meds list nor reordered here. A fib on EKG. BNP was 659. Ordered for lasix 80mg IV BID. Urinating a lot. K low this am 2.8    1/11/19 Getting lasix 80mg IV bid per Cardiology; not much urine output yesterday with dribbling noted; concern for obstruction; will get bladder scan; consider renal US.   Creat 0.9 stable; K+ 3.6    CPK 2378>1762; TNI 0.032 x 1; repeat this am     1-12 Pt continues to have SOB and pul congestion; Dr Robertson consulted    1-13 Pt is fairly complex with mx " cardiac/pulmonary issues causing his coughing and SOB; has a hx of underlying prostate problems; a swing bed might be necessary; Dr Robertson and cardiology following    1/14 pt is still on clinda and cefepime for the lower ext cellulitis. Afebrile/ no elevated WBC. US negative for DVT. Still swollen but reports that it is much better than his normal   CXR 1/812 Left basilar consolidation or atelectasis and small pleural effusion. He is 95% on RA  Still weak. Having trouble standing on side bed.     1/15/19 Pt started feeling worse yesterday evening; this am noted to have LOW BP- SBP 80s, increased RR 30s, Temp 95.6,   Dr. Jean-Baptiste reports HF better, BNP 200s, still with LE edema, US negative for DVT; Also gets pradaxa  Underlying chronic prostate problems; defers any intervention or sx   Day 7/10 Cefepime and clinda for LE cellulitis and pneumonia. Blood cultures negative.   Looks jaundice with sclerema icterus; admit bilirubin - 2.0 elevated on 1/9/19 ; check LFTs/CMP/bilirubin    1/15/19: patient with worsening respiratory status overnight. He was tried on BiPaP but refused to wear it. Was agitated during the night. He was abdominal breathing but maintainings sats on 2L NC. BNP normal. D-dimer normal and anticoagulated since admit, restarted PO pradaxa yesterday. This AM, continues to decompensated. He is alert and oriented on initial eval but over the course of our conversation decompensates and becomes very lethargic. He has been waxing and waning throughout the night per RN report. Unclear if has h/o cirrhosis but on chart review this AM his bilirubin 2 and mild elevation AST--this was not trended during his hospital stay. This AM he has scleral icterus and yellowing of skin which was NOT PRESENT YESTERDAY. Also his LLE cellulitis is worsening, redness extending up the shin to the knee today which was NOT PRESENT YESTERDAY. He is hypothermic, tachypenic, hypotensive and tachycardic (despite BB on board).      1/16/19  Transferred up to ICU yesterday for hypotension and AMS on the floor. NH4 level 24. Bolused 1 liter NS. Did not require pressors. This am 120s/70s  UOP is good. Abx changed from cefepime and clinda to cefepime and VANC. Afebrile.  ABG ordered yesterday. He does have some hypercapnea. Refuses to wear bipap.    1/17/19  Patient became more alert and arousable throughout the day yesterday. Still with issues lying flat (which is why his imaging was not completed yesterday). However, his BP is improved without his home meds. He is tolerating his abx well. He did wear Bipap VERY little last night, but did not sleep because of it. UOP is good. No fevers.     1/18/19  He is awake and alert this am. He wore his bipap overnight   Tachy but BP is stable   Increased edema this am     1/19/19  He is very somnolent this am. Fought with him overnight to get bipap. Required ativan. He is sleepy this am   He is now a DNR     1/20/19  Steroids increased and IV Lasix added yesterday  He wore bipap about an hour last night   He is more awake today, no ativan given.   Spoke about hospice yesterday since he largely refuses the Bipap. Pt family is in agreement with hospice if he deteriorates or fails to improve   Out of bed and eating breakfast today    1/21 he is awake and alert again today. Did wear bipap a couple hours over evening yesterday (5 1/2hrs). Ct shows pleural effusion and pericardial effusion. Not on diuresis due to creat inability to tolerate. D/julia IV yesterday. Creat 1.4 today.     He is still on vanc (7days) and cefepime (13 days) (and s/p 1 week clinda) for the lower leg cellulitis. Afebrile WBC did go up as well but on steroids dose up and down over last week. No fevers. BP stable     TTE December 2018 with LVEF 55%, mild LVH    1/22   Abx were d/julia yesterday as he has had 1 week vanc and clinda as well as 2 weeks cefepime and cellulitis had resolved yesterday. He remains a febrile. WBC stable (slightly  lower than yesterday) legs without increase redness or swelling    Dr tom following. Does not feel that a throcentesis is warranted at this time. Will order patient CPAP for home use as he is tolerating it more each day. Pleural effusion stable. On 2L NC and sats 96%. Also noted pericardial effusion. [facility] following as well. Added aldactone and demadex 20mg po daily yesterday.  GFR stable. He is out -1420 on yesterday I/O. Reports breathing is getting better    Will need to have diuretics BID     1/23  This am pt bp was 77/52 manually. He was asymptomatic. Was increased to torsemide 20mg po BID and also on aldactone 25mg po daily. Creat stable 1.2 this am. He was -3620 yesterday.  this am. Given 500ml NS bolus for hypotension this am. Bp better 100/57. Cards following    Dr tom following as well. Trilogy ordered for home use. Awaiting approval.     Still without abx for cellulitis, legs remain without s/s infection.     Interval History: see HC     Review of Systems   Constitutional: Negative for activity change, fatigue, fever and unexpected weight change.   HENT: Negative for congestion, ear pain, hearing loss, rhinorrhea and sore throat.    Eyes: Negative for pain, redness and visual disturbance.   Respiratory: Positive for shortness of breath. Negative for cough and wheezing.    Cardiovascular: Positive for leg swelling (improving). Negative for chest pain and palpitations.   Gastrointestinal: Negative for abdominal pain, constipation, diarrhea, nausea and vomiting.   Genitourinary: Negative for decreased urine volume, dysuria, frequency, hematuria and urgency. Difficulty urinating: due to BPH.   Musculoskeletal: Negative for back pain, joint swelling and neck pain.   Skin: Positive for wound (improving). Negative for color change (erythema resolved b/l LE) and rash.   Neurological: Negative for dizziness, weakness, light-headedness and headaches.   Psychiatric/Behavioral: Negative for agitation.      Objective:     Vital Signs (Most Recent):  Temp: (!) 94.4 °F (34.7 °C) (01/23/19 0759)  Pulse: 98 (01/23/19 0759)  Resp: 18 (01/23/19 0759)  BP: (!) 77/52 (01/23/19 0759)  SpO2: 98 % (01/23/19 0759) Vital Signs (24h Range):  Temp:  [94.4 °F (34.7 °C)-97.1 °F (36.2 °C)] 94.4 °F (34.7 °C)  Pulse:  [] 98  Resp:  [18-22] 18  SpO2:  [96 %-100 %] 98 %  BP: ()/(52-64) 77/52     Weight: 102 kg (224 lb 13.9 oz)(RD reviewed)  Body mass index is 29.67 kg/m².    Intake/Output Summary (Last 24 hours) at 1/23/2019 0845  Last data filed at 1/23/2019 0800  Gross per 24 hour   Intake 805 ml   Output 4100 ml   Net -3295 ml      Physical Exam   Constitutional: He is oriented to person, place, and time. He appears well-developed and well-nourished. No distress.    Eating breakfast- alert and oriented   HENT:   Head: Normocephalic and atraumatic.   Right Ear: External ear normal.   Left Ear: External ear normal.   Nose: Nose normal.   Eyes: Conjunctivae and EOM are normal. Pupils are equal, round, and reactive to light. Right eye exhibits no discharge. Left eye exhibits no discharge.   Neck: Normal range of motion. Neck supple. No tracheal deviation present.   Cardiovascular: Normal rate, regular rhythm and normal heart sounds.   No murmur heard.  Pulmonary/Chest: Effort normal. No respiratory distress. Wheezes: b/l. He has no rales.       Abdominal: Soft. Bowel sounds are normal. He exhibits no distension. There is no tenderness.   Musculoskeletal: Normal range of motion. He exhibits edema (1+ b/l LE edema to thighs but this is improving from my last evaluation 1 week ago).   Neurological: He is alert and oriented to person, place, and time. He has normal reflexes. No cranial nerve deficit.   Skin: Skin is warm and dry. No erythema.   LLE dressing c/d/i  Erythema of b/l LE is resolved     Psychiatric: He has a normal mood and affect. His behavior is normal. Judgment and thought content normal.   Nursing note and vitals  reviewed.      Significant Labs:   CBC:   Recent Labs   Lab 01/22/19 0455 01/23/19 0414   WBC 12.94* 12.18   HGB 10.5* 9.9*   HCT 33.6* 31.9*    210     CMP:   Recent Labs   Lab 01/22/19 0455 01/23/19 0414    140   K 4.4 4.0    103   CO2 29 33*   * 116*   BUN 58* 59*   CREATININE 1.3 1.2   CALCIUM 9.1 8.7   PROT 5.8* 5.3*   ALBUMIN 2.7* 2.5*   BILITOT 1.3* 1.3*   ALKPHOS 51* 46*   AST 26 25   ALT 42 42   ANIONGAP 5* 4*   EGFRNONAA 50* 55*     Cardiac Markers:   Recent Labs   Lab 01/23/19 0414   *       vanc trough 18.8    Lab Results   Component Value Date    INR 1.4 (H) 01/18/2019    INR 1.2 01/17/2019    INR 1.2 01/16/2019     Blood cultures 1/9/19 NGTD x 2    US chest mediastinum   There is a moderate left-sided pleural effusion.  A measurable portion of the collection measures 10.8 x 5.6 x 4.7 cm.    No evidence of internal echogenicity or debris to suggest a complicated effusion.        1/20 chest CT   1. There are changes of left greater than right pleural effusions with associated left compressive atelectasis.  Similar findings of left pleural effusion and compressive atelectasis were present on a prior CTA chest dated 10/04/2017.  There is no obvious loculation within the left pleural effusion.  2. There is cardiomegaly.  Redemonstrated is a pericardial effusion measuring approximately 1.7 cm in greatest thickness.  This was present previously as well.  3. There is atherosclerosis in addition to coronary artery calcifications.    1/16 CXR   The heart shadow is severely enlarged.  There is pulmonary vascular congestion with pulmonary edema.  Small right and moderate left-sided pleural effusions are suspected.  The skeletal structures are intact.    US lower ext Negative for bilateral lower extremity DVT.    Assessment/Plan:      * Severe sepsis    Due to Venous stasis and wound of LLE   Start Cefepime and clinda to cover for everything including MRSA and psuedomonas.    Clinda instead of Vanc since mild CKD and we will diurese him as well   Lactic acid q 6 coming down  Bolus 2 liters overnight creat 0.9 bp 111/66 this am  woundcare consult     1-12 legs are down but still pul congestion.    1-13 Not retaining as much fluids, but still SOB and coughing; burns when he urinates from his Prostate issues(Ca)    1-14 better/resolved    1-15: worsening status this AM. + confusion, hypotension, tachycardia. LLE redness is worsening from yesterday despite antibx. Should be well covered with clida and cefepime so puzzling. When stable consider imaging of leg to look for deeper infection    1/16:  Continue cefepime and VANC. CT chest today to check for empyema/underlying infiltrate. CXR not adequate to determine    1/17  At this point he was switched to vanc 1 day ago and is improved. Won't be able to lie flat for CT. Will go ahead and u/s chest to r/o empyema. But it seems as though he will need 7 days of iv vanc.    1/18  Continue VANC day 4 and cefepime day 10     1/19  Continue VANC day 5 and cefepime Day 11     1/20  Continue VANC day 6 and cefepime Day 12     1/21   On vanc and cefepime, cellulitis appears to have resolved. Will d/c abx, completed 1 week clinda then vanc, respectively and cefepime 2 weeks. LE erythema resolved. Stop today and monitor to ensure does not clinically begin to worsen as he did last week.     1/22 resolved         Encephalopathy, metabolic    Resolved.       Emphysema/COPD    Increased solumedrol to 80 q 8 1/19/20---This has helped significantly, will continue this dose for now.   Encourage bipap use   wean    1/21/19: still wheezing. Cont steroids for now    1/22: reduced steroids yesterday from 80bid to 40 bid, cont nebs.    1/23 wean steroids again. To prednisone 40mgpo daily     Pulmonary heart disease    Pt has orthopnea with laying flat. Needs hospital bed for positioning        Chronic respiratory failure with hypoxia    He has bipap ordered. He is  "mostly refusing. I will not restrain this 84 year old man and force him to wear bipap if he is not willing. He is a DNR.   1/20/19---Since he is a DNR and mostly refusing bipap, the plan was to start hospice today unless he showed improvement. Remarkably, he has. He looks much better today. We will downgrade him to the floor later today.  However, if he deteriorates or fails to continue to show improvement, I strongly recommend getting hospice involved. This was discussed with wife and his sons Dequan and Lele.  They are in agreement with this. He remains a DNR.     1/21 he is tolerating the bipap a little more. He is O2 dep which is new for him this admission. Will need to qualify home for O2 and a bipap at home pt is considering thoracentesis to assess the effusion and see if it improves his breathing    1/22 pt had ambulating pox assessed yesterday 86% on RA, qualified for home O2.    1/213 POX today on RA resting 98% will walk test again today, plan for d/c tomorrow. Will need trilogy prior to d/c     Dysphagia    Likely secondary to mental status on 1/16  ST was following ands reports patient has ability to identify safe swallow strategies and good independent implementation of compensatories to minimize risk of aspiration on least restrictive diet.         Venous stasis ulcer    Wound care.  Cont to apply silver.       Pleural effusion    Getting bigger on CXR from 1/14/20  CT ordered--will do when stabilized. I think today(1/20/19) is the first day he is well enough to try and get this done. Will attempt    ? Underlying consolidation. ? Empyema  U/S ordered---"no underlying debris to suggest complicated effusion."   following   Will continue VANC( day 6) as he has shown some improvement when this was started on the 15th.  Cefepime day 12. Initially started for LE cellulitis that has since resolved. Will continue for presumed LLL pneumonia, but need a CT to further characterize this and the associated " effusion on CXR.     1/21 1/21 Ct does not show infiltrate. Will d/c abx as he has had 1 week vanc and clida and 2 weeks cefepime. Consider pleural effusion as cause for resp issue. Discussed thoracentesis .    1/22 No pneumonia, abx stopped. NIV ordered for home use per Dr Robertson. Dr robertson does not feel like throcentesis warranted at this time. CT reports no change from 2017 imaging. diuresing    1/23 awaiting approval for home trilogy     Encephalopathy    Waxing and waning mental status  Overnight thought to be sundowning but his AM dramatic waxing and waning just during our 10 minute conversation. Went form alert to very lethargic. + jaundice  Hypotensive, tachycardic, hypothermic (95.6 axillary)  Sepsis vs hepatic    1/1619  LFTs, lactic acid unremarkable.  I think this may be from his hypercapnea and refusal to wear bipap   Will get CT head to rule out CVA    1/17/19  Metabolic? He is VERY alert and appropriate today. CT head on hold. Need to be cognizant of the fact that he has prostate cancer, though.    1/18/19  Alert this am. He wore his bipap for the better part of the night last night     1/19/19  He is lethargic this am. This is likely CO2 narcosis as he hasn't worn his bipap much overnight. Also he has some ativan on board. Will see if we can get him to wear bipap today, but I will not restrain and force him to do so. That wouldn't be humane in this 84 year old DNR patient.     1/20/19  He waxes and wanes. Today is a good day. I think ativan had a lot to do with his altered mental state yesterday. Ativan has been discontinued. Haldol prn agitation, but honestly, if we dont force the bipap on him, he doesn't get agitated.     1/21/19 resolved, very AAOx3     Benign prostatic hyperplasia (BPH) with post-void dribbling    Resume flomax and casodex--with waxing and waning mental status may remove PO meds  Has a valdes currently. Will continue . F/u urology outpt     Pneumonia    Need to assess if truly  hypoxic. No low sats charted but has been on venti and now 4L NC. Will try to wean. Repeat CXR.   1/11/19 O2 sat 95-99%     1-12 Pt is labored with his breathing and continuing to cough; Dr Robertson consulted  1-14 no longer needing O2, cont cefepime and clinda x 10 days, cont nebs,stop steroids.    1/15/19: marked change in respiratory status this morning, was worsening overnight. He is oxygenating, sats good on 2L and ABG shows good sats. CXR with enlarging left pleural effusion otherwise normal. CT ordered and will do once stabilized--? Underlying consolidation with empyema. Looking for loculations, etc. Consider thoracentesis. Could explain his worsening status. He is in respiratory distress and abdominal breathing. Taking off BiPaP. He is a FULL CODE.    1/16/19:  Continue VANC/Cefepime. He is considering DNR . Told Dr. Jean-Baptiste no to resuscitation. We need to get him to sign a DNR     1/17/19  Patient seems to have staph pneumonia as he has improved with add'l coverage. Check u/s to r/o empyema    1/18/19  Improved clinically. Continue VANC cefepime     1/19/19  Continue VANC and Cefepime     1/20/20  Continue VANC(day6) and cefepime(day 12, initially started for LLE cellulitis at admit but treating pneumonia at least since the 10th). Hoping to get CT chest done today to further characterize this effusion and infiltrate which is poorly characterized on x ray.     1/21 Ct does not show infiltrate. Will d/c abx as he has had 1 week vanc and clida and 2 weeks cefepime. Consider pleural effusion as cause for resp issue. Discussed thoracentesis and will see what Dr. Robertson thinks. Is this exudate or transudate? Also thoracentesis may help with his breathing issues.     1/22 No pneumonia, abx stopped. NIV ordered for home use per Dr Robertson. Dr robertson does not feel like throcentesis warranted at this time. CT reports no change from 2017 imaging     1/23 awaiting approval for trilogy     Cellulitis of left lower extremity    See  severe sepsis treatment  Check u/s  Negative for bilateral lower extremity DVT.    cont cefepime and clinda x 10 days wound care Cleanse wound with NS using gauze. Apply silver alginate to wound bed, cover with ABD and secure with tape daily.      1/15/19: marked worsening of erythema from yesterday despite antibx. Puzzling why the sudden change. D-dimer negative and anticoagulated so low suspicion for DVT. Worsening vitals as well (see sepsis).        1/16/19  Leg is looking much better already     1/17/19  Much better, but still with small open wound. Wound care should see today.    1/19/19  Resolved   abx continued for LLL pneumonia     1/21/19   D/c abx today (see sepsis plan)    1/22 cont to hold abx. Cellulitis seems to have resolved     Hypokalemia    Likely due to torsemide use  Resolved .  Aldactone ordered     CHF (congestive heart failure)    1/18/19  [facility] consulted   ECHO in their records  Continue BB. No ACE currently. I presume this is from low BP during the week. Increasing BB. Add ACE in am if BP is stable     1/19/19  Will diurese cautiously today. Lasix 20mg IV BID     1/20  BB. Negative fluid balance. Creatinine bumping up slightly. D/c low dose IV Lasix. BNP, BMP daily .  Add ARB when able     1/21 cont BB. Lasix stopped yesterday as creat bumped. Still with cough and O2 dependent due to pleural effusion and atelectasis. [facility] consulted. Will need recommendation on diuresis and if they feel this effusion of CHF related vs other etiology. He does still have some LE edema but this is GREATLY improved from my last evaluation and at this point I do not think he is having acute exacerbation of his CHF.     1/22cont BB, [facility] added aldactone and demadex, creat/GFR stable. Holding ARB for now. StillO2 dependent at this time    1/23 still holding arb as bp so low. demadex was increased yesterday and kidney function remained stable but pressure dropped significantly this am. Will drop back to daily with  aldactone. BNP as good as it gets 132. Remains on torpol as well HR 90's. Now down to RA POX 98%     Atrial fibrillation    Rate controlled. On metoprolol    He reports that he is taking pradaxa but called pharmacy and they do not report that he has filled anything for anticoagulation especially pradaxa in recent months. Start lovenox 1mg/kg BID until we get his home meds    [facility] was [facility], will restart at d/c    1/15/19: tachycardic despite BB and now hypotensive. Holding BB and all BP meds. Sinus on telemetry right now. Cont pradaxa for now but may stop again and switch back to lovenox today if continues to worsen and pending labs      1/16/19  Continue pradaxa    1/17/19  Will resume BB today. For most part he is rate controlled.    1/19/19  Continue BB, increase to 100mg     1/20  Metoprolol switched to IV yesterday b/c was somnolent. Switch back to po, 100mg daily     1/21   Cont metoprolol and pradaxa HR 84.    1/22 no changes    1/23 cont pradaxa and toprol     Cardiomyopathy    Cardiology is following  Diastolic CHF: TTE December 2018 with LVEF 55%, mild LVH  Was started on diuretics  BNP is only 200s now  CXR with left pleural effusion but not severe pulmonary congestion  1/15/10: BP dropping, hold ACEi, BB and diuretics for now and give back IVF---careful for volume overload though.   1/16/19: resume BB and ACE per [facility]   1/17/18.  Meds have not been resumed. Start at 25 of metoprolol rather than home dose of 100.    1/19/19  Increase BB to 100mg     1/20  Continue metoprolol 100mg. Resume ARB when BP able to tolerate     1/21 now on metoprolol 100XL daily BP stable 122/68 still holding arb creat 1.4    1/22 arb still on hold as we are adding diuresis and watching kidneys closely. Cont toprol XL 100mg po daily BP stable    1/23 still holding arb as bp so low. demadex was increased yesterday and kidney function remained stable but pressure dropped significantly this am. Will drop back to daily  with aldactone. BNP as good as it gets 132. Remains on torpol as well HR 90's       VTE Risk Mitigation (From admission, onward)        Ordered     dabigatran etexilate capsule 150 mg  2 times daily      01/14/19 1112     IP VTE HIGH RISK PATIENT  Once      01/09/19 7662              Jesús Tay MD  Department of Hospital Medicine   [facility]

## 2019-01-23 NOTE — ASSESSMENT & PLAN NOTE
Rate controlled. On metoprolol    He reports that he is taking pradaxa but called pharmacy and they do not report that he has filled anything for anticoagulation especially pradaxa in recent months. Start lovenox 1mg/kg BID until we get his home meds    [facility], will restart at d/c    1/15/19: tachycardic despite BB and now hypotensive. Holding BB and all BP meds. Sinus on telemetry right now. Cont pradaxa for now but may stop again and switch back to lovenox today if continues to worsen and pending labs      1/16/19  Continue pradaxa    1/17/19  Will resume BB today. For most part he is rate controlled.    1/19/19  Continue BB, increase to 100mg     1/20  Metoprolol switched to IV yesterday b/c was somnolent. Switch back to po, 100mg daily     1/21   Cont metoprolol and pradaxa HR 84.    1/22 no changes    1/23 cont pradaxa and toprol

## 2019-01-23 NOTE — PROGRESS NOTES
Followed up with  Radhames. Patient concerned about his rx at [facility] expiring. Explained after 1 year, rx expires, but we can get new rx from physician. Patient verbalized understanding. States he had no questions about his medications and is aware of fall precautions.

## 2019-01-23 NOTE — PLAN OF CARE
01/23/19 1042   Discharge Reassessment   Assessment Type Discharge Planning Reassessment         Patient will remain for continued treatment today as acute findings happened during the night. Patient now requesting hospital bed upon discharge. Will start process as soon as order is in. CM will continue to follow and assist as needed.

## 2019-01-23 NOTE — ASSESSMENT & PLAN NOTE
Need to assess if truly hypoxic. No low sats charted but has been on venti and now 4L NC. Will try to wean. Repeat CXR.   1/11/19 O2 sat 95-99%     1-12 Pt is labored with his breathing and continuing to cough; Dr Robertson consulted  1-14 no longer needing O2, cont cefepime and clinda x 10 days, cont nebs,stop steroids.    1/15/19: marked change in respiratory status this morning, was worsening overnight. He is oxygenating, sats good on 2L and ABG shows good sats. CXR with enlarging left pleural effusion otherwise normal. CT ordered and will do once stabilized--? Underlying consolidation with empyema. Looking for loculations, etc. Consider thoracentesis. Could explain his worsening status. He is in respiratory distress and abdominal breathing. Taking off BiPaP. He is a FULL CODE.    1/16/19:  Continue VANC/Cefepime. He is considering DNR . Told Dr. Jean-Baptiste no to resuscitation. We need to get him to sign a DNR     1/17/19  Patient seems to have staph pneumonia as he has improved with add'l coverage. Check u/s to r/o empyema    1/18/19  Improved clinically. Continue VANC cefepime     1/19/19  Continue VANC and Cefepime     1/20/20  Continue VANC(day6) and cefepime(day 12, initially started for LLE cellulitis at admit but treating pneumonia at least since the 10th). Hoping to get CT chest done today to further characterize this effusion and infiltrate which is poorly characterized on x ray.     1/21 Ct does not show infiltrate. Will d/c abx as he has had 1 week vanc and clida and 2 weeks cefepime. Consider pleural effusion as cause for resp issue. Discussed thoracentesis and will see what Dr. Robertson thinks. Is this exudate or transudate? Also thoracentesis may help with his breathing issues.     1/22 No pneumonia, abx stopped. NIV ordered for home use per Dr Robertson. Dr robretson does not feel like throcentesis warranted at this time. CT reports no change from 2017 imaging     1/23 awaiting approval for [facility]

## 2019-01-23 NOTE — ASSESSMENT & PLAN NOTE
Completely lucid No issues now completely lucid  Much improved for last 3 days    pt aware of person place time and surroundings   Yesterday pt was very appreciative and interactive was going and did wear bipap but todAY is not without change in his metabolicsttais except WBC up 4000

## 2019-01-23 NOTE — ASSESSMENT & PLAN NOTE
Resume flomax and casodex--with waxing and waning mental status may remove PO meds  Has a valdes currently. Will continue . F/u urology outpt

## 2019-01-23 NOTE — PLAN OF CARE
01/23/19 1353   Post-Acute Status   Post-Acute Authorization HME   HME Status Referrals Sent       Referral sent to [facility] for hospital bed. Spoke with Lore with [facility] who states to fax in the order along with face sheet and documentation why it is needed. Faxed all requested documentation. Awaiting response.

## 2019-01-23 NOTE — ASSESSMENT & PLAN NOTE
Patient now requires a  Home Ventilator due to chronic Respirator failure copd Cor pulmonale and pulmonary heart disease and  this will decrease hospitalizations ,Decrease Work of Breathing,extend his life  and improve the Paients Quality of life.Without this form of ventilation it could lead to patient harm or death.Pressure support with Safety tidal volume mode of therapy in this patient will optimize Gas Exchange. Bipap is not reliable due to patients condition.         needs home ventilator      Now wants home ventilator and is willing to use   Patient is compliant with home respiratory regimen of Duoneb but remains symptomatic. As stated above patient was initially against trying hospital BiPap, but did and tolerated mask well but not completely improved on bipap. However patient has not had sufficient improvement on Bipap with symptoms and thus requiring noninvasive home ventilator       Tolerating bipap now calm 1/20/19  Waxing and  Wanes in thought processes does not want bipap very verbally and physically against wearing bipap   In view of this will not pursue NIV at home   Patient now requires a  Home Ventilator due to chronic Respirator failure copd Cor pulmonale and pulmonary heart disease and  this will decrease hospitalizations ,Decrease Work of Breathing,extend his life  and improve the Paients Quality of life.Without this form of ventilation it could lead to patient harm or death.Pressure support with Safety tidal volume mode of therapy in this patient will optimize Gas Exchange. Bipap is not reliable due to patients condition.

## 2019-01-23 NOTE — ASSESSMENT & PLAN NOTE
He has bipap ordered. He is mostly refusing. I will not restrain this 84 year old man and force him to wear bipap if he is not willing. He is a DNR.   1/20/19---Since he is a DNR and mostly refusing bipap, the plan was to start hospice today unless he showed improvement. Remarkably, he has. He looks much better today. We will downgrade him to the floor later today.  However, if he deteriorates or fails to continue to show improvement, I strongly recommend getting hospice involved. This was discussed with wife and his sons Dequan and Lele.  They are in agreement with this. He remains a DNR.     1/21 he is tolerating the bipap a little more. He is O2 dep which is new for him this admission. Will need to qualify home for O2 and a bipap at home pt is considering thoracentesis to assess the effusion and see if it improves his breathing    1/22 pt had ambulating pox assessed yesterday 86% on RA, qualified for home O2.    1/213 POX today on RA resting 98% will walk test again today, plan for d/c tomorrow. Will need trilogy prior to d/c

## 2019-01-23 NOTE — PLAN OF CARE
01/23/19 1443   Discharge Reassessment   Assessment Type Discharge Planning Reassessment         Mk here and set up astral machine.

## 2019-01-23 NOTE — PLAN OF CARE
01/23/19 1112   Discharge Assessment   Assessment Type Discharge Planning Reassessment   DME Needed Upon Discharge  hospital bed

## 2019-01-23 NOTE — SUBJECTIVE & OBJECTIVE
Interval History: feeling somewhat better     Objective:     Vital Signs (Most Recent):  Temp: 96.9 °F (36.1 °C) (01/22/19 1918)  Pulse: 95 (01/22/19 2200)  Resp: 20 (01/22/19 1921)  BP: (!) 102/59 (01/22/19 1918)  SpO2: 97 % (01/22/19 1921) Vital Signs (24h Range):  Temp:  [96.1 °F (35.6 °C)-97.1 °F (36.2 °C)] 96.9 °F (36.1 °C)  Pulse:  [] 95  Resp:  [18-21] 20  SpO2:  [96 %-100 %] 97 %  BP: (102-126)/(59-83) 102/59     Weight: 102 kg (224 lb 13.9 oz)(RD reviewed)  Body mass index is 29.67 kg/m².      Intake/Output Summary (Last 24 hours) at 1/22/2019 2211  Last data filed at 1/22/2019 1639  Gross per 24 hour   Intake 480 ml   Output 3100 ml   Net -2620 ml       Physical Exam   Constitutional: He is oriented to person, place, and time. He appears well-developed and well-nourished. He is cooperative.  Non-toxic appearance. He does not appear ill. No distress.   HENT:   Head: Normocephalic and atraumatic.   Right Ear: Hearing, tympanic membrane, external ear and ear canal normal.   Left Ear: Hearing, tympanic membrane, external ear and ear canal normal.   Nose: Nose normal. No mucosal edema, rhinorrhea or nasal deformity. No epistaxis. Right sinus exhibits no maxillary sinus tenderness and no frontal sinus tenderness. Left sinus exhibits no maxillary sinus tenderness and no frontal sinus tenderness.   Mouth/Throat: Uvula is midline, oropharynx is clear and moist and mucous membranes are normal. No trismus in the jaw. Normal dentition. No uvula swelling. No posterior oropharyngeal erythema.   Eyes: Conjunctivae and lids are normal. No scleral icterus.   Sclera clear bilat   Neck: Trachea normal, full passive range of motion without pain and phonation normal. Neck supple.   Cardiovascular: Normal rate, regular rhythm, normal heart sounds, intact distal pulses and normal pulses.   Pulmonary/Chest: He is in respiratory distress (mild to moderate). He has decreased breath sounds in the right lower field and the left  lower field. He has wheezes in the left lower field. He has rhonchi in the left lower field.   Abdominal: Soft. Normal appearance and bowel sounds are normal. He exhibits no distension. There is no tenderness.   Musculoskeletal: Normal range of motion. He exhibits no edema or deformity.   Neurological: He is alert and oriented to person, place, and time. He exhibits normal muscle tone. Coordination normal.   Skin: Skin is warm, dry and intact. He is not diaphoretic. No pallor.   Psychiatric: He has a normal mood and affect. His speech is normal and behavior is normal. Judgment and thought content normal. Cognition and memory are normal.   Nursing note and vitals reviewed.      Vents:  Oxygen Concentration (%): 28 (01/22/19 0006)    Lines/Drains/Airways     Drain                 Urethral Catheter 01/15/19 1215 Latex 16 Fr. 7 days          Peripheral Intravenous Line                 Peripheral IV - Single Lumen 01/17/19 Anterior;Right Forearm 5 days                Significant Labs:    CBC/Anemia Profile:  Recent Labs   Lab 01/21/19  0526 01/22/19  0455   WBC 13.10* 12.94*   HGB 10.9* 10.5*   HCT 34.9* 33.6*    212   * 101*   RDW 13.6 13.7        Chemistries:  Recent Labs   Lab 01/21/19  0526 01/22/19  0455    137   K 4.8 4.4    103   CO2 27 29   BUN 48* 58*   CREATININE 1.4 1.3   CALCIUM 9.3 9.1   ALBUMIN 2.8* 2.7*   PROT 5.9* 5.8*   BILITOT 1.3* 1.3*   ALKPHOS 51* 51*   ALT 41 42   AST 27 26       All pertinent labs within the past 24 hours have been reviewed.    Significant Imaging:  I have reviewed all pertinent imaging results/findings within the past 24 hours.

## 2019-01-23 NOTE — PROGRESS NOTES
Home Oxygen Evaluation    Date Performed: 2019    1) Patient's O2 Sat on room air, while at rest: 98%        If O2 sats on room air at rest are 88% or below, patient qualifies. No additional testing needed. Document N/A in steps 2 and 3. If 89% or above, complete steps 2.      2) Patient's O2 Sat on room air while exercisin%   (Patient ambulating per PT)        If O2 sats on room air while exercising remain 89% or above patient does not qualify, no further testing needed Document N/A in step 3. If O2 sats on room air while exercising are 88% or below, continue to step 3.      3) Patient's O2 Sat while exercising on O2:   N/A         (Must show improvement from #2 for patients to qualify)    If O2 sats improve on oxygen, patient qualifies for portable oxygen. If not, the patient does not qualify.

## 2019-01-23 NOTE — SUBJECTIVE & OBJECTIVE
Interval History: see      Review of Systems   Constitutional: Negative for activity change, fatigue, fever and unexpected weight change.   HENT: Negative for congestion, ear pain, hearing loss, rhinorrhea and sore throat.    Eyes: Negative for pain, redness and visual disturbance.   Respiratory: Positive for shortness of breath. Negative for cough and wheezing.    Cardiovascular: Positive for leg swelling (improving). Negative for chest pain and palpitations.   Gastrointestinal: Negative for abdominal pain, constipation, diarrhea, nausea and vomiting.   Genitourinary: Negative for decreased urine volume, dysuria, frequency, hematuria and urgency. Difficulty urinating: due to BPH.   Musculoskeletal: Negative for back pain, joint swelling and neck pain.   Skin: Positive for wound (improving). Negative for color change (erythema resolved b/l LE) and rash.   Neurological: Negative for dizziness, weakness, light-headedness and headaches.   Psychiatric/Behavioral: Negative for agitation.     Objective:     Vital Signs (Most Recent):  Temp: (!) 94.4 °F (34.7 °C) (01/23/19 0759)  Pulse: 98 (01/23/19 0759)  Resp: 18 (01/23/19 0759)  BP: (!) 77/52 (01/23/19 0759)  SpO2: 98 % (01/23/19 0759) Vital Signs (24h Range):  Temp:  [94.4 °F (34.7 °C)-97.1 °F (36.2 °C)] 94.4 °F (34.7 °C)  Pulse:  [] 98  Resp:  [18-22] 18  SpO2:  [96 %-100 %] 98 %  BP: ()/(52-64) 77/52     Weight: 102 kg (224 lb 13.9 oz)(RD reviewed)  Body mass index is 29.67 kg/m².    Intake/Output Summary (Last 24 hours) at 1/23/2019 0845  Last data filed at 1/23/2019 0800  Gross per 24 hour   Intake 805 ml   Output 4100 ml   Net -3295 ml      Physical Exam   Constitutional: He is oriented to person, place, and time. He appears well-developed and well-nourished. No distress.    Eating breakfast- alert and oriented   HENT:   Head: Normocephalic and atraumatic.   Right Ear: External ear normal.   Left Ear: External ear normal.   Nose: Nose normal.   Eyes:  Conjunctivae and EOM are normal. Pupils are equal, round, and reactive to light. Right eye exhibits no discharge. Left eye exhibits no discharge.   Neck: Normal range of motion. Neck supple. No tracheal deviation present.   Cardiovascular: Normal rate, regular rhythm and normal heart sounds.   No murmur heard.  Pulmonary/Chest: Effort normal. No respiratory distress. Wheezes: b/l. He has no rales.       Abdominal: Soft. Bowel sounds are normal. He exhibits no distension. There is no tenderness.   Musculoskeletal: Normal range of motion. He exhibits edema (1+ b/l LE edema to thighs but this is improving from my last evaluation 1 week ago).   Neurological: He is alert and oriented to person, place, and time. He has normal reflexes. No cranial nerve deficit.   Skin: Skin is warm and dry. No erythema.   LLE dressing c/d/i  Erythema of b/l LE is resolved     Psychiatric: He has a normal mood and affect. His behavior is normal. Judgment and thought content normal.   Nursing note and vitals reviewed.      Significant Labs:   CBC:   Recent Labs   Lab 01/22/19 0455 01/23/19 0414   WBC 12.94* 12.18   HGB 10.5* 9.9*   HCT 33.6* 31.9*    210     CMP:   Recent Labs   Lab 01/22/19 0455 01/23/19 0414    140   K 4.4 4.0    103   CO2 29 33*   * 116*   BUN 58* 59*   CREATININE 1.3 1.2   CALCIUM 9.1 8.7   PROT 5.8* 5.3*   ALBUMIN 2.7* 2.5*   BILITOT 1.3* 1.3*   ALKPHOS 51* 46*   AST 26 25   ALT 42 42   ANIONGAP 5* 4*   EGFRNONAA 50* 55*     Cardiac Markers:   Recent Labs   Lab 01/23/19 0414   *       vanc trough 18.8    Lab Results   Component Value Date    INR 1.4 (H) 01/18/2019    INR 1.2 01/17/2019    INR 1.2 01/16/2019     Blood cultures 1/9/19 NGTD x 2    US chest mediastinum   There is a moderate left-sided pleural effusion.  A measurable portion of the collection measures 10.8 x 5.6 x 4.7 cm.    No evidence of internal echogenicity or debris to suggest a complicated effusion.        1/20  chest CT   1. There are changes of left greater than right pleural effusions with associated left compressive atelectasis.  Similar findings of left pleural effusion and compressive atelectasis were present on a prior CTA chest dated 10/04/2017.  There is no obvious loculation within the left pleural effusion.  2. There is cardiomegaly.  Redemonstrated is a pericardial effusion measuring approximately 1.7 cm in greatest thickness.  This was present previously as well.  3. There is atherosclerosis in addition to coronary artery calcifications.    1/16 CXR   The heart shadow is severely enlarged.  There is pulmonary vascular congestion with pulmonary edema.  Small right and moderate left-sided pleural effusions are suspected.  The skeletal structures are intact.    US lower ext Negative for bilateral lower extremity DVT.

## 2019-01-23 NOTE — PHYSICIAN QUERY
PT Name: Radhames Alonzo  MR #: [id number]    Physician Query Form - Non-Pressure Ulcer Clarification      CDS/: Kayleigh Bai               Contact information: [email or web address]    This form is a permanent document in the medical record.     Query Date: January 23, 2019    By submitting this query, we are merely seeking further clarification of documentation. Please utilize your independent clinical judgment when addressing the question(s) below.    The Medical Record contains the following:   Indicator   Supporting Clinical Findings Location in Medical Record   x Non-pressure Ulcer found to be septic with a venous stasis ulcer to left foot.   Cards PN 1/10    Wound Care Consult      Radiology Findings     x Acute/Chronic Illness history of atrial fibrillation and congestive heart failure    Severe sepsis Due to Venous stasis and wound of LLE    ED Provider Notes 1/9      H&P 1/9   x Treatment: Cleanse wound with NS using gauze. Apply silver alginate to wound bed, cover with ABD and secure with tape daily.     Optimize nutrition with increased protein and elevate lower legs to promote venous return.    Wound Care CN 1/10    Other:        Provider, Please specify the diagnosis or diagnoses associated with above clinical findings.  [  x ] Skin breakdown only   [   ] Exposed fat layer   [   ] Muscle involvement without evidence of necrosis   [   ] Muscle necrosis   [   ] Bone involvement without evidence of necrosis   [   ] Bone necrosis   [   ] Other depth (please specify):   [   ] Other Integumentary Diagnosis (please specify):   [  ] Clinically Undetermined       Please document in your progress notes daily for the duration of treatment, until resolved, and include in your discharge summary.

## 2019-01-23 NOTE — PLAN OF CARE
01/23/19 1500   Post-Acute Status   Post-Acute Authorization HME   HME Status Authorization Obtained         Spoke with Chula with [facility] who says authorization is obtained for hospital bed, and will be delivered on day of d/c. She will contact spouse, Marylou.

## 2019-01-23 NOTE — PLAN OF CARE
Problem: Adult Inpatient Plan of Care  Goal: Plan of Care Review  Outcome: Ongoing (interventions implemented as appropriate)  Patient aware of plan of care. VS stable. Afebrile. A-Fib on tele, rate controlled. Daily weight. C/o heartburn tonight, zantac given. C/o bladder spasms, pain controlled with po meds. Free from falls/injuries. No questions or concerns at this time. Agrees with plan of care.

## 2019-01-23 NOTE — ASSESSMENT & PLAN NOTE
"Getting bigger on CXR from 1/14/20  CT ordered--will do when stabilized. I think today(1/20/19) is the first day he is well enough to try and get this done. Will attempt    ? Underlying consolidation. ? Empyema  U/S ordered---"no underlying debris to suggest complicated effusion."   following   Will continue VANC( day 6) as he has shown some improvement when this was started on the 15th.  Cefepime day 12. Initially started for LE cellulitis that has since resolved. Will continue for presumed LLL pneumonia, but need a CT to further characterize this and the associated effusion on CXR.     1/21 1/21 Ct does not show infiltrate. Will d/c abx as he has had 1 week vanc and clida and 2 weeks cefepime. Consider pleural effusion as cause for resp issue. Discussed thoracentesis .    1/22 No pneumonia, abx stopped. NIV ordered for home use per Dr Robertson. Dr robertson does not feel like throcentesis warranted at this time. CT reports no change from 2017 imaging. diuresing    1/23 awaiting approval for home trilogy  "

## 2019-01-23 NOTE — PT/OT/SLP PROGRESS
Physical Therapy Treatment    Patient Name:  Radhames Alonzo   MRN:  [id number]    Recommendations:     Discharge Recommendations:  other (see comments)(home with home health)   Discharge Equipment Recommendations: walker, rolling, commode   Barriers to discharge: None    Assessment:     Radhames Alonzo is a 84 y.o. male admitted with a medical diagnosis of Severe sepsis.  He presents with the following impairments/functional limitations:  weakness, impaired endurance, impaired self care skills, impaired functional mobilty, decreased lower extremity function, impaired cardiopulmonary response to activity, pain . Patient seen with the Respiratory Therapist to monitor oxygen level during gait trng. At rest at supine position 98% oxygen level. Ambulated patient with RW x 50 feet with cg assistance with oxygen level at 96%. No sign of SOB & no sign of distress but reports feeling tired. Provided patient  With proper diaphragmatic deep breathing ex x 5 reps and felt better. Continue PT tx to address and attain remaining PT goals established. Patient will benefit for Home Health Physical Therapy to maximized independent performance with mobility and self care activities.  Rehab Prognosis: Good; patient would benefit from acute skilled PT services to address these deficits and reach maximum level of function.    Recent Surgery: * No surgery found *      Plan:     During this hospitalization, patient to be seen 5 x/week to address the identified rehab impairments via gait training, therapeutic activities, therapeutic exercises and progress toward the following goals:    · Plan of Care Expires:  01/24/19    Subjective     Chief Complaint: bilat hips pain upon movt  Patient/Family Comments/goals: to be able to get up by myself.  Pain/Comfort:  · Location - Side 1: Bilateral  · Location 1: hip  · Pain Addressed 1: Reposition, Cessation of Activity      Objective:     Communicated with patient prior to session.  Patient found all  lines intact, call button in reach and nurse notified valdes catheter  upon PT entry to room.     General Precautions: Standard, fall   Orthopedic Precautions:N/A   Braces: N/A     Functional Mobility:  · Bed Mobility:     · Rolling Left:  supervision  · Rolling Right: supervision  · Supine to Sit: supervision  · Sit to Supine: supervision  · Transfers:     · Sit to Stand:  contact guard assistance with rolling walker  · Bed to Chair: contact guard assistance with  rolling walker  using  Step Transfer  · Gait: RW ambulationx 50 feet cg assistance      AM-PAC 6 CLICK MOBILITY  Turning over in bed (including adjusting bedclothes, sheets and blankets)?: 3  Sitting down on and standing up from a chair with arms (e.g., wheelchair, bedside commode, etc.): 3  Moving from lying on back to sitting on the side of the bed?: 3  Moving to and from a bed to a chair (including a wheelchair)?: 3  Need to walk in hospital room?: 3  Climbing 3-5 steps with a railing?: 2  Basic Mobility Total Score: 17       Therapeutic Activities and Exercises:   Worked on body mechanics in sit to stand and step transfer using RW with verbal and tactile cues for correct biody sequence. GAIT: Pt able to ambulate with rolling walker with Contact Guard Assistance x 50 ft with the following gait deviation(s): decreased uli, decreased velocity of limb motion, decreased stride length, decreased swing-to-stance ratio and decreased toe-to-floor clearance     Patient left up in chair with all lines intact, call button in reach and nurse notified..    GOALS:   Multidisciplinary Problems     Physical Therapy Goals        Problem: Physical Therapy Goal    Goal Priority Disciplines Outcome Goal Variances Interventions   Physical Therapy Goal     PT, PT/OT Ongoing (interventions implemented as appropriate)     Description:  Goals to be met by: 7  (reviewed 1/16/19) (reviewed 1/22/19)    Patient will increase functional independence with mobility by  performin. Supine to sit with Supervision or Set-up Assistance - met 19  2. Sit to supine with Supervision or Set-up Assistance - met 19  3. Bed to chair transfer with Supervision or Set-up Assistancewith or without rolling walker using Step Transfer TECHNIQUE  4. Gait  x 100  feet with Supervision or Set-up Assistance with or without rolling walker  5. Lower extremity exercise program x10 reps per handout, with assistance as needed                         Time Tracking:     PT Received On: 19  PT Start Time: 1245     PT Stop Time: 1311  PT Total Time (min): 26 min     Billable Minutes: Gait Training 12 and Therapeutic Activity 10    Treatment Type: Treatment  PT/PTA: PT           Baldomero Velázquez, PT  2019

## 2019-01-23 NOTE — PROGRESS NOTES
[facility]  Pulmonology  Progress Note    Patient Name: Radhames Alonzo  MRN: [id number]  Admission Date: 1/9/2019  Hospital Length of Stay: 14 days  Code Status: DNR  Attending Provider: Jesús Tay MD  Primary Care Provider: Pennie Jorge NP   Principal Problem: Severe sepsis    Subjective:     Interval History: feeling somewhat better     Objective:     Vital Signs (Most Recent):  Temp: 96.9 °F (36.1 °C) (01/22/19 1918)  Pulse: 95 (01/22/19 2200)  Resp: 20 (01/22/19 1921)  BP: (!) 102/59 (01/22/19 1918)  SpO2: 97 % (01/22/19 1921) Vital Signs (24h Range):  Temp:  [96.1 °F (35.6 °C)-97.1 °F (36.2 °C)] 96.9 °F (36.1 °C)  Pulse:  [] 95  Resp:  [18-21] 20  SpO2:  [96 %-100 %] 97 %  BP: (102-126)/(59-83) 102/59     Weight: 102 kg (224 lb 13.9 oz)(RD reviewed)  Body mass index is 29.67 kg/m².      Intake/Output Summary (Last 24 hours) at 1/22/2019 2211  Last data filed at 1/22/2019 1639  Gross per 24 hour   Intake 480 ml   Output 3100 ml   Net -2620 ml       Physical Exam   Constitutional: He is oriented to person, place, and time. He appears well-developed and well-nourished. He is cooperative.  Non-toxic appearance. He does not appear ill. No distress.   HENT:   Head: Normocephalic and atraumatic.   Right Ear: Hearing, tympanic membrane, external ear and ear canal normal.   Left Ear: Hearing, tympanic membrane, external ear and ear canal normal.   Nose: Nose normal. No mucosal edema, rhinorrhea or nasal deformity. No epistaxis. Right sinus exhibits no maxillary sinus tenderness and no frontal sinus tenderness. Left sinus exhibits no maxillary sinus tenderness and no frontal sinus tenderness.   Mouth/Throat: Uvula is midline, oropharynx is clear and moist and mucous membranes are normal. No trismus in the jaw. Normal dentition. No uvula swelling. No posterior oropharyngeal erythema.   Eyes: Conjunctivae and lids are normal. No scleral icterus.   Sclera clear bilat   Neck: Trachea  normal, full passive range of motion without pain and phonation normal. Neck supple.   Cardiovascular: Normal rate, regular rhythm, normal heart sounds, intact distal pulses and normal pulses.   Pulmonary/Chest: He is in respiratory distress (mild to moderate). He has decreased breath sounds in the right lower field and the left lower field. He has wheezes in the left lower field. He has rhonchi in the left lower field.   Abdominal: Soft. Normal appearance and bowel sounds are normal. He exhibits no distension. There is no tenderness.   Musculoskeletal: Normal range of motion. He exhibits no edema or deformity.   Neurological: He is alert and oriented to person, place, and time. He exhibits normal muscle tone. Coordination normal.   Skin: Skin is warm, dry and intact. He is not diaphoretic. No pallor.   Psychiatric: He has a normal mood and affect. His speech is normal and behavior is normal. Judgment and thought content normal. Cognition and memory are normal.   Nursing note and vitals reviewed.      Vents:  Oxygen Concentration (%): 28 (01/22/19 0006)    Lines/Drains/Airways     Drain                 Urethral Catheter 01/15/19 1215 Latex 16 Fr. 7 days          Peripheral Intravenous Line                 Peripheral IV - Single Lumen 01/17/19 Anterior;Right Forearm 5 days                Significant Labs:    CBC/Anemia Profile:  Recent Labs   Lab 01/21/19  0526 01/22/19  0455   WBC 13.10* 12.94*   HGB 10.9* 10.5*   HCT 34.9* 33.6*    212   * 101*   RDW 13.6 13.7        Chemistries:  Recent Labs   Lab 01/21/19  0526 01/22/19  0455    137   K 4.8 4.4    103   CO2 27 29   BUN 48* 58*   CREATININE 1.4 1.3   CALCIUM 9.3 9.1   ALBUMIN 2.8* 2.7*   PROT 5.9* 5.8*   BILITOT 1.3* 1.3*   ALKPHOS 51* 51*   ALT 41 42   AST 27 26       All pertinent labs within the past 24 hours have been reviewed.    Significant Imaging:  I have reviewed all pertinent imaging results/findings within the past 24  hours.    Assessment/Plan:     Encephalopathy, metabolic    Completely lucid No issues now completely lucid  Much improved for last 3 days    pt aware of person place time and surroundings   Yesterday pt was very appreciative and interactive was going and did wear bipap but todAY is not without change in his metabolicsttais except WBC up 4000     Emphysema/COPD    moderaTE COPD      Pulmonary heart disease    Cor pulmonale secondary to chronic resp failure Third spacing fluid in arms and legs due to profound Right sided heart failure      Chronic respiratory failure with hypoxia    Patient now requires a  Home Ventilator due to chronic Respirator failure copd Cor pulmonale and pulmonary heart disease and  this will decrease hospitalizations ,Decrease Work of Breathing,extend his life  and improve the Paients Quality of life.Without this form of ventilation it could lead to patient harm or death.Pressure support with Safety tidal volume mode of therapy in this patient will optimize Gas Exchange. Bipap is not reliable due to patients condition.         needs home ventilator      Now wants home ventilator and is willing to use   Patient is compliant with home respiratory regimen of Duoneb but remains symptomatic. As stated above patient was initially against trying hospital BiPap, but did and tolerated mask well but not completely improved on bipap. However patient has not had sufficient improvement on Bipap with symptoms and thus requiring noninvasive home ventilator       Tolerating bipap now calm 1/20/19  Waxing and  Wanes in thought processes does not want bipap very verbally and physically against wearing bipap   In view of this will not pursue NIV at home   Patient now requires a  Home Ventilator due to chronic Respirator failure copd Cor pulmonale and pulmonary heart disease and  this will decrease hospitalizations ,Decrease Work of Breathing,extend his life  and improve the Paients Quality of life.Without this  form of ventilation it could lead to patient harm or death.Pressure support with Safety tidal volume mode of therapy in this patient will optimize Gas Exchange. Bipap is not reliable due to patients condition.         CHF (congestive heart failure)    stable      Atrial fibrillation    Follow HR      Cardiomyopathy    Improving less swelling everywhere   Unstable now   ++ pericardial effusion on ct        Chronic respiratory failure    COPD   Sukh Robertson MD  Pulmonology  [facility]

## 2019-01-23 NOTE — PROGRESS NOTES
[facility]  Cardiology  Progress Note    Patient Name: Radhames Alonzo  MRN: [id number]  Admission Date: 1/9/2019  Hospital Length of Stay: 14 days  Code Status: DNR   Attending Physician: Jesús Tay MD   Primary Care Physician: Pennie Jorge NP  Expected Discharge Date: 1/10/2019  Principal Problem:Severe sepsis    Subjective:     Interval History: was planned for DC today on BID torsemide but had hypotensive episode this AM, corrected with NS bolus.  Torsemide reduced to qday and plan to observe another day and poss DC in AM    Review of Systems   Constitution: Negative.   HENT: Negative.    Eyes: Negative.    Cardiovascular: Positive for dyspnea on exertion. Negative for chest pain.   Endocrine: Negative.    Musculoskeletal: Positive for muscle weakness.   Gastrointestinal: Negative.    Genitourinary: Negative.    Neurological: Negative.    Psychiatric/Behavioral: Negative.      Objective:     Vital Signs (Most Recent):  Temp: 96.3 °F (35.7 °C) (01/23/19 1102)  Pulse: 93 (01/23/19 1102)  Resp: 18 (01/23/19 1102)  BP: 96/61 (01/23/19 1102)  SpO2: 100 % (01/23/19 1102) Vital Signs (24h Range):  Temp:  [94.4 °F (34.7 °C)-97.1 °F (36.2 °C)] 96.3 °F (35.7 °C)  Pulse:  [] 93  Resp:  [18-22] 18  SpO2:  [96 %-100 %] 100 %  BP: ()/(52-64) 96/61     Weight: 102 kg (224 lb 13.9 oz)(RD reviewed)  Body mass index is 29.67 kg/m².    SpO2: 100 %  O2 Device (Oxygen Therapy): room air      Intake/Output Summary (Last 24 hours) at 1/23/2019 1147  Last data filed at 1/23/2019 0800  Gross per 24 hour   Intake 805 ml   Output 4100 ml   Net -3295 ml       Lines/Drains/Airways     Drain                 Urethral Catheter 01/15/19 1215 Latex 16 Fr. 7 days          Peripheral Intravenous Line                 Peripheral IV - Single Lumen 01/17/19 Anterior;Right Forearm 6 days              Current Facility-Administered Medications   Medication    acetaminophen suppository 650 mg    bicalutamide  tablet 50 mg    dabigatran etexilate capsule 150 mg    haloperidol lactate injection 5 mg    HYDROcodone-acetaminophen 5-325 mg per tablet 1 tablet    levalbuterol nebulizer solution 1.25 mg    levalbuterol nebulizer solution 1.25 mg    metoprolol succinate (TOPROL-XL) 24 hr tablet 100 mg    polyethylene glycol packet 17 g    predniSONE tablet 40 mg    ranitidine 15 mg/mL syrup 300 mg    sodium chloride 0.9% bolus 500 mL    spironolactone tablet 25 mg    tamsulosin 24 hr capsule 0.4 mg    torsemide tablet 20 mg     Physical Exam   Constitutional: He appears well-developed and well-nourished.   HENT:   Head: Normocephalic.   Cardiovascular: Normal rate.   No murmur heard.  Pulmonary/Chest: Effort normal and breath sounds normal.   Abdominal: Soft.   Neurological: He is alert.   Skin: Skin is warm and dry.   Psychiatric: He has a normal mood and affect. His behavior is normal. Judgment and thought content normal.   Nursing note and vitals reviewed.      Significant Labs:   BMP:   Recent Labs   Lab 01/22/19 0455 01/23/19  0414   * 116*    140   K 4.4 4.0    103   CO2 29 33*   BUN 58* 59*   CREATININE 1.3 1.2   CALCIUM 9.1 8.7   , CBC   Recent Labs   Lab 01/22/19 0455 01/23/19  0414   WBC 12.94* 12.18   HGB 10.5* 9.9*   HCT 33.6* 31.9*    210    and Troponin No results for input(s): TROPONINI in the last 48 hours.    Significant Imaging: Echocardiogram: Transthoracic echo (TTE) complete (Cupid Only): No results found for this or any previous visit.  Assessment and Plan:   Dx:   CHF  sepsis LLeg cellulitis  Continues to improve. Persistent L pleural effusion noted     Sepsis possibly secondary to venous stasis ulcer on left lateral foot. IV abx initiated per primary.      Left sided pleural effusion and mild pulmonary edema evidenced by chest x ray. BNP moderately elevated at 659.      Acute on chronic diastolic CHF TTE December 2018 with LVEF 55%, mild LVH. Lasix IV 80 mg  bid.     Rhabdomyolysis improving. Chronic ETOH use. CPK improved at 1762 from 2378.      Chronic atrial fibrillation, on Pradaxa at home     Chronic peripheral edema at baseline.      Dyslipidemia     Hypertension now controlled    Plan:  Cont anticoagulation/low dose diuretic given hypotension  Losartan on hold/   Fluid bolus  Follow H/H as it did fall a bit overnight   early outpt f/u on DC    Active Diagnoses:    Diagnosis Date Noted POA    PRINCIPAL PROBLEM:  Severe sepsis [A41.9, R65.20] 01/09/2019 Yes    Emphysema/COPD [J43.9] 01/19/2019 Yes    Encephalopathy, metabolic [G93.41] 01/19/2019 No    Chronic respiratory failure with hypoxia [J96.11] 01/18/2019 Yes    Dysphagia [R13.10] 01/17/2019 Yes    Venous stasis ulcer [I83.009, L97.909] 01/16/2019 Yes    Encephalopathy [G93.40] 01/15/2019 No    Pleural effusion [J90] 01/15/2019 Yes    Benign prostatic hyperplasia (BPH) with post-void dribbling [N40.1, N39.43] 01/11/2019 Yes    Hypokalemia [E87.6] 01/10/2019 Yes    Cellulitis of left lower extremity [L03.116] 01/10/2019 Yes    Pneumonia [J18.9] 01/10/2019 Yes    CHF (congestive heart failure) [I50.9] 01/09/2019 Yes    Atrial fibrillation [I48.91] 04/30/2015 Yes    Cardiomyopathy [I42.9] 04/30/2015 Yes      Problems Resolved During this Admission:    Diagnosis Date Noted Date Resolved POA    Rhabdomyolysis [M62.82] 01/09/2019 01/21/2019 Yes       VTE Risk Mitigation (From admission, onward)        Ordered     dabigatran etexilate capsule 150 mg  2 times daily      01/14/19 1112     IP VTE HIGH RISK PATIENT  Once      01/09/19 1751          Rianna Nair NP  Cardiology  [facility]  I attest that I have personally seen and examined this patient. I have reviewed and discussed the management in detail as outlined above.

## 2019-01-23 NOTE — ASSESSMENT & PLAN NOTE
1/18/19  [facility] consulted   ECHO in their records  Continue BB. No ACE currently. I presume this is from low BP during the week. Increasing BB. Add ACE in am if BP is stable     1/19/19  Will diurese cautiously today. Lasix 20mg IV BID     1/20  BB. Negative fluid balance. Creatinine bumping up slightly. D/c low dose IV Lasix. BNP, BMP daily .  Add ARB when able     1/21 cont BB. Lasix stopped yesterday as creat bumped. Still with cough and O2 dependent due to pleural effusion and atelectasis. [facility] consulted. Will need recommendation on diuresis and if they feel this effusion of CHF related vs other etiology. He does still have some LE edema but this is GREATLY improved from my last evaluation and at this point I do not think he is having acute exacerbation of his CHF.     1/22cont BB, [facility] added aldactone and demadex, creat/GFR stable. Holding ARB for now. StillO2 dependent at this time    1/23 still holding arb as bp so low. demadex was increased yesterday and kidney function remained stable but pressure dropped significantly this am. Will drop back to daily with aldactone. BNP as good as it gets 132. Remains on torpol as well HR 90's. Now down to RA POX 98%

## 2019-01-23 NOTE — PROGRESS NOTES
Staff Handoff    Bedside report received from BECKY Nair. VS stable. Afebrile. No distress noted. Assessment complete per flowsheet. Call bell in reach. Instructed to call for any needs. Will continue to monitor for any change in status.  Resident Handoff

## 2019-01-23 NOTE — ASSESSMENT & PLAN NOTE
Cardiology is following  Diastolic CHF: TTE December 2018 with LVEF 55%, mild LVH  Was started on diuretics  BNP is only 200s now  CXR with left pleural effusion but not severe pulmonary congestion  1/15/10: BP dropping, hold ACEi, BB and diuretics for now and give back IVF---careful for volume overload though.   1/16/19: resume BB and ACE per [facility]   1/17/18.  Meds have not been resumed. Start at 25 of metoprolol rather than home dose of 100.    1/19/19  Increase BB to 100mg     1/20  Continue metoprolol 100mg. Resume ARB when BP able to tolerate     1/21 now on metoprolol 100XL daily BP stable 122/68 still holding arb creat 1.4    1/22 arb still on hold as we are adding diuresis and watching kidneys closely. Cont toprol XL 100mg po daily BP stable    1/23 still holding arb as bp so low. demadex was increased yesterday and kidney function remained stable but pressure dropped significantly this am. Will drop back to daily with aldactone. BNP as good as it gets 132. Remains on torpol as well HR 90's

## 2019-01-23 NOTE — PHYSICIAN QUERY
PT Name: Radhames Alonzo  MR #: [id number]    Physician Query Form - Respiratory Condition Clarification      CDS/: Kayleigh Bai               Contact information: [email or web address]    This form is a permanent document in the medical record.    Query Date: January 23, 2019    By submitting this query, we are merely seeking further clarification of documentation. Please utilize your independent clinical judgment when addressing the question(s) below.    The Medical record contains the following   Indicators   Supporting Clinical Findings Location in Medical Record   x   SOB, NUNEZ, Wheezing, Productive Cough, Use of Accessory Muscles, etc. SOB for about a year. Worse recently. Last night it got to the point that he was too SOB to stand up.   SOB at rest and with eating.     He is in respiratory distress. He has wheezes. He has rales.    HM PN 1/10   x   Acute/Chronic Illness history of atrial fibrillation and congestive heart failure     Severe sepsis Due to Venous stasis and wound of LLE    ED Provider Notes 1/9        H&P 1/9   x   Radiology Findings There is elevation of the left hemidiaphragm.  There is a small left-sided pleural effusion.  Central pulmonary vascular congestion is noted with mild interstitial edema.  The heart is enlarged.  Calcified atheromatous disease affects the aorta.  Age-appropriate degenerative changes affect the skeleton.      The heart shadow is significantly enlarged.  There is left retrocardiac opacity which could represent atelectasis, aspiration or pneumonia.  Left-sided pleural effusion is suspected.  There is congestion of the pulmonary vasculature.   CXR 1/9                          CXR 1/10   x   Respiratory Distress or Failure He is in respiratory distress    Chronic respiratory failure with hypoxia and hypercapnia    PN 1/10    HM PN 1/19   x   Hypoxia or Hypercapnia Chronic respiratory failure with hypoxia and hypercapnia    PN 1/19   x   RR         ABGs          O2 sat PH = 7.46  PCO2 = 57  PO2 = 99  HCO3 = 40.50    RR = 18-25   ABGs 1/14          VS Flowsheet 1/9   x   BiPAP/Intubation Patient is improving with BiPaP    HM PN 1/15   x   Supplemental O2 O2 Device (Oxygen Therapy): venti mask  Cards CN 1/9   x   Home O2, Oxygen Dependence Patient now requires a Home Ventilator   Pulm PN 1/18      Treatment        Other       Respiratory failure can be acute, chronic or both. It is generally further specified as hypoxic, hypercapnic or both. Lastly, it is important to identify an etiology, if known or suspected.   References:: https://www.acphospitalist.org/archives/2013/10/coding; htm; http://Trendrating/acute-respiratory-failure-know    The clinical guidelines noted below are only system guidelines, and do not replace the providers clinical judgment.    Provider, please specify diagnosis or diagnoses associated with above clinical findings.   [   ] Acute and (on) Chronic Respiratory Failure with Hypoxia and Hypercapnia - Hypoxic: pO2 >10 mmHg below baseline OR SpO2 < 91% on usual home O2 OR O2 ? 2L/min over baseline home O2 and Hypercapnic:  pCO2 >10 mmHg over baseline and pH < 7.35    [  x ] Chronic Respiratory Failure with Hypoxia and Hypercapnia - Continuous home oxygen and Normal pH with high pCO2 (chronic hypercapnia) (common example: COPD)   [   ] Other Respiratory Diagnosis (please specify): _________________________________   [   ]  Clinically Undetermined       Please document in your progress notes daily for the duration of treatment until resolved and include in your discharge summary.

## 2019-01-23 NOTE — PLAN OF CARE
Problem: Adult Inpatient Plan of Care  Goal: Plan of Care Review  Outcome: Ongoing (interventions implemented as appropriate)  Vitals stable, afebrile. Episode of hypotension this AM that was resolved with 500mL bolus. BP stayed in 90s/60s through the day, asymptomatic. AAO. Sat up at bedside. Will do continuous pulse ox through the night. Trilogy delivered to bedside. Discussed plan of care, stated understanding

## 2019-01-23 NOTE — PLAN OF CARE
01/23/19 1040   Post-Acute Status   Post-Acute Authorization HME   HME Status Set-up Complete         Patient approved for [facility]. RT from [facility] will come set up at hospital for use and upon discharge will set up at home as well.

## 2019-01-24 VITALS
OXYGEN SATURATION: 97 % | WEIGHT: 224 LBS | HEART RATE: 91 BPM | SYSTOLIC BLOOD PRESSURE: 124 MMHG | BODY MASS INDEX: 29.69 KG/M2 | TEMPERATURE: 97 F | HEIGHT: 73 IN | RESPIRATION RATE: 18 BRPM | DIASTOLIC BLOOD PRESSURE: 57 MMHG

## 2019-01-24 LAB
ALBUMIN SERPL BCP-MCNC: 2.6 G/DL
ALP SERPL-CCNC: 49 U/L
ALT SERPL W/O P-5'-P-CCNC: 46 U/L
ANION GAP SERPL CALC-SCNC: 4 MMOL/L
AST SERPL-CCNC: 27 U/L
BASOPHILS # BLD AUTO: 0.01 K/UL
BASOPHILS NFR BLD: 0.1 %
BILIRUB SERPL-MCNC: 1.5 MG/DL
BNP SERPL-MCNC: 143 PG/ML
BUN SERPL-MCNC: 53 MG/DL
CALCIUM SERPL-MCNC: 8.8 MG/DL
CHLORIDE SERPL-SCNC: 102 MMOL/L
CO2 SERPL-SCNC: 34 MMOL/L
CREAT SERPL-MCNC: 1.1 MG/DL
DIFFERENTIAL METHOD: ABNORMAL
EOSINOPHIL # BLD AUTO: 0.1 K/UL
EOSINOPHIL NFR BLD: 0.4 %
ERYTHROCYTE [DISTWIDTH] IN BLOOD BY AUTOMATED COUNT: 13.8 %
EST. GFR  (AFRICAN AMERICAN): >60 ML/MIN/1.73 M^2
EST. GFR  (NON AFRICAN AMERICAN): >60 ML/MIN/1.73 M^2
GLUCOSE SERPL-MCNC: 113 MG/DL
HCT VFR BLD AUTO: 31.6 %
HGB BLD-MCNC: 9.7 G/DL
LYMPHOCYTES # BLD AUTO: 1 K/UL
LYMPHOCYTES NFR BLD: 7.3 %
MCH RBC QN AUTO: 31.1 PG
MCHC RBC AUTO-ENTMCNC: 30.7 G/DL
MCV RBC AUTO: 101 FL
MONOCYTES # BLD AUTO: 1.3 K/UL
MONOCYTES NFR BLD: 9.8 %
NEUTROPHILS # BLD AUTO: 11 K/UL
NEUTROPHILS NFR BLD: 82.4 %
PLATELET # BLD AUTO: 213 K/UL
PMV BLD AUTO: 9.7 FL
POTASSIUM SERPL-SCNC: 4.6 MMOL/L
PROT SERPL-MCNC: 5.4 G/DL
RBC # BLD AUTO: 3.12 M/UL
SODIUM SERPL-SCNC: 140 MMOL/L
WBC # BLD AUTO: 13.36 K/UL

## 2019-01-24 PROCEDURE — 94660 CPAP INITIATION&MGMT: CPT

## 2019-01-24 PROCEDURE — 99239 HOSP IP/OBS DSCHRG MGMT >30: CPT | Mod: ,,, | Performed by: FAMILY MEDICINE

## 2019-01-24 PROCEDURE — 94668 MNPJ CHEST WALL SBSQ: CPT

## 2019-01-24 PROCEDURE — 36415 COLL VENOUS BLD VENIPUNCTURE: CPT

## 2019-01-24 PROCEDURE — 25000003 PHARM REV CODE 250: Performed by: NURSE PRACTITIONER

## 2019-01-24 PROCEDURE — 25000003 PHARM REV CODE 250: Performed by: FAMILY MEDICINE

## 2019-01-24 PROCEDURE — 97530 THERAPEUTIC ACTIVITIES: CPT

## 2019-01-24 PROCEDURE — 80053 COMPREHEN METABOLIC PANEL: CPT

## 2019-01-24 PROCEDURE — 83880 ASSAY OF NATRIURETIC PEPTIDE: CPT

## 2019-01-24 PROCEDURE — 27000221 HC OXYGEN, UP TO 24 HOURS

## 2019-01-24 PROCEDURE — 94640 AIRWAY INHALATION TREATMENT: CPT

## 2019-01-24 PROCEDURE — 94761 N-INVAS EAR/PLS OXIMETRY MLT: CPT

## 2019-01-24 PROCEDURE — 99239 PR HOSPITAL DISCHARGE DAY,>30 MIN: ICD-10-PCS | Mod: ,,, | Performed by: FAMILY MEDICINE

## 2019-01-24 PROCEDURE — 85025 COMPLETE CBC W/AUTO DIFF WBC: CPT

## 2019-01-24 PROCEDURE — 63600175 PHARM REV CODE 636 W HCPCS: Performed by: NURSE PRACTITIONER

## 2019-01-24 PROCEDURE — 25000242 PHARM REV CODE 250 ALT 637 W/ HCPCS: Performed by: NURSE PRACTITIONER

## 2019-01-24 PROCEDURE — 97116 GAIT TRAINING THERAPY: CPT

## 2019-01-24 PROCEDURE — 99900035 HC TECH TIME PER 15 MIN (STAT)

## 2019-01-24 RX ORDER — DABIGATRAN ETEXILATE 150 MG/1
150 CAPSULE ORAL 2 TIMES DAILY
Qty: 60 CAPSULE | Refills: 0 | Status: SHIPPED | OUTPATIENT
Start: 2019-01-24

## 2019-01-24 RX ORDER — SPIRONOLACTONE 25 MG/1
25 TABLET ORAL DAILY
Qty: 30 TABLET | Refills: 0 | Status: SHIPPED | OUTPATIENT
Start: 2019-01-25 | End: 2019-02-06 | Stop reason: SDUPTHER

## 2019-01-24 RX ORDER — POLYETHYLENE GLYCOL 3350 17 G/17G
17 POWDER, FOR SOLUTION ORAL DAILY PRN
Qty: 30 EACH | Refills: 0 | Status: SHIPPED | OUTPATIENT
Start: 2019-01-24

## 2019-01-24 RX ADMIN — DABIGATRAN ETEXILATE MESYLATE 150 MG: 75 CAPSULE ORAL at 09:01

## 2019-01-24 RX ADMIN — TORSEMIDE 20 MG: 20 TABLET ORAL at 09:01

## 2019-01-24 RX ADMIN — METOPROLOL SUCCINATE 100 MG: 50 TABLET, EXTENDED RELEASE ORAL at 09:01

## 2019-01-24 RX ADMIN — BICALUTAMIDE 50 MG: 50 TABLET, FILM COATED ORAL at 09:01

## 2019-01-24 RX ADMIN — SPIRONOLACTONE 25 MG: 25 TABLET ORAL at 09:01

## 2019-01-24 RX ADMIN — LEVALBUTEROL 1.25 MG: 1.25 SOLUTION, CONCENTRATE RESPIRATORY (INHALATION) at 02:01

## 2019-01-24 RX ADMIN — PREDNISONE 40 MG: 20 TABLET ORAL at 09:01

## 2019-01-24 RX ADMIN — TAMSULOSIN HYDROCHLORIDE 0.4 MG: 0.4 CAPSULE ORAL at 09:01

## 2019-01-24 RX ADMIN — LEVALBUTEROL 1.25 MG: 1.25 SOLUTION, CONCENTRATE RESPIRATORY (INHALATION) at 07:01

## 2019-01-24 NOTE — PLAN OF CARE
01/24/19 1232   Post-Acute Status   Post-Acute Authorization HME   HME Status Set-up Complete       Patient requesting BSC for discharge. Spoke with Lore with [facility] who gave permission to pull one from our depot. BSC delivered to patient in room. Demographics, signed delivery ticket, and signed order printed and brought back to designated area in depot. Also informed Lore that patient will discharge today, so the hospital bed can be delivered. She states her understanding for delivery.

## 2019-01-24 NOTE — PROGRESS NOTES
Called wife for update on bed delivery to update the patient. Bed has been delivered and wife will be on her way. Pt is packed and ready to go.

## 2019-01-24 NOTE — ASSESSMENT & PLAN NOTE
Need to assess if truly hypoxic. No low sats charted but has been on venti and now 4L NC. Will try to wean. Repeat CXR.   1/11/19 O2 sat 95-99%     1-12 Pt is labored with his breathing and continuing to cough; Dr Robertson consulted  1-14 no longer needing O2, cont cefepime and clinda x 10 days, cont nebs,stop steroids.    1/15/19: marked change in respiratory status this morning, was worsening overnight. He is oxygenating, sats good on 2L and ABG shows good sats. CXR with enlarging left pleural effusion otherwise normal. CT ordered and will do once stabilized--? Underlying consolidation with empyema. Looking for loculations, etc. Consider thoracentesis. Could explain his worsening status. He is in respiratory distress and abdominal breathing. Taking off BiPaP. He is a FULL CODE.    1/16/19:  Continue VANC/Cefepime. He is considering DNR . Told Dr. Jean-Baptiste no to resuscitation. We need to get him to sign a DNR     1/17/19  Patient seems to have staph pneumonia as he has improved with add'l coverage. Check u/s to r/o empyema    1/18/19  Improved clinically. Continue VANC cefepime     1/19/19  Continue VANC and Cefepime     1/20/20  Continue VANC(day6) and cefepime(day 12, initially started for LLE cellulitis at admit but treating pneumonia at least since the 10th). Hoping to get CT chest done today to further characterize this effusion and infiltrate which is poorly characterized on x ray.     1/21 Ct does not show infiltrate. Will d/c abx as he has had 1 week vanc and clida and 2 weeks cefepime. Consider pleural effusion as cause for resp issue. Discussed thoracentesis and will see what Dr. Robertson thinks. Is this exudate or transudate? Also thoracentesis may help with his breathing issues.     1/22 No pneumonia, abx stopped. NIV ordered for home use per Dr Robertson. Dr robertson does not feel like throcentesis warranted at this time. CT reports no change from 2017 imaging     1/23 awaiting approval for trilogy    1/24  approved/delivered

## 2019-01-24 NOTE — PROGRESS NOTES
Staff Handoff  Bedside report received from BECKY Breaux. VS stable. Afebrile. No distress noted. Assessment complete per flowsheet. Call bell in reach. Instructed to call for any needs. Will continue to monitor for any change in status.    Resident Handoff

## 2019-01-24 NOTE — SUBJECTIVE & OBJECTIVE
Interval History: see      Review of Systems   Constitutional: Negative for activity change, fatigue, fever and unexpected weight change.   HENT: Negative for congestion, ear pain, hearing loss, rhinorrhea and sore throat.    Eyes: Negative for pain, redness and visual disturbance.   Respiratory: Negative for cough, shortness of breath and wheezing.    Cardiovascular: Positive for leg swelling (improving). Negative for chest pain and palpitations.   Gastrointestinal: Negative for abdominal pain, constipation, diarrhea, nausea and vomiting.   Genitourinary: Positive for difficulty urinating (due to BPH). Negative for decreased urine volume, dysuria, frequency, hematuria and urgency.   Musculoskeletal: Negative for back pain, joint swelling and neck pain.   Skin: Positive for wound (improving). Negative for color change (erythema resolved b/l LE) and rash.   Neurological: Negative for dizziness, weakness, light-headedness and headaches.   Psychiatric/Behavioral: Negative for agitation.     Objective:     Vital Signs (Most Recent):  Temp: 96.2 °F (35.7 °C) (01/24/19 0724)  Pulse: 78 (01/24/19 0806)  Resp: 16 (01/24/19 0739)  BP: (!) 123/58 (01/24/19 0724)  SpO2: 98 % (01/24/19 0739) Vital Signs (24h Range):  Temp:  [96.2 °F (35.7 °C)-98.6 °F (37 °C)] 96.2 °F (35.7 °C)  Pulse:  [] 78  Resp:  [16-21] 16  SpO2:  [95 %-100 %] 98 %  BP: ()/(58-65) 123/58     Weight: 102 kg (224 lb 13.9 oz)(RD reviewed)  Body mass index is 29.67 kg/m².    Intake/Output Summary (Last 24 hours) at 1/24/2019 0919  Last data filed at 1/24/2019 0900  Gross per 24 hour   Intake 1150 ml   Output 3050 ml   Net -1900 ml      Physical Exam   Constitutional: He is oriented to person, place, and time. He appears well-developed and well-nourished. No distress.   HENT:   Head: Normocephalic and atraumatic.   Right Ear: External ear normal.   Left Ear: External ear normal.   Nose: Nose normal.   Eyes: Conjunctivae and EOM are normal. Pupils  are equal, round, and reactive to light. Right eye exhibits no discharge. Left eye exhibits no discharge.   Neck: Normal range of motion. Neck supple. No tracheal deviation present.   Cardiovascular: Normal rate, regular rhythm and normal heart sounds.   No murmur heard.  Pulmonary/Chest: Effort normal. No respiratory distress. He has wheezes. He has no rales.   Abdominal breathing, but not in distress   Abdominal: Soft. Bowel sounds are normal. He exhibits no distension. There is no tenderness.   Musculoskeletal: Normal range of motion. He exhibits edema (2-3+ b/l LE edema to knees).   Neurological: He is alert and oriented to person, place, and time. He has normal reflexes. No cranial nerve deficit.   Skin: Skin is warm and dry. No erythema.        Psychiatric: He has a normal mood and affect. His behavior is normal. Judgment and thought content normal.   Nursing note and vitals reviewed.      Significant Labs:   CBC:   Recent Labs   Lab 01/23/19 0414 01/24/19 0411   WBC 12.18 13.36*   HGB 9.9* 9.7*   HCT 31.9* 31.6*    213     CMP:   Recent Labs   Lab 01/23/19 0414 01/24/19 0411    140   K 4.0 4.6    102   CO2 33* 34*   * 113*   BUN 59* 53*   CREATININE 1.2 1.1   CALCIUM 8.7 8.8   PROT 5.3* 5.4*   ALBUMIN 2.5* 2.6*   BILITOT 1.3* 1.5*   ALKPHOS 46* 49*   AST 25 27   ALT 42 46*   ANIONGAP 4* 4*   EGFRNONAA 55* >60     Cardiac Markers:   Recent Labs   Lab 01/24/19 0411   *       vanc trough 18.8    Lab Results   Component Value Date    INR 1.4 (H) 01/18/2019    INR 1.2 01/17/2019    INR 1.2 01/16/2019     Blood cultures 1/9/19 NGTD x 2    US chest mediastinum   There is a moderate left-sided pleural effusion.  A measurable portion of the collection measures 10.8 x 5.6 x 4.7 cm.    No evidence of internal echogenicity or debris to suggest a complicated effusion.        1/20 chest CT   1. There are changes of left greater than right pleural effusions with associated left  compressive atelectasis.  Similar findings of left pleural effusion and compressive atelectasis were present on a prior CTA chest dated 10/04/2017.  There is no obvious loculation within the left pleural effusion.  2. There is cardiomegaly.  Redemonstrated is a pericardial effusion measuring approximately 1.7 cm in greatest thickness.  This was present previously as well.  3. There is atherosclerosis in addition to coronary artery calcifications.    1/16 CXR   The heart shadow is severely enlarged.  There is pulmonary vascular congestion with pulmonary edema.  Small right and moderate left-sided pleural effusions are suspected.  The skeletal structures are intact.    US lower ext Negative for bilateral lower extremity DVT.

## 2019-01-24 NOTE — PLAN OF CARE
Problem: Adult Inpatient Plan of Care  Goal: Plan of Care Review  Outcome: Ongoing (interventions implemented as appropriate)  Educate patient/family/cargiver about administration of aerosolized medications via the inhalation route to aid in bronchial hygiene & deliver medications.  Educate patient/family/cargiver about indications, benefits, &  safety of use  Benefits:  Increased FRC & PaO2  Decreased work of breathing  Reduce intrapulmonary shunt  Avoid tracheal intubation & mechanical intubation    Pt agreed to put on BIPAP, but found pt. With it off and on standby. Unaware as to what time pt. Took it off. Pt. Awake and in no distress.

## 2019-01-24 NOTE — PLAN OF CARE
01/24/19 1428   Post-Acute Status   Home Health/Hospice Status Authorization Obtained         [facility] has accepted patient.

## 2019-01-24 NOTE — PROGRESS NOTES
[facility]  Cardiology  Progress Note    Patient Name: Radhames Alonzo  MRN: [id number]  Admission Date: 1/9/2019  Hospital Length of Stay: 15 days  Code Status: DNR   Attending Physician: Jesús Tay MD   Primary Care Physician: Pennie Jorge NP  Expected Discharge Date: 1/10/2019  Principal Problem:Severe sepsis    Subjective:       Interval History: Hypotension resolved with NS bolus and reduction in diuretic therapy. Plans for DC home today with close outpatient follow up.     ROS     Constitution: Negative.   HENT: Negative.    Eyes: Negative.    Cardiovascular: Positive for dyspnea on exertion. Negative for chest pain.   Endocrine: Negative.    Musculoskeletal: Positive for muscle weakness.   Gastrointestinal: Negative.    Genitourinary: Negative.    Neurological: Negative.    Psychiatric/Behavioral: Negative.      Objective:     Vital Signs (Most Recent):  Temp: 96.2 °F (35.7 °C) (01/24/19 0724)  Pulse: 95 (01/24/19 0739)  Resp: 16 (01/24/19 0739)  BP: (!) 123/58 (01/24/19 0724)  SpO2: 98 % (01/24/19 0739) Vital Signs (24h Range):  Temp:  [95.7 °F (35.4 °C)-98.6 °F (37 °C)] 96.2 °F (35.7 °C)  Pulse:  [] 95  Resp:  [16-21] 16  SpO2:  [95 %-100 %] 98 %  BP: ()/(57-65) 123/58     Weight: 102 kg (224 lb 13.9 oz)(RD reviewed)  Body mass index is 29.67 kg/m².    SpO2: 98 %  O2 Device (Oxygen Therapy): room air      Intake/Output Summary (Last 24 hours) at 1/24/2019 0829  Last data filed at 1/24/2019 0600  Gross per 24 hour   Intake 590 ml   Output 3050 ml   Net -2460 ml       Lines/Drains/Airways     Drain                 Urethral Catheter 01/15/19 1215 Latex 16 Fr. 8 days          Peripheral Intravenous Line                 Peripheral IV - Single Lumen 01/17/19 Anterior;Right Forearm 7 days                Physical Exam     Constitutional: He appears well-developed and well-nourished.   HENT:   Head: Normocephalic.   Cardiovascular: Normal rate.   No murmur  heard.  Pulmonary/Chest: Effort normal and breath sounds normal.   Abdominal: Soft.   Neurological: He is alert.   Skin: Skin is warm and dry.   Psychiatric: He has a normal mood and affect. His behavior is normal. Judgment and thought content normal.   Nursing note and vitals reviewed      Significant Labs:   BMP:   Recent Labs   Lab 01/23/19 0414 01/24/19 0411   * 113*    140   K 4.0 4.6    102   CO2 33* 34*   BUN 59* 53*   CREATININE 1.2 1.1   CALCIUM 8.7 8.8   , CMP   Recent Labs   Lab 01/23/19 0414 01/24/19 0411    140   K 4.0 4.6    102   CO2 33* 34*   * 113*   BUN 59* 53*   CREATININE 1.2 1.1   CALCIUM 8.7 8.8   PROT 5.3* 5.4*   ALBUMIN 2.5* 2.6*   BILITOT 1.3* 1.5*   ALKPHOS 46* 49*   AST 25 27   ALT 42 46*   ANIONGAP 4* 4*   ESTGFRAFRICA >60 >60   EGFRNONAA 55* >60   , CBC   Recent Labs   Lab 01/23/19 0414 01/24/19 0411   WBC 12.18 13.36*   HGB 9.9* 9.7*   HCT 31.9* 31.6*    213    and Troponin No results for input(s): TROPONINI in the last 48 hours.    Assessment and Plan:     Active Diagnoses:    Diagnosis Date Noted POA    PRINCIPAL PROBLEM:  Severe sepsis [A41.9, R65.20] 01/09/2019 Yes    COPD exacerbation [J44.1] 01/23/2019 Unknown    Emphysema/COPD [J43.9] 01/19/2019 Yes    Encephalopathy, metabolic [G93.41] 01/19/2019 No    Chronic respiratory failure with hypoxia [J96.11] 01/18/2019 Yes    Dysphagia [R13.10] 01/17/2019 Yes    Venous stasis ulcer [I83.009, L97.909] 01/16/2019 Yes    Encephalopathy [G93.40] 01/15/2019 No    Pleural effusion [J90] 01/15/2019 Yes    Benign prostatic hyperplasia (BPH) with post-void dribbling [N40.1, N39.43] 01/11/2019 Yes    Hypokalemia [E87.6] 01/10/2019 Yes    Cellulitis of left lower extremity [L03.116] 01/10/2019 Yes    Pneumonia [J18.9] 01/10/2019 Yes    CHF (congestive heart failure) [I50.9] 01/09/2019 Yes    Atrial fibrillation [I48.91] 04/30/2015 Yes    Cardiomyopathy [I42.9] 04/30/2015 Yes       Problems Resolved During this Admission:    Diagnosis Date Noted Date Resolved POA    Rhabdomyolysis [M62.82] 01/09/2019 01/21/2019 Yes       VTE Risk Mitigation (From admission, onward)        Ordered     dabigatran etexilate capsule 150 mg  2 times daily      01/14/19 1112     IP VTE HIGH RISK PATIENT  Once      01/09/19 1751          Current Facility-Administered Medications   Medication    acetaminophen suppository 650 mg    bicalutamide tablet 50 mg    dabigatran etexilate capsule 150 mg    haloperidol lactate injection 5 mg    HYDROcodone-acetaminophen 5-325 mg per tablet 1 tablet    levalbuterol nebulizer solution 1.25 mg    levalbuterol nebulizer solution 1.25 mg    metoprolol succinate (TOPROL-XL) 24 hr tablet 100 mg    polyethylene glycol packet 17 g    predniSONE tablet 40 mg    ranitidine 15 mg/mL syrup 300 mg    spironolactone tablet 25 mg    tamsulosin 24 hr capsule 0.4 mg    torsemide tablet 20 mg       Dx: Resolved CHF  sepsis LLeg cellulitis  Persistent L pleural effusion noted     Sepsis possibly secondary to venous stasis ulcer on left lateral foot. IV abx initiated per primary.      Left sided pleural effusion and mild pulmonary edema evidenced by chest x ray. BNP moderately elevated at 659.      Acute on chronic diastolic CHF TTE December 2018 with LVEF 55%, mild LVH. Lasix IV 80 mg bid.     Rhabdomyolysis improving. Chronic ETOH use. CPK improved at 1762 from 2378.      Chronic atrial fibrillation, on Pradaxa at home     Chronic peripheral edema at baseline.      Dyslipidemia     Hypertension now controlled    Plan: May DC home today    Delma Haq NP for Dr. Jean-Baptiste  Cardiology  [facility]    I attest that I have personally seen and examined this patient. I have reviewed and discussed the management in detail as outlined above.

## 2019-01-24 NOTE — ASSESSMENT & PLAN NOTE
"Getting bigger on CXR from 1/14/20  CT ordered--will do when stabilized. I think today(1/20/19) is the first day he is well enough to try and get this done. Will attempt    ? Underlying consolidation. ? Empyema  U/S ordered---"no underlying debris to suggest complicated effusion."   following   Will continue VANC( day 6) as he has shown some improvement when this was started on the 15th.  Cefepime day 12. Initially started for LE cellulitis that has since resolved. Will continue for presumed LLL pneumonia, but need a CT to further characterize this and the associated effusion on CXR.     1/21 1/21 Ct does not show infiltrate. Will d/c abx as he has had 1 week vanc and clida and 2 weeks cefepime. Consider pleural effusion as cause for resp issue. Discussed thoracentesis .    1/22 No pneumonia, abx stopped. NIV ordered for home use per Dr Robertson. Dr robertson does not feel like throcentesis warranted at this time. CT reports no change from 2017 imaging. diuresing    1/23 awaiting approval for home trilogy  1/24 approved and delivered  "

## 2019-01-24 NOTE — ASSESSMENT & PLAN NOTE
Increased solumedrol to 80 q 8 1/19/20---This has helped significantly, will continue this dose for now.   Encourage bipap use   wean    1/21/19: still wheezing. Cont steroids for now    1/22: reduced steroids yesterday from 80bid to 40 bid, cont nebs.    1/23 wean steroids again. To prednisone 40mgpo daily  D/c steroids after today 1/24

## 2019-01-24 NOTE — ASSESSMENT & PLAN NOTE
Rate controlled. On metoprolol    He reports that he is taking pradaxa but called pharmacy and they do not report that he has filled anything for anticoagulation especially pradaxa in recent months. Start lovenox 1mg/kg BID until we get his home meds    [facility], will restart at d/c    1/15/19: tachycardic despite BB and now hypotensive. Holding BB and all BP meds. Sinus on telemetry right now. Cont pradaxa for now but may stop again and switch back to lovenox today if continues to worsen and pending labs      1/16/19  Continue pradaxa    1/17/19  Will resume BB today. For most part he is rate controlled.    1/19/19  Continue BB, increase to 100mg     1/20  Metoprolol switched to IV yesterday b/c was somnolent. Switch back to po, 100mg daily     1/21   Cont metoprolol and pradaxa HR 84.    1/22 no changes    1/23 cont pradaxa and toprol    1/24 cont meds, check occult

## 2019-01-24 NOTE — ASSESSMENT & PLAN NOTE
Cardiology is following  Diastolic CHF: TTE December 2018 with LVEF 55%, mild LVH  Was started on diuretics  BNP is only 200s now  CXR with left pleural effusion but not severe pulmonary congestion  1/15/10: BP dropping, hold ACEi, BB and diuretics for now and give back IVF---careful for volume overload though.   1/16/19: resume BB and ACE per [facility]   1/17/18.  Meds have not been resumed. Start at 25 of metoprolol rather than home dose of 100.    1/19/19  Increase BB to 100mg     1/20  Continue metoprolol 100mg. Resume ARB when BP able to tolerate     1/21 now on metoprolol 100XL daily BP stable 122/68 still holding arb creat 1.4    1/22 arb still on hold as we are adding diuresis and watching kidneys closely. Cont toprol XL 100mg po daily BP stable    1/23 still holding arb as bp so low. demadex was increased yesterday and kidney function remained stable but pressure dropped significantly this am. Will drop back to daily with aldactone. BNP as good as it gets 132. Remains on torpol as well HR 90's    1/24 on torsemide and aldactone daily. Creat stable. Still with lower ext swelling

## 2019-01-24 NOTE — PLAN OF CARE
01/24/19 1235   Medicare Message   Important Message from Medicare regarding Discharge Appeal Rights Given to patient/caregiver;Explained to patient/caregiver;Signed/date by patient/caregiver   Date IMM was signed 01/24/19   Time IMM was signed 0955       Signed for discharge.

## 2019-01-24 NOTE — ASSESSMENT & PLAN NOTE
Rate controlled. On metoprolol    He reports that he is taking pradaxa but called pharmacy and they do not report that he has filled anything for anticoagulation especially pradaxa in recent months. Start lovenox 1mg/kg BID until we get his home meds    [facility], will restart at d/c    1/15/19: tachycardic despite BB and now hypotensive. Holding BB and all BP meds. Sinus on telemetry right now. Cont pradaxa for now but may stop again and switch back to lovenox today if continues to worsen and pending labs      1/16/19  Continue pradaxa    1/17/19  Will resume BB today. For most part he is rate controlled.    1/19/19  Continue BB, increase to 100mg     1/20  Metoprolol switched to IV yesterday b/c was somnolent. Switch back to po, 100mg daily     1/21   Cont metoprolol and pradaxa HR 84.    1/22 no changes    1/23 cont pradaxa and toprol    1/24 cont meds, check occult occult was negative

## 2019-01-24 NOTE — PROGRESS NOTES
"[facility] Medicine  Progress Note    Patient Name: Radhames Alonzo  MRN: [id number]  Patient Class: IP- Inpatient   Admission Date: 1/9/2019  Length of Stay: 15 days  Attending Physician: Jesús Tay MD  Primary Care Provider: Pennie Jorge NP        Subjective:     Principal Problem:Severe sepsis    HPI:  84 year old male presents to ED b/c " I couldn't get up and walk."   SOB for about a year. Worse recently. Last night it got to the point that he was too SOB to stand up.   SOB at rest and with eating.   He also notes left foot pain. He has been seeing woundcare for a wound on this foot last 7 months   Workup in ED is significant for BNP 600s. Last BNP 200s 3 weeks ago. 160s 9 months ago.  He sees Dr. Jean-Baptiste regularly.   [facility] has seen pt and rec admit for diuresis, rule out MI   First TPN with slight bump.038  Lactic acid is 3.6. Meets I am told by ED MD that patient "has no signs of infection on physical exam." I am also told that CXR and U/A are clear.     He drinks six pack daily. Last drink 2 days ago. No h/o DT's in past         Hospital Course:  He has been started on cefepime and clinda for left lower ext cellulitis. He has been afebrile. WBC was 24782. Blood cultures NGTD. Lactate was up to 3.8, coming down.RR was 24 and had low sats on admission. Was on venti, now down to 3L NC   He is also wheezing. C/o SOB. CXR with CHF.   He does report that he takes pradaxa for his a fib at home. Not on home meds list nor reordered here. A fib on EKG. BNP was 659. Ordered for lasix 80mg IV BID. Urinating a lot. K low this am 2.8    1/11/19 Getting lasix 80mg IV bid per Cardiology; not much urine output yesterday with dribbling noted; concern for obstruction; will get bladder scan; consider renal US.   Creat 0.9 stable; K+ 3.6    CPK 2378>1762; TNI 0.032 x 1; repeat this am     1-12 Pt continues to have SOB and pul congestion; Dr Robertson consulted    1-13 Pt is fairly complex with mx " cardiac/pulmonary issues causing his coughing and SOB; has a hx of underlying prostate problems; a swing bed might be necessary; Dr Robertson and cardiology following    1/14 pt is still on clinda and cefepime for the lower ext cellulitis. Afebrile/ no elevated WBC. US negative for DVT. Still swollen but reports that it is much better than his normal   CXR 1/812 Left basilar consolidation or atelectasis and small pleural effusion. He is 95% on RA  Still weak. Having trouble standing on side bed.     1/15/19 Pt started feeling worse yesterday evening; this am noted to have LOW BP- SBP 80s, increased RR 30s, Temp 95.6,   Dr. Jean-Baptiste reports HF better, BNP 200s, still with LE edema, US negative for DVT; Also gets pradaxa  Underlying chronic prostate problems; defers any intervention or sx   Day 7/10 Cefepime and clinda for LE cellulitis and pneumonia. Blood cultures negative.   Looks jaundice with sclerema icterus; admit bilirubin - 2.0 elevated on 1/9/19 ; check LFTs/CMP/bilirubin    1/15/19: patient with worsening respiratory status overnight. He was tried on BiPaP but refused to wear it. Was agitated during the night. He was abdominal breathing but maintainings sats on 2L NC. BNP normal. D-dimer normal and anticoagulated since admit, restarted PO pradaxa yesterday. This AM, continues to decompensated. He is alert and oriented on initial eval but over the course of our conversation decompensates and becomes very lethargic. He has been waxing and waning throughout the night per RN report. Unclear if has h/o cirrhosis but on chart review this AM his bilirubin 2 and mild elevation AST--this was not trended during his hospital stay. This AM he has scleral icterus and yellowing of skin which was NOT PRESENT YESTERDAY. Also his LLE cellulitis is worsening, redness extending up the shin to the knee today which was NOT PRESENT YESTERDAY. He is hypothermic, tachypenic, hypotensive and tachycardic (despite BB on board).      1/16/19  Transferred up to ICU yesterday for hypotension and AMS on the floor. NH4 level 24. Bolused 1 liter NS. Did not require pressors. This am 120s/70s  UOP is good. Abx changed from cefepime and clinda to cefepime and VANC. Afebrile.  ABG ordered yesterday. He does have some hypercapnea. Refuses to wear bipap.    1/17/19  Patient became more alert and arousable throughout the day yesterday. Still with issues lying flat (which is why his imaging was not completed yesterday). However, his BP is improved without his home meds. He is tolerating his abx well. He did wear Bipap VERY little last night, but did not sleep because of it. UOP is good. No fevers.     1/18/19  He is awake and alert this am. He wore his bipap overnight   Tachy but BP is stable   Increased edema this am     1/19/19  He is very somnolent this am. Fought with him overnight to get bipap. Required ativan. He is sleepy this am   He is now a DNR     1/20/19  Steroids increased and IV Lasix added yesterday  He wore bipap about an hour last night   He is more awake today, no ativan given.   Spoke about hospice yesterday since he largely refuses the Bipap. Pt family is in agreement with hospice if he deteriorates or fails to improve   Out of bed and eating breakfast today    1/21 he is awake and alert again today. Did wear bipap a couple hours over evening yesterday (5 1/2hrs). Ct shows pleural effusion and pericardial effusion. Not on diuresis due to creat inability to tolerate. D/julia IV yesterday. Creat 1.4 today.     He is still on vanc (7days) and cefepime (13 days) (and s/p 1 week clinda) for the lower leg cellulitis. Afebrile WBC did go up as well but on steroids dose up and down over last week. No fevers. BP stable     TTE December 2018 with LVEF 55%, mild LVH    1/22   Abx were d/julia yesterday as he has had 1 week vanc and clinda as well as 2 weeks cefepime and cellulitis had resolved yesterday. He remains a febrile. WBC stable (slightly  "lower than yesterday) legs without increase redness or swelling    Dr tom following. Does not feel that a throcentesis is warranted at this time. Will order patient CPAP for home use as he is tolerating it more each day. Pleural effusion stable. On 2L NC and sats 96%. Also noted pericardial effusion. [facility] following as well. Added aldactone and demadex 20mg po daily yesterday.  GFR stable. He is out -1420 on yesterday I/O. Reports breathing is getting better    Will need to have diuretics BID     1/23  This am pt bp was 77/52 manually. He was asymptomatic. Was increased to torsemide 20mg po BID and also on aldactone 25mg po daily. Creat stable 1.2 this am. He was -3620 yesterday.  this am. Given 500ml NS bolus for hypotension this am. Bp better 100/57. Cards following    Dr tom following as well. Trilogy ordered for home use. Awaiting approval.     Still without abx for cellulitis, legs remain without s/s infection.     1/24   He is lying in bed today. He reports that he is breathing "pretty damn good". He is no longer on O2. Has trilogy ordered for at home. Also has home health. Legs without redness, still with pitting edema. On torsemide daily and aldactone daily. Kidney function stable. No fever. WBC stable. On prednisone 40mg po daily.     He reports that he has BM yesterday evening. Reports that it was green/brown. H/H slowing dropping and BUN up. Still has catheter,. Has had issues with urinary retention in past. Will set up with urology at d/c    Interval History: see      Review of Systems   Constitutional: Negative for activity change, fatigue, fever and unexpected weight change.   HENT: Negative for congestion, ear pain, hearing loss, rhinorrhea and sore throat.    Eyes: Negative for pain, redness and visual disturbance.   Respiratory: Negative for cough, shortness of breath and wheezing.    Cardiovascular: Positive for leg swelling (improving). Negative for chest pain and palpitations. "   Gastrointestinal: Negative for abdominal pain, constipation, diarrhea, nausea and vomiting.   Genitourinary: Positive for difficulty urinating (due to BPH). Negative for decreased urine volume, dysuria, frequency, hematuria and urgency.   Musculoskeletal: Negative for back pain, joint swelling and neck pain.   Skin: Positive for wound (improving). Negative for color change (erythema resolved b/l LE) and rash.   Neurological: Negative for dizziness, weakness, light-headedness and headaches.   Psychiatric/Behavioral: Negative for agitation.     Objective:     Vital Signs (Most Recent):  Temp: 96.2 °F (35.7 °C) (01/24/19 0724)  Pulse: 78 (01/24/19 0806)  Resp: 16 (01/24/19 0739)  BP: (!) 123/58 (01/24/19 0724)  SpO2: 98 % (01/24/19 0739) Vital Signs (24h Range):  Temp:  [96.2 °F (35.7 °C)-98.6 °F (37 °C)] 96.2 °F (35.7 °C)  Pulse:  [] 78  Resp:  [16-21] 16  SpO2:  [95 %-100 %] 98 %  BP: ()/(58-65) 123/58     Weight: 102 kg (224 lb 13.9 oz)(RD reviewed)  Body mass index is 29.67 kg/m².    Intake/Output Summary (Last 24 hours) at 1/24/2019 0919  Last data filed at 1/24/2019 0900  Gross per 24 hour   Intake 1150 ml   Output 3050 ml   Net -1900 ml      Physical Exam   Constitutional: He is oriented to person, place, and time. He appears well-developed and well-nourished. No distress.   HENT:   Head: Normocephalic and atraumatic.   Right Ear: External ear normal.   Left Ear: External ear normal.   Nose: Nose normal.   Eyes: Conjunctivae and EOM are normal. Pupils are equal, round, and reactive to light. Right eye exhibits no discharge. Left eye exhibits no discharge.   Neck: Normal range of motion. Neck supple. No tracheal deviation present.   Cardiovascular: Normal rate, regular rhythm and normal heart sounds.   No murmur heard.  Pulmonary/Chest: Effort normal. No respiratory distress. He has wheezes. He has no rales.   Abdominal breathing, but not in distress   Abdominal: Soft. Bowel sounds are normal. He  exhibits no distension. There is no tenderness.   Musculoskeletal: Normal range of motion. He exhibits edema (2-3+ b/l LE edema to knees).   Neurological: He is alert and oriented to person, place, and time. He has normal reflexes. No cranial nerve deficit.   Skin: Skin is warm and dry. No erythema.        Psychiatric: He has a normal mood and affect. His behavior is normal. Judgment and thought content normal.   Nursing note and vitals reviewed.      Significant Labs:   CBC:   Recent Labs   Lab 01/23/19 0414 01/24/19 0411   WBC 12.18 13.36*   HGB 9.9* 9.7*   HCT 31.9* 31.6*    213     CMP:   Recent Labs   Lab 01/23/19 0414 01/24/19 0411    140   K 4.0 4.6    102   CO2 33* 34*   * 113*   BUN 59* 53*   CREATININE 1.2 1.1   CALCIUM 8.7 8.8   PROT 5.3* 5.4*   ALBUMIN 2.5* 2.6*   BILITOT 1.3* 1.5*   ALKPHOS 46* 49*   AST 25 27   ALT 42 46*   ANIONGAP 4* 4*   EGFRNONAA 55* >60     Cardiac Markers:   Recent Labs   Lab 01/24/19 0411   *       vanc trough 18.8    Lab Results   Component Value Date    INR 1.4 (H) 01/18/2019    INR 1.2 01/17/2019    INR 1.2 01/16/2019     Blood cultures 1/9/19 NGTD x 2    US chest mediastinum   There is a moderate left-sided pleural effusion.  A measurable portion of the collection measures 10.8 x 5.6 x 4.7 cm.    No evidence of internal echogenicity or debris to suggest a complicated effusion.        1/20 chest CT   1. There are changes of left greater than right pleural effusions with associated left compressive atelectasis.  Similar findings of left pleural effusion and compressive atelectasis were present on a prior CTA chest dated 10/04/2017.  There is no obvious loculation within the left pleural effusion.  2. There is cardiomegaly.  Redemonstrated is a pericardial effusion measuring approximately 1.7 cm in greatest thickness.  This was present previously as well.  3. There is atherosclerosis in addition to coronary artery calcifications.    1/16  CXR   The heart shadow is severely enlarged.  There is pulmonary vascular congestion with pulmonary edema.  Small right and moderate left-sided pleural effusions are suspected.  The skeletal structures are intact.    US lower ext Negative for bilateral lower extremity DVT.    Assessment/Plan:      * Severe sepsis    Due to Venous stasis and wound of LLE   Start Cefepime and clinda to cover for everything including MRSA and psuedomonas.   Clinda instead of Vanc since mild CKD and we will diurese him as well   Lactic acid q 6 coming down  Bolus 2 liters overnight creat 0.9 bp 111/66 this am  woundcare consult     1-12 legs are down but still pul congestion.    1-13 Not retaining as much fluids, but still SOB and coughing; burns when he urinates from his Prostate issues(Ca)    1-14 better/resolved    1-15: worsening status this AM. + confusion, hypotension, tachycardia. LLE redness is worsening from yesterday despite antibx. Should be well covered with clida and cefepime so puzzling. When stable consider imaging of leg to look for deeper infection    1/16:  Continue cefepime and VANC. CT chest today to check for empyema/underlying infiltrate. CXR not adequate to determine    1/17  At this point he was switched to vanc 1 day ago and is improved. Won't be able to lie flat for CT. Will go ahead and u/s chest to r/o empyema. But it seems as though he will need 7 days of iv vanc.    1/18  Continue VANC day 4 and cefepime day 10     1/19  Continue VANC day 5 and cefepime Day 11     1/20  Continue VANC day 6 and cefepime Day 12     1/21   On vanc and cefepime, cellulitis appears to have resolved. Will d/c abx, completed 1 week clinda then vanc, respectively and cefepime 2 weeks. LE erythema resolved. Stop today and monitor to ensure does not clinically begin to worsen as he did last week.     1/22 resolved         Encephalopathy, metabolic    Resolved.       Emphysema/COPD    Increased solumedrol to 80 q 8 1/19/20---This has  helped significantly, will continue this dose for now.   Encourage bipap use   wean    1/21/19: still wheezing. Cont steroids for now    1/22: reduced steroids yesterday from 80bid to 40 bid, cont nebs.    1/23 wean steroids again. To prednisone 40mgpo daily  D/c steroids after today 1/24     Pulmonary heart disease           Chronic respiratory failure with hypoxia    He has bipap ordered. He is mostly refusing. I will not restrain this 84 year old man and force him to wear bipap if he is not willing. He is a DNR.   1/20/19---Since he is a DNR and mostly refusing bipap, the plan was to start hospice today unless he showed improvement. Remarkably, he has. He looks much better today. We will downgrade him to the floor later today.  However, if he deteriorates or fails to continue to show improvement, I strongly recommend getting hospice involved. This was discussed with wife and his sons Dequan and Lele.  They are in agreement with this. He remains a DNR.     1/21 he is tolerating the bipap a little more. He is O2 dep which is new for him this admission. Will need to qualify home for O2 and a bipap at home pt is considering thoracentesis to assess the effusion and see if it improves his breathing    1/22 pt had ambulating pox assessed yesterday 86% on RA, qualified for home O2.    1/213 POX today on RA resting 98% will walk test again today, plan for d/c tomorrow. Will need trilogy prior to d/c  1/24 same     Dysphagia    Likely secondary to mental status on 1/16  ST was following ands reports patient has ability to identify safe swallow strategies and good independent implementation of compensatories to minimize risk of aspiration on least restrictive diet.         Venous stasis ulcer    Wound care.  Cont to apply silver.  Unna boots with HH       Pleural effusion    Getting bigger on CXR from 1/14/20  CT ordered--will do when stabilized. I think today(1/20/19) is the first day he is well enough to try and get  "this done. Will attempt    ? Underlying consolidation. ? Empyema  U/S ordered---"no underlying debris to suggest complicated effusion."   following   Will continue VANC( day 6) as he has shown some improvement when this was started on the 15th.  Cefepime day 12. Initially started for LE cellulitis that has since resolved. Will continue for presumed LLL pneumonia, but need a CT to further characterize this and the associated effusion on CXR.     1/21 1/21 Ct does not show infiltrate. Will d/c abx as he has had 1 week vanc and clida and 2 weeks cefepime. Consider pleural effusion as cause for resp issue. Discussed thoracentesis .    1/22 No pneumonia, abx stopped. NIV ordered for home use per Dr Robertson. Dr robertson does not feel like throcentesis warranted at this time. CT reports no change from 2017 imaging. diuresing    1/23 awaiting approval for home trilogy  1/24 approved and delivered     Encephalopathy    Waxing and waning mental status  Overnight thought to be sundowning but his AM dramatic waxing and waning just during our 10 minute conversation. Went form alert to very lethargic. + jaundice  Hypotensive, tachycardic, hypothermic (95.6 axillary)  Sepsis vs hepatic    1/1619  LFTs, lactic acid unremarkable.  I think this may be from his hypercapnea and refusal to wear bipap   Will get CT head to rule out CVA    1/17/19  Metabolic? He is VERY alert and appropriate today. CT head on hold. Need to be cognizant of the fact that he has prostate cancer, though.    1/18/19  Alert this am. He wore his bipap for the better part of the night last night     1/19/19  He is lethargic this am. This is likely CO2 narcosis as he hasn't worn his bipap much overnight. Also he has some ativan on board. Will see if we can get him to wear bipap today, but I will not restrain and force him to do so. That wouldn't be humane in this 84 year old DNR patient.     1/20/19  He waxes and wanes. Today is a good day. I think ativan had a lot " to do with his altered mental state yesterday. Ativan has been discontinued. Haldol prn agitation, but honestly, if we dont force the bipap on him, he doesn't get agitated.     1/21/19 resolved, very AAOx3     Benign prostatic hyperplasia (BPH) with post-void dribbling    Resume flomax and casodex--with waxing and waning mental status may remove PO meds  Has a valdes currently. Will continue . F/u urology outpt     Pneumonia    Need to assess if truly hypoxic. No low sats charted but has been on venti and now 4L NC. Will try to wean. Repeat CXR.   1/11/19 O2 sat 95-99%     1-12 Pt is labored with his breathing and continuing to cough; Dr Robertson consulted  1-14 no longer needing O2, cont cefepime and clinda x 10 days, cont nebs,stop steroids.    1/15/19: marked change in respiratory status this morning, was worsening overnight. He is oxygenating, sats good on 2L and ABG shows good sats. CXR with enlarging left pleural effusion otherwise normal. CT ordered and will do once stabilized--? Underlying consolidation with empyema. Looking for loculations, etc. Consider thoracentesis. Could explain his worsening status. He is in respiratory distress and abdominal breathing. Taking off BiPaP. He is a FULL CODE.    1/16/19:  Continue VANC/Cefepime. He is considering DNR . Told Dr. Jean-Baptiste no to resuscitation. We need to get him to sign a DNR     1/17/19  Patient seems to have staph pneumonia as he has improved with add'l coverage. Check u/s to r/o empyema    1/18/19  Improved clinically. Continue VANC cefepime     1/19/19  Continue VANC and Cefepime     1/20/20  Continue VANC(day6) and cefepime(day 12, initially started for LLE cellulitis at admit but treating pneumonia at least since the 10th). Hoping to get CT chest done today to further characterize this effusion and infiltrate which is poorly characterized on x ray.     1/21 Ct does not show infiltrate. Will d/c abx as he has had 1 week vanc and clida and 2 weeks cefepime.  Consider pleural effusion as cause for resp issue. Discussed thoracentesis and will see what Dr. Robertson thinks. Is this exudate or transudate? Also thoracentesis may help with his breathing issues.     1/22 No pneumonia, abx stopped. NIV ordered for home use per Dr Robertson. Dr robertson does not feel like throcentesis warranted at this time. CT reports no change from 2017 imaging     1/23 awaiting approval for trilogy    1/24 approved/delivered     Cellulitis of left lower extremity    See severe sepsis treatment  Check u/s  Negative for bilateral lower extremity DVT.    cont cefepime and clinda x 10 days wound care Cleanse wound with NS using gauze. Apply silver alginate to wound bed, cover with ABD and secure with tape daily.      1/15/19: marked worsening of erythema from yesterday despite antibx. Puzzling why the sudden change. D-dimer negative and anticoagulated so low suspicion for DVT. Worsening vitals as well (see sepsis).        1/16/19  Leg is looking much better already     1/17/19  Much better, but still with small open wound. Wound care should see today.    1/19/19  Resolved   abx continued for LLL pneumonia     1/21/19   D/c abx today (see sepsis plan)    1/22 cont to hold abx. Cellulitis seems to have resolved     Hypokalemia    Likely due to torsemide use  Resolved .  Aldactone ordered  4.6 today     CHF (congestive heart failure)    1/18/19  [facility] consulted   ECHO in their records  Continue BB. No ACE currently. I presume this is from low BP during the week. Increasing BB. Add ACE in am if BP is stable     1/19/19  Will diurese cautiously today. Lasix 20mg IV BID     1/20  BB. Negative fluid balance. Creatinine bumping up slightly. D/c low dose IV Lasix. BNP, BMP daily .  Add ARB when able     1/21 cont BB. Lasix stopped yesterday as creat bumped. Still with cough and O2 dependent due to pleural effusion and atelectasis. [facility] consulted. Will need recommendation on diuresis and if they feel this effusion of CHF  related vs other etiology. He does still have some LE edema but this is GREATLY improved from my last evaluation and at this point I do not think he is having acute exacerbation of his CHF.     1/22cont BB, [facility] added aldactone and demadex, creat/GFR stable. Holding ARB for now. StillO2 dependent at this time    1/23 still holding arb as bp so low. demadex was increased yesterday and kidney function remained stable but pressure dropped significantly this am. Will drop back to daily with aldactone. BNP as good as it gets 132. Remains on torpol as well HR 90's. Now down to RA POX 98%    1/24 cont demadex and aldactone for now with toprol no ace/arb bp 123/58     Atrial fibrillation    Rate controlled. On metoprolol    He reports that he is taking pradaxa but called pharmacy and they do not report that he has filled anything for anticoagulation especially pradaxa in recent months. Start lovenox 1mg/kg BID until we get his home meds    [facility] was [facility], will restart at d/c    1/15/19: tachycardic despite BB and now hypotensive. Holding BB and all BP meds. Sinus on telemetry right now. Cont pradaxa for now but may stop again and switch back to lovenox today if continues to worsen and pending labs      1/16/19  Continue pradaxa    1/17/19  Will resume BB today. For most part he is rate controlled.    1/19/19  Continue BB, increase to 100mg     1/20  Metoprolol switched to IV yesterday b/c was somnolent. Switch back to po, 100mg daily     1/21   Cont metoprolol and pradaxa HR 84.    1/22 no changes    1/23 cont pradaxa and toprol    1/24 cont meds, check occult     Cardiomyopathy    Cardiology is following  Diastolic CHF: TTE December 2018 with LVEF 55%, mild LVH  Was started on diuretics  BNP is only 200s now  CXR with left pleural effusion but not severe pulmonary congestion  1/15/10: BP dropping, hold ACEi, BB and diuretics for now and give back IVF---careful for volume overload though.   1/16/19: resume BB  and ACE per [facility]   1/17/18.  Meds have not been resumed. Start at 25 of metoprolol rather than home dose of 100.    1/19/19  Increase BB to 100mg     1/20  Continue metoprolol 100mg. Resume ARB when BP able to tolerate     1/21 now on metoprolol 100XL daily BP stable 122/68 still holding arb creat 1.4    1/22 arb still on hold as we are adding diuresis and watching kidneys closely. Cont toprol XL 100mg po daily BP stable    1/23 still holding arb as bp so low. demadex was increased yesterday and kidney function remained stable but pressure dropped significantly this am. Will drop back to daily with aldactone. BNP as good as it gets 132. Remains on torpol as well HR 90's    1/24 on torsemide and aldactone daily. Creat stable. Still with lower ext swelling       VTE Risk Mitigation (From admission, onward)        Ordered     dabigatran etexilate capsule 150 mg  2 times daily      01/14/19 1112     IP VTE HIGH RISK PATIENT  Once      01/09/19 [id number]              Laura Ruiz MD  Department of Hospital Medicine   [facility]

## 2019-01-24 NOTE — ASSESSMENT & PLAN NOTE
Cardiology is following  Diastolic CHF: TTE December 2018 with LVEF 55%, mild LVH  Was started on diuretics  BNP is only 200s now  CXR with left pleural effusion but not severe pulmonary congestion  1/15/10: BP dropping, hold ACEi, BB and diuretics for now and give back IVF---careful for volume overload though.   1/16/19: resume BB and ACE per [facility]   1/17/18.  Meds have not been resumed. Start at 25 of metoprolol rather than home dose of 100.    1/19/19  Increase BB to 100mg     1/20  Continue metoprolol 100mg. Resume ARB when BP able to tolerate     1/21 now on metoprolol 100XL daily BP stable 122/68 still holding arb creat 1.4    1/22 arb still on hold as we are adding diuresis and watching kidneys closely. Cont toprol XL 100mg po daily BP stable    1/23 still holding arb as bp so low. demadex was increased yesterday and kidney function remained stable but pressure dropped significantly this am. Will drop back to daily with aldactone. BNP as good as it gets 132. Remains on torpol as well HR 90's    1/24 on torsemide and aldactone daily. Creat stable. Still with lower ext swelling  Unna boots per home health

## 2019-01-24 NOTE — DISCHARGE SUMMARY
"[facility] Medicine  Discharge Summary      Patient Name: Radhames Alonzo  MRN: [id number]  Admission Date: 1/9/2019  Hospital Length of Stay: 15 days  Discharge Date and Time:  01/24/2019 12:22 PM  Attending Physician: Jesús Tay MD   Discharging Provider: Kathe Schmid NP  Primary Care Provider: Pennie Jorge NP      HPI:   84 year old male presents to ED b/c " I couldn't get up and walk."   SOB for about a year. Worse recently. Last night it got to the point that he was too SOB to stand up.   SOB at rest and with eating.   He also notes left foot pain. He has been seeing woundcare for a wound on this foot last 7 months   Workup in ED is significant for BNP 600s. Last BNP 200s 3 weeks ago. 160s 9 months ago.  He sees Dr. Jean-Baptiste regularly.   [facility] has seen pt and rec admit for diuresis, rule out MI   First TPN with slight bump.038  Lactic acid is 3.6. Meets I am told by ED MD that patient "has no signs of infection on physical exam." I am also told that CXR and U/A are clear.     He drinks six pack daily. Last drink 2 days ago. No h/o DT's in past         * No surgery found *      Hospital Course:   He has been started on cefepime and clinda for left lower ext cellulitis. He has been afebrile. WBC was 04977. Blood cultures NGTD. Lactate was up to 3.8, coming down.RR was 24 and had low sats on admission. Was on venti, now down to 3L NC   He is also wheezing. C/o SOB. CXR with CHF.   He does report that he takes pradaxa for his a fib at home. Not on home meds list nor reordered here. A fib on EKG. BNP was 659. Ordered for lasix 80mg IV BID. Urinating a lot. K low this am 2.8    1/11/19 Getting lasix 80mg IV bid per Cardiology; not much urine output yesterday with dribbling noted; concern for obstruction; will get bladder scan; consider renal US.   Creat 0.9 stable; K+ 3.6    CPK 2378>1762; TNI 0.032 x 1; repeat this am     1-12 Pt continues to have SOB and pul congestion; Dr"  consulted    1-13 Pt is fairly complex with mx cardiac/pulmonary issues causing his coughing and SOB; has a hx of underlying prostate problems; a swing bed might be necessary; Dr Robertson and cardiology following    1/14 pt is still on clinda and cefepime for the lower ext cellulitis. Afebrile/ no elevated WBC. US negative for DVT. Still swollen but reports that it is much better than his normal   CXR 1/812 Left basilar consolidation or atelectasis and small pleural effusion. He is 95% on RA  Still weak. Having trouble standing on side bed.     1/15/19 Pt started feeling worse yesterday evening; this am noted to have LOW BP- SBP 80s, increased RR 30s, Temp 95.6,   Dr. Jean-Baptiste reports HF better, BNP 200s, still with LE edema, US negative for DVT; Also gets pradaxa  Underlying chronic prostate problems; defers any intervention or sx   Day 7/10 Cefepime and clinda for LE cellulitis and pneumonia. Blood cultures negative.   Looks jaundice with sclerema icterus; admit bilirubin - 2.0 elevated on 1/9/19 ; check LFTs/CMP/bilirubin    1/15/19: patient with worsening respiratory status overnight. He was tried on BiPaP but refused to wear it. Was agitated during the night. He was abdominal breathing but maintainings sats on 2L NC. BNP normal. D-dimer normal and anticoagulated since admit, restarted PO pradaxa yesterday. This AM, continues to decompensated. He is alert and oriented on initial eval but over the course of our conversation decompensates and becomes very lethargic. He has been waxing and waning throughout the night per RN report. Unclear if has h/o cirrhosis but on chart review this AM his bilirubin 2 and mild elevation AST--this was not trended during his hospital stay. This AM he has scleral icterus and yellowing of skin which was NOT PRESENT YESTERDAY. Also his LLE cellulitis is worsening, redness extending up the shin to the knee today which was NOT PRESENT YESTERDAY. He is hypothermic, tachypenic, hypotensive  and tachycardic (despite BB on board).     1/16/19  Transferred up to ICU yesterday for hypotension and AMS on the floor. NH4 level 24. Bolused 1 liter NS. Did not require pressors. This am 120s/70s  UOP is good. Abx changed from cefepime and clinda to cefepime and VANC. Afebrile.  ABG ordered yesterday. He does have some hypercapnea. Refuses to wear bipap.    1/17/19  Patient became more alert and arousable throughout the day yesterday. Still with issues lying flat (which is why his imaging was not completed yesterday). However, his BP is improved without his home meds. He is tolerating his abx well. He did wear Bipap VERY little last night, but did not sleep because of it. UOP is good. No fevers.     1/18/19  He is awake and alert this am. He wore his bipap overnight   Tachy but BP is stable   Increased edema this am     1/19/19  He is very somnolent this am. Fought with him overnight to get bipap. Required ativan. He is sleepy this am   He is now a DNR     1/20/19  Steroids increased and IV Lasix added yesterday  He wore bipap about an hour last night   He is more awake today, no ativan given.   Spoke about hospice yesterday since he largely refuses the Bipap. Pt family is in agreement with hospice if he deteriorates or fails to improve   Out of bed and eating breakfast today    1/21 he is awake and alert again today. Did wear bipap a couple hours over evening yesterday (5 1/2hrs). Ct shows pleural effusion and pericardial effusion. Not on diuresis due to creat inability to tolerate. D/julia IV yesterday. Creat 1.4 today.     He is still on vanc (7days) and cefepime (13 days) (and s/p 1 week clinda) for the lower leg cellulitis. Afebrile WBC did go up as well but on steroids dose up and down over last week. No fevers. BP stable     TTE December 2018 with LVEF 55%, mild LVH    1/22   Abx were d/julia yesterday as he has had 1 week vanc and clinda as well as 2 weeks cefepime and cellulitis had resolved yesterday. He  "remains a febrile. WBC stable (slightly lower than yesterday) legs without increase redness or swelling    Dr robertson following. Does not feel that a throcentesis is warranted at this time. Will order patient CPAP for home use as he is tolerating it more each day. Pleural effusion stable. On 2L NC and sats 96%. Also noted pericardial effusion. [facility] following as well. Added aldactone and demadex 20mg po daily yesterday.  GFR stable. He is out -1420 on yesterday I/O. Reports breathing is getting better    Will need to have diuretics BID     1/23  This am pt bp was 77/52 manually. He was asymptomatic. Was increased to torsemide 20mg po BID and also on aldactone 25mg po daily. Creat stable 1.2 this am. He was -3620 yesterday.  this am. Given 500ml NS bolus for hypotension this am. Bp better 100/57. Cards following    Dr robertson following as well. Trilogy ordered for home use. Awaiting approval.     Still without abx for cellulitis, legs remain without s/s infection.     1/24   He is lying in bed today. He reports that he is breathing "pretty damn good". He is no longer on O2. Has trilogy ordered for at home. Also has home health. Legs without redness, still with pitting edema. On torsemide daily and aldactone daily. Kidney function stable. No fever. WBC stable. On prednisone 40mg po daily.     He reports that he has BM yesterday evening. Reports that it was green/brown. H/H slowing dropping and BUN up. Still has catheter,. Has had issues with urinary retention in past. Will set up with urology at d/c     Consults:   Consults (From admission, onward)        Status Ordering Provider     Inpatient consult to Cardiology-[facility]  Once     Provider:  Danny Jean-Baptiste MD    Acknowledged AIDAN GUADALUPE     Inpatient consult to [facility]  Once     Provider:  [facility]    Completed CHRISTEN STAUFFER     Inpatient consult to Pulmonology  Once     Provider:  Sukh Robertson MD    Acknowledged IZABEL REYES     " Inpatient consult to Social Work  Once     Provider:  (Not yet assigned)    Acknowledged DICKSON CORNELIUS          * Severe sepsis    Due to Venous stasis and wound of LLE   Start Cefepime and clinda to cover for everything including MRSA and psuedomonas.   Clinda instead of Vanc since mild CKD and we will diurese him as well   Lactic acid q 6 coming down  Bolus 2 liters overnight creat 0.9 bp 111/66 this am  woundcare consult     1-12 legs are down but still pul congestion.    1-13 Not retaining as much fluids, but still SOB and coughing; burns when he urinates from his Prostate issues(Ca)    1-14 better/resolved    1-15: worsening status this AM. + confusion, hypotension, tachycardia. LLE redness is worsening from yesterday despite antibx. Should be well covered with clida and cefepime so puzzling. When stable consider imaging of leg to look for deeper infection    1/16:  Continue cefepime and VANC. CT chest today to check for empyema/underlying infiltrate. CXR not adequate to determine    1/17  At this point he was switched to vanc 1 day ago and is improved. Won't be able to lie flat for CT. Will go ahead and u/s chest to r/o empyema. But it seems as though he will need 7 days of iv vanc.    1/18  Continue VANC day 4 and cefepime day 10     1/19  Continue VANC day 5 and cefepime Day 11     1/20  Continue VANC day 6 and cefepime Day 12     1/21   On vanc and cefepime, cellulitis appears to have resolved. Will d/c abx, completed 1 week clinda then vanc, respectively and cefepime 2 weeks. LE erythema resolved. Stop today and monitor to ensure does not clinically begin to worsen as he did last week.     1/22 resolved         Encephalopathy, metabolic    Resolved.       Emphysema/COPD    Increased solumedrol to 80 q 8 1/19/20---This has helped significantly, will continue this dose for now.   Encourage bipap use   wean    1/21/19: still wheezing. Cont steroids for now    1/22: reduced steroids yesterday from 80bid to 40  "bid, cont nebs.    1/23 wean steroids again. To prednisone 40mgpo daily  D/c steroids after today 1/24     Chronic respiratory failure with hypoxia    He has bipap ordered. He is mostly refusing. I will not restrain this 84 year old man and force him to wear bipap if he is not willing. He is a DNR.   1/20/19---Since he is a DNR and mostly refusing bipap, the plan was to start hospice today unless he showed improvement. Remarkably, he has. He looks much better today. We will downgrade him to the floor later today.  However, if he deteriorates or fails to continue to show improvement, I strongly recommend getting hospice involved. This was discussed with wife and his sons Dequan and Lele.  They are in agreement with this. He remains a DNR.     1/21 he is tolerating the bipap a little more. He is O2 dep which is new for him this admission. Will need to qualify home for O2 and a bipap at home pt is considering thoracentesis to assess the effusion and see if it improves his breathing    1/22 pt had ambulating pox assessed yesterday 86% on RA, qualified for home O2.    1/213 POX today on RA resting 98% will walk test again today, plan for d/c tomorrow. Will need trilogy prior to d/c  1/24 same     Dysphagia    Likely secondary to mental status on 1/16  ST was following ands reports patient has ability to identify safe swallow strategies and good independent implementation of compensatories to minimize risk of aspiration on least restrictive diet.         Venous stasis ulcer    Wound care.  Cont to apply silver.  Unna boots with HH       Pleural effusion    Getting bigger on CXR from 1/14/20  CT ordered--will do when stabilized. I think today(1/20/19) is the first day he is well enough to try and get this done. Will attempt    ? Underlying consolidation. ? Empyema  U/S ordered---"no underlying debris to suggest complicated effusion."  Marcelo following   Will continue VANC( day 6) as he has shown some improvement when this " was started on the 15th.  Cefepime day 12. Initially started for LE cellulitis that has since resolved. Will continue for presumed LLL pneumonia, but need a CT to further characterize this and the associated effusion on CXR.     1/21 1/21 Ct does not show infiltrate. Will d/c abx as he has had 1 week vanc and clida and 2 weeks cefepime. Consider pleural effusion as cause for resp issue. Discussed thoracentesis .    1/22 No pneumonia, abx stopped. NIV ordered for home use per Dr Robertson. Dr robertson does not feel like throcentesis warranted at this time. CT reports no change from 2017 imaging. diuresing    1/23 awaiting approval for home trilogy  1/24 approved and delivered     Encephalopathy    Waxing and waning mental status  Overnight thought to be sundowning but his AM dramatic waxing and waning just during our 10 minute conversation. Went form alert to very lethargic. + jaundice  Hypotensive, tachycardic, hypothermic (95.6 axillary)  Sepsis vs hepatic    1/1619  LFTs, lactic acid unremarkable.  I think this may be from his hypercapnea and refusal to wear bipap   Will get CT head to rule out CVA    1/17/19  Metabolic? He is VERY alert and appropriate today. CT head on hold. Need to be cognizant of the fact that he has prostate cancer, though.    1/18/19  Alert this am. He wore his bipap for the better part of the night last night     1/19/19  He is lethargic this am. This is likely CO2 narcosis as he hasn't worn his bipap much overnight. Also he has some ativan on board. Will see if we can get him to wear bipap today, but I will not restrain and force him to do so. That wouldn't be humane in this 84 year old DNR patient.     1/20/19  He waxes and wanes. Today is a good day. I think ativan had a lot to do with his altered mental state yesterday. Ativan has been discontinued. Haldol prn agitation, but honestly, if we dont force the bipap on him, he doesn't get agitated.     1/21/19 resolved, very AAOx3     Benign  prostatic hyperplasia (BPH) with post-void dribbling    Resume flomax and casodex--with waxing and waning mental status may remove PO meds  Has a valdes currently. Will continue . F/u urology outpt     Pneumonia    Need to assess if truly hypoxic. No low sats charted but has been on venti and now 4L NC. Will try to wean. Repeat CXR.   1/11/19 O2 sat 95-99%     1-12 Pt is labored with his breathing and continuing to cough; Dr Robertson consulted  1-14 no longer needing O2, cont cefepime and clinda x 10 days, cont nebs,stop steroids.    1/15/19: marked change in respiratory status this morning, was worsening overnight. He is oxygenating, sats good on 2L and ABG shows good sats. CXR with enlarging left pleural effusion otherwise normal. CT ordered and will do once stabilized--? Underlying consolidation with empyema. Looking for loculations, etc. Consider thoracentesis. Could explain his worsening status. He is in respiratory distress and abdominal breathing. Taking off BiPaP. He is a FULL CODE.    1/16/19:  Continue VANC/Cefepime. He is considering DNR . Told Dr. Jean-Baptiste no to resuscitation. We need to get him to sign a DNR     1/17/19  Patient seems to have staph pneumonia as he has improved with add'l coverage. Check u/s to r/o empyema    1/18/19  Improved clinically. Continue VANC cefepime     1/19/19  Continue VANC and Cefepime     1/20/20  Continue VANC(day6) and cefepime(day 12, initially started for LLE cellulitis at admit but treating pneumonia at least since the 10th). Hoping to get CT chest done today to further characterize this effusion and infiltrate which is poorly characterized on x ray.     1/21 Ct does not show infiltrate. Will d/c abx as he has had 1 week vanc and clida and 2 weeks cefepime. Consider pleural effusion as cause for resp issue. Discussed thoracentesis and will see what Dr. Robertson thinks. Is this exudate or transudate? Also thoracentesis may help with his breathing issues.     1/22 No pneumonia, abx  stopped. NIV ordered for home use per Dr Robertson. Dr robertson does not feel like throcentesis warranted at this time. CT reports no change from 2017 imaging     1/23 awaiting approval for trilogy    1/24 approved/delivered     Cellulitis of left lower extremity    See severe sepsis treatment  Check u/s  Negative for bilateral lower extremity DVT.    cont cefepime and clinda x 10 days wound care Cleanse wound with NS using gauze. Apply silver alginate to wound bed, cover with ABD and secure with tape daily.      1/15/19: marked worsening of erythema from yesterday despite antibx. Puzzling why the sudden change. D-dimer negative and anticoagulated so low suspicion for DVT. Worsening vitals as well (see sepsis).        1/16/19  Leg is looking much better already     1/17/19  Much better, but still with small open wound. Wound care should see today.    1/19/19  Resolved   abx continued for LLL pneumonia     1/21/19   D/c abx today (see sepsis plan)    1/22 cont to hold abx. Cellulitis seems to have resolved     Hypokalemia    Likely due to torsemide use  Resolved .  Aldactone ordered  4.6 today     CHF (congestive heart failure)    1/18/19  [facility] consulted   ECHO in their records  Continue BB. No ACE currently. I presume this is from low BP during the week. Increasing BB. Add ACE in am if BP is stable     1/19/19  Will diurese cautiously today. Lasix 20mg IV BID     1/20  BB. Negative fluid balance. Creatinine bumping up slightly. D/c low dose IV Lasix. BNP, BMP daily .  Add ARB when able     1/21 cont BB. Lasix stopped yesterday as creat bumped. Still with cough and O2 dependent due to pleural effusion and atelectasis. [facility] consulted. Will need recommendation on diuresis and if they feel this effusion of CHF related vs other etiology. He does still have some LE edema but this is GREATLY improved from my last evaluation and at this point I do not think he is having acute exacerbation of his CHF.     1/22cont BB, [facility] added  aldactone and demadex, creat/GFR stable. Holding ARB for now. StillO2 dependent at this time    1/23 still holding arb as bp so low. demadex was increased yesterday and kidney function remained stable but pressure dropped significantly this am. Will drop back to daily with aldactone. BNP as good as it gets 132. Remains on torpol as well HR 90's. Now down to RA POX 98%    1/24 cont demadex and aldactone for now with toprol no ace/arb bp 123/58     Atrial fibrillation    Rate controlled. On metoprolol    He reports that he is taking pradaxa but called pharmacy and they do not report that he has filled anything for anticoagulation especially pradaxa in recent months. Start lovenox 1mg/kg BID until we get his home meds    [facility] was [facility], will restart at d/c    1/15/19: tachycardic despite BB and now hypotensive. Holding BB and all BP meds. Sinus on telemetry right now. Cont pradaxa for now but may stop again and switch back to lovenox today if continues to worsen and pending labs      1/16/19  Continue pradaxa    1/17/19  Will resume BB today. For most part he is rate controlled.    1/19/19  Continue BB, increase to 100mg     1/20  Metoprolol switched to IV yesterday b/c was somnolent. Switch back to po, 100mg daily     1/21   Cont metoprolol and pradaxa HR 84.    1/22 no changes    1/23 cont pradaxa and toprol    1/24 cont meds, check occult occult was negative     Cardiomyopathy    Cardiology is following  Diastolic CHF: TTE December 2018 with LVEF 55%, mild LVH  Was started on diuretics  BNP is only 200s now  CXR with left pleural effusion but not severe pulmonary congestion  1/15/10: BP dropping, hold ACEi, BB and diuretics for now and give back IVF---careful for volume overload though.   1/16/19: resume BB and ACE per [facility]   1/17/18.  Meds have not been resumed. Start at 25 of metoprolol rather than home dose of 100.    1/19/19  Increase BB to 100mg     1/20  Continue metoprolol 100mg. Resume ARB when  BP able to tolerate     1/21 now on metoprolol 100XL daily BP stable 122/68 still holding arb creat 1.4    1/22 arb still on hold as we are adding diuresis and watching kidneys closely. Cont toprol XL 100mg po daily BP stable    1/23 still holding arb as bp so low. demadex was increased yesterday and kidney function remained stable but pressure dropped significantly this am. Will drop back to daily with aldactone. BNP as good as it gets 132. Remains on torpol as well HR 90's    1/24 on torsemide and aldactone daily. Creat stable. Still with lower ext swelling  Unna boots per home health       Final Active Diagnoses:    Diagnosis Date Noted POA    PRINCIPAL PROBLEM:  Severe sepsis [A41.9, R65.20] 01/09/2019 Yes    COPD exacerbation [J44.1] 01/23/2019 Yes    Emphysema/COPD [J43.9] 01/19/2019 Yes    Encephalopathy, metabolic [G93.41] 01/19/2019 No    Chronic respiratory failure with hypoxia [J96.11] 01/18/2019 Yes    Dysphagia [R13.10] 01/17/2019 Yes    Venous stasis ulcer [I83.009, L97.909] 01/16/2019 Yes    Encephalopathy [G93.40] 01/15/2019 No    Pleural effusion [J90] 01/15/2019 Yes    Benign prostatic hyperplasia (BPH) with post-void dribbling [N40.1, N39.43] 01/11/2019 Yes    Hypokalemia [E87.6] 01/10/2019 Yes    Cellulitis of left lower extremity [L03.116] 01/10/2019 Yes    Pneumonia [J18.9] 01/10/2019 Yes    CHF (congestive heart failure) [I50.9] 01/09/2019 Yes    Atrial fibrillation [I48.91] 04/30/2015 Yes    Cardiomyopathy [I42.9] 04/30/2015 Yes      Problems Resolved During this Admission:    Diagnosis Date Noted Date Resolved POA    Rhabdomyolysis [M62.82] 01/09/2019 01/21/2019 Yes       Discharged Condition: stable    Disposition: Home or Self Care    Follow Up:  Follow-up Information     Chau Whitaker Jr, MD In 2 weeks.    Specialty:  Urology  Why:  urinary retention and valdes  Contact information:  [address] [place] [id number]  [phone]             Pennie Jorge,  "NP In 1 week.    Specialty:  Family Medicine  Contact information:  [address] [place] [address]  [phone]             Danny Jean-Baptiste MD In 1 week.    Specialty:  Cardiology  Contact information:  [address] [place] [address]  [phone]                 Patient Instructions:      HOSPITAL BED FOR HOME USE     Order Specific Question Answer Comments   Type: Semi-electric    Length of need (1-99 months): 99    Does patient have medical equipment at home? walker, standard    Height: 6' 1" (1.854 m)    Weight: 102 kg (224 lb 13.9 oz) RD reviewed   Please check all that apply: Patient requires positioning of the body in ways not feasible in an ordinary bed due to a medical condition which is expected to last at least one month.    Please check all that apply: Patient requires, for the alleviation of pain, positioning of the body in ways not feasible in an ordinary bed.      COMMODE FOR HOME USE     Order Specific Question Answer Comments   Type: Standard    Height: 6' 1" (1.854 m)    Weight: 101.6 kg (223 lb 15.8 oz)    Does patient have medical equipment at home? walker, standard    Length of need (1-99 months): 99      Ambulatory referral to Home Health   Referral Priority: Routine Referral Type: Home Health   Referral Reason: Specialty Services Required   Requested Specialty: Home Health Services   Number of Visits Requested: 1       Significant Diagnostic Studies:     CBC:        Recent Labs   Lab 01/23/19 0414 01/24/19 0411   WBC 12.18 13.36*   HGB 9.9* 9.7*   HCT 31.9* 31.6*    213      CMP:        Recent Labs   Lab 01/23/19 0414 01/24/19 0411    140   K 4.0 4.6    102   CO2 33* 34*   * 113*   BUN 59* 53*   CREATININE 1.2 1.1   CALCIUM 8.7 8.8   PROT 5.3* 5.4*   ALBUMIN 2.5* 2.6*   BILITOT 1.3* 1.5*   ALKPHOS 46* 49*   AST 25 27   ALT 42 46*   ANIONGAP 4* 4*   EGFRNONAA 55* >60      Cardiac Markers:       Recent Labs   Lab 01/24/19 0411   *         vanc " trough 18.8           Lab Results   Component Value Date     INR 1.4 (H) 01/18/2019     INR 1.2 01/17/2019     INR 1.2 01/16/2019      Blood cultures 1/9/19 NGTD x 2     US chest mediastinum   There is a moderate left-sided pleural effusion.  A measurable portion of the collection measures 10.8 x 5.6 x 4.7 cm.    No evidence of internal echogenicity or debris to suggest a complicated effusion.          1/20 chest CT   1. There are changes of left greater than right pleural effusions with associated left compressive atelectasis.  Similar findings of left pleural effusion and compressive atelectasis were present on a prior CTA chest dated 10/04/2017.  There is no obvious loculation within the left pleural effusion.  2. There is cardiomegaly.  Redemonstrated is a pericardial effusion measuring approximately 1.7 cm in greatest thickness.  This was present previously as well.  3. There is atherosclerosis in addition to coronary artery calcifications.     1/16 CXR   The heart shadow is severely enlarged.  There is pulmonary vascular congestion with pulmonary edema.  Small right and moderate left-sided pleural effusions are suspected.  The skeletal structures are intact.     US lower ext Negative for bilateral lower extremity DVT.          Pending Diagnostic Studies:     Procedure Component Value Units Date/Time    Fl Modified Barium Swallow Speech [[id number]]     Order Status:  Sent Lab Status:  No result     Fl Modified Barium Swallow Speech [[id number]] Resulted:  01/17/19 0920    Order Status:  Sent Lab Status:  In process Updated:  01/17/19 0921    Transthoracic echo (TTE) complete [[id number]]     Order Status:  Sent Lab Status:  No result     Transthoracic echo (TTE) complete (Cupid Only) [[id number]]     Order Status:  Sent Lab Status:  No result          Medications:  Reconciled Home Medications:      Medication List      START taking these medications    dabigatran etexilate 150 mg Cap  Commonly known as:   "PRADAXA  Take 1 capsule (150 mg total) by mouth 2 (two) times daily. "Do NOT break, chew, or open capsules."     polyethylene glycol 17 gram Pwpk  Commonly known as:  GLYCOLAX  Take 17 g by mouth daily as needed (constipation).     spironolactone 25 MG tablet  Commonly known as:  ALDACTONE  Take 1 tablet (25 mg total) by mouth once daily.  Start taking on:  1/25/2019        CHANGE how you take these medications    ipratropium-albuterol  mcg/actuation inhaler  Commonly known as:  COMBIVENT  Inhale 1 puff into the lungs every 6 (six) hours as needed for Wheezing or Shortness of Breath.  What changed:  Another medication with the same name was removed. Continue taking this medication, and follow the directions you see here.        CONTINUE taking these medications    bicalutamide 50 MG Tab  Commonly known as:  CASODEX  Take 50 mg by mouth once daily.     metoprolol succinate 100 MG 24 hr tablet  Commonly known as:  TOPROL-XL  Take 100 mg by mouth once daily.     potassium chloride SA 20 MEQ tablet  Commonly known as:  K-DUR,KLOR-CON  Take 20 mEq by mouth 2 (two) times daily.     tamsulosin 0.4 mg Cap  Commonly known as:  FLOMAX  Take 1 capsule (0.4 mg total) by mouth once daily.     torsemide 20 MG Tab  Commonly known as:  DEMADEX  Take 20 mg by mouth once daily.     VITAMIN D3 2,000 unit Cap  Generic drug:  cholecalciferol (vitamin D3)  Take 1 capsule by mouth once daily.        STOP taking these medications    cloNIDine 0.1 MG tablet  Commonly known as:  CATAPRES     furosemide 40 MG tablet  Commonly known as:  LASIX     HYDROcodone-acetaminophen 7.5-325 mg per tablet  Commonly known as:  NORCO     losartan 100 MG tablet  Commonly known as:  COZAAR     nitroGLYCERIN 0.4 MG SL tablet  Commonly known as:  NITROSTAT     simvastatin 80 MG tablet  Commonly known as:  ZOCOR            Indwelling Lines/Drains at time of discharge:   Lines/Drains/Airways     Drain                 Urethral Catheter 01/15/19 1215 Latex " 16 Fr. 9 days                Time spent on the discharge of patient: 30 minutes  Patient was seen and examined on the date of discharge and determined to be suitable for discharge.         Kathe Schmid NP  Department of Hospital Medicine  [facility]

## 2019-01-24 NOTE — ASSESSMENT & PLAN NOTE
He has bipap ordered. He is mostly refusing. I will not restrain this 84 year old man and force him to wear bipap if he is not willing. He is a DNR.   1/20/19---Since he is a DNR and mostly refusing bipap, the plan was to start hospice today unless he showed improvement. Remarkably, he has. He looks much better today. We will downgrade him to the floor later today.  However, if he deteriorates or fails to continue to show improvement, I strongly recommend getting hospice involved. This was discussed with wife and his sons Dequan and Lele.  They are in agreement with this. He remains a DNR.     1/21 he is tolerating the bipap a little more. He is O2 dep which is new for him this admission. Will need to qualify home for O2 and a bipap at home pt is considering thoracentesis to assess the effusion and see if it improves his breathing    1/22 pt had ambulating pox assessed yesterday 86% on RA, qualified for home O2.    1/213 POX today on RA resting 98% will walk test again today, plan for d/c tomorrow. Will need trilogy prior to d/c  1/24 same

## 2019-01-24 NOTE — PROGRESS NOTES
Called wife for an update on  time. Wife hung up on me. We called back again and the wife said the sons were coming to pick him up. Both sons are not in town so wife was told she needed to come get him.

## 2019-01-24 NOTE — PLAN OF CARE
01/24/19 1241   Post-Acute Status   Post-Acute Authorization Home Health/Hospice   Home Health/Hospice Status Authorization Obtained         Notified [facility] of discharge and referral sent. Choice paper signed, as [facility] is who he had prior to admit.

## 2019-01-24 NOTE — DISCHARGE INSTRUCTIONS
Discharge Instructions for Cellulitis  You have been diagnosed with cellulitis. This is an infection in the deepest layer of the skin. In some cases, the infection also affects the muscle. Cellulitis is caused by bacteria. The bacteria can enter the body through broken skin. This can happen with a cut, scratch, animal bite, or an insect bite that has been scratched. You may have been treated in the hospital with antibiotics and fluids. You will likely be given a prescription for antibiotics to take at home. This sheet will help you take care of yourself at home.  Home care  When you are home:  · Take the prescribed antibiotic medicine you are given as directed until it is gone. Take it even if you feel better. It treats the infection and stops it from returning. Not taking all the medicine can make future infections hard to treat.  · Keep the infected area clean.  · When possible, raise the infected area above the level of your heart. This helps keep swelling down.  · Talk with your healthcare provider if you are in pain. Ask what kind of over-the-counter medicine you can take for pain.  · Apply clean bandages as advised.  · Take your temperature once a day for a week.  · Wash your hands often to prevent spreading the infection.  In the future, wash your hands before and after you touch cuts, scratches, or bandages. This will help prevent infection.   When to call your healthcare provider  Call your healthcare provider immediately if you have any of the following:  · Difficulty or pain when moving the joints above or below the infected area  · Discharge or pus draining from the area  · Fever of 100.4°F (38°C) or higher, or as directed by your healthcare provider  · Pain that gets worse in or around the infected   · Redness that gets worse in or around the infected area, particularly if the area of redness expands to a wider area  · Shaking chills  · Swelling of the infected area  · Vomiting   Date Last Reviewed:  8/1/2016  © 7998-9361 [employer] [address] [place] [address], [place] [address]. All rights reserved. This information is not intended as a substitute for professional medical care. Always follow your healthcare professional's instructions.        Sepsis     To treat sepsis, antibiotics and fluids may by given through an intravenous (IV) line.     Sepsis happens when your body responds with widespread inflammation to a bad infection or bacteremia--the presence of bacteria in your bloodstream. Sepsis can be deadly. Blood pressure may drop and the lungs and kidneys may start to fail. Emergency care for sepsis is crucial.  Risk factors  Those most at risk for sepsis are:  · Infants or older adults  · People who have an illness that weakens their immune system, such as cancer, AIDS, or diabetes  · People being treated with chemotherapy medicines or radiation, which weakens the immune system  · People who have had a transplant  · People with a very severe infection such as pneumonia, meningitis, or a urinary tract infection  When to go to the emergency department (ED)  Sepsis is an emergency. Go to the nearest ED if you have a fever with any of these symptoms:  · Chills and shaking  · Rapid heartbeat and breathing  · Trouble breathing  · Severe nausea or uncontrolled vomiting  · Confusion, disorientation, drowsiness, or dizziness  · Decreased urination  · Severe pain, including in the back or joints   What to expect in the ED  · Blood and urine tests are done to look for bacteria. They also check for organ failure.  · Blood, urine, or sputum cultures may be taken. The samples are sent to a lab. They are placed in a special container. Any bacteria should grow in 24 hours.  · X-rays or other imaging tests may be done.  A person with sepsis will be admitted to the hospital and treated with antibiotics. Treatment may also include oxygen and intravenous fluids.  Date Last Reviewed: 10/1/2016  © 3408-7448 [employer] [address] [place] [address], [place] [address]. All rights reserved. This information is not intended as a substitute for professional medical care. Always follow your healthcare professional's instructions.

## 2019-01-24 NOTE — PLAN OF CARE
Problem: Adult Inpatient Plan of Care  Goal: Plan of Care Review  Outcome: Ongoing (interventions implemented as appropriate)  Patient aware of plan of care. VS stable. Afebrile. A-Fib on tele, rate controlled. Daily weight. C/o bladder spasms, pain controlled with po meds. Free from falls/injuries. No questions or concerns at this time. Agrees with plan of care.

## 2019-01-24 NOTE — ASSESSMENT & PLAN NOTE
1/18/19  [facility] consulted   [facility] in their records  Continue BB. No ACE currently. I presume this is from low BP during the week. Increasing BB. Add ACE in am if BP is stable     1/19/19  Will diurese cautiously today. Lasix 20mg IV BID     1/20  BB. Negative fluid balance. Creatinine bumping up slightly. D/c low dose IV Lasix. BNP, BMP daily .  Add ARB when able     1/21 cont BB. Lasix stopped yesterday as creat bumped. Still with cough and O2 dependent due to pleural effusion and atelectasis. [facility] consulted. Will need recommendation on diuresis and if they feel this effusion of CHF related vs other etiology. He does still have some LE edema but this is GREATLY improved from my last evaluation and at this point I do not think he is having acute exacerbation of his CHF.     1/22cont BB, [facility] added aldactone and demadex, creat/GFR stable. Holding ARB for now. StillO2 dependent at this time    1/23 still holding arb as bp so low. demadex was increased yesterday and kidney function remained stable but pressure dropped significantly this am. Will drop back to daily with aldactone. BNP as good as it gets 132. Remains on torpol as well HR 90's. Now down to RA POX 98%    1/24 cont demadex and aldactone for now with toprol no ace/arb bp 123/58

## 2019-01-24 NOTE — PT/OT/SLP PROGRESS
Physical Therapy Treatment    Patient Name:  Radhames Alonzo   MRN:  [id number]    Recommendations:     Discharge Recommendations:  other (see comments)(home with home health)   Discharge Equipment Recommendations: walker, rolling, commode   Barriers to discharge: None    Assessment:     Radhames Alonzo is a 84 y.o. male admitted with a medical diagnosis of Severe sepsis.  He presents with the following impairments/functional limitations:  weakness, impaired endurance, impaired self care skills, impaired functional mobilty, gait instability, impaired cardiopulmonary response to activity, decreased lower extremity function . Patient seen on bed at rest with Oxygen level 96%. Sat patient on side of the bed with supervision. Performed sit to stand using RW with supervision and with goal met today. Gait trng performed x 75 feet with cg assistance and noted slight SOB and oxygen level at 97%. No sign of distress. Continue PT tx  To maximized patient's functional mobility with lesser physical assistance needed.     Rehab Prognosis: Good; patient would benefit from acute skilled PT services to address these deficits and reach maximum level of function.    Recent Surgery: * No surgery found *      Plan:     During this hospitalization, patient to be seen 5 x/week to address the identified rehab impairments via gait training, therapeutic activities, therapeutic exercises and progress toward the following goals:    · Plan of Care Expires:  01/25/19    Subjective     Chief Complaint: no new complain  Patient/Family Comments/goals: to walk farther distance without short winded  Pain/Comfort:  · Pain Rating 1: 0/10      Objective:     Communicated with patient prior to session.  Patient found all lines intact, call button in reach, bed alarm on and nurse notified valdes catheter, telemetry  upon PT entry to room.     General Precautions: Standard, fall   Orthopedic Precautions:N/A   Braces: N/A     Functional Mobility:  · Bed Mobility:      · Rolling Left:  supervision  · Rolling Right: supervision  · Supine to Sit: supervision  · Sit to Supine: supervision  · Transfers:     · Sit to Stand:  supervision with rolling walker  · Gait: RW ambulationx 75 feet with cg assistance      AM-PAC 6 CLICK MOBILITY  Turning over in bed (including adjusting bedclothes, sheets and blankets)?: 3  Sitting down on and standing up from a chair with arms (e.g., wheelchair, bedside commode, etc.): 3  Moving from lying on back to sitting on the side of the bed?: 3  Moving to and from a bed to a chair (including a wheelchair)?: 3  Need to walk in hospital room?: 3  Climbing 3-5 steps with a railing?: 2  Basic Mobility Total Score: 17       Therapeutic Activities and Exercises:   Worked on body mechancis with transfers sit to/from stand with sup using RW and step transfer tech for chair transfers from the bed. GAIT: Pt able to ambulate with rolling walker with Contact Guard Assistance x 75 ft with the following gait deviation(s): decreased uli, decreased toe-to-floor clearance and decreased weight-shifting ability     Patient left up in chair with all lines intact, call button in reach, bed alarm on and nurse notified..    GOALS:   Multidisciplinary Problems     Physical Therapy Goals        Problem: Physical Therapy Goal    Goal Priority Disciplines Outcome Goal Variances Interventions   Physical Therapy Goal     PT, PT/OT Ongoing (interventions implemented as appropriate)     Description:  Goals to be met by: 7  (reviewed 19) (reviewed 19) (reviewed 19)    Patient will increase functional independence with mobility by performin. Supine to sit with Supervision or Set-up Assistance - met 19  2. Sit to supine with Supervision or Set-up Assistance - met 19  3. Bed to chair transfer with Supervision or Set-up Assistancewith or without rolling walker using Step Transfer  TECHNIQUE - met 19  4. Gait  x 100  feet with Supervision or Set-up  Assistance with or without rolling walker  5. Lower extremity exercise program x10 reps per handout, with assistance as needed                          Time Tracking:     PT Received On: 01/24/19  PT Start Time: 1316     PT Stop Time: 1339  PT Total Time (min): 23 min     Billable Minutes: Gait Training 10 and Therapeutic Activity 8    Treatment Type: Treatment  PT/PTA: PT           Baldomero Velázquez, PT  01/24/2019

## 2019-01-24 NOTE — PLAN OF CARE
01/24/19 1237   Final Note   Assessment Type Final Discharge Note   Anticipated Discharge Disposition Home-Health   What phone number can be called within the next 1-3 days to see how you are doing after discharge? [phone]   Hospital Follow Up  Appt(s) scheduled? Yes   Discharge plans and expectations educations in teach back method with documentation complete? Yes   Right Care Referral Info   Post Acute Recommendation Home-care   Referral Type Home Health   [facility] [place]         Patient will discharge home today. He has all equipment needed including BSC and Astral machine. He already has a walker at home. He will have HH upon discharge with [facility]. Patient reminded about what signs and symptoms to look for when discharged, and educated that if any symptoms return, make a same day appointment with PCP or come back to the ED. Patient states understanding and agreement. Patient denies any other needs or concerns for discharge.

## 2019-01-25 ENCOUNTER — LAB VISIT (OUTPATIENT)
Dept: LAB | Facility: HOSPITAL | Age: 84
End: 2019-01-25
Attending: FAMILY MEDICINE
Payer: MEDICARE

## 2019-01-25 ENCOUNTER — TELEPHONE (OUTPATIENT)
Dept: FAMILY MEDICINE | Facility: CLINIC | Age: 84
End: 2019-01-25

## 2019-01-25 DIAGNOSIS — I50.9 CHF (CONGESTIVE HEART FAILURE): Primary | ICD-10-CM

## 2019-01-25 LAB
ALBUMIN SERPL BCP-MCNC: 2.8 G/DL
ALP SERPL-CCNC: 56 U/L
ALT SERPL W/O P-5'-P-CCNC: 47 U/L
ANION GAP SERPL CALC-SCNC: 6 MMOL/L
AST SERPL-CCNC: 26 U/L
BASOPHILS # BLD AUTO: 0 K/UL
BASOPHILS NFR BLD: 0 %
BILIRUB SERPL-MCNC: 1.8 MG/DL
BNP SERPL-MCNC: 327 PG/ML
BUN SERPL-MCNC: 48 MG/DL
CALCIUM SERPL-MCNC: 9.1 MG/DL
CHLORIDE SERPL-SCNC: 102 MMOL/L
CO2 SERPL-SCNC: 33 MMOL/L
CREAT SERPL-MCNC: 1.3 MG/DL
DIFFERENTIAL METHOD: ABNORMAL
EOSINOPHIL # BLD AUTO: 0.1 K/UL
EOSINOPHIL NFR BLD: 1.1 %
ERYTHROCYTE [DISTWIDTH] IN BLOOD BY AUTOMATED COUNT: 14.1 %
EST. GFR  (AFRICAN AMERICAN): 58 ML/MIN/1.73 M^2
EST. GFR  (NON AFRICAN AMERICAN): 50 ML/MIN/1.73 M^2
GLUCOSE SERPL-MCNC: 102 MG/DL
HCT VFR BLD AUTO: 33.7 %
HGB BLD-MCNC: 10.2 G/DL
LYMPHOCYTES # BLD AUTO: 1.1 K/UL
LYMPHOCYTES NFR BLD: 10.4 %
MCH RBC QN AUTO: 31.6 PG
MCHC RBC AUTO-ENTMCNC: 30.3 G/DL
MCV RBC AUTO: 104 FL
MONOCYTES # BLD AUTO: 1 K/UL
MONOCYTES NFR BLD: 8.9 %
NEUTROPHILS # BLD AUTO: 8.6 K/UL
NEUTROPHILS NFR BLD: 79.6 %
PLATELET # BLD AUTO: 214 K/UL
PMV BLD AUTO: 9.9 FL
POTASSIUM SERPL-SCNC: 3.9 MMOL/L
PROT SERPL-MCNC: 5.5 G/DL
RBC # BLD AUTO: 3.23 M/UL
SODIUM SERPL-SCNC: 141 MMOL/L
WBC # BLD AUTO: 10.83 K/UL

## 2019-01-25 PROCEDURE — 85025 COMPLETE CBC W/AUTO DIFF WBC: CPT

## 2019-01-25 PROCEDURE — 80053 COMPREHEN METABOLIC PANEL: CPT

## 2019-01-25 PROCEDURE — 83880 ASSAY OF NATRIURETIC PEPTIDE: CPT

## 2019-01-25 NOTE — TELEPHONE ENCOUNTER
Spoke to Kayleigh with home health, patient restarted with home health from hospital discharge.     Patient was treated in hospital for dysphagia. States they have speech therapy services and MSW (). Would like approval for ST and MSW along with OT.     The hospital ordered Jazzy boot for left leg twice a week,  home health is continuing order.   Also using calcium alginate and foam dressing since leg is oozing.     Kayleigh: [phone]  [facility]    (patient scheduled for hospital follow up on 1/31/2019

## 2019-01-25 NOTE — TELEPHONE ENCOUNTER
----- Message from Laura Bauman sent at 2019  1:59 PM CST -----  Contact: jolene - [facility]  Radhames Alonzo  MRN: [id number]  : 3/17/1934  PCP: Pennie Jorge  Home Phone      [phone]  Work Phone      Not on file.  [employer]          [phone]      MESSAGE:     Jolene called to discuss pt's home health, would like therapy and an msw ordered. As well as discuss his wound care.   [phone]

## 2019-01-25 NOTE — PT/OT/SLP DISCHARGE
Physical Therapy Discharge Summary    Name: Radhames Alonzo  MRN: [id number]   Principal Problem: Severe sepsis     Patient Discharged from acute Physical Therapy on 19.  Please refer to prior PT noted date on 19 for functional status.     Assessment:     Patient was discharged unexpectedly.  Information required to complete an accurate discharge summary is unknown.  Refer to therapy initial evaluation and last progress note for initial and most recent functional status and goal achievement.  Recommendations made may be found in medical record. Goals partially met.    Objective:     GOALS:   Multidisciplinary Problems     Physical Therapy Goals        Problem: Physical Therapy Goal    Goal Priority Disciplines Outcome Goal Variances Interventions   Physical Therapy Goal     PT, PT/OT Ongoing (interventions implemented as appropriate)     Description:  Goals to be met by: 7  (reviewed 19) (reviewed 19) (reviewed 19)    Patient will increase functional independence with mobility by performin. Supine to sit with Supervision or Set-up Assistance - met 19  2. Sit to supine with Supervision or Set-up Assistance - met 19  3. Bed to chair transfer with Supervision or Set-up Assistancewith or without rolling walker using Step Transfer  TECHNIQUE - met 19  4. Gait  x 100  feet with Supervision or Set-up Assistance with or without rolling walker  5. Lower extremity exercise program x10 reps per handout, with assistance as needed                          Reasons for Discontinuation of Therapy Services  Transfer to alternate level of care.      Plan:     Patient Discharged to: Home with Home Health Service.    Baldomero Velázquez, PT  2019

## 2019-01-28 ENCOUNTER — TELEPHONE (OUTPATIENT)
Dept: FAMILY MEDICINE | Facility: CLINIC | Age: 84
End: 2019-01-28

## 2019-01-28 NOTE — TELEPHONE ENCOUNTER
----- Message from Deejay Berkowitz sent at 2019  9:09 AM CST -----  Contact: Markus @ [employer]  Radhames Alonzo  MRN: [id number]  : 3/17/1934  PCP: Pennie Jorge  Home Phone      [phone]  Work Phone      Not on file.  [employer]          [phone]      MESSAGE: Would like to discuss PT eval & vitals that were outside of perimeter    Call Markus @ [phone]    PCP: Maggi

## 2019-01-28 NOTE — TELEPHONE ENCOUNTER
FYI:    Spoke to Markus with home health. Vitals on Saturday, R:24 lying down, but when he sat up respirations 20.     States pulse was out of parameters and had to notify provider.

## 2019-01-28 NOTE — TELEPHONE ENCOUNTER
I approve of all of these requests. Thanks for taking such great care of our patients, home health!

## 2019-01-31 ENCOUNTER — TELEPHONE (OUTPATIENT)
Dept: FAMILY MEDICINE | Facility: CLINIC | Age: 84
End: 2019-01-31

## 2019-01-31 NOTE — TELEPHONE ENCOUNTER
Spoke to Yanci with [facility], patient trying to get into [facility] for rehab. Will need TB test but patient has difficulty getting out. Please order Quantiferon Gold.

## 2019-01-31 NOTE — TELEPHONE ENCOUNTER
----- Message from Laura Bauamn sent at 2019 11:16 AM CST -----  Contact: yanci acuña   Radhames Alonzo  MRN: [id number]  : 3/17/1934  PCP: Pennie Jorge  Home Phone      [phone]  Work Phone      Not on file.  [employer]          [phone]      MESSAGE:    Yanci called to talk to the nurse about orders for the pt to go to [facility] for Rehab.     [phone]

## 2019-02-01 DIAGNOSIS — M79.672 LEFT FOOT PAIN: ICD-10-CM

## 2019-02-01 DIAGNOSIS — L97.529 ULCER OF LEFT FOOT, UNSPECIFIED ULCER STAGE: ICD-10-CM

## 2019-02-01 RX ORDER — HYDROCODONE BITARTRATE AND ACETAMINOPHEN 7.5; 325 MG/1; MG/1
1 TABLET ORAL EVERY 8 HOURS PRN
Qty: 21 TABLET | Refills: 0 | Status: SHIPPED | OUTPATIENT
Start: 2019-02-01 | End: 2019-02-08

## 2019-02-01 NOTE — TELEPHONE ENCOUNTER
----- Message from Laura Bauman sent at 2019  3:22 PM CST -----  Contact: Marylou - wife  Radhames Alonzo  MRN: [id number]  : 3/17/1934  PCP: Pennie Jorge  Home Phone      [phone]  Work Phone      Not on file.  [employer]          [phone]      MESSAGE:   Pt requesting refill or new Rx.   Is this a refill or new RX:  refill  RX name and strength: HYDROcodone-acetaminophen (NORCO) 7.5-325 mg per tablet (Discontinued)  Last office visit: 19  Is this a 30-day or 90-day RX:  30  Pharmacy name and location:  [facility] [place]  Comments:  Wife called and said he can not make it through the day without this medication and is out.     Phone:  [phone]

## 2019-02-04 ENCOUNTER — TELEPHONE (OUTPATIENT)
Dept: FAMILY MEDICINE | Facility: CLINIC | Age: 84
End: 2019-02-04

## 2019-02-04 ENCOUNTER — LAB VISIT (OUTPATIENT)
Dept: LAB | Facility: HOSPITAL | Age: 84
End: 2019-02-04
Attending: FAMILY MEDICINE
Payer: MEDICARE

## 2019-02-04 DIAGNOSIS — J44.9 COPD (CHRONIC OBSTRUCTIVE PULMONARY DISEASE): ICD-10-CM

## 2019-02-04 DIAGNOSIS — I50.9 CHF (CONGESTIVE HEART FAILURE): Primary | ICD-10-CM

## 2019-02-04 PROCEDURE — 86480 TB TEST CELL IMMUN MEASURE: CPT

## 2019-02-04 NOTE — TELEPHONE ENCOUNTER
----- Message from Laura Bauman sent at 2019  4:32 PM CST -----  Contact: cherri - Therapist arianne  Radhames Alonzo  MRN: [id number]  : 3/17/1934  PCP: Pennie Jorge  Home Phone      [phone]  Work Phone      Not on file.  [employer]          [phone]      MESSAGE:    Pt is having some low blood pressure.  left arm 86/60- right arm 95-70, Minimal light headedness  Pt took morning around 4:30, have not been taking the spironolactone (ALDACTONE) 25 MG tablet. 3lbs weight gain since 2019.    [phone] (Cherri, physical therapist)   [phone] (Arianne, call with any medication changes)

## 2019-02-05 RX ORDER — SPIRONOLACTONE 25 MG/1
25 TABLET ORAL DAILY
Qty: 30 TABLET | Refills: 2 | Status: SHIPPED | OUTPATIENT
Start: 2019-02-05 | End: 2020-02-05

## 2019-02-05 NOTE — TELEPHONE ENCOUNTER
Spoke to terrence with [facility], states patient currently on Torsemide 20 mg daily. States they do not have a prescription for Aldactone.    [phone]  Terrence

## 2019-02-05 NOTE — TELEPHONE ENCOUNTER
Ok. Put parameters on spironolactone. Give one dose now and order daily. Hold for systolic BP less than 90

## 2019-02-05 NOTE — TELEPHONE ENCOUNTER
----- Message from Deejay Berkowitz sent at 2019  4:39 PM CST -----  Contact: Kayleigh @ [facility]  Radhames Alonzo  MRN: [id number]  : 3/17/1934  PCP: Pennie Jorge  Home Phone      [phone]  Work Phone      Not on file.  [employer]          [phone]      MESSAGE: wound draining excessively -- changing Unna boot every other day -- will change to 2 layer compression & see if that helps -- will increase to 3 x weekly     Call Kayleigh @ [phone]    PCP: Maggi

## 2019-02-06 LAB
M TB IFN-G CD4+ BCKGRND COR BLD-ACNC: 0.17 IU/ML
MITOGEN IGNF BCKGRD COR BLD-ACNC: 4.54 IU/ML
MITOGEN IGNF BCKGRD COR BLD-ACNC: NEGATIVE [IU]/ML
NIL: 0.09 IU/ML
TB2 - NIL: 0.02 IU/ML

## 2019-02-06 RX ORDER — SPIRONOLACTONE 25 MG/1
25 TABLET ORAL DAILY
Qty: 30 TABLET | Refills: 0 | Status: SHIPPED | OUTPATIENT
Start: 2019-02-06 | End: 2019-02-22 | Stop reason: SDUPTHER

## 2019-02-06 NOTE — ED NOTES
Reported positive sepsis screen to Jeff Stuart [facility]   I personally performed the service described in the documentation recorded by the scribe in my presence, and it accurately and completely records my words and actions.

## 2019-02-06 NOTE — TELEPHONE ENCOUNTER
Informed pt that  Rx was sent to pharmacy. Verbalized Understanding.     Called [facility] and canceled Rx.  Please resign Rx and print it out.  [facility] [place]

## 2019-02-07 ENCOUNTER — TELEPHONE (OUTPATIENT)
Dept: FAMILY MEDICINE | Facility: CLINIC | Age: 84
End: 2019-02-07

## 2019-02-07 NOTE — TELEPHONE ENCOUNTER
Notified Yanci that I faxed the papers today and received confirmation.  She verbalized understanding.

## 2019-02-07 NOTE — TELEPHONE ENCOUNTER
----- Message from Laura Bauman sent at 2019  3:02 PM CST -----  Contact[facility]  Radhames Alonzo  MRN: [id number]  : 3/17/1934  PCP: Pennie Jorge  Home Phone      [phone]  Work Phone      Not on file.  [employer]          [phone]      MESSAGE:     Yanci called stating she wanted to talk to Rupal about orders for [facility].     [phone]

## 2019-02-08 ENCOUNTER — TELEPHONE (OUTPATIENT)
Dept: FAMILY MEDICINE | Facility: CLINIC | Age: 84
End: 2019-02-08

## 2019-02-08 NOTE — TELEPHONE ENCOUNTER
----- Message from Ree Heredia sent at 2019  2:33 PM CST -----  Contact: Self  Radhames Alonzo  MRN: [id number]  : 3/17/1934  PCP: Pennie Jorge  Home Phone      [phone]  Work Phone      Not on file.  Mobile          [phone]      MESSAGE:   Pt states he needs an [facility] note letting the [facility] know that [facility] is [facility] for spironolactone (ALDACTONE) 25 MG tablet to be sent via mail order. [phone]

## 2019-02-09 ENCOUNTER — TELEPHONE (OUTPATIENT)
Dept: FAMILY MEDICINE | Facility: CLINIC | Age: 84
End: 2019-02-09

## 2019-02-09 NOTE — TELEPHONE ENCOUNTER
----- Message from Sabra Corrales sent at 2019 11:02 AM CST -----  Contact: Sparkle Alonzo  MRN: [id number]  : 3/17/1934  PCP: Pennie Jorge  Home Phone      [phone]  Work Phone      Not on file.  [employer]          [phone]      MESSAGE:   Patient would like to have orders to take out catheter and leave it out as a trial run. Please advise.      Kayleigh  [phone]

## 2019-02-09 NOTE — TELEPHONE ENCOUNTER
Patient would like to have orders to take out catheter and leave it out as a trial run.      Spoke with Kayleigh with [facility].  She spoke with pt, he will keep valdes in until Monday.   Pt will let Kayleigh know if he will go to the [place].

## 2019-02-11 ENCOUNTER — TELEPHONE (OUTPATIENT)
Dept: FAMILY MEDICINE | Facility: CLINIC | Age: 84
End: 2019-02-11

## 2019-02-11 RX ORDER — SPIRONOLACTONE 25 MG/1
25 TABLET ORAL DAILY
Qty: 30 TABLET | Refills: 0 | Status: CANCELLED | OUTPATIENT
Start: 2019-02-11 | End: 2020-02-11

## 2019-02-11 NOTE — TELEPHONE ENCOUNTER
----- Message from Deejay Berkowitz sent at 2019 10:06 AM CST -----  Contact: Kayleigh @ [facility]  Radhames Alonzo  MRN: [id number]  : 3/17/1934  PCP: Pennie Jorge  Home Phone      [phone]  Work Phone      Not on file.  [employer]          [phone]      MESSAGE: Rx for Spirolactone was sent to wrong pharmacy -- [facility] -- fax # [phone]    Call Kayleigh @ [phone]    PCP: Maggi

## 2019-02-11 NOTE — TELEPHONE ENCOUNTER
Spoke with Kayleigh and [facility] Fax: [phone].     Kayleigh also wanted to inform you that she put pts catheter back in and there was blood.

## 2019-02-11 NOTE — TELEPHONE ENCOUNTER
Notified jolene. She verbally understood.    I cannot find the Rx for pt to needs to [facility] to [facility] please reprint

## 2019-02-11 NOTE — TELEPHONE ENCOUNTER
----- Message from Sabra Corrales sent at 2019 10:38 AM CST -----  Contact: Kayleigh Alonzo  MRN: [id number]  : 3/17/1934  PCP: Pennie Jorge  Home Phone      [phone]  Work Phone      Not on file.  [employer]          [phone]      MESSAGE:   Patient had catheter placed and changed. There was blood when it was placed inside the tubing.    Kayleigh  [phone]

## 2019-02-11 NOTE — TELEPHONE ENCOUNTER
Ok. He has prostate CA and is on pradaxa, so I am not surprised.  Watch UOP. If his UOP stops or significantly decreases in the setting of bloody urine, we need t oworry about a blood clot obstructing urinary flow.  That would necessitate a trip to the ED.

## 2019-02-11 NOTE — TELEPHONE ENCOUNTER
----- Message from Jesús Tay MD sent at 2/10/2019  7:29 PM CST -----  I do not see a ST report in the chart. Can we get copy of it? Thanks !

## 2019-02-12 ENCOUNTER — TELEPHONE (OUTPATIENT)
Dept: FAMILY MEDICINE | Facility: CLINIC | Age: 84
End: 2019-02-12

## 2019-02-12 NOTE — TELEPHONE ENCOUNTER
----- Message from Laura Bauman sent at 2019 11:10 AM CST -----  Contact: [facility]  Radhames Alonzo  MRN: [id number]  : 3/17/1934  PCP: Pennie Jorge  Home Phone      [phone]  Work Phone      Not on file.  Mobile          [phone]      MESSAGE:     [facility] needs an auth. That they sent him here to see . They can not fill the medication until they receive some type of documentation that he was suppose to be seen here.     [phone] ([facility])

## 2019-02-15 ENCOUNTER — TELEPHONE (OUTPATIENT)
Dept: FAMILY MEDICINE | Facility: CLINIC | Age: 84
End: 2019-02-15

## 2019-02-15 NOTE — TELEPHONE ENCOUNTER
[facility] called stating Pt has had an BM in a week. He is taking Glycolax daily, keeping hydrated, and laxatives. Pt had a loose stool on yesterday. Per Dr. [facility]ina [facility] should check for impaction. If impacted to remove impaction to do an enema if needed.

## 2019-02-19 ENCOUNTER — TELEPHONE (OUTPATIENT)
Dept: FAMILY MEDICINE | Facility: CLINIC | Age: 84
End: 2019-02-19

## 2019-02-19 NOTE — TELEPHONE ENCOUNTER
----- Message from Sabra Corrales sent at 2019  2:19 PM CST -----  Contact: Yanci from [employer]  Radhames Alonzo  MRN: [id number]  : 3/17/1934  PCP: Pennie Jorge  Home Phone      [phone]  Work Phone      Not on file.  Mobile          [phone]      MESSAGE:   2 lb weight gain since yesterday. Yanci would like to discuss some medications, possibly adding or increasing things. No increase of SOB (pulse ox 99)Please advise.    Yanci  [phone]    Spoke to Yanci with [employer], states patient has not received [facility]one [facility], states he is currently only taking Torsemide 20 mg daily.   Home health nurse will be going back to check on patient tomorrow. No symptoms noted besides 2 lb weight gain.

## 2019-02-20 NOTE — TELEPHONE ENCOUNTER
I called. No answer.   Tell her to double Torsemide if greater than 2# weight gain in 24 hours. Otherwise just take the 20mg thanks

## 2019-02-22 NOTE — TELEPHONE ENCOUNTER
----- Message from Deejay Berkowitz sent at 2019 12:15 PM CST -----  Contact: Kayleigh @ [facility]  Radhames Alonzo  MRN: [id number]  : 3/17/1934  PCP: Pennie Jorge  Home Phone      [phone]  Work Phone      Not on file.  [employer]          [phone]      MESSAGE: Re: Aldactone Rx -- takes 25 mg daily -- [facility] to the [facility] @ [phone] ATTN: BECKY [place] --- fax Rx along with the reason he was placed on the medication    Call Kayleigh @ [phone]    PCP: Maggi

## 2019-02-24 RX ORDER — SPIRONOLACTONE 25 MG/1
25 TABLET ORAL DAILY
Qty: 30 TABLET | Refills: 0 | Status: SHIPPED | OUTPATIENT
Start: 2019-02-24 | End: 2020-03-05

## 2019-02-25 ENCOUNTER — TELEPHONE (OUTPATIENT)
Dept: FAMILY MEDICINE | Facility: CLINIC | Age: 84
End: 2019-02-25

## 2019-02-25 ENCOUNTER — TELEPHONE (OUTPATIENT)
Dept: INTERNAL MEDICINE | Facility: CLINIC | Age: 84
End: 2019-02-25

## 2019-02-25 DIAGNOSIS — L03.116 CELLULITIS OF LEFT LOWER EXTREMITY: Primary | ICD-10-CM

## 2019-02-25 RX ORDER — HYDROCODONE BITARTRATE AND ACETAMINOPHEN 5; 325 MG/1; MG/1
1 TABLET ORAL EVERY 8 HOURS PRN
Qty: 21 TABLET | Refills: 0 | Status: SHIPPED | OUTPATIENT
Start: 2019-02-25 | End: 2019-03-20 | Stop reason: SDUPTHER

## 2019-02-25 NOTE — TELEPHONE ENCOUNTER
----- Message from Roby Mejia LPN sent at 2019  5:10 PM CST -----  Contact: Candace @ [facility] (speech therapist)      ----- Message -----  From: Deejay Berkowitz  Sent: 2019   9:21 AM  To: Maggi HYLTON Staff    Radhames Alonzo  MRN: [id number]  : 3/17/1934  PCP: Pennie Jorge  Home Phone      [phone]  Work Phone      Not on file.  [employer]          [phone]      MESSAGE: patient requesting discharge from speech therapy -- sent fax 2 days ago for Dr Tay -- would like non-visit discharge today -- please advise     Call Candace @ [phone]    PCP: Maggi

## 2019-02-25 NOTE — TELEPHONE ENCOUNTER
----- Message from Deejay Berkowitz sent at 2019 12:43 PM CST -----  Contact: Kayleigh @ [facility]  Radhames Alonzo  MRN: [id number]  : 3/17/1934  PCP: Pennie Jorge  Home Phone      [phone]  Work Phone      Not on file.  [employer]          [phone]      MESSAGE: requesting refill on Hydrocodone 7.5-325 mg -- uses [facility] in [place]    Call Kayleigh @ [phone]    PCP: Luisito

## 2019-02-25 NOTE — TELEPHONE ENCOUNTER
Spoke to Candace with [facility], states patient request to discontinue Speech Therapy.     Candace states patient has reached max potential for speech therapy.     Need approval to discontinue Speech Therapy.     Candace with [facility]: [phone]    Approved to discontinue speech therapy per MD Oskar/LRLPN.    Notified Candace with [facility].

## 2019-02-27 ENCOUNTER — TELEPHONE (OUTPATIENT)
Dept: INTERNAL MEDICINE | Facility: CLINIC | Age: 84
End: 2019-02-27

## 2019-03-04 ENCOUNTER — TELEPHONE (OUTPATIENT)
Dept: FAMILY MEDICINE | Facility: CLINIC | Age: 84
End: 2019-03-04

## 2019-03-04 NOTE — TELEPHONE ENCOUNTER
----- Message from Laura Bauman sent at 3/4/2019  9:48 AM CST -----  Contact: cherri aviles   Radhames Alonzo  MRN: [id number]  : 3/17/1934  PCP: Pennie Jorge  Home Phone      [phone]  Work Phone      Not on file.  [employer]          [phone]      MESSAGE:     Needs order to continue PT.     [phone]

## 2019-03-11 ENCOUNTER — TELEPHONE (OUTPATIENT)
Dept: FAMILY MEDICINE | Facility: CLINIC | Age: 84
End: 2019-03-11

## 2019-03-11 RX ORDER — CITALOPRAM 10 MG/1
10 TABLET ORAL DAILY
Qty: 30 TABLET | Refills: 0 | Status: SHIPPED | OUTPATIENT
Start: 2019-03-11 | End: 2020-03-10

## 2019-03-11 NOTE — TELEPHONE ENCOUNTER
----- Message from Deejay Berkowitz sent at 3/11/2019  3:54 PM CDT -----  Contact: Clark @ [facility]  Radhames Alonzo  MRN: [id number]  : 3/17/1934  PCP: No primary care provider on file.  Home Phone      [phone]  Work Phone      Not on file.  [employer]          [phone]      MESSAGE: reports depression X 2 wks -- please advise ---- also, requesting order for  assessment for community resouces    Call Markus @ [phone]    PCP: Maggi

## 2019-03-20 ENCOUNTER — TELEPHONE (OUTPATIENT)
Dept: FAMILY MEDICINE | Facility: CLINIC | Age: 84
End: 2019-03-20

## 2019-03-20 DIAGNOSIS — L03.116 CELLULITIS OF LEFT LOWER EXTREMITY: ICD-10-CM

## 2019-03-20 RX ORDER — HYDROCODONE BITARTRATE AND ACETAMINOPHEN 5; 325 MG/1; MG/1
1 TABLET ORAL EVERY 8 HOURS PRN
Qty: 21 TABLET | Refills: 0 | Status: SHIPPED | OUTPATIENT
Start: 2019-03-20 | End: 2019-04-30 | Stop reason: SDUPTHER

## 2019-03-20 NOTE — TELEPHONE ENCOUNTER
----- Message from Deejay Berkowitz sent at 3/20/2019  1:03 PM CDT -----  Contact: Butch @ [facility]  Radhames Alonzo  MRN: [id number]  : 3/17/1934  PCP: No primary care provider on file.  Home Phone      [phone]  Work Phone      Not on file.  [employer]          [phone]      MESSAGE: patient requesting refill on Norco -- would like increase in strength - states 5-325 not working -- uses [facility] in [place]    Call Butch @ [phone]    PCP: Maggi

## 2019-03-26 ENCOUNTER — TELEPHONE (OUTPATIENT)
Dept: FAMILY MEDICINE | Facility: CLINIC | Age: 84
End: 2019-03-26

## 2019-03-26 NOTE — TELEPHONE ENCOUNTER
----- Message from Deejay Berkowitz sent at 3/26/2019  1:30 PM CDT -----  Contact: Patient  Radhames Alonzo  MRN: [id number]  : 3/17/1934  PCP: Jesús Tay  Home Phone      [phone]  Work Phone      Not on file.  [employer]          [phone]      MESSAGE: has growth on back of neck -- would like to speak with nurse -- may need dermatology referral    Call [phone]    PCP: Maggi

## 2019-04-08 ENCOUNTER — TELEPHONE (OUTPATIENT)
Dept: FAMILY MEDICINE | Facility: CLINIC | Age: 84
End: 2019-04-08

## 2019-04-08 NOTE — TELEPHONE ENCOUNTER
----- Message from Aysha Sweeney sent at 2019 10:48 AM CDT -----  Contact: jolene hoang/ [facility]  Radhames Alonzo  MRN: [id number]  : 3/17/1934  PCP: Jesús Tay  Home Phone      [phone]  Work Phone      Not on file.  [employer]          [phone]      MESSAGE:  Jolene with [facility] is calling in regards to the pt have a large abscess on his neck. Jolene states it is really red, large, and painful to touch.Jolene states pt hasn't been out of the house since the hospital. Please advise, thanks.  Phone: [phone]

## 2019-04-17 ENCOUNTER — TELEPHONE (OUTPATIENT)
Dept: FAMILY MEDICINE | Facility: CLINIC | Age: 84
End: 2019-04-17

## 2019-04-17 NOTE — TELEPHONE ENCOUNTER
"Spoke to Kayleigh with [facility], states neck wound opening is the size of a pinhole. States unable to pack. States the [facility] instructed [facility] to apply warm compresses and "milk" area. States patient has been doing treatment. Kayleigh with home health states wound is no longer red and has small amount of serous sanguineous drainage. Kayleigh states she tried to drain and only had a small amount of white drainage come out of wound.   Patient has no fever, and states area is no longer sensitive to touch.     Would like to know if needing antibiotic ointment and cover with gauze. Advise.      "

## 2019-04-17 NOTE — TELEPHONE ENCOUNTER
----- Message from eDejay Berkowitz sent at 2019 12:40 PM CDT -----  Contact: Kayleigh @ [facility]  Radhames Alonzo  MRN: [id number]  : 3/17/1934  PCP: Jesús Tay  Home Phone      [phone]  Work Phone      Not on file.  [employer]          [phone]      MESSAGE: [facility] -- abscess on his neck popped, looks better - has a small opening @ site -- requesting info on how Dr Tay wants to care for it  -- unable to get in touch with the [facility] dr -- please advise    Call Kayleigh @ [phone]    PCP: Maggi

## 2019-04-30 ENCOUNTER — TELEPHONE (OUTPATIENT)
Dept: FAMILY MEDICINE | Facility: CLINIC | Age: 84
End: 2019-04-30

## 2019-04-30 DIAGNOSIS — L03.116 CELLULITIS OF LEFT LOWER EXTREMITY: ICD-10-CM

## 2019-04-30 RX ORDER — HYDROCODONE BITARTRATE AND ACETAMINOPHEN 5; 325 MG/1; MG/1
1 TABLET ORAL EVERY 8 HOURS PRN
Qty: 21 TABLET | Refills: 0 | Status: SHIPPED | OUTPATIENT
Start: 2019-04-30

## 2019-05-09 ENCOUNTER — TELEPHONE (OUTPATIENT)
Dept: FAMILY MEDICINE | Facility: CLINIC | Age: 84
End: 2019-05-09

## 2019-05-09 NOTE — TELEPHONE ENCOUNTER
----- Message from Laura Bauman sent at 2019 11:33 AM CDT -----  Contact: jolene Kwan Alonzo  MRN: [id number]  : 3/17/1934  PCP: Jesús Tay  Home Phone      [phone]  Work Phone      Not on file.  [employer]          [phone]      MESSAGE:    Jolene would like to talk to the nurse about the pt's blood pressure & cloudy UA. She let  about the blood pressure, he ordered a bmp to be done for tomorrow.   Would like to know if  would like to order anything else or or anything to collect a UA?  BP was 85/50 today w/ a HR of 88, pt is also weak   Also would like to have a Physical therapy eval done      [phone] (office)  [phone] (office fax number)  [phone] (jolene's cell)

## 2019-05-10 ENCOUNTER — LAB VISIT (OUTPATIENT)
Dept: LAB | Facility: HOSPITAL | Age: 84
End: 2019-05-10
Attending: FAMILY MEDICINE
Payer: MEDICARE

## 2019-05-10 DIAGNOSIS — I50.9 CHF (CONGESTIVE HEART FAILURE): Primary | ICD-10-CM

## 2019-05-10 DIAGNOSIS — R30.0 DYSURIA: ICD-10-CM

## 2019-05-10 LAB
AMORPH CRY URNS QL MICRO: ABNORMAL
ANION GAP SERPL CALC-SCNC: 7 MMOL/L (ref 8–16)
BACTERIA #/AREA URNS HPF: ABNORMAL /HPF
BILIRUB UR QL STRIP: NEGATIVE
BUN SERPL-MCNC: 11 MG/DL (ref 8–23)
CALCIUM SERPL-MCNC: 9.1 MG/DL (ref 8.7–10.5)
CHLORIDE SERPL-SCNC: 102 MMOL/L (ref 95–110)
CLARITY UR: ABNORMAL
CO2 SERPL-SCNC: 31 MMOL/L (ref 23–29)
COLOR UR: YELLOW
CREAT SERPL-MCNC: 1.2 MG/DL (ref 0.5–1.4)
EST. GFR  (AFRICAN AMERICAN): >60 ML/MIN/1.73 M^2
EST. GFR  (NON AFRICAN AMERICAN): 55 ML/MIN/1.73 M^2
GLUCOSE SERPL-MCNC: 99 MG/DL (ref 70–110)
GLUCOSE UR QL STRIP: NEGATIVE
HGB UR QL STRIP: ABNORMAL
HYALINE CASTS #/AREA URNS LPF: 0 /LPF
KETONES UR QL STRIP: NEGATIVE
LEUKOCYTE ESTERASE UR QL STRIP: ABNORMAL
MICROSCOPIC COMMENT: ABNORMAL
NITRITE UR QL STRIP: NEGATIVE
PH UR STRIP: >8 [PH] (ref 5–8)
POTASSIUM SERPL-SCNC: 3.2 MMOL/L (ref 3.5–5.1)
PROT UR QL STRIP: ABNORMAL
RBC #/AREA URNS HPF: 3 /HPF (ref 0–4)
SODIUM SERPL-SCNC: 140 MMOL/L (ref 136–145)
SP GR UR STRIP: 1.01 (ref 1–1.03)
SQUAMOUS #/AREA URNS HPF: 0 /HPF
TRI-PHOS CRY URNS QL MICRO: ABNORMAL
URN SPEC COLLECT METH UR: ABNORMAL
UROBILINOGEN UR STRIP-ACNC: 1 EU/DL
WBC #/AREA URNS HPF: 3 /HPF (ref 0–5)

## 2019-05-10 PROCEDURE — 80048 BASIC METABOLIC PNL TOTAL CA: CPT

## 2019-05-10 PROCEDURE — 81000 URINALYSIS NONAUTO W/SCOPE: CPT

## 2019-05-10 RX ORDER — CIPROFLOXACIN 500 MG/1
500 TABLET ORAL 2 TIMES DAILY
Qty: 14 TABLET | Refills: 0 | Status: SHIPPED | OUTPATIENT
Start: 2019-05-10 | End: 2019-05-17

## 2019-05-10 NOTE — TELEPHONE ENCOUNTER
Please review U/A results collected from [facility] .       Call main office with any recommendations.    [phone] (office)

## 2019-05-13 ENCOUNTER — LAB VISIT (OUTPATIENT)
Dept: LAB | Facility: HOSPITAL | Age: 84
End: 2019-05-13
Attending: FAMILY MEDICINE
Payer: MEDICARE

## 2019-05-13 DIAGNOSIS — R30.0 DYSURIA: Primary | ICD-10-CM

## 2019-05-13 PROCEDURE — 87088 URINE BACTERIA CULTURE: CPT

## 2019-05-13 PROCEDURE — 87186 SC STD MICRODIL/AGAR DIL: CPT

## 2019-05-13 PROCEDURE — 87077 CULTURE AEROBIC IDENTIFY: CPT

## 2019-05-13 PROCEDURE — 87086 URINE CULTURE/COLONY COUNT: CPT

## 2019-05-15 NOTE — TELEPHONE ENCOUNTER
Spoke to Yanci and she stated that the preliminary report was faxed over today and she called [facility] ans they stated that the culture is not complete.

## 2019-05-15 NOTE — TELEPHONE ENCOUNTER
I spoke to home health nurse Friday   Should have repeated urine culture and started abx that day. That is what I instructed her to do

## 2019-05-15 NOTE — TELEPHONE ENCOUNTER
Spoke to jolene and she stated she collected pts urine Monday afternoon and sent it in to [facility].  She is unaware if pt started abx yet.  I tried called pt to make sure he started taking them, no one answer the phone.  Will try again at a later time

## 2019-05-16 ENCOUNTER — TELEPHONE (OUTPATIENT)
Dept: FAMILY MEDICINE | Facility: CLINIC | Age: 84
End: 2019-05-16

## 2019-05-16 DIAGNOSIS — Z12.5 SCREENING FOR PROSTATE CANCER: Primary | ICD-10-CM

## 2019-05-16 DIAGNOSIS — R97.20 ELEVATED PSA: ICD-10-CM

## 2019-05-16 LAB — BACTERIA UR CULT: NORMAL

## 2019-05-16 NOTE — TELEPHONE ENCOUNTER
----- Message from Jesús Tay MD sent at 5/16/2019  4:03 PM CDT -----  He is sensitive to the cipro, assuming he is taking it

## 2019-05-16 NOTE — TELEPHONE ENCOUNTER
Patient is taking Cipro, please review his blood work and I will return his call after. Patient would like to have a PSA drawn states he has not had one in 2 yrs and it was 15.0. Please advise.

## 2019-05-22 ENCOUNTER — LAB VISIT (OUTPATIENT)
Dept: LAB | Facility: HOSPITAL | Age: 84
End: 2019-05-22
Attending: INTERNAL MEDICINE
Payer: MEDICARE

## 2019-05-22 DIAGNOSIS — E87.6 HYPOKALEMIA: Primary | ICD-10-CM

## 2019-05-22 LAB
ANION GAP SERPL CALC-SCNC: 10 MMOL/L (ref 8–16)
BUN SERPL-MCNC: 9 MG/DL (ref 8–23)
CALCIUM SERPL-MCNC: 9.1 MG/DL (ref 8.7–10.5)
CHLORIDE SERPL-SCNC: 102 MMOL/L (ref 95–110)
CO2 SERPL-SCNC: 28 MMOL/L (ref 23–29)
CREAT SERPL-MCNC: 1.3 MG/DL (ref 0.5–1.4)
EST. GFR  (AFRICAN AMERICAN): 58 ML/MIN/1.73 M^2
EST. GFR  (NON AFRICAN AMERICAN): 50 ML/MIN/1.73 M^2
GLUCOSE SERPL-MCNC: 92 MG/DL (ref 70–110)
POTASSIUM SERPL-SCNC: 3.4 MMOL/L (ref 3.5–5.1)
SODIUM SERPL-SCNC: 140 MMOL/L (ref 136–145)

## 2019-05-22 PROCEDURE — 80048 BASIC METABOLIC PNL TOTAL CA: CPT

## 2019-05-22 PROCEDURE — 36415 COLL VENOUS BLD VENIPUNCTURE: CPT

## 2019-05-31 ENCOUNTER — TELEPHONE (OUTPATIENT)
Dept: FAMILY MEDICINE | Facility: CLINIC | Age: 84
End: 2019-05-31

## 2019-05-31 NOTE — TELEPHONE ENCOUNTER
----- Message from Prachi Henderson sent at 2019 12:50 PM CDT -----  Contact: [facility]  Radhames Alonzo  MRN: [id number]  : 3/17/1934  PCP: Jesús Tay  Home Phone      [phone]  Work Phone      Not on file.  [employer]          [phone]      MESSAGE:   Kayleigh with [facility] home [facility] states when she went to check on the pt, he told her he removed his own catheter because it was painful, [facility] states she instructed the pt not to do that again. Please return call @ [phone]

## 2019-05-31 NOTE — TELEPHONE ENCOUNTER
Patient removed catheter and Kayleigh with [facility] reinserted today due to patient having difficulties urinating.  Kayleigh instructed patient on the affects of removing catheter per self.

## 2019-06-06 ENCOUNTER — TELEPHONE (OUTPATIENT)
Dept: FAMILY MEDICINE | Facility: CLINIC | Age: 84
End: 2019-06-06

## 2019-06-06 RX ORDER — PRAMOXINE HYDROCHLORIDE 10 MG/G
AEROSOL, FOAM TOPICAL 3 TIMES DAILY PRN
Refills: 0 | COMMUNITY
Start: 2019-06-06

## 2019-06-06 NOTE — TELEPHONE ENCOUNTER
----- Message from Deejay Berkowitz sent at 2019  2:11 PM CDT -----  Contact: Kayleigh @ [facility]  Radhames Alonzo  MRN: [id number]  : 3/17/1934  PCP: Jesús Tay  Home Phone      [phone]  Work Phone      Not on file.  [employer]          [phone]      MESSAGE: large hard bowel movement yesterday -- now with rectal pain - thinks he may have internal hemorrhoid -- please advise    Call Kayleigh @ [phone]    PCP: Sara

## 2019-06-06 NOTE — TELEPHONE ENCOUNTER
Spoke with Kayleigh and she states that patient is c/o discomfort in his rectal area.  No bleeding but he had a large BM and thinks he may have a hemorrhoid.  Please advise.

## 2019-06-27 ENCOUNTER — TELEPHONE (OUTPATIENT)
Dept: FAMILY MEDICINE | Facility: CLINIC | Age: 84
End: 2019-06-27

## 2019-06-27 NOTE — TELEPHONE ENCOUNTER
----- Message from Sabra Corrales sent at 2019 10:43 AM CDT -----  Contact: Kayleigh from [employer]  Radhames Alonzo  MRN: [id number]  : 3/17/1934  PCP: Jesús Tay  Home Phone      [phone]  Work Phone      Not on file.  [employer]          [phone]      MESSAGE:   Kayleigh was calling to verify that Dr. Tay will continue ordering home health.     Kayleigh  [phone]

## 2019-08-29 ENCOUNTER — TELEPHONE (OUTPATIENT)
Dept: FAMILY MEDICINE | Facility: CLINIC | Age: 84
End: 2019-08-29

## 2019-08-29 NOTE — TELEPHONE ENCOUNTER
----- Message from Laura Bauman sent at 2019 10:29 AM CDT -----  Contact: jolene Alonzo  MRN: [id number]  : 3/17/1934  PCP: Jesús Tay  Home Phone      [phone]  Work Phone      Not on file.  [employer]          [phone]      MESSAGE:     Jolene will re-cert the pt and continue home health if that is okay with ?  Also wondering if they can educate the family on irrigating the cathter as needed.     [phone]

## 2019-08-30 ENCOUNTER — LAB VISIT (OUTPATIENT)
Dept: LAB | Facility: HOSPITAL | Age: 84
End: 2019-08-30
Attending: FAMILY MEDICINE
Payer: MEDICARE

## 2019-08-30 ENCOUNTER — TELEPHONE (OUTPATIENT)
Dept: FAMILY MEDICINE | Facility: CLINIC | Age: 84
End: 2019-08-30

## 2019-08-30 DIAGNOSIS — N39.0 UTI (URINARY TRACT INFECTION): Primary | ICD-10-CM

## 2019-08-30 LAB
BACTERIA #/AREA URNS HPF: ABNORMAL /HPF
BILIRUB UR QL STRIP: NEGATIVE
CLARITY UR: ABNORMAL
COLOR UR: YELLOW
GLUCOSE UR QL STRIP: NEGATIVE
HGB UR QL STRIP: ABNORMAL
HYALINE CASTS #/AREA URNS LPF: 0 /LPF
KETONES UR QL STRIP: NEGATIVE
LEUKOCYTE ESTERASE UR QL STRIP: ABNORMAL
MICROSCOPIC COMMENT: ABNORMAL
NITRITE UR QL STRIP: NEGATIVE
PH UR STRIP: 7 [PH] (ref 5–8)
PROT UR QL STRIP: ABNORMAL
RBC #/AREA URNS HPF: 10 /HPF (ref 0–4)
SP GR UR STRIP: 1.01 (ref 1–1.03)
URN SPEC COLLECT METH UR: ABNORMAL
UROBILINOGEN UR STRIP-ACNC: 1 EU/DL
WBC #/AREA URNS HPF: >100 /HPF (ref 0–5)
WBC CLUMPS URNS QL MICRO: ABNORMAL

## 2019-08-30 PROCEDURE — 87086 URINE CULTURE/COLONY COUNT: CPT

## 2019-08-30 PROCEDURE — 87077 CULTURE AEROBIC IDENTIFY: CPT

## 2019-08-30 PROCEDURE — 81000 URINALYSIS NONAUTO W/SCOPE: CPT

## 2019-08-30 PROCEDURE — 87186 SC STD MICRODIL/AGAR DIL: CPT

## 2019-08-30 PROCEDURE — 87088 URINE BACTERIA CULTURE: CPT

## 2019-08-30 NOTE — TELEPHONE ENCOUNTER
----- Message from Deejay Berkowitz sent at 2019 12:56 PM CDT -----  Contact: Kayleigh @ [facility]  Radhames Alonzo  MRN: [id number]  : 3/17/1934  PCP: Jesús Tay  Home Phone      [phone]  Work Phone      Not on file.  [employer]          [phone]      MESSAGE: patient having bladder spasms - reports no urination for a couple of hrs -- going out to flush catheter -- would like order for Urinalysis -- please advise    Call Kayleigh @ [phone]    PCP: Maggi

## 2019-09-02 LAB
BACTERIA UR CULT: ABNORMAL
BACTERIA UR CULT: ABNORMAL

## 2019-09-03 ENCOUNTER — TELEPHONE (OUTPATIENT)
Dept: FAMILY MEDICINE | Facility: CLINIC | Age: 84
End: 2019-09-03

## 2019-09-03 RX ORDER — CIPROFLOXACIN 500 MG/1
500 TABLET ORAL 2 TIMES DAILY
Qty: 14 TABLET | Refills: 0 | Status: SHIPPED | OUTPATIENT
Start: 2019-09-03

## 2019-09-04 NOTE — TELEPHONE ENCOUNTER
Patient notified that Cipro sent to [facility] pharmacy in [place]. Recommendations from urinalysis result collected on 8/30/2019.    Also notified Arelis with [facility] of recommendations.

## 2019-09-05 ENCOUNTER — TELEPHONE (OUTPATIENT)
Dept: FAMILY MEDICINE | Facility: CLINIC | Age: 84
End: 2019-09-05

## 2019-09-05 DIAGNOSIS — Z85.46 HISTORY OF PROSTATE CANCER: Primary | ICD-10-CM

## 2019-09-05 DIAGNOSIS — Z12.5 ENCOUNTER FOR SCREENING FOR MALIGNANT NEOPLASM OF PROSTATE: ICD-10-CM

## 2019-09-05 NOTE — TELEPHONE ENCOUNTER
----- Message from Sharifa Phillips MA sent at 2019  2:24 PM CDT -----  Contact: Kayleigh @ [facility]      ----- Message -----  From: Deejay Berkowitz  Sent: 2019   1:26 PM  To: Maggi HYLTON Staff    Radhames Alonzo  MRN: [id number]  : 3/17/1934  PCP: Jesús Tay  Home Phone      [phone]  Work Phone      Not on file.  [employer]          [phone]      MESSAGE: time for labs due to history of prostate cancer -- please advise if orders are placed    Call Kayleigh @ [phone]    PCP: Maggi

## 2019-09-06 ENCOUNTER — TELEPHONE (OUTPATIENT)
Dept: FAMILY MEDICINE | Facility: CLINIC | Age: 84
End: 2019-09-06

## 2019-09-06 NOTE — TELEPHONE ENCOUNTER
Left message returning patients spouse call.     Also spoke to Arelis with [facility] to see if they are aware of any issue regarding patients toe. Informed that patients spouse had called for antibiotics. Arelis states she does not see any notation of a problem. States she will contact patients spouse to discuss and send a nurse out to see him over the weekend if needed.

## 2019-09-06 NOTE — TELEPHONE ENCOUNTER
----- Message from Shavonne Bernardo sent at 2019  1:44 PM CDT -----  Contact: wife-rosendo Alonzo  MRN: [id number]  : 3/17/1934  PCP: Jesús Tay  Home Phone      [phone]  Work Phone      Not on file.  [employer]          [phone]      MESSAGE:   Patient wide is calling to see if something can get called in for his toe infection.  Would like to get a call from a nurse.     [phone]

## 2019-09-11 ENCOUNTER — LAB VISIT (OUTPATIENT)
Dept: LAB | Facility: HOSPITAL | Age: 84
End: 2019-09-11
Attending: FAMILY MEDICINE
Payer: MEDICARE

## 2019-09-11 DIAGNOSIS — Z79.01 LONG TERM (CURRENT) USE OF ANTICOAGULANTS: ICD-10-CM

## 2019-09-11 DIAGNOSIS — I10 HTN (HYPERTENSION): ICD-10-CM

## 2019-09-11 DIAGNOSIS — I50.9 CHF (CONGESTIVE HEART FAILURE): ICD-10-CM

## 2019-09-11 DIAGNOSIS — C61 MALIGNANT NEOPLASM OF PROSTATE: Primary | ICD-10-CM

## 2019-09-11 DIAGNOSIS — Z12.5 SCREENING PSA (PROSTATE SPECIFIC ANTIGEN): ICD-10-CM

## 2019-09-11 LAB
ALBUMIN SERPL BCP-MCNC: 3.2 G/DL (ref 3.5–5.2)
ALP SERPL-CCNC: 89 U/L (ref 55–135)
ALT SERPL W/O P-5'-P-CCNC: 6 U/L (ref 10–44)
ANION GAP SERPL CALC-SCNC: 9 MMOL/L (ref 8–16)
AST SERPL-CCNC: 17 U/L (ref 10–40)
BASOPHILS # BLD AUTO: 0.03 K/UL (ref 0–0.2)
BASOPHILS NFR BLD: 0.8 % (ref 0–1.9)
BILIRUB SERPL-MCNC: 1.1 MG/DL (ref 0.1–1)
BUN SERPL-MCNC: 11 MG/DL (ref 8–23)
CALCIUM SERPL-MCNC: 8.9 MG/DL (ref 8.7–10.5)
CHLORIDE SERPL-SCNC: 104 MMOL/L (ref 95–110)
CO2 SERPL-SCNC: 28 MMOL/L (ref 23–29)
COMPLEXED PSA SERPL-MCNC: 5.5 NG/ML (ref 0–4)
CREAT SERPL-MCNC: 1.1 MG/DL (ref 0.5–1.4)
DIFFERENTIAL METHOD: ABNORMAL
EOSINOPHIL # BLD AUTO: 0.2 K/UL (ref 0–0.5)
EOSINOPHIL NFR BLD: 5.3 % (ref 0–8)
ERYTHROCYTE [DISTWIDTH] IN BLOOD BY AUTOMATED COUNT: 13.2 % (ref 11.5–14.5)
EST. GFR  (AFRICAN AMERICAN): >60 ML/MIN/1.73 M^2
EST. GFR  (NON AFRICAN AMERICAN): >60 ML/MIN/1.73 M^2
GLUCOSE SERPL-MCNC: 90 MG/DL (ref 70–110)
HCT VFR BLD AUTO: 37.9 % (ref 40–54)
HGB BLD-MCNC: 12 G/DL (ref 14–18)
IMM GRANULOCYTES # BLD AUTO: 0.01 K/UL (ref 0–0.04)
IMM GRANULOCYTES NFR BLD AUTO: 0.3 % (ref 0–0.5)
LYMPHOCYTES # BLD AUTO: 1.1 K/UL (ref 1–4.8)
LYMPHOCYTES NFR BLD: 30.4 % (ref 18–48)
MCH RBC QN AUTO: 31 PG (ref 27–31)
MCHC RBC AUTO-ENTMCNC: 31.7 G/DL (ref 32–36)
MCV RBC AUTO: 98 FL (ref 82–98)
MONOCYTES # BLD AUTO: 0.5 K/UL (ref 0.3–1)
MONOCYTES NFR BLD: 13.6 % (ref 4–15)
NEUTROPHILS # BLD AUTO: 1.8 K/UL (ref 1.8–7.7)
NEUTROPHILS NFR BLD: 49.6 % (ref 38–73)
NRBC BLD-RTO: 0 /100 WBC
PLATELET # BLD AUTO: 159 K/UL (ref 150–350)
PMV BLD AUTO: 9.3 FL (ref 9.2–12.9)
POTASSIUM SERPL-SCNC: 3.2 MMOL/L (ref 3.5–5.1)
PROT SERPL-MCNC: 6.7 G/DL (ref 6–8.4)
RBC # BLD AUTO: 3.87 M/UL (ref 4.6–6.2)
SODIUM SERPL-SCNC: 141 MMOL/L (ref 136–145)
WBC # BLD AUTO: 3.59 K/UL (ref 3.9–12.7)

## 2019-09-11 PROCEDURE — 85025 COMPLETE CBC W/AUTO DIFF WBC: CPT

## 2019-09-11 PROCEDURE — 80053 COMPREHEN METABOLIC PANEL: CPT

## 2019-09-11 PROCEDURE — 84153 ASSAY OF PSA TOTAL: CPT

## 2019-09-12 NOTE — PROGRESS NOTES
Is he taking potassium as prescribed? If he is, then increase it to 20meq TID instead of BID thanks

## 2019-09-13 ENCOUNTER — TELEPHONE (OUTPATIENT)
Dept: FAMILY MEDICINE | Facility: CLINIC | Age: 84
End: 2019-09-13

## 2019-09-13 NOTE — TELEPHONE ENCOUNTER
Spoke to pt he stated he is not sure what medication he is taking at this time, Pt will need me to call back in about 20 minutes to speak with Susana about all of his medication and what he takes and when and the dosages.

## 2019-09-13 NOTE — TELEPHONE ENCOUNTER
Spoke to pt again and notified pt of how he needs to increase his K tablets to TID. Pt verbally understood

## 2019-09-13 NOTE — TELEPHONE ENCOUNTER
----- Message from Jesús Tay MD sent at 9/12/2019  5:13 PM CDT -----  Is he taking potassium as prescribed? If he is, then increase it to 20meq TID instead of BID thanks

## 2019-09-19 ENCOUNTER — TELEPHONE (OUTPATIENT)
Dept: HOME HEALTH SERVICES | Facility: HOSPITAL | Age: 84
End: 2019-09-19

## 2020-01-20 ENCOUNTER — LAB VISIT (OUTPATIENT)
Dept: LAB | Facility: HOSPITAL | Age: 85
End: 2020-01-20
Attending: INTERNAL MEDICINE
Payer: MEDICARE

## 2020-01-20 DIAGNOSIS — Z79.899 LONG-TERM USE OF HIGH-RISK MEDICATION: ICD-10-CM

## 2020-01-20 DIAGNOSIS — I50.9 CHF (CONGESTIVE HEART FAILURE): ICD-10-CM

## 2020-01-20 DIAGNOSIS — I42.9 CARDIOMYOPATHY: ICD-10-CM

## 2020-01-20 DIAGNOSIS — C61 MALIGNANT NEOPLASM OF PROSTATE: ICD-10-CM

## 2020-01-20 DIAGNOSIS — I50.9 HEART FAILURE: ICD-10-CM

## 2020-01-20 DIAGNOSIS — I10 HTN (HYPERTENSION): Primary | ICD-10-CM

## 2020-01-20 LAB
25(OH)D3+25(OH)D2 SERPL-MCNC: 49 NG/ML (ref 30–96)
ALBUMIN SERPL BCP-MCNC: 3.4 G/DL (ref 3.5–5.2)
ALP SERPL-CCNC: 112 U/L (ref 55–135)
ALT SERPL W/O P-5'-P-CCNC: 13 U/L (ref 10–44)
ANION GAP SERPL CALC-SCNC: 8 MMOL/L (ref 8–16)
AST SERPL-CCNC: 17 U/L (ref 10–40)
BASOPHILS # BLD AUTO: 0.04 K/UL (ref 0–0.2)
BASOPHILS NFR BLD: 0.9 % (ref 0–1.9)
BILIRUB DIRECT SERPL-MCNC: 0.6 MG/DL (ref 0.1–0.3)
BILIRUB SERPL-MCNC: 1.1 MG/DL (ref 0.1–1)
BNP SERPL-MCNC: 212 PG/ML (ref 0–99)
BUN SERPL-MCNC: 19 MG/DL (ref 8–23)
CALCIUM SERPL-MCNC: 9.2 MG/DL (ref 8.7–10.5)
CHLORIDE SERPL-SCNC: 103 MMOL/L (ref 95–110)
CHOLEST SERPL-MCNC: 119 MG/DL (ref 120–199)
CHOLEST/HDLC SERPL: 2.8 {RATIO} (ref 2–5)
CO2 SERPL-SCNC: 30 MMOL/L (ref 23–29)
CREAT SERPL-MCNC: 1.5 MG/DL (ref 0.5–1.4)
DIFFERENTIAL METHOD: ABNORMAL
EOSINOPHIL # BLD AUTO: 0.2 K/UL (ref 0–0.5)
EOSINOPHIL NFR BLD: 3.5 % (ref 0–8)
ERYTHROCYTE [DISTWIDTH] IN BLOOD BY AUTOMATED COUNT: 13.2 % (ref 11.5–14.5)
EST. GFR  (AFRICAN AMERICAN): 48 ML/MIN/1.73 M^2
EST. GFR  (NON AFRICAN AMERICAN): 42 ML/MIN/1.73 M^2
GLUCOSE SERPL-MCNC: 91 MG/DL (ref 70–110)
HCT VFR BLD AUTO: 38.4 % (ref 40–54)
HDLC SERPL-MCNC: 42 MG/DL (ref 40–75)
HDLC SERPL: 35.3 % (ref 20–50)
HGB BLD-MCNC: 12 G/DL (ref 14–18)
IMM GRANULOCYTES # BLD AUTO: 0.01 K/UL (ref 0–0.04)
IMM GRANULOCYTES NFR BLD AUTO: 0.2 % (ref 0–0.5)
LDLC SERPL CALC-MCNC: 68 MG/DL (ref 63–159)
LYMPHOCYTES # BLD AUTO: 1.1 K/UL (ref 1–4.8)
LYMPHOCYTES NFR BLD: 23.5 % (ref 18–48)
MAGNESIUM SERPL-MCNC: 2.2 MG/DL (ref 1.6–2.6)
MCH RBC QN AUTO: 31.9 PG (ref 27–31)
MCHC RBC AUTO-ENTMCNC: 31.3 G/DL (ref 32–36)
MCV RBC AUTO: 102 FL (ref 82–98)
MONOCYTES # BLD AUTO: 0.6 K/UL (ref 0.3–1)
MONOCYTES NFR BLD: 12 % (ref 4–15)
NEUTROPHILS # BLD AUTO: 2.8 K/UL (ref 1.8–7.7)
NEUTROPHILS NFR BLD: 59.9 % (ref 38–73)
NONHDLC SERPL-MCNC: 77 MG/DL
NRBC BLD-RTO: 0 /100 WBC
PLATELET # BLD AUTO: 186 K/UL (ref 150–350)
PMV BLD AUTO: 9.8 FL (ref 9.2–12.9)
POTASSIUM SERPL-SCNC: 3.9 MMOL/L (ref 3.5–5.1)
PROT SERPL-MCNC: 7.1 G/DL (ref 6–8.4)
RBC # BLD AUTO: 3.76 M/UL (ref 4.6–6.2)
SODIUM SERPL-SCNC: 141 MMOL/L (ref 136–145)
T3 SERPL-MCNC: 50 NG/DL (ref 60–180)
T4 FREE SERPL-MCNC: 1.22 NG/DL (ref 0.71–1.51)
TRIGL SERPL-MCNC: 45 MG/DL (ref 30–150)
TSH SERPL DL<=0.005 MIU/L-ACNC: 2.45 UIU/ML (ref 0.4–4)
WBC # BLD AUTO: 4.59 K/UL (ref 3.9–12.7)

## 2020-01-20 PROCEDURE — 80061 LIPID PANEL: CPT

## 2020-01-20 PROCEDURE — 82306 VITAMIN D 25 HYDROXY: CPT

## 2020-01-20 PROCEDURE — 84443 ASSAY THYROID STIM HORMONE: CPT

## 2020-01-20 PROCEDURE — 83735 ASSAY OF MAGNESIUM: CPT

## 2020-01-20 PROCEDURE — 85025 COMPLETE CBC W/AUTO DIFF WBC: CPT

## 2020-01-20 PROCEDURE — 84480 ASSAY TRIIODOTHYRONINE (T3): CPT

## 2020-01-20 PROCEDURE — 80048 BASIC METABOLIC PNL TOTAL CA: CPT

## 2020-01-20 PROCEDURE — 83880 ASSAY OF NATRIURETIC PEPTIDE: CPT

## 2020-01-20 PROCEDURE — 80076 HEPATIC FUNCTION PANEL: CPT

## 2020-01-20 PROCEDURE — 84439 ASSAY OF FREE THYROXINE: CPT

## 2020-02-04 NOTE — ASSESSMENT & PLAN NOTE
Follow HR    Nutrition Problem: Inadequate oral intake  Intervention: Food and/or Nutrient Delivery: Continue current diet, Start ONS  Nutritional Goals: Pt will consume at least 50% of meals and supplements

## 2020-03-05 ENCOUNTER — OFFICE VISIT (OUTPATIENT)
Dept: FAMILY MEDICINE | Facility: CLINIC | Age: 85
End: 2020-03-05
Payer: MEDICARE

## 2020-03-05 VITALS — DIASTOLIC BLOOD PRESSURE: 60 MMHG | SYSTOLIC BLOOD PRESSURE: 102 MMHG | HEART RATE: 68 BPM | RESPIRATION RATE: 95 BRPM

## 2020-03-05 DIAGNOSIS — I48.91 ATRIAL FIBRILLATION, UNSPECIFIED TYPE: ICD-10-CM

## 2020-03-05 DIAGNOSIS — R53.83 FATIGUE, UNSPECIFIED TYPE: ICD-10-CM

## 2020-03-05 DIAGNOSIS — L97.909 VENOUS STASIS ULCER, UNSPECIFIED SITE, UNSPECIFIED ULCER STAGE, UNSPECIFIED WHETHER VARICOSE VEINS PRESENT: Primary | ICD-10-CM

## 2020-03-05 DIAGNOSIS — I50.9 CONGESTIVE HEART FAILURE, UNSPECIFIED HF CHRONICITY, UNSPECIFIED HEART FAILURE TYPE: ICD-10-CM

## 2020-03-05 DIAGNOSIS — I83.009 VENOUS STASIS ULCER, UNSPECIFIED SITE, UNSPECIFIED ULCER STAGE, UNSPECIFIED WHETHER VARICOSE VEINS PRESENT: Primary | ICD-10-CM

## 2020-03-05 PROCEDURE — 85025 COMPLETE CBC W/AUTO DIFF WBC: CPT

## 2020-03-05 PROCEDURE — 99213 OFFICE O/P EST LOW 20 MIN: CPT | Mod: PBBFAC | Performed by: FAMILY MEDICINE

## 2020-03-05 PROCEDURE — 99999 PR STA SHADOW: CPT | Mod: PBBFAC,,, | Performed by: FAMILY MEDICINE

## 2020-03-05 PROCEDURE — 80053 COMPREHEN METABOLIC PANEL: CPT

## 2020-03-05 PROCEDURE — 84443 ASSAY THYROID STIM HORMONE: CPT

## 2020-03-05 PROCEDURE — 99999 PR PBB SHADOW E&M-EST. PATIENT-LVL III: ICD-10-PCS | Mod: PBBFAC,,, | Performed by: FAMILY MEDICINE

## 2020-03-05 PROCEDURE — 36415 COLL VENOUS BLD VENIPUNCTURE: CPT | Mod: PBBFAC

## 2020-03-05 PROCEDURE — 99214 OFFICE O/P EST MOD 30 MIN: CPT | Mod: S$PBB | Performed by: FAMILY MEDICINE

## 2020-03-05 PROCEDURE — 99999 PR PBB SHADOW E&M-EST. PATIENT-LVL III: CPT | Mod: PBBFAC,,, | Performed by: FAMILY MEDICINE

## 2020-03-05 NOTE — PROGRESS NOTES
Subjective:       Patient ID: Radhames Alonzo is a 85 y.o. male.    Chief Complaint: Annual Exam and sores on feet    Pt is a 85 y.o. male who presents for evaluation and management of   Encounter Diagnoses   Name Primary?    Venous stasis ulcer, unspecified site, unspecified ulcer stage, unspecified whether varicose veins present Yes    Atrial fibrillation, unspecified type     Congestive heart failure, unspecified HF chronicity, unspecified heart failure type     Fatigue, unspecified type    .I have not seen him in almost 2 years due to the difficulty he has leaving home.     Doing well on current meds. Denies any side effects. Prevention is up to date.  Review of Systems   Constitutional: Positive for fatigue. Negative for fever.   Respiratory: Positive for shortness of breath.    Cardiovascular: Positive for leg swelling.        Dr. Jean-Baptiste wants him to go in the hospital to be diuresed. He says he will never go in the hospital again   Skin: Positive for wound.       Objective:      Physical Exam   Constitutional: He is oriented to person, place, and time. He appears well-developed and well-nourished.   In wheelchair    HENT:   Head: Normocephalic and atraumatic.   Right Ear: External ear normal.   Left Ear: External ear normal.   Nose: Nose normal.   Mouth/Throat: Oropharynx is clear and moist.   Eyes: Pupils are equal, round, and reactive to light. Conjunctivae and EOM are normal. Right eye exhibits no discharge. Left eye exhibits no discharge. No scleral icterus.   Neck: Normal range of motion. Neck supple. No JVD present. No tracheal deviation present. No thyromegaly present.   Cardiovascular: Normal rate, regular rhythm, normal heart sounds and intact distal pulses.   No murmur heard.  Pulmonary/Chest: Effort normal and breath sounds normal. No respiratory distress. He has no wheezes. He has no rales. He exhibits no tenderness.   Abdominal: Soft. Bowel sounds are normal. He exhibits no distension and no  mass. There is no tenderness. There is no rebound and no guarding.   Musculoskeletal: Normal range of motion. He exhibits edema.   2-3 plus edema bilaterally    Lymphadenopathy:     He has no cervical adenopathy.   Neurological: He is alert and oriented to person, place, and time. He has normal reflexes. He displays normal reflexes. No cranial nerve deficit. He exhibits normal muscle tone. Coordination normal.   Skin: Skin is warm and dry.   LEft foot with lateral foot ulcer and dorsal ulcer consistent with venous stasis.    Psychiatric: He has a normal mood and affect. His behavior is normal. Judgment and thought content normal.       Assessment:       1. Venous stasis ulcer, unspecified site, unspecified ulcer stage, unspecified whether varicose veins present    2. Atrial fibrillation, unspecified type    3. Congestive heart failure, unspecified HF chronicity, unspecified heart failure type    4. Fatigue, unspecified type        Plan:   Radhames was seen today for annual exam and sores on feet.    Diagnoses and all orders for this visit:    Venous stasis ulcer, unspecified site, unspecified ulcer stage, unspecified whether varicose veins present  -     Ambulatory referral/consult to Hospice; Future    Atrial fibrillation, unspecified type  -     Ambulatory referral/consult to Hospice; Future    Congestive heart failure, unspecified HF chronicity, unspecified heart failure type  -     Ambulatory referral/consult to Hospice; Future    Fatigue, unspecified type  -     CBC auto differential; Future  -     Comprehensive metabolic panel; Future  -     TSH; Future      Problem List Items Addressed This Visit     Atrial fibrillation    Overview     On pradaxa  Sees Dr Jean-Baptiste          Relevant Orders    Ambulatory referral/consult to Hospice    CHF (congestive heart failure)    Overview     Sees Dr. Jean-Baptiste   Aldactone, torsemide, metoprolol.              Relevant Orders    Ambulatory referral/consult to Hospice    Venous stasis  ulcer - Primary    Relevant Orders    Ambulatory referral/consult to Hospice      Other Visit Diagnoses     Fatigue, unspecified type        Relevant Orders    CBC auto differential    Comprehensive metabolic panel    TSH      Ordering unnaboot. For LLE   Following with [facility]  He doesn't want to go in the hospital again. 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 He has very little quality of life   Talked about hospice today. Will consult [facility] to talk to wife and pt     No follow-ups on file.

## 2020-03-06 LAB
ALBUMIN SERPL BCP-MCNC: 3.1 G/DL (ref 3.5–5.2)
ALP SERPL-CCNC: 98 U/L (ref 55–135)
ALT SERPL W/O P-5'-P-CCNC: 8 U/L (ref 10–44)
ANION GAP SERPL CALC-SCNC: 8 MMOL/L (ref 8–16)
AST SERPL-CCNC: 19 U/L (ref 10–40)
BASOPHILS # BLD AUTO: 0.04 K/UL (ref 0–0.2)
BASOPHILS NFR BLD: 0.8 % (ref 0–1.9)
BILIRUB SERPL-MCNC: 1 MG/DL (ref 0.1–1)
BUN SERPL-MCNC: 23 MG/DL (ref 8–23)
CALCIUM SERPL-MCNC: 9 MG/DL (ref 8.7–10.5)
CHLORIDE SERPL-SCNC: 101 MMOL/L (ref 95–110)
CO2 SERPL-SCNC: 30 MMOL/L (ref 23–29)
CREAT SERPL-MCNC: 1.5 MG/DL (ref 0.5–1.4)
DIFFERENTIAL METHOD: ABNORMAL
EOSINOPHIL # BLD AUTO: 0.2 K/UL (ref 0–0.5)
EOSINOPHIL NFR BLD: 4 % (ref 0–8)
ERYTHROCYTE [DISTWIDTH] IN BLOOD BY AUTOMATED COUNT: 13.2 % (ref 11.5–14.5)
EST. GFR  (AFRICAN AMERICAN): 48 ML/MIN/1.73 M^2
EST. GFR  (NON AFRICAN AMERICAN): 42 ML/MIN/1.73 M^2
GLUCOSE SERPL-MCNC: 108 MG/DL (ref 70–110)
HCT VFR BLD AUTO: 38.4 % (ref 40–54)
HGB BLD-MCNC: 11.8 G/DL (ref 14–18)
IMM GRANULOCYTES # BLD AUTO: 0.01 K/UL (ref 0–0.04)
IMM GRANULOCYTES NFR BLD AUTO: 0.2 % (ref 0–0.5)
LYMPHOCYTES # BLD AUTO: 1.1 K/UL (ref 1–4.8)
LYMPHOCYTES NFR BLD: 21.5 % (ref 18–48)
MCH RBC QN AUTO: 31.9 PG (ref 27–31)
MCHC RBC AUTO-ENTMCNC: 30.7 G/DL (ref 32–36)
MCV RBC AUTO: 104 FL (ref 82–98)
MONOCYTES # BLD AUTO: 0.6 K/UL (ref 0.3–1)
MONOCYTES NFR BLD: 12.1 % (ref 4–15)
NEUTROPHILS # BLD AUTO: 3 K/UL (ref 1.8–7.7)
NEUTROPHILS NFR BLD: 61.4 % (ref 38–73)
NRBC BLD-RTO: 0 /100 WBC
PLATELET # BLD AUTO: 164 K/UL (ref 150–350)
PMV BLD AUTO: 10.3 FL (ref 9.2–12.9)
POTASSIUM SERPL-SCNC: 3.8 MMOL/L (ref 3.5–5.1)
PROT SERPL-MCNC: 6.9 G/DL (ref 6–8.4)
RBC # BLD AUTO: 3.7 M/UL (ref 4.6–6.2)
SODIUM SERPL-SCNC: 139 MMOL/L (ref 136–145)
TSH SERPL DL<=0.005 MIU/L-ACNC: 3.21 UIU/ML (ref 0.4–4)
WBC # BLD AUTO: 4.94 K/UL (ref 3.9–12.7)

## 2020-03-12 ENCOUNTER — TELEPHONE (OUTPATIENT)
Dept: HOME HEALTH SERVICES | Facility: HOSPITAL | Age: 85
End: 2020-03-12

## 2020-05-11 NOTE — PLAN OF CARE
Dr Tay spoke with Dequan and Lele concerning pt condition and plan of care, both sons voiced understanding. He explained to them that Bipap was essential to improve his lung status however the pt refuses to wear the Bipap. It was confirmed by Dr Tay that the plan is to monitor the pts condition for 24 hours then consider Hospice. Both Dequan and Lele voiced understanding. Sons asked Dr Tay if staff could transport pt in w/c outside of facility for a short period, he agreed to this.    Patient says he is wanting to change because its causing heart issues according to dr Gisela Olmos. He is suggesting the jardiance.    Pharmacy [facility]

## 2020-07-06 NOTE — PT/OT/SLP PROGRESS
"Speech Language Pathology Treatment    Patient Name:  Radhames Alonzo   MRN:  [id number]  Admitting Diagnosis: Severe sepsis    Recommendations:                 General Recommendations:  Follow up for diet tolerance  Diet recommendations: Solid Diet Level: Mechanical soft with chopped meats, Liquid Diet Level: Thin   Aspiration Precautions:   · Feed only when awake and alert  · Small bites/sips  · Meds whole one at a time  · 2-3 swallow for all bites/sips  · HOB at 90 degrees   General Precautions: Standard    Communication strategies:  none    Subjective     Pt found sitting in chair at initiation of session. Pt continues with appropriate levels of alertness for PO intake. Pt's son present throughout session.    Patient goals: none stated     Pain/Comfort:  · Pain Rating 1: 0/10    Objective:     Has the patient been evaluated by SLP for swallowing?   Yes  Keep patient NPO? No   Current Respiratory Status: nasal cannula      Pt was stated that he has been tolerating diet well overall with small instances of coughing. He remarked, "I know when I take a bite that's too big because I choke so I have to slow down." Pt successfully recalled 3/4 safe swallow strategies independently and remaining strategy with MIN verbal cues. PO trials of small open cup sips of thin liquids and pamela grahams were completed without s/s of aspiration. Pt demonstrated ability to employ compensatory strategies independently across thin liquids and solids. Pt's son was educated regarding his father's safe swallow strategies. All verbalized understanding of skilled speech recommendations.     Assessment:     Radhames Alonzo is a 84 y.o. male with an SLP diagnosis of mild pharyngeal dysphagia. He presents with the ability to identify safe swallow strategies and good independent implementation of compensatories to minimize risk of aspiraiton. SLP to follow up for one additional visit to ensure continued tolerance of least restrictive PO diet. "     Goals:   Multidisciplinary Problems     SLP Goals        Problem: SLP Goal    Goal Priority Disciplines Outcome   SLP Goal     SLP Ongoing (interventions implemented as appropriate)   Description:    Long Term Goals:   1. Pt will tolerate least restrictive PO diet with no s/s of aspiration in order to maintain adequate hydration and nutrition  2. Pt will undergo MBSS to objectively determine the presence/severity of dysphagia MET 1/17/19                      Plan:     · Patient to be seen:  3 x/week, 4 x/week, 5 x/week   · Plan of Care expires:  01/30/19  · Plan of Care reviewed with:  patient, son   · SLP Follow-Up:  Yes       Discharge recommendations:  (home per speech)   Barriers to Discharge:  Level of Skilled Assistance Needed - pt continues to require skilled medical personnel for managmeent of medical diagnoses    Time Tracking:     SLP Treatment Date:   01/18/19  Speech Start Time:  0846  Speech Stop Time:  0858     Speech Total Time (min):  12 min    Billable Minutes: Treatment Swallowing Dysfunction - 12 minutes       ENEIDA Reeves-SLP  01/18/2019        None

## 2022-01-06 NOTE — PROGRESS NOTES
Test results normal   Spray Paint Text: The liquid nitrogen was applied to the skin utilizing a spray paint frosting technique. Price (Use Numbers Only, No Special Characters Or $): 0 Post-Care Instructions: I reviewed with the patient in detail post-care instructions. Patient is to wear sunprotection, and avoid picking at any of the treated lesions. Pt may apply Vaseline to crusted or scabbing areas. Show Spray Paint Technique Variable?: Yes Billing Information: Bill by Static Price Detail Level: Detailed Consent: The patient's consent was obtained including but not limited to risks of crusting, scabbing, blistering, scarring, darker or lighter pigmentary change, recurrence, incomplete removal and infection. The patient understands that the procedure is cosmetic in nature and is not covered by insurance. Render Post-Care Instructions In Note?: no

## 2023-09-18 NOTE — TELEPHONE ENCOUNTER
----- Message from Nalini Allen sent at 2019 12:09 PM CST -----  Contact: Huberrajwinders-Kayleigh Alonzo  MRN: [id number]  : 3/17/1934  PCP: Pennie Jorge  Home Phone      [phone]  Work Phone      Not on file.  Mobile          [phone]      MESSAGE:   Kayleigh is calling to inform  That the patients last bowel movement was on  he is taking 4 stool softners a day. He is having bowel sounds and passing gas.  Kayleigh Would like to know if there anything else that the patient can take for this.    Pharmacy- [facility] in [place]    Phone: [phone]    
Probably an enema if he is impacted  
Spoke with Kayleigh--gave recommendations per Ping for enema use, voiced understanding. Also she advised that the pt had an enema last time and it worked, but pt is not going to want to do an enema every week. She suggested a specific med for this issue--didn't know the name of it.  
This gentleman needs to be seeing IM. He needs a good work up and appropriate treatment  
.
24-Jun-2019 02:12